# Patient Record
Sex: MALE | Race: ASIAN | NOT HISPANIC OR LATINO | Employment: OTHER | URBAN - METROPOLITAN AREA
[De-identification: names, ages, dates, MRNs, and addresses within clinical notes are randomized per-mention and may not be internally consistent; named-entity substitution may affect disease eponyms.]

---

## 2017-05-23 ENCOUNTER — HOSPITAL ENCOUNTER (EMERGENCY)
Facility: HOSPITAL | Age: 77
Discharge: HOME | End: 2017-05-23
Attending: EMERGENCY MEDICINE
Payer: COMMERCIAL

## 2017-05-23 ENCOUNTER — APPOINTMENT (EMERGENCY)
Dept: CT IMAGING | Facility: HOSPITAL | Age: 77
End: 2017-05-23
Payer: COMMERCIAL

## 2017-05-23 VITALS
RESPIRATION RATE: 18 BRPM | OXYGEN SATURATION: 97 % | SYSTOLIC BLOOD PRESSURE: 187 MMHG | HEART RATE: 78 BPM | TEMPERATURE: 98.9 F | DIASTOLIC BLOOD PRESSURE: 88 MMHG

## 2017-05-23 DIAGNOSIS — S13.9XXA ACUTE CERVICAL SPRAIN, INITIAL ENCOUNTER: ICD-10-CM

## 2017-05-23 DIAGNOSIS — S00.83XA TRAUMATIC HEMATOMA OF FOREHEAD, INITIAL ENCOUNTER: ICD-10-CM

## 2017-05-23 DIAGNOSIS — S00.81XA ABRASION OF FACE, INITIAL ENCOUNTER: ICD-10-CM

## 2017-05-23 DIAGNOSIS — S09.90XA CLOSED HEAD INJURY, INITIAL ENCOUNTER: Primary | ICD-10-CM

## 2017-05-23 DIAGNOSIS — S00.83XA CONTUSION OF FACE, INITIAL ENCOUNTER: ICD-10-CM

## 2017-05-23 PROCEDURE — 70450 CT HEAD/BRAIN W/O DYE: CPT

## 2017-05-23 PROCEDURE — 72125 CT NECK SPINE W/O DYE: CPT

## 2017-05-23 PROCEDURE — 99284 EMERGENCY DEPT VISIT MOD MDM: CPT

## 2017-06-27 PROBLEM — Z00.00 ENCOUNTER FOR PREVENTIVE HEALTH EXAMINATION: Status: ACTIVE | Noted: 2017-06-27

## 2017-07-08 ENCOUNTER — OUTPATIENT (OUTPATIENT)
Dept: OUTPATIENT SERVICES | Facility: HOSPITAL | Age: 77
LOS: 1 days | End: 2017-07-08
Payer: MEDICARE

## 2017-07-08 ENCOUNTER — APPOINTMENT (OUTPATIENT)
Dept: MRI IMAGING | Facility: IMAGING CENTER | Age: 77
End: 2017-07-08

## 2017-07-08 DIAGNOSIS — Z00.8 ENCOUNTER FOR OTHER GENERAL EXAMINATION: ICD-10-CM

## 2017-07-08 DIAGNOSIS — Z98.890 OTHER SPECIFIED POSTPROCEDURAL STATES: Chronic | ICD-10-CM

## 2017-07-08 DIAGNOSIS — Z90.49 ACQUIRED ABSENCE OF OTHER SPECIFIED PARTS OF DIGESTIVE TRACT: Chronic | ICD-10-CM

## 2017-07-08 PROCEDURE — A9585: CPT

## 2017-07-08 PROCEDURE — 82565 ASSAY OF CREATININE: CPT

## 2017-07-08 PROCEDURE — 70553 MRI BRAIN STEM W/O & W/DYE: CPT

## 2017-08-03 ENCOUNTER — APPOINTMENT (OUTPATIENT)
Dept: CT IMAGING | Facility: IMAGING CENTER | Age: 77
End: 2017-08-03
Payer: MEDICARE

## 2017-08-03 ENCOUNTER — OUTPATIENT (OUTPATIENT)
Dept: OUTPATIENT SERVICES | Facility: HOSPITAL | Age: 77
LOS: 1 days | End: 2017-08-03
Payer: MEDICARE

## 2017-08-03 DIAGNOSIS — Z98.890 OTHER SPECIFIED POSTPROCEDURAL STATES: Chronic | ICD-10-CM

## 2017-08-03 DIAGNOSIS — Z90.49 ACQUIRED ABSENCE OF OTHER SPECIFIED PARTS OF DIGESTIVE TRACT: Chronic | ICD-10-CM

## 2017-08-03 DIAGNOSIS — Z00.8 ENCOUNTER FOR OTHER GENERAL EXAMINATION: ICD-10-CM

## 2017-08-03 PROCEDURE — 70496 CT ANGIOGRAPHY HEAD: CPT

## 2017-08-03 PROCEDURE — 70496 CT ANGIOGRAPHY HEAD: CPT | Mod: 26

## 2017-09-19 ENCOUNTER — FORM ENCOUNTER (OUTPATIENT)
Age: 77
End: 2017-09-19

## 2017-09-20 ENCOUNTER — OUTPATIENT (OUTPATIENT)
Dept: OUTPATIENT SERVICES | Facility: HOSPITAL | Age: 77
LOS: 1 days | End: 2017-09-20
Payer: MEDICARE

## 2017-09-20 ENCOUNTER — APPOINTMENT (OUTPATIENT)
Dept: CT IMAGING | Facility: IMAGING CENTER | Age: 77
End: 2017-09-20
Payer: MEDICARE

## 2017-09-20 ENCOUNTER — INPATIENT (INPATIENT)
Facility: HOSPITAL | Age: 77
LOS: 5 days | Discharge: HOME | DRG: 392 | End: 2017-09-26
Attending: SURGERY | Admitting: SURGERY
Payer: MEDICARE

## 2017-09-20 VITALS
RESPIRATION RATE: 18 BRPM | TEMPERATURE: 99 F | WEIGHT: 134.92 LBS | HEART RATE: 89 BPM | DIASTOLIC BLOOD PRESSURE: 77 MMHG | SYSTOLIC BLOOD PRESSURE: 119 MMHG | OXYGEN SATURATION: 95 %

## 2017-09-20 DIAGNOSIS — R10.30 LOWER ABDOMINAL PAIN, UNSPECIFIED: ICD-10-CM

## 2017-09-20 DIAGNOSIS — Z00.8 ENCOUNTER FOR OTHER GENERAL EXAMINATION: ICD-10-CM

## 2017-09-20 DIAGNOSIS — Z98.890 OTHER SPECIFIED POSTPROCEDURAL STATES: Chronic | ICD-10-CM

## 2017-09-20 DIAGNOSIS — Z95.5 PRESENCE OF CORONARY ANGIOPLASTY IMPLANT AND GRAFT: Chronic | ICD-10-CM

## 2017-09-20 DIAGNOSIS — Z90.49 ACQUIRED ABSENCE OF OTHER SPECIFIED PARTS OF DIGESTIVE TRACT: Chronic | ICD-10-CM

## 2017-09-20 PROCEDURE — 99285 EMERGENCY DEPT VISIT HI MDM: CPT | Mod: 25

## 2017-09-20 PROCEDURE — 82565 ASSAY OF CREATININE: CPT

## 2017-09-20 PROCEDURE — 93010 ELECTROCARDIOGRAM REPORT: CPT

## 2017-09-20 PROCEDURE — 74177 CT ABD & PELVIS W/CONTRAST: CPT

## 2017-09-20 PROCEDURE — 74177 CT ABD & PELVIS W/CONTRAST: CPT | Mod: 26

## 2017-09-20 RX ORDER — SODIUM CHLORIDE 9 MG/ML
1000 INJECTION INTRAMUSCULAR; INTRAVENOUS; SUBCUTANEOUS ONCE
Qty: 0 | Refills: 0 | Status: COMPLETED | OUTPATIENT
Start: 2017-09-20 | End: 2017-09-20

## 2017-09-20 NOTE — ED PROVIDER NOTE - OBJECTIVE STATEMENT
78 y/o M pt with PMHx of HTN, DM, HLD, cardiac stent (APR 2017) PSHx of hernia repair c/o abd pain x6 weeks. Pt went to urologist for the abd pain and they completed CT of pelvis and abd today. CT indicated an abscess and infection of the colon. Pt was sent into the ED by urologist to get abscess removed. Denies fever or any other complaints. NKDA. Current mediations: metformin

## 2017-09-20 NOTE — ED ADULT NURSE NOTE - OBJECTIVE STATEMENT
77 year old male presented to ED with c/o of abdominal pain for the past 5 weeks. Pt went to imaging and had CT and showed cyst on colon. Pt referred to surgery. Pt denies CP, SOB, nausea/vomiting, numbness/tingling, fever, cough, chills, dizziness, headache. Pt a&ox3, lung sounds clear, heart rate regular, abdomen soft nontender nondistended to palp. Pt states he is not having normal BM and last BM was 1 day ago. +BS in all four quadrants. skin intact. Pt currently resting in bed with family at bedside. Will continue to monitor and reassess while offering support and reassurance.

## 2017-09-20 NOTE — ED ADULT NURSE NOTE - PMH
Anemia    BPH (benign prostatic hyperplasia)    Diabetes    GERD (gastroesophageal reflux disease)    HLD (hyperlipidemia)    HTN (hypertension)    TIA (transient ischemic attack)

## 2017-09-20 NOTE — ED PROVIDER NOTE - NS_ ATTENDINGSCRIBEDETAILS _ED_A_ED_FT
Attending MD Medrano: 77M with PMH including HTN DM HLD CAD s/p stent 4/2017, PSH bilateral inguinal hernia repairs presents to the ED with 6 weeks of abdominal pain.  Reports that his urologist Dr. Brianda Lujan sent him for a CT A/P today and was told he had an infection of the colon and an abscess and should come to the ED immediately.  Denies fevers, chills.  Reports chronic chest pain unchanged from baseline, reports chronic shortness of breath with chest pain also unchanged, palpitations.  Reports PMD Dr. Kingston.  Denies dysuria, hematuria, change in urinary habits, reports prostate "problems".  On exam, head NCAT, PERRL, FROM at neck, no tenderness to palpation or stepoffs along length of spine, lungs CTAB with good inspiratory effort, +S1S2, no m/r/g, abdomen soft with +BS, +diffuse tenderness greater in lower abdomen, mild distention, no CVAT, moving all extremities with 5/5 strength bilateral upper and lower extremities, good and equal  strength bilaterally; A/P: 77M with sigmoid diverticulitis with abscess on CT A/P, will obtain preop labs, EKG, CXR for admission, surgery consult

## 2017-09-20 NOTE — ED ADULT NURSE NOTE - CHPI ED SYMPTOMS NEG
no dysuria/no blood in stool/no diarrhea/no hematuria/no chills/no fever/no burning urination/no vomiting/no abdominal distension

## 2017-09-20 NOTE — ED ADULT NURSE NOTE - CHIEF COMPLAINT QUOTE
ct of abd at [address] due to abdominal pain; called to come to ED for eval for a cyst and infection of colon; now has intermittant pain, nausea and vomit

## 2017-09-20 NOTE — ED ADULT TRIAGE NOTE - CHIEF COMPLAINT QUOTE
ct of abd at [address] due to abdominal pain; called to come to ED for eval for a cyst and infection of colon; now has intermittant pain, nausea and vomit

## 2017-09-21 ENCOUNTER — MESSAGE (OUTPATIENT)
Age: 77
End: 2017-09-21

## 2017-09-21 DIAGNOSIS — K57.20 DIVERTICULITIS OF LARGE INTESTINE WITH PERFORATION AND ABSCESS WITHOUT BLEEDING: ICD-10-CM

## 2017-09-21 LAB
ALBUMIN SERPL ELPH-MCNC: 3.9 G/DL — SIGNIFICANT CHANGE UP (ref 3.3–5)
ALP SERPL-CCNC: 69 U/L — SIGNIFICANT CHANGE UP (ref 40–120)
ALT FLD-CCNC: 11 U/L RC — SIGNIFICANT CHANGE UP (ref 10–45)
ANION GAP SERPL CALC-SCNC: 10 MMOL/L — SIGNIFICANT CHANGE UP (ref 5–17)
ANION GAP SERPL CALC-SCNC: 12 MMOL/L — SIGNIFICANT CHANGE UP (ref 5–17)
ANION GAP SERPL CALC-SCNC: 19 MMOL/L — HIGH (ref 5–17)
APPEARANCE UR: ABNORMAL
APTT BLD: 29.5 SEC — SIGNIFICANT CHANGE UP (ref 27.5–37.4)
AST SERPL-CCNC: 11 U/L — SIGNIFICANT CHANGE UP (ref 10–40)
BASOPHILS # BLD AUTO: 0 K/UL — SIGNIFICANT CHANGE UP (ref 0–0.2)
BILIRUB SERPL-MCNC: 0.5 MG/DL — SIGNIFICANT CHANGE UP (ref 0.2–1.2)
BILIRUB UR-MCNC: NEGATIVE — SIGNIFICANT CHANGE UP
BLD GP AB SCN SERPL QL: NEGATIVE — SIGNIFICANT CHANGE UP
BUN SERPL-MCNC: 10 MG/DL — SIGNIFICANT CHANGE UP (ref 7–23)
BUN SERPL-MCNC: 10 MG/DL — SIGNIFICANT CHANGE UP (ref 7–23)
BUN SERPL-MCNC: 15 MG/DL — SIGNIFICANT CHANGE UP (ref 7–23)
CALCIUM SERPL-MCNC: 8.8 MG/DL — SIGNIFICANT CHANGE UP (ref 8.4–10.5)
CALCIUM SERPL-MCNC: 9.1 MG/DL — SIGNIFICANT CHANGE UP (ref 8.4–10.5)
CALCIUM SERPL-MCNC: 9.1 MG/DL — SIGNIFICANT CHANGE UP (ref 8.4–10.5)
CHLORIDE SERPL-SCNC: 87 MMOL/L — LOW (ref 96–108)
CHLORIDE SERPL-SCNC: 92 MMOL/L — LOW (ref 96–108)
CHLORIDE SERPL-SCNC: 93 MMOL/L — LOW (ref 96–108)
CK MB BLD-MCNC: 5.6 % — HIGH (ref 0–3.5)
CK MB CFR SERPL CALC: 1.8 NG/ML — SIGNIFICANT CHANGE UP (ref 0–6.7)
CK SERPL-CCNC: 32 U/L — SIGNIFICANT CHANGE UP (ref 30–200)
CO2 SERPL-SCNC: 21 MMOL/L — LOW (ref 22–31)
CO2 SERPL-SCNC: 25 MMOL/L — SIGNIFICANT CHANGE UP (ref 22–31)
CO2 SERPL-SCNC: 28 MMOL/L — SIGNIFICANT CHANGE UP (ref 22–31)
COLOR SPEC: YELLOW — SIGNIFICANT CHANGE UP
CREAT SERPL-MCNC: 0.84 MG/DL — SIGNIFICANT CHANGE UP (ref 0.5–1.3)
CREAT SERPL-MCNC: 0.87 MG/DL — SIGNIFICANT CHANGE UP (ref 0.5–1.3)
CREAT SERPL-MCNC: 1 MG/DL — SIGNIFICANT CHANGE UP (ref 0.5–1.3)
DIFF PNL FLD: ABNORMAL
EOSINOPHIL # BLD AUTO: 0.2 K/UL — SIGNIFICANT CHANGE UP (ref 0–0.5)
EOSINOPHIL NFR BLD AUTO: 1 % — SIGNIFICANT CHANGE UP (ref 0–6)
EPI CELLS # UR: SIGNIFICANT CHANGE UP /HPF
GAS PNL BLDV: SIGNIFICANT CHANGE UP
GLUCOSE SERPL-MCNC: 157 MG/DL — HIGH (ref 70–99)
GLUCOSE SERPL-MCNC: 159 MG/DL — HIGH (ref 70–99)
GLUCOSE SERPL-MCNC: 179 MG/DL — HIGH (ref 70–99)
GLUCOSE UR QL: NEGATIVE — SIGNIFICANT CHANGE UP
GRAM STN FLD: SIGNIFICANT CHANGE UP
HCT VFR BLD CALC: 35.4 % — LOW (ref 39–50)
HGB BLD-MCNC: 11.2 G/DL — LOW (ref 13–17)
HYALINE CASTS # UR AUTO: ABNORMAL
INR BLD: 1.16 RATIO — SIGNIFICANT CHANGE UP (ref 0.88–1.16)
KETONES UR-MCNC: NEGATIVE — SIGNIFICANT CHANGE UP
LACTATE BLDV-MCNC: 0.8 MMOL/L — SIGNIFICANT CHANGE UP (ref 0.7–2)
LEUKOCYTE ESTERASE UR-ACNC: ABNORMAL
LYMPHOCYTES # BLD AUTO: 1.2 K/UL — SIGNIFICANT CHANGE UP (ref 1–3.3)
LYMPHOCYTES # BLD AUTO: 5 % — LOW (ref 13–44)
MAGNESIUM SERPL-MCNC: 1.5 MG/DL — LOW (ref 1.6–2.6)
MCHC RBC-ENTMCNC: 21.1 PG — LOW (ref 27–34)
MCHC RBC-ENTMCNC: 31.5 GM/DL — LOW (ref 32–36)
MCV RBC AUTO: 67.1 FL — LOW (ref 80–100)
MONOCYTES # BLD AUTO: 1.6 K/UL — HIGH (ref 0–0.9)
MONOCYTES NFR BLD AUTO: 8 % — SIGNIFICANT CHANGE UP (ref 2–14)
NEUTROPHILS # BLD AUTO: 12.2 K/UL — HIGH (ref 1.8–7.4)
NEUTROPHILS NFR BLD AUTO: 85 % — HIGH (ref 43–77)
NITRITE UR-MCNC: NEGATIVE — SIGNIFICANT CHANGE UP
OSMOLALITY SERPL: 273 MOS/KG — LOW (ref 275–300)
PH UR: 6.5 — SIGNIFICANT CHANGE UP (ref 5–8)
PHOSPHATE SERPL-MCNC: 3.3 MG/DL — SIGNIFICANT CHANGE UP (ref 2.5–4.5)
PLATELET # BLD AUTO: 338 K/UL — SIGNIFICANT CHANGE UP (ref 150–400)
POTASSIUM SERPL-MCNC: 3.7 MMOL/L — SIGNIFICANT CHANGE UP (ref 3.5–5.3)
POTASSIUM SERPL-MCNC: 3.8 MMOL/L — SIGNIFICANT CHANGE UP (ref 3.5–5.3)
POTASSIUM SERPL-MCNC: 4.1 MMOL/L — SIGNIFICANT CHANGE UP (ref 3.5–5.3)
POTASSIUM SERPL-SCNC: 3.7 MMOL/L — SIGNIFICANT CHANGE UP (ref 3.5–5.3)
POTASSIUM SERPL-SCNC: 3.8 MMOL/L — SIGNIFICANT CHANGE UP (ref 3.5–5.3)
POTASSIUM SERPL-SCNC: 4.1 MMOL/L — SIGNIFICANT CHANGE UP (ref 3.5–5.3)
POTASSIUM UR-SCNC: 12 MMOL/L — SIGNIFICANT CHANGE UP
PROT SERPL-MCNC: 7.4 G/DL — SIGNIFICANT CHANGE UP (ref 6–8.3)
PROT UR-MCNC: 100 MG/DL
PROTHROM AB SERPL-ACNC: 12.7 SEC — SIGNIFICANT CHANGE UP (ref 9.8–12.7)
RBC # BLD: 5.27 M/UL — SIGNIFICANT CHANGE UP (ref 4.2–5.8)
RBC # FLD: 17.1 % — HIGH (ref 10.3–14.5)
RBC CASTS # UR COMP ASSIST: ABNORMAL /HPF (ref 0–2)
RH IG SCN BLD-IMP: POSITIVE — SIGNIFICANT CHANGE UP
RH IG SCN BLD-IMP: POSITIVE — SIGNIFICANT CHANGE UP
SODIUM SERPL-SCNC: 127 MMOL/L — LOW (ref 135–145)
SODIUM SERPL-SCNC: 129 MMOL/L — LOW (ref 135–145)
SODIUM SERPL-SCNC: 131 MMOL/L — LOW (ref 135–145)
SODIUM UR-SCNC: 41 MMOL/L — SIGNIFICANT CHANGE UP
SP GR SPEC: >1.03 — HIGH (ref 1.01–1.02)
SPECIMEN SOURCE: SIGNIFICANT CHANGE UP
TROPONIN T SERPL-MCNC: <0.01 NG/ML — SIGNIFICANT CHANGE UP (ref 0–0.06)
TSH SERPL-MCNC: 1.58 UIU/ML — SIGNIFICANT CHANGE UP (ref 0.27–4.2)
UROBILINOGEN FLD QL: NEGATIVE — SIGNIFICANT CHANGE UP
UUN UR-MCNC: 189 MG/DL — SIGNIFICANT CHANGE UP
WBC # BLD: 15.3 K/UL — HIGH (ref 3.8–10.5)
WBC # FLD AUTO: 15.3 K/UL — HIGH (ref 3.8–10.5)
WBC UR QL: >50 /HPF (ref 0–5)

## 2017-09-21 PROCEDURE — 99223 1ST HOSP IP/OBS HIGH 75: CPT

## 2017-09-21 PROCEDURE — 49406 IMAGE CATH FLUID PERI/RETRO: CPT

## 2017-09-21 PROCEDURE — 99232 SBSQ HOSP IP/OBS MODERATE 35: CPT

## 2017-09-21 PROCEDURE — 99223 1ST HOSP IP/OBS HIGH 75: CPT | Mod: AI

## 2017-09-21 PROCEDURE — 71010: CPT | Mod: 26

## 2017-09-21 RX ORDER — SODIUM CHLORIDE 9 MG/ML
1000 INJECTION, SOLUTION INTRAVENOUS
Qty: 0 | Refills: 0 | Status: DISCONTINUED | OUTPATIENT
Start: 2017-09-21 | End: 2017-09-26

## 2017-09-21 RX ORDER — MAGNESIUM SULFATE 500 MG/ML
2 VIAL (ML) INJECTION ONCE
Qty: 0 | Refills: 0 | Status: COMPLETED | OUTPATIENT
Start: 2017-09-21 | End: 2017-09-21

## 2017-09-21 RX ORDER — METOPROLOL TARTRATE 50 MG
100 TABLET ORAL DAILY
Qty: 0 | Refills: 0 | Status: DISCONTINUED | OUTPATIENT
Start: 2017-09-21 | End: 2017-09-26

## 2017-09-21 RX ORDER — AMPICILLIN SODIUM AND SULBACTAM SODIUM 250; 125 MG/ML; MG/ML
3 INJECTION, POWDER, FOR SUSPENSION INTRAMUSCULAR; INTRAVENOUS ONCE
Qty: 0 | Refills: 0 | Status: COMPLETED | OUTPATIENT
Start: 2017-09-21 | End: 2017-09-21

## 2017-09-21 RX ORDER — POTASSIUM CHLORIDE 20 MEQ
10 PACKET (EA) ORAL ONCE
Qty: 0 | Refills: 0 | Status: COMPLETED | OUTPATIENT
Start: 2017-09-21 | End: 2017-09-21

## 2017-09-21 RX ORDER — DEXTROSE 50 % IN WATER 50 %
1 SYRINGE (ML) INTRAVENOUS ONCE
Qty: 0 | Refills: 0 | Status: DISCONTINUED | OUTPATIENT
Start: 2017-09-21 | End: 2017-09-26

## 2017-09-21 RX ORDER — SODIUM CHLORIDE 9 MG/ML
1000 INJECTION, SOLUTION INTRAVENOUS
Qty: 0 | Refills: 0 | Status: DISCONTINUED | OUTPATIENT
Start: 2017-09-21 | End: 2017-09-21

## 2017-09-21 RX ORDER — GLUCAGON INJECTION, SOLUTION 0.5 MG/.1ML
1 INJECTION, SOLUTION SUBCUTANEOUS ONCE
Qty: 0 | Refills: 0 | Status: DISCONTINUED | OUTPATIENT
Start: 2017-09-21 | End: 2017-09-26

## 2017-09-21 RX ORDER — DEXTROSE 50 % IN WATER 50 %
25 SYRINGE (ML) INTRAVENOUS ONCE
Qty: 0 | Refills: 0 | Status: DISCONTINUED | OUTPATIENT
Start: 2017-09-21 | End: 2017-09-26

## 2017-09-21 RX ORDER — SIMVASTATIN 20 MG/1
40 TABLET, FILM COATED ORAL AT BEDTIME
Qty: 0 | Refills: 0 | Status: DISCONTINUED | OUTPATIENT
Start: 2017-09-21 | End: 2017-09-26

## 2017-09-21 RX ORDER — MORPHINE SULFATE 50 MG/1
2 CAPSULE, EXTENDED RELEASE ORAL EVERY 4 HOURS
Qty: 0 | Refills: 0 | Status: DISCONTINUED | OUTPATIENT
Start: 2017-09-21 | End: 2017-09-22

## 2017-09-21 RX ORDER — PANTOPRAZOLE SODIUM 20 MG/1
40 TABLET, DELAYED RELEASE ORAL
Qty: 0 | Refills: 0 | Status: DISCONTINUED | OUTPATIENT
Start: 2017-09-21 | End: 2017-09-26

## 2017-09-21 RX ORDER — HEPARIN SODIUM 5000 [USP'U]/ML
5000 INJECTION INTRAVENOUS; SUBCUTANEOUS EVERY 8 HOURS
Qty: 0 | Refills: 0 | Status: DISCONTINUED | OUTPATIENT
Start: 2017-09-21 | End: 2017-09-26

## 2017-09-21 RX ORDER — AMPICILLIN SODIUM AND SULBACTAM SODIUM 250; 125 MG/ML; MG/ML
3 INJECTION, POWDER, FOR SUSPENSION INTRAMUSCULAR; INTRAVENOUS EVERY 6 HOURS
Qty: 0 | Refills: 0 | Status: DISCONTINUED | OUTPATIENT
Start: 2017-09-21 | End: 2017-09-22

## 2017-09-21 RX ORDER — INSULIN LISPRO 100/ML
VIAL (ML) SUBCUTANEOUS
Qty: 0 | Refills: 0 | Status: DISCONTINUED | OUTPATIENT
Start: 2017-09-21 | End: 2017-09-21

## 2017-09-21 RX ORDER — ASPIRIN/CALCIUM CARB/MAGNESIUM 324 MG
81 TABLET ORAL DAILY
Qty: 0 | Refills: 0 | Status: DISCONTINUED | OUTPATIENT
Start: 2017-09-21 | End: 2017-09-26

## 2017-09-21 RX ORDER — ACETAMINOPHEN 500 MG
1000 TABLET ORAL ONCE
Qty: 0 | Refills: 0 | Status: COMPLETED | OUTPATIENT
Start: 2017-09-21 | End: 2017-09-22

## 2017-09-21 RX ORDER — MORPHINE SULFATE 50 MG/1
4 CAPSULE, EXTENDED RELEASE ORAL EVERY 4 HOURS
Qty: 0 | Refills: 0 | Status: DISCONTINUED | OUTPATIENT
Start: 2017-09-21 | End: 2017-09-22

## 2017-09-21 RX ORDER — SODIUM CHLORIDE 9 MG/ML
1000 INJECTION INTRAMUSCULAR; INTRAVENOUS; SUBCUTANEOUS
Qty: 0 | Refills: 0 | Status: DISCONTINUED | OUTPATIENT
Start: 2017-09-21 | End: 2017-09-24

## 2017-09-21 RX ORDER — DEXTROSE 50 % IN WATER 50 %
12.5 SYRINGE (ML) INTRAVENOUS ONCE
Qty: 0 | Refills: 0 | Status: DISCONTINUED | OUTPATIENT
Start: 2017-09-21 | End: 2017-09-26

## 2017-09-21 RX ORDER — AMPICILLIN SODIUM AND SULBACTAM SODIUM 250; 125 MG/ML; MG/ML
INJECTION, POWDER, FOR SUSPENSION INTRAMUSCULAR; INTRAVENOUS
Qty: 0 | Refills: 0 | Status: DISCONTINUED | OUTPATIENT
Start: 2017-09-21 | End: 2017-09-22

## 2017-09-21 RX ORDER — PILOCARPINE HCL 4 %
1 DROPS OPHTHALMIC (EYE)
Qty: 0 | Refills: 0 | Status: DISCONTINUED | OUTPATIENT
Start: 2017-09-21 | End: 2017-09-26

## 2017-09-21 RX ORDER — INSULIN LISPRO 100/ML
VIAL (ML) SUBCUTANEOUS AT BEDTIME
Qty: 0 | Refills: 0 | Status: DISCONTINUED | OUTPATIENT
Start: 2017-09-21 | End: 2017-09-21

## 2017-09-21 RX ORDER — INSULIN LISPRO 100/ML
VIAL (ML) SUBCUTANEOUS EVERY 6 HOURS
Qty: 0 | Refills: 0 | Status: DISCONTINUED | OUTPATIENT
Start: 2017-09-21 | End: 2017-09-22

## 2017-09-21 RX ADMIN — SODIUM CHLORIDE 100 MILLILITER(S): 9 INJECTION, SOLUTION INTRAVENOUS at 02:06

## 2017-09-21 RX ADMIN — AMPICILLIN SODIUM AND SULBACTAM SODIUM 200 GRAM(S): 250; 125 INJECTION, POWDER, FOR SUSPENSION INTRAMUSCULAR; INTRAVENOUS at 08:08

## 2017-09-21 RX ADMIN — Medication 81 MILLIGRAM(S): at 15:45

## 2017-09-21 RX ADMIN — Medication 100 MILLIEQUIVALENT(S): at 19:58

## 2017-09-21 RX ADMIN — Medication 1 DROP(S): at 06:43

## 2017-09-21 RX ADMIN — AMPICILLIN SODIUM AND SULBACTAM SODIUM 200 GRAM(S): 250; 125 INJECTION, POWDER, FOR SUSPENSION INTRAMUSCULAR; INTRAVENOUS at 02:05

## 2017-09-21 RX ADMIN — Medication 50 GRAM(S): at 08:57

## 2017-09-21 RX ADMIN — SIMVASTATIN 40 MILLIGRAM(S): 20 TABLET, FILM COATED ORAL at 21:10

## 2017-09-21 RX ADMIN — Medication 100 MILLIGRAM(S): at 06:39

## 2017-09-21 RX ADMIN — SODIUM CHLORIDE 1000 MILLILITER(S): 9 INJECTION INTRAMUSCULAR; INTRAVENOUS; SUBCUTANEOUS at 00:17

## 2017-09-21 RX ADMIN — HEPARIN SODIUM 5000 UNIT(S): 5000 INJECTION INTRAVENOUS; SUBCUTANEOUS at 06:40

## 2017-09-21 RX ADMIN — HEPARIN SODIUM 5000 UNIT(S): 5000 INJECTION INTRAVENOUS; SUBCUTANEOUS at 15:45

## 2017-09-21 RX ADMIN — PANTOPRAZOLE SODIUM 40 MILLIGRAM(S): 20 TABLET, DELAYED RELEASE ORAL at 06:39

## 2017-09-21 RX ADMIN — Medication 1 DROP(S): at 17:07

## 2017-09-21 RX ADMIN — Medication 1: at 17:37

## 2017-09-21 RX ADMIN — AMPICILLIN SODIUM AND SULBACTAM SODIUM 200 GRAM(S): 250; 125 INJECTION, POWDER, FOR SUSPENSION INTRAMUSCULAR; INTRAVENOUS at 15:42

## 2017-09-21 RX ADMIN — AMPICILLIN SODIUM AND SULBACTAM SODIUM 200 GRAM(S): 250; 125 INJECTION, POWDER, FOR SUSPENSION INTRAMUSCULAR; INTRAVENOUS at 21:10

## 2017-09-21 RX ADMIN — HEPARIN SODIUM 5000 UNIT(S): 5000 INJECTION INTRAVENOUS; SUBCUTANEOUS at 21:10

## 2017-09-21 RX ADMIN — Medication 1 DROP(S): at 11:47

## 2017-09-21 NOTE — H&P ADULT - ASSESSMENT
78yo M with diverticulitis and pelvic abscess after a few weeks of pain. No evidence of free perforation, peritonitis, or sepsis.      - NPO  - Unasyn  - IVF resuscitation  - Serial abdominal exams 78yo M with diverticulitis and pelvic abscess after a few weeks of pain. No evidence of free perforation, peritonitis, or sepsis.      - NPO  - Unasyn  - IVF resuscitation  - Serial abdominal exams  - Will plan for initial nonoperative management. Pt will require colonoscopy as outpatient after 6-8 wks

## 2017-09-21 NOTE — H&P ADULT - NSHPSOCIALHISTORY_GEN_ALL_CORE
No previous smoking or ETOH history. Pt lives at home with his wife. Family reports he is starting to have memory problems.

## 2017-09-21 NOTE — H&P ADULT - NSHPREVIEWOFSYSTEMS_GEN_ALL_CORE
Systemic:	[ ] Fever	[ ] Chills	[ ] Night sweats    [ ] Fatigue	[ ] Other  [] Cardiovascular:  [] Pulmonary:  [] Renal/Urologic:  [x] Gastrointestinal: abdominal pain, constipation  [] Metabolic:  [] Neurologic:  [] Hematologic:  [] ENT:  [] Ophthalmologic:  [] Musculoskeletal: 10-pt ROS negative except as in HPI.

## 2017-09-21 NOTE — H&P ADULT - ATTENDING COMMENTS
78y/o man with h/o DM, HTN, CAD s/p stent placement March 2017 on ASA/Plavix presents with several weeks of LLQ pain which he has never had before, as well as dysuria and urinary frequency. In ED, pt was hemodynamically normal with LLQ tenderness without rebound or guarding. Labs were significant for WBC 15, LA 3.4, and Na 127. I personally reviewed pt's CT images, which demonstrate sigmoid diverticulitis with anterior abscess abutting and with mass effect on the bladder.     Sigmoid diverticulitis with pelvic abscess (Hinchey 1), hyponatremia, and elevated lactate:  - NPO  - IV antibiotics  - IR for percutaneous drainage of abscess  - IVF: D5NS for hyponatremia  - Recheck LA  - Recheck Na

## 2017-09-21 NOTE — PROGRESS NOTE ADULT - SUBJECTIVE AND OBJECTIVE BOX
[facility] GENERAL SURGERY POST-OP NOTE    SUBJECTIVE: Pt underwent IR drainage of pelvic abscess. Intra-operatively, 5cc of purulent fluid drained from an air and fluid filled   collection interposed between the sigmoid colon and the bladder, which was unchanged from the prior exam. Pt tolerated the procedure well and was transferred to PACU then floor. Pt seen and evaluated at bedside. Resting comfortably in bed. Pain controlled. Denies nausea/vomiting, CP, palpitations, SOB, lightheaded, dizziness. NPO. On Unasyn abx.     Objective:  Gen: NAD, AAOx3  Pulm: b/l chest rise. No work on breathing  Card: RRR  Abd: soft, LLQ appropriately tender, ND. Dressings CDI.    Vital Signs Last 24 Hrs  T(C): 36.6 (21 Sep 2017 16:40), Max: 37.2 (21 Sep 2017 01:30)  T(F): 97.9 (21 Sep 2017 16:40), Max: 99 (21 Sep 2017 01:30)  HR: 64 (21 Sep 2017 16:40) (64 - 88)  BP: 141/83 (21 Sep 2017 16:40) (120/78 - 158/84)  BP(mean): --  RR: 18 (21 Sep 2017 16:40) (18 - 18)  SpO2: 97% (21 Sep 2017 16:40) (96% - 98%)  I&O's Summary    20 Sep 2017 07:01  -  21 Sep 2017 07:00  --------------------------------------------------------  IN: 400 mL / OUT: 350 mL / NET: 50 mL    21 Sep 2017 07:01  -  21 Sep 2017 21:33  --------------------------------------------------------  IN: 1030 mL / OUT: 500 mL / NET: 530 mL      I&O's Detail    20 Sep 2017 07:01  -  21 Sep 2017 07:00  --------------------------------------------------------  IN:    dextrose 5% + sodium chloride 0.9%: 400 mL  Total IN: 400 mL    OUT:    Voided: 350 mL  Total OUT: 350 mL    Total NET: 50 mL      21 Sep 2017 07:01  -  21 Sep 2017 21:33  --------------------------------------------------------  IN:    IV PiggyBack: 250 mL    sodium chloride 0.9%.: 780 mL  Total IN: 1030 mL    OUT:    Voided: 500 mL  Total OUT: 500 mL    Total NET: 530 mL          MEDICATIONS  (STANDING):  ampicillin/sulbactam  IVPB      heparin  Injectable 5000 Unit(s) SubCutaneous every 8 hours  metoprolol succinate  milliGRAM(s) Oral daily  pilocarpine 1% Solution 1 Drop(s) Both EYES four times a day  pantoprazole    Tablet 40 milliGRAM(s) Oral before breakfast  simvastatin 40 milliGRAM(s) Oral at bedtime  aspirin enteric coated 81 milliGRAM(s) Oral daily  ampicillin/sulbactam  IVPB 3 Gram(s) IV Intermittent every 6 hours  dextrose 5%. 1000 milliLiter(s) (50 mL/Hr) IV Continuous <Continuous>  dextrose 50% Injectable 12.5 Gram(s) IV Push once  dextrose 50% Injectable 25 Gram(s) IV Push once  dextrose 50% Injectable 25 Gram(s) IV Push once  insulin lispro (HumaLOG) corrective regimen sliding scale   SubCutaneous every 6 hours  sodium chloride 0.9%. 1000 milliLiter(s) (65 mL/Hr) IV Continuous <Continuous>    MEDICATIONS  (PRN):  morphine  - Injectable 2 milliGRAM(s) IV Push every 4 hours PRN Moderate Pain (4 - 6)  morphine  - Injectable 4 milliGRAM(s) IV Push every 4 hours PRN Severe Pain (7 - 10)  dextrose Gel 1 Dose(s) Oral once PRN Blood Glucose LESS THAN 70 milliGRAM(s)/deciliter  glucagon  Injectable 1 milliGRAM(s) IntraMuscular once PRN Glucose LESS THAN 70 milligrams/deciliter  acetaminophen  IVPB. 1000 milliGRAM(s) IV Intermittent once PRN Moderate Pain (4 - 6)      LABS:                        11.2   15.3  )-----------( 338      ( 21 Sep 2017 00:11 )             35.4     09-21    131<L>  |  93<L>  |  10  ----------------------------<  157<H>  3.8   |  28  |  0.87    Ca    9.1      21 Sep 2017 09:44  Phos  3.3     09-21  Mg     1.5     09-21    TPro  7.4  /  Alb  3.9  /  TBili  0.5  /  DBili  x   /  AST  11  /  ALT  11  /  AlkPhos  69  09-21    PT/INR - ( 21 Sep 2017 00:11 )   PT: 12.7 sec;   INR: 1.16 ratio         PTT - ( 21 Sep 2017 00:11 )  PTT:29.5 sec  Urinalysis Basic - ( 21 Sep 2017 00:16 )    Color: Yellow / Appearance: Turbid / SG: >1.030 / pH: x  Gluc: x / Ketone: Negative  / Bili: Negative / Urobili: Negative   Blood: x / Protein: 100 mg/dL / Nitrite: Negative   Leuk Esterase: Large / RBC: 5-10 /HPF / WBC >50 /HPF   Sq Epi: x / Non Sq Epi: Few /HPF / Bacteria: x        RADIOLOGY & ADDITIONAL STUDIES:      A/P: 77y Male with sigmoid diverticulitis with pelvic abscess s/p IR drainage of pelvic abscess. Pt doing well and hemodynamically stable.      - Pain control - continue current regimen  - Strict I/O's  - NPO/ D5NS fluids for hyponatremia  - OOB/DVT ppx  - F/u AM labs  - Continue Unasyn [facility] GENERAL SURGERY POST-OP NOTE    SUBJECTIVE: Pt underwent IR drainage of pelvic abscess. Intra-operatively, 5cc of purulent fluid drained from an air and fluid filled   collection interposed between the sigmoid colon and the bladder, which was unchanged from the prior exam. Pt tolerated the procedure well and was transferred to PACU then floor. Pt seen and evaluated at bedside. Resting comfortably in bed. Pain controlled. Denies nausea/vomiting, CP, palpitations, SOB, lightheaded, dizziness, fevers or chills. NPO. On Unasyn abx.     Objective:  Gen: NAD, AAOx3  Pulm: b/l chest rise. No work on breathing  Card: RRR  Abd: soft, minimal suprapubic tenderness, ND. Dressings CDI, drain in place with moderate serosanguinous output    Vital Signs Last 24 Hrs  T(C): 36.6 (21 Sep 2017 16:40), Max: 37.2 (21 Sep 2017 01:30)  T(F): 97.9 (21 Sep 2017 16:40), Max: 99 (21 Sep 2017 01:30)  HR: 64 (21 Sep 2017 16:40) (64 - 88)  BP: 141/83 (21 Sep 2017 16:40) (120/78 - 158/84)  BP(mean): --  RR: 18 (21 Sep 2017 16:40) (18 - 18)  SpO2: 97% (21 Sep 2017 16:40) (96% - 98%)  I&O's Summary    20 Sep 2017 07:01  -  21 Sep 2017 07:00  --------------------------------------------------------  IN: 400 mL / OUT: 350 mL / NET: 50 mL    21 Sep 2017 07:01  -  21 Sep 2017 21:33  --------------------------------------------------------  IN: 1030 mL / OUT: 500 mL / NET: 530 mL      I&O's Detail    20 Sep 2017 07:01  -  21 Sep 2017 07:00  --------------------------------------------------------  IN:    dextrose 5% + sodium chloride 0.9%: 400 mL  Total IN: 400 mL    OUT:    Voided: 350 mL  Total OUT: 350 mL    Total NET: 50 mL      21 Sep 2017 07:01  -  21 Sep 2017 21:33  --------------------------------------------------------  IN:    IV PiggyBack: 250 mL    sodium chloride 0.9%.: 780 mL  Total IN: 1030 mL    OUT:    Voided: 500 mL  Total OUT: 500 mL    Total NET: 530 mL          MEDICATIONS  (STANDING):  ampicillin/sulbactam  IVPB      heparin  Injectable 5000 Unit(s) SubCutaneous every 8 hours  metoprolol succinate  milliGRAM(s) Oral daily  pilocarpine 1% Solution 1 Drop(s) Both EYES four times a day  pantoprazole    Tablet 40 milliGRAM(s) Oral before breakfast  simvastatin 40 milliGRAM(s) Oral at bedtime  aspirin enteric coated 81 milliGRAM(s) Oral daily  ampicillin/sulbactam  IVPB 3 Gram(s) IV Intermittent every 6 hours  dextrose 5%. 1000 milliLiter(s) (50 mL/Hr) IV Continuous <Continuous>  dextrose 50% Injectable 12.5 Gram(s) IV Push once  dextrose 50% Injectable 25 Gram(s) IV Push once  dextrose 50% Injectable 25 Gram(s) IV Push once  insulin lispro (HumaLOG) corrective regimen sliding scale   SubCutaneous every 6 hours  sodium chloride 0.9%. 1000 milliLiter(s) (65 mL/Hr) IV Continuous <Continuous>    MEDICATIONS  (PRN):  morphine  - Injectable 2 milliGRAM(s) IV Push every 4 hours PRN Moderate Pain (4 - 6)  morphine  - Injectable 4 milliGRAM(s) IV Push every 4 hours PRN Severe Pain (7 - 10)  dextrose Gel 1 Dose(s) Oral once PRN Blood Glucose LESS THAN 70 milliGRAM(s)/deciliter  glucagon  Injectable 1 milliGRAM(s) IntraMuscular once PRN Glucose LESS THAN 70 milligrams/deciliter  acetaminophen  IVPB. 1000 milliGRAM(s) IV Intermittent once PRN Moderate Pain (4 - 6)      LABS:                        11.2   15.3  )-----------( 338      ( 21 Sep 2017 00:11 )             35.4     09-21    131<L>  |  93<L>  |  10  ----------------------------<  157<H>  3.8   |  28  |  0.87    Ca    9.1      21 Sep 2017 09:44  Phos  3.3     09-21  Mg     1.5     09-21    TPro  7.4  /  Alb  3.9  /  TBili  0.5  /  DBili  x   /  AST  11  /  ALT  11  /  AlkPhos  69  09-21    PT/INR - ( 21 Sep 2017 00:11 )   PT: 12.7 sec;   INR: 1.16 ratio         PTT - ( 21 Sep 2017 00:11 )  PTT:29.5 sec  Urinalysis Basic - ( 21 Sep 2017 00:16 )    Color: Yellow / Appearance: Turbid / SG: >1.030 / pH: x  Gluc: x / Ketone: Negative  / Bili: Negative / Urobili: Negative   Blood: x / Protein: 100 mg/dL / Nitrite: Negative   Leuk Esterase: Large / RBC: 5-10 /HPF / WBC >50 /HPF   Sq Epi: x / Non Sq Epi: Few /HPF / Bacteria: x        RADIOLOGY & ADDITIONAL STUDIES:      A/P: 77y Male with sigmoid diverticulitis with pelvic abscess s/p IR drainage of pelvic abscess. Pt doing well and hemodynamically stable, and afebrile.      - Pain control - continue current regimen  - Strict I/O's  - NPO/ D5NS fluids for hyponatremia  - OOB/DVT ppx  - F/u AM labs  - Continue Unasyn

## 2017-09-21 NOTE — CONSULT NOTE ADULT - ASSESSMENT
77 year old male with hx of CAD s/p stent few months ago on DAPT, HTN, HLD, DM2, BPH, TIA, gerd, admitted for sigmoid diverticulitis with pelvic abscess    1.Sigmoid diverticulitis with pelvic abscess   -IR today for drainage  -IV unasyn  -Continue IVF  -Trend wbc and temp curve   -Currently NPO   -Morphine prn for pain control   -Sx follow up     2. CAD s/p stents   -Plavix on hold  -Continue asprin   -Continue toprol and zocor  -Cardiology follow up     3.HTN  -Continue toprol xl 100mg   -Resume losartan 100mg qd  -Hold home dose of hctz in the setting of hyponatremia     4.HLD  -Continue zocor 40mg     5.DM2  -Continue SS for now  -Check A1C  -Continue to monitor FS    6.Gerd  -Please start protonix 40mg, interchange for prilosec 20mg    7.Hyponatremia  -Follow up urine lytes, serum osm  -Start NS @65cc/hr for 24 hours  -Check BMP at 6pm    8.Microcytic anemia   -Check iron studies, folate, b12  -Continue to trend h/h    9.DVT ppx Hep sq    Full code

## 2017-09-21 NOTE — CONSULT NOTE ADULT - SUBJECTIVE AND OBJECTIVE BOX
HPI:  76yo M with h/o CAD s/p RIGO in LAD (12/16, unstable angina) presents to ER with abdominal pain for a few weeks. Pt is unable to clearly define how long he has had the pain. The pain was initially vague, but became more localized to the LLQ over time. He notes that the pain is worse with movement/going over bumps, urination, and with bowel movements. PT also notes dysuria and frequency of urination. He endorses chills, denies fevers. He is able to tolerate PO without emesis or nausea. He has been having small, hard bowel movements, no diarrhea.     His last colonoscopy was a number of years ago. The pt was unable to recall when, but reports it was normal. (21 Sep 2017 04:15)    Currently reports abdominal pain is better, no chills, no chest pain, no sob    PAST MEDICAL & SURGICAL HISTORY:  TIA (transient ischemic attack)  HLD (hyperlipidemia)  GERD (gastroesophageal reflux disease)  Anemia  Diabetes  BPH (benign prostatic hyperplasia)  HTN (hypertension)  Stented coronary artery  H/O hernia repair  History of appendectomy      Review of Systems:   CONSTITUTIONAL: No fever, weight loss, or fatigue  EYES: No eye pain, visual disturbances, or discharge  ENMT:  No difficulty hearing, tinnitus, vertigo; No sinus or throat pain  NECK: No pain or stiffness  RESPIRATORY: No cough, wheezing, chills or hemoptysis; No shortness of breath  CARDIOVASCULAR: No chest pain, palpitations, dizziness, or leg swelling  GASTROINTESTINAL: abdominal pain  GENITOURINARY: No dysuria, frequency, hematuria, or incontinence  NEUROLOGICAL: No headaches, memory loss, loss of strength, numbness, or tremors  MUSCULOSKELETAL: No joint pain or swelling; No muscle, back, or extremity pain  PSYCHIATRIC: No depression, anxiety, mood swings, or difficulty sleeping    Allergies    No Known Allergies    Intolerances        Social History:    Quit smoking 40 years ago, smoked 20 years, few ciggarettes/day, social etoh use     FAMILY HISTORY:  Family history of heart disease (Father)      MEDICATIONS  (STANDING):  ampicillin/sulbactam  IVPB      heparin  Injectable 5000 Unit(s) SubCutaneous every 8 hours  metoprolol succinate  milliGRAM(s) Oral daily  pilocarpine 1% Solution 1 Drop(s) Both EYES four times a day  pantoprazole    Tablet 40 milliGRAM(s) Oral before breakfast  simvastatin 40 milliGRAM(s) Oral at bedtime  aspirin enteric coated 81 milliGRAM(s) Oral daily  ampicillin/sulbactam  IVPB 3 Gram(s) IV Intermittent every 6 hours  dextrose 5%. 1000 milliLiter(s) (50 mL/Hr) IV Continuous <Continuous>  dextrose 50% Injectable 12.5 Gram(s) IV Push once  dextrose 50% Injectable 25 Gram(s) IV Push once  dextrose 50% Injectable 25 Gram(s) IV Push once  potassium chloride  10 mEq/100 mL IVPB 10 milliEquivalent(s) IV Intermittent once  insulin lispro (HumaLOG) corrective regimen sliding scale   SubCutaneous every 6 hours  sodium chloride 0.9%. 1000 milliLiter(s) (65 mL/Hr) IV Continuous <Continuous>    MEDICATIONS  (PRN):  morphine  - Injectable 2 milliGRAM(s) IV Push every 4 hours PRN Moderate Pain (4 - 6)  morphine  - Injectable 4 milliGRAM(s) IV Push every 4 hours PRN Severe Pain (7 - 10)  dextrose Gel 1 Dose(s) Oral once PRN Blood Glucose LESS THAN 70 milliGRAM(s)/deciliter  glucagon  Injectable 1 milliGRAM(s) IntraMuscular once PRN Glucose LESS THAN 70 milligrams/deciliter        CAPILLARY BLOOD GLUCOSE  133 (21 Sep 2017 11:50)        I&O's Summary    20 Sep 2017 07:01  -  21 Sep 2017 07:00  --------------------------------------------------------  IN: 400 mL / OUT: 350 mL / NET: 50 mL    21 Sep 2017 07:01  -  21 Sep 2017 13:54  --------------------------------------------------------  IN: 150 mL / OUT: 300 mL / NET: -150 mL        PHYSICAL EXAM:  GENERAL: NAD, well-developed  HEAD:  Atraumatic, Normocephalic  EYES: EOMI, PERRLA, conjunctiva and sclera clear  NECK: Supple, No JVD  CHEST/LUNG: Clear to auscultation bilaterally; No wheeze  HEART: Regular rate and rhythm; S1S2  ABDOMEN: Soft, generalized abdominal tenderness, Nondistended; Bowel sounds present  EXTREMITIES:  2+ Peripheral Pulses, No clubbing, cyanosis, or edema  PSYCH: AAOx3  NEUROLOGY: non-focal  SKIN: No rashes or lesions    LABS:                        11.2   15.3  )-----------( 338      ( 21 Sep 2017 00:11 )             35.4     09-21    131<L>  |  93<L>  |  10  ----------------------------<  157<H>  3.8   |  28  |  0.87    Ca    9.1      21 Sep 2017 09:44  Phos  3.3     09-21  Mg     1.5     09-21    TPro  7.4  /  Alb  3.9  /  TBili  0.5  /  DBili  x   /  AST  11  /  ALT  11  /  AlkPhos  69  09-21    PT/INR - ( 21 Sep 2017 00:11 )   PT: 12.7 sec;   INR: 1.16 ratio         PTT - ( 21 Sep 2017 00:11 )  PTT:29.5 sec  CARDIAC MARKERS ( 21 Sep 2017 00:11 )  x     / <0.01 ng/mL / 32 U/L / x     / 1.8 ng/mL      Urinalysis Basic - ( 21 Sep 2017 00:16 )    Color: Yellow / Appearance: Turbid / SG: >1.030 / pH: x  Gluc: x / Ketone: Negative  / Bili: Negative / Urobili: Negative   Blood: x / Protein: 100 mg/dL / Nitrite: Negative   Leuk Esterase: Large / RBC: 5-10 /HPF / WBC >50 /HPF   Sq Epi: x / Non Sq Epi: Few /HPF / Bacteria: x        RADIOLOGY & ADDITIONAL TESTS:    Imaging Personally Reviewed:    Consultant(s) Notes Reviewed:  sx    Care Discussed with Consultants/Other Providers: sx

## 2017-09-21 NOTE — H&P ADULT - NSHPLABSRESULTS_GEN_ALL_CORE
11.2   15.3  )-----------( 338      ( 21 Sep 2017 00:11 )             35.4   09-21    127<L>  |  87<L>  |  15  ----------------------------<  179<H>  4.1   |  21<L>  |  1.00    Ca    9.1      21 Sep 2017 00:11    TPro  7.4  /  Alb  3.9  /  TBili  0.5  /  DBili  x   /  AST  11  /  ALT  11  /  AlkPhos  69  09-21    PT/INR - ( 21 Sep 2017 00:11 )   PT: 12.7 sec;   INR: 1.16 ratio       PTT - ( 21 Sep 2017 00:11 )  PTT:29.5 sec    < from: CT Abdomen and Pelvis w/ Oral Cont and w/ IV Cont (09.20.17 @ 17:30) >    IMPRESSION: Sigmoid diverticulitis complicated by 4.4 cm anterior pelvic   abscess.    < end of copied text >

## 2017-09-21 NOTE — H&P ADULT - NSHPPHYSICALEXAM_GEN_ALL_CORE
PHYSICAL EXAM:    Constitutional: NAD    Eyes: anicteric    ENMT: no rhinorrhea    Neck: supple    Respiratory: Clear bilaterally, respirations not labored, no retractions    Cardiovascular: RRR, NL S1S2    Gastrointestinal: Soft, ND, Tender in LLQ, voluntary guarding, no rebound    Genitourinary: Normal external genitalia    Extremities: No deformities    Vascular: Ext. warm, normal cap refill    Neurological: sensation and motor intact to all extremities    Skin: warm, dry, no rash    Lymph Nodes: no palpable adenopathy

## 2017-09-21 NOTE — PROGRESS NOTE ADULT - SUBJECTIVE AND OBJECTIVE BOX
78yo M with h/o CAD s/p RIGO in LAD on plavix last dose on 9/20 AM a/w abdominal pain with CT scan showing abdominal abscess presented to IR for drainage.     Allergies:No Known Allergies      PAST MEDICAL & SURGICAL HISTORY:  TIA (transient ischemic attack)  HLD (hyperlipidemia)  GERD (gastroesophageal reflux disease)  Anemia  Diabetes  BPH (benign prostatic hyperplasia)  HTN (hypertension)  Stented coronary artery  H/O hernia repair  History of appendectomy        Pertinent labs:                      11.2   15.3  )-----------( 338      ( 21 Sep 2017 00:11 )             35.4   09-21    131<L>  |  93<L>  |  10  ----------------------------<  157<H>  3.8   |  28  |  0.87    Ca    9.1      21 Sep 2017 09:44  Phos  3.3     09-21  Mg     1.5     09-21    TPro  7.4  /  Alb  3.9  /  TBili  0.5  /  DBili  x   /  AST  11  /  ALT  11  /  AlkPhos  69  09-21  PT/INR - ( 21 Sep 2017 00:11 )   PT: 12.7 sec;   INR: 1.16 ratio         PTT - ( 21 Sep 2017 00:11 )  PTT:29.5 sec    Consent: Procedure/risks/ Benefits explained. Informed consent obtained from patient. Pt verbalizes understanding.

## 2017-09-22 LAB
ANION GAP SERPL CALC-SCNC: 17 MMOL/L — SIGNIFICANT CHANGE UP (ref 5–17)
ANISOCYTOSIS BLD QL: SLIGHT — SIGNIFICANT CHANGE UP
BASOPHILS # BLD AUTO: 0 K/UL — SIGNIFICANT CHANGE UP (ref 0–0.2)
BASOPHILS # BLD AUTO: 0.04 K/UL — SIGNIFICANT CHANGE UP (ref 0–0.2)
BASOPHILS NFR BLD AUTO: 0 % — SIGNIFICANT CHANGE UP (ref 0–2)
BASOPHILS NFR BLD AUTO: 0.4 % — SIGNIFICANT CHANGE UP (ref 0–2)
BLASTS # FLD: 0 % — SIGNIFICANT CHANGE UP (ref 0–0)
BUN SERPL-MCNC: 8 MG/DL — SIGNIFICANT CHANGE UP (ref 7–23)
CALCIUM SERPL-MCNC: 8.7 MG/DL — SIGNIFICANT CHANGE UP (ref 8.4–10.5)
CHLORIDE SERPL-SCNC: 99 MMOL/L — SIGNIFICANT CHANGE UP (ref 96–108)
CO2 SERPL-SCNC: 20 MMOL/L — LOW (ref 22–31)
CREAT SERPL-MCNC: 0.77 MG/DL — SIGNIFICANT CHANGE UP (ref 0.5–1.3)
CULTURE RESULTS: SIGNIFICANT CHANGE UP
EOSINOPHIL # BLD AUTO: 0.11 K/UL — SIGNIFICANT CHANGE UP (ref 0–0.5)
EOSINOPHIL # BLD AUTO: 0.13 K/UL — SIGNIFICANT CHANGE UP (ref 0–0.5)
EOSINOPHIL NFR BLD AUTO: 0.9 % — SIGNIFICANT CHANGE UP (ref 0–6)
EOSINOPHIL NFR BLD AUTO: 1.4 % — SIGNIFICANT CHANGE UP (ref 0–6)
FERRITIN SERPL-MCNC: 56 NG/ML — SIGNIFICANT CHANGE UP (ref 30–400)
FOLATE SERPL-MCNC: >20 NG/ML — SIGNIFICANT CHANGE UP (ref 4.8–24.2)
GIANT PLATELETS BLD QL SMEAR: PRESENT — SIGNIFICANT CHANGE UP
GLUCOSE SERPL-MCNC: 93 MG/DL — SIGNIFICANT CHANGE UP (ref 70–99)
HBA1C BLD-MCNC: 6.6 % — HIGH (ref 4–5.6)
HCT VFR BLD CALC: 30.3 % — LOW (ref 39–50)
HCT VFR BLD CALC: 33.4 % — LOW (ref 39–50)
HGB BLD-MCNC: 10.6 G/DL — LOW (ref 13–17)
HGB BLD-MCNC: 9.6 G/DL — LOW (ref 13–17)
IMM GRANULOCYTES NFR BLD AUTO: 2.1 % — HIGH (ref 0–1.5)
IRON SATN MFR SERPL: 20 UG/DL — LOW (ref 45–165)
IRON SATN MFR SERPL: 9 % — LOW (ref 16–55)
LYMPHOCYTES # BLD AUTO: 0.68 K/UL — LOW (ref 1–3.3)
LYMPHOCYTES # BLD AUTO: 1.16 K/UL — SIGNIFICANT CHANGE UP (ref 1–3.3)
LYMPHOCYTES # BLD AUTO: 12.7 % — LOW (ref 13–44)
LYMPHOCYTES # BLD AUTO: 5.3 % — LOW (ref 13–44)
LYMPHOCYTES # SPEC AUTO: 0 % — SIGNIFICANT CHANGE UP (ref 0–0)
MAGNESIUM SERPL-MCNC: 2.1 MG/DL — SIGNIFICANT CHANGE UP (ref 1.6–2.6)
MANUAL SMEAR VERIFICATION: SIGNIFICANT CHANGE UP
MANUAL SMEAR VERIFICATION: SIGNIFICANT CHANGE UP
MCHC RBC-ENTMCNC: 19.9 PG — LOW (ref 27–34)
MCHC RBC-ENTMCNC: 20.3 PG — LOW (ref 27–34)
MCHC RBC-ENTMCNC: 31.7 GM/DL — LOW (ref 32–36)
MCHC RBC-ENTMCNC: 31.7 GM/DL — LOW (ref 32–36)
MCV RBC AUTO: 62.7 FL — LOW (ref 80–100)
MCV RBC AUTO: 63.9 FL — LOW (ref 80–100)
METAMYELOCYTES # FLD: 0 % — SIGNIFICANT CHANGE UP (ref 0–0)
MICROCYTES BLD QL: SLIGHT — SIGNIFICANT CHANGE UP
MONOCYTES # BLD AUTO: 0.62 K/UL — SIGNIFICANT CHANGE UP (ref 0–0.9)
MONOCYTES # BLD AUTO: 0.89 K/UL — SIGNIFICANT CHANGE UP (ref 0–0.9)
MONOCYTES NFR BLD AUTO: 6.8 % — SIGNIFICANT CHANGE UP (ref 2–14)
MONOCYTES NFR BLD AUTO: 7 % — SIGNIFICANT CHANGE UP (ref 2–14)
MYELOCYTES NFR BLD: 0.9 % — HIGH (ref 0–0)
NEUTROPHILS # BLD AUTO: 10.73 K/UL — HIGH (ref 1.8–7.4)
NEUTROPHILS # BLD AUTO: 7.02 K/UL — SIGNIFICANT CHANGE UP (ref 1.8–7.4)
NEUTROPHILS NFR BLD AUTO: 76.6 % — SIGNIFICANT CHANGE UP (ref 43–77)
NEUTROPHILS NFR BLD AUTO: 84.2 % — HIGH (ref 43–77)
NEUTS BAND # BLD: 0 % — SIGNIFICANT CHANGE UP (ref 0–8)
OSMOLALITY UR: 193 MOS/KG — SIGNIFICANT CHANGE UP (ref 50–1200)
OVALOCYTES BLD QL SMEAR: SIGNIFICANT CHANGE UP
PHOSPHATE SERPL-MCNC: 3.4 MG/DL — SIGNIFICANT CHANGE UP (ref 2.5–4.5)
PLAT MORPH BLD: NORMAL — SIGNIFICANT CHANGE UP
PLAT MORPH BLD: NORMAL — SIGNIFICANT CHANGE UP
PLATELET # BLD AUTO: 314 K/UL — SIGNIFICANT CHANGE UP (ref 150–400)
PLATELET # BLD AUTO: 325 K/UL — SIGNIFICANT CHANGE UP (ref 150–400)
POIKILOCYTOSIS BLD QL AUTO: SIGNIFICANT CHANGE UP
POLYCHROMASIA BLD QL SMEAR: SLIGHT — SIGNIFICANT CHANGE UP
POTASSIUM SERPL-MCNC: 4.4 MMOL/L — SIGNIFICANT CHANGE UP (ref 3.5–5.3)
POTASSIUM SERPL-SCNC: 4.4 MMOL/L — SIGNIFICANT CHANGE UP (ref 3.5–5.3)
PROMYELOCYTES # FLD: 0 % — SIGNIFICANT CHANGE UP (ref 0–0)
RBC # BLD: 4.83 M/UL — SIGNIFICANT CHANGE UP (ref 4.2–5.8)
RBC # BLD: 5.23 M/UL — SIGNIFICANT CHANGE UP (ref 4.2–5.8)
RBC # FLD: 18 % — HIGH (ref 10.3–14.5)
RBC # FLD: 18.1 % — HIGH (ref 10.3–14.5)
RBC BLD AUTO: ABNORMAL
RBC BLD AUTO: SIGNIFICANT CHANGE UP
SODIUM SERPL-SCNC: 136 MMOL/L — SIGNIFICANT CHANGE UP (ref 135–145)
SPECIMEN SOURCE: SIGNIFICANT CHANGE UP
TIBC SERPL-MCNC: 235 UG/DL — SIGNIFICANT CHANGE UP (ref 220–430)
UIBC SERPL-MCNC: 215 UG/DL — SIGNIFICANT CHANGE UP (ref 110–370)
VARIANT LYMPHS # BLD: 1.8 % — SIGNIFICANT CHANGE UP (ref 0–6)
VIT B12 SERPL-MCNC: 1944 PG/ML — HIGH (ref 243–894)
WBC # BLD: 12.74 K/UL — HIGH (ref 3.8–10.5)
WBC # BLD: 9.16 K/UL — SIGNIFICANT CHANGE UP (ref 3.8–10.5)
WBC # FLD AUTO: 12.74 K/UL — HIGH (ref 3.8–10.5)
WBC # FLD AUTO: 9.16 K/UL — SIGNIFICANT CHANGE UP (ref 3.8–10.5)

## 2017-09-22 PROCEDURE — 99232 SBSQ HOSP IP/OBS MODERATE 35: CPT

## 2017-09-22 PROCEDURE — 99233 SBSQ HOSP IP/OBS HIGH 50: CPT

## 2017-09-22 RX ORDER — OXYCODONE HYDROCHLORIDE 5 MG/1
5 TABLET ORAL EVERY 4 HOURS
Qty: 0 | Refills: 0 | Status: DISCONTINUED | OUTPATIENT
Start: 2017-09-22 | End: 2017-09-26

## 2017-09-22 RX ORDER — ACETAMINOPHEN 500 MG
650 TABLET ORAL EVERY 6 HOURS
Qty: 0 | Refills: 0 | Status: DISCONTINUED | OUTPATIENT
Start: 2017-09-22 | End: 2017-09-26

## 2017-09-22 RX ORDER — INSULIN LISPRO 100/ML
VIAL (ML) SUBCUTANEOUS
Qty: 0 | Refills: 0 | Status: DISCONTINUED | OUTPATIENT
Start: 2017-09-22 | End: 2017-09-26

## 2017-09-22 RX ORDER — CLOPIDOGREL BISULFATE 75 MG/1
75 TABLET, FILM COATED ORAL DAILY
Qty: 0 | Refills: 0 | Status: DISCONTINUED | OUTPATIENT
Start: 2017-09-22 | End: 2017-09-26

## 2017-09-22 RX ORDER — PIPERACILLIN AND TAZOBACTAM 4; .5 G/20ML; G/20ML
3.38 INJECTION, POWDER, LYOPHILIZED, FOR SOLUTION INTRAVENOUS EVERY 8 HOURS
Qty: 0 | Refills: 0 | Status: DISCONTINUED | OUTPATIENT
Start: 2017-09-22 | End: 2017-09-25

## 2017-09-22 RX ADMIN — SIMVASTATIN 40 MILLIGRAM(S): 20 TABLET, FILM COATED ORAL at 22:31

## 2017-09-22 RX ADMIN — Medication 1: at 12:24

## 2017-09-22 RX ADMIN — HEPARIN SODIUM 5000 UNIT(S): 5000 INJECTION INTRAVENOUS; SUBCUTANEOUS at 06:20

## 2017-09-22 RX ADMIN — Medication 1 DROP(S): at 17:53

## 2017-09-22 RX ADMIN — Medication 1000 MILLIGRAM(S): at 06:48

## 2017-09-22 RX ADMIN — Medication 400 MILLIGRAM(S): at 06:28

## 2017-09-22 RX ADMIN — CLOPIDOGREL BISULFATE 75 MILLIGRAM(S): 75 TABLET, FILM COATED ORAL at 17:56

## 2017-09-22 RX ADMIN — Medication 1 DROP(S): at 06:21

## 2017-09-22 RX ADMIN — Medication 1 DROP(S): at 12:19

## 2017-09-22 RX ADMIN — PIPERACILLIN AND TAZOBACTAM 25 GRAM(S): 4; .5 INJECTION, POWDER, LYOPHILIZED, FOR SOLUTION INTRAVENOUS at 22:31

## 2017-09-22 RX ADMIN — AMPICILLIN SODIUM AND SULBACTAM SODIUM 200 GRAM(S): 250; 125 INJECTION, POWDER, FOR SUSPENSION INTRAMUSCULAR; INTRAVENOUS at 03:38

## 2017-09-22 RX ADMIN — PANTOPRAZOLE SODIUM 40 MILLIGRAM(S): 20 TABLET, DELAYED RELEASE ORAL at 06:21

## 2017-09-22 RX ADMIN — Medication 1: at 18:07

## 2017-09-22 RX ADMIN — Medication 81 MILLIGRAM(S): at 12:19

## 2017-09-22 RX ADMIN — HEPARIN SODIUM 5000 UNIT(S): 5000 INJECTION INTRAVENOUS; SUBCUTANEOUS at 22:31

## 2017-09-22 RX ADMIN — PIPERACILLIN AND TAZOBACTAM 25 GRAM(S): 4; .5 INJECTION, POWDER, LYOPHILIZED, FOR SOLUTION INTRAVENOUS at 14:10

## 2017-09-22 RX ADMIN — HEPARIN SODIUM 5000 UNIT(S): 5000 INJECTION INTRAVENOUS; SUBCUTANEOUS at 13:31

## 2017-09-22 NOTE — PROVIDER CONTACT NOTE (OTHER) - ACTION/TREATMENT ORDERED:
MD aware, will assess pt on rounds this afternoon
MD notified, instruction to re-assess in AM, no further interventions given at this time, will continue to monitor.

## 2017-09-22 NOTE — PROVIDER CONTACT NOTE (CRITICAL VALUE NOTIFICATION) - SITUATION
[facility] called w/ results from body fluid culture collected 9/21: few gram variable rods and gram variable cocci, numerous polymorphonuclear leukocytes

## 2017-09-22 NOTE — PROGRESS NOTE ADULT - ASSESSMENT
77y Male with Hinchey 2 sigmoid diverticulitis with pelvic abscess s/p IR drainage of pelvic abscess with cocci and rods on priliminary gram stain. Pt doing well and hemodynamically stable, and afebrile overnight. Pain adequately controlled. No GI function yet.        Plan:  - advance to [facility], assess tolerance and GI function  - Continue Unasyn  - Pain control - continue current regimen  - Strict I/O's  - OOB/DVT ppx  - F/u AM labs

## 2017-09-22 NOTE — PROGRESS NOTE ADULT - ASSESSMENT
77 year old male with hx of CAD s/p stent few months ago on DAPT, HTN, HLD, DM2, BPH, TIA, gerd, admitted for sigmoid diverticulitis with pelvic abscess    1.Sigmoid diverticulitis with pelvic abscess   -S/p IR drainage yesterday   -Continue IV unasyn, duration per surgical team  -Abdominal fluid culture with gram variable rods  -Trend wbc and temp curve   -Currently on clears diet, advance as tolerated   -Morphine prn for pain control   -Sx follow up     2. CAD s/p stents   -Plavix on hold  -Continue asprin   -Continue toprol and zocor  -Resume all meds on discharge      3.HTN  -Continue toprol xl 100mg   -Resume losartan 100mg qd  -Hold home dose of hctz in the setting of hyponatremia     4.HLD  -Continue zocor 40mg     5.DM2  -Continue SS for now  -Check A1C, pending   -Continue to monitor FS, have been stable thus far    6.Gerd  -Please start protonix 40mg, interchange for prilosec 20mg    7.Hyponatremia  -Follow up urine lytes, serum osm  -pt on NS @65cc/hr for 24 hours  -Na levels stable  -No mental status changes     8.Microcytic anemia   -Check iron studies, folate, b12  -Continue to trend h/h  -Stable thus far    9.DVT ppx Hep sq    10. Headaches, chronic  -Pt s/p MRI brain 7/8/17 with findings of:  Wedge-shaped area of abnormal to prolongation in the right   cerebellum with associated area of enhancement. This finding is   suspicious for subacute infarct though clinical correlation and close and   follow-up recommended.    Serpiginous area of abnormal enhancement is seen involving the right   cerebellum which could be compatible underlying vascular malformation CT   angiogram can be done to better evaluate this finding.    Numerous areas of susceptibility are seen in the posterior fossa   supratentorial region which could be compatible areas of old hemorrhage   possibly secondary to amyloid angiopathy. Clinical correlation continued   close interval follow-up recommended    obtained outpt PMD records, documenting headaches secondary to tension headaches, pt currently without any neurologic defecits, oupt PMD follow up onm discharge with Dr. Asa Kingston [phone]

## 2017-09-22 NOTE — PROVIDER CONTACT NOTE (OTHER) - SITUATION
pt states he had fallen "months ago" and says that since then he gets intermittent headaches and has been to a neurologist and had an MRI.
Pt's fingerstick was 99, previous fingerstick from 6 hours prior was 175. Concerned sugar is dropping because IVF was changed to normal saline w/ no dextrose.

## 2017-09-22 NOTE — PROVIDER CONTACT NOTE (OTHER) - ASSESSMENT
pt offered tylenol for pain, declines and states he has no pain at this time
Pt A&Ox4, VSS, NS infusing @65ml/hr.

## 2017-09-22 NOTE — PROGRESS NOTE ADULT - SUBJECTIVE AND OBJECTIVE BOX
Patient is a 77y old  Male who presents with a chief complaint of Abdominal pain (21 Sep 2017 04:15)      SUBJECTIVE / OVERNIGHT EVENTS: No abdominal pain, no chills, no cp, sob. Complaining of headache, front to back, with sensitivity to light and noise     MEDICATIONS  (STANDING):  heparin  Injectable 5000 Unit(s) SubCutaneous every 8 hours  metoprolol succinate  milliGRAM(s) Oral daily  pilocarpine 1% Solution 1 Drop(s) Both EYES four times a day  pantoprazole    Tablet 40 milliGRAM(s) Oral before breakfast  simvastatin 40 milliGRAM(s) Oral at bedtime  aspirin enteric coated 81 milliGRAM(s) Oral daily  dextrose 5%. 1000 milliLiter(s) (50 mL/Hr) IV Continuous <Continuous>  dextrose 50% Injectable 12.5 Gram(s) IV Push once  dextrose 50% Injectable 25 Gram(s) IV Push once  dextrose 50% Injectable 25 Gram(s) IV Push once  sodium chloride 0.9%. 1000 milliLiter(s) (65 mL/Hr) IV Continuous <Continuous>  piperacillin/tazobactam IVPB. 3.375 Gram(s) IV Intermittent every 8 hours  insulin lispro (HumaLOG) corrective regimen sliding scale   SubCutaneous Before meals and at bedtime    MEDICATIONS  (PRN):  dextrose Gel 1 Dose(s) Oral once PRN Blood Glucose LESS THAN 70 milliGRAM(s)/deciliter  glucagon  Injectable 1 milliGRAM(s) IntraMuscular once PRN Glucose LESS THAN 70 milligrams/deciliter  acetaminophen   Tablet. 650 milliGRAM(s) Oral every 6 hours PRN Mild Pain (1 - 3)  oxyCODONE    IR 5 milliGRAM(s) Oral every 4 hours PRN Moderate Pain (4 - 6)        CAPILLARY BLOOD GLUCOSE  187 (22 Sep 2017 12:19)  105 (22 Sep 2017 06:05)  99 (22 Sep 2017 01:06)  175 (21 Sep 2017 17:29)        I&O's Summary    21 Sep 2017 07:01  -  22 Sep 2017 07:00  --------------------------------------------------------  IN: 2110 mL / OUT: 1050 mL / NET: 1060 mL    22 Sep 2017 07:01  -  22 Sep 2017 13:58  --------------------------------------------------------  IN: 165 mL / OUT: 250 mL / NET: -85 mL        PHYSICAL EXAM:  GENERAL: NAD, well-developed  HEAD:  Atraumatic, Normocephalic  EYES: EOMI, PERRLA, conjunctiva and sclera clear  NECK: Supple, No JVD  CHEST/LUNG: Clear to auscultation bilaterally; No wheeze  HEART: Regular rate and rhythm; S1S2  ABDOMEN: Soft, right sided abdomen ttp, Nondistended; Bowel sounds present  EXTREMITIES:  2+ Peripheral Pulses, No clubbing, cyanosis, or edema  PSYCH: AAOx3  NEUROLOGY: non-focal      LABS:                        10.6   9.16  )-----------( 314      ( 22 Sep 2017 09:38 )             33.4     09-22    136  |  99  |  8   ----------------------------<  93  4.4   |  20<L>  |  0.77    Ca    8.7      22 Sep 2017 09:24  Phos  3.4     09-22  Mg     2.1     09-22    TPro  7.4  /  Alb  3.9  /  TBili  0.5  /  DBili  x   /  AST  11  /  ALT  11  /  AlkPhos  69  09-21    PT/INR - ( 21 Sep 2017 00:11 )   PT: 12.7 sec;   INR: 1.16 ratio         PTT - ( 21 Sep 2017 00:11 )  PTT:29.5 sec  CARDIAC MARKERS ( 21 Sep 2017 00:11 )  x     / <0.01 ng/mL / 32 U/L / x     / 1.8 ng/mL      Urinalysis Basic - ( 21 Sep 2017 00:16 )    Color: Yellow / Appearance: Turbid / SG: >1.030 / pH: x  Gluc: x / Ketone: Negative  / Bili: Negative / Urobili: Negative   Blood: x / Protein: 100 mg/dL / Nitrite: Negative   Leuk Esterase: Large / RBC: 5-10 /HPF / WBC >50 /HPF   Sq Epi: x / Non Sq Epi: Few /HPF / Bacteria: x        RADIOLOGY & ADDITIONAL TESTS:    Imaging Personally Reviewed:    Consultant(s) Notes Reviewed:  sx    Care Discussed with Consultants/Other Providers: sx

## 2017-09-22 NOTE — PROGRESS NOTE ADULT - SUBJECTIVE AND OBJECTIVE BOX
Daily Progress Note    SUBJECTIVE: Pt underwent IR drainage of pelvic abscess yesterday with 5cc of purulent fluid drained, preliminary gram stain showed gram vrriable cocci and rods. Pt tolerated the procedure well. Seen this morning at bedside, stating not having "as much pain as before". Pt has no gas or bowel movement yet.      Objective:  Gen: NAD, AAOx3  Pulm: b/l chest rise. No work on breathing  Abd: soft, minimal suprapubic tenderness, ND. Dressings CDI, drain in place with moderate serosanguinous output    Vital Signs Last 24 Hrs  T(C): 36.6 (21 Sep 2017 16:40), Max: 37.2 (21 Sep 2017 01:30)  T(F): 97.9 (21 Sep 2017 16:40), Max: 99 (21 Sep 2017 01:30)  HR: 64 (21 Sep 2017 16:40) (64 - 88)  BP: 141/83 (21 Sep 2017 16:40) (120/78 - 158/84)  BP(mean): --  RR: 18 (21 Sep 2017 16:40) (18 - 18)  SpO2: 97% (21 Sep 2017 16:40) (96% - 98%)  I&O's Summary    20 Sep 2017 07:01  -  21 Sep 2017 07:00  --------------------------------------------------------  IN: 400 mL / OUT: 350 mL / NET: 50 mL    21 Sep 2017 07:01  -  21 Sep 2017 21:33  --------------------------------------------------------  IN: 1030 mL / OUT: 500 mL / NET: 530 mL      I&O's Detail    20 Sep 2017 07:01  -  21 Sep 2017 07:00  --------------------------------------------------------  IN:    dextrose 5% + sodium chloride 0.9%: 400 mL  Total IN: 400 mL    OUT:    Voided: 350 mL  Total OUT: 350 mL    Total NET: 50 mL      21 Sep 2017 07:01  -  21 Sep 2017 21:33  --------------------------------------------------------  IN:    IV PiggyBack: 250 mL    sodium chloride 0.9%.: 780 mL  Total IN: 1030 mL    OUT:    Voided: 500 mL  Total OUT: 500 mL    Total NET: 530 mL          MEDICATIONS  (STANDING):  ampicillin/sulbactam  IVPB      heparin  Injectable 5000 Unit(s) SubCutaneous every 8 hours  metoprolol succinate  milliGRAM(s) Oral daily  pilocarpine 1% Solution 1 Drop(s) Both EYES four times a day  pantoprazole    Tablet 40 milliGRAM(s) Oral before breakfast  simvastatin 40 milliGRAM(s) Oral at bedtime  aspirin enteric coated 81 milliGRAM(s) Oral daily  ampicillin/sulbactam  IVPB 3 Gram(s) IV Intermittent every 6 hours  dextrose 5%. 1000 milliLiter(s) (50 mL/Hr) IV Continuous <Continuous>  dextrose 50% Injectable 12.5 Gram(s) IV Push once  dextrose 50% Injectable 25 Gram(s) IV Push once  dextrose 50% Injectable 25 Gram(s) IV Push once  insulin lispro (HumaLOG) corrective regimen sliding scale   SubCutaneous every 6 hours  sodium chloride 0.9%. 1000 milliLiter(s) (65 mL/Hr) IV Continuous <Continuous>    MEDICATIONS  (PRN):  morphine  - Injectable 2 milliGRAM(s) IV Push every 4 hours PRN Moderate Pain (4 - 6)  morphine  - Injectable 4 milliGRAM(s) IV Push every 4 hours PRN Severe Pain (7 - 10)  dextrose Gel 1 Dose(s) Oral once PRN Blood Glucose LESS THAN 70 milliGRAM(s)/deciliter  glucagon  Injectable 1 milliGRAM(s) IntraMuscular once PRN Glucose LESS THAN 70 milligrams/deciliter  acetaminophen  IVPB. 1000 milliGRAM(s) IV Intermittent once PRN Moderate Pain (4 - 6)      LABS:                        11.2   15.3  )-----------( 338      ( 21 Sep 2017 00:11 )             35.4     09-21    131<L>  |  93<L>  |  10  ----------------------------<  157<H>  3.8   |  28  |  0.87    Ca    9.1      21 Sep 2017 09:44  Phos  3.3     09-21  Mg     1.5     09-21    TPro  7.4  /  Alb  3.9  /  TBili  0.5  /  DBili  x   /  AST  11  /  ALT  11  /  AlkPhos  69  09-21    PT/INR - ( 21 Sep 2017 00:11 )   PT: 12.7 sec;   INR: 1.16 ratio         PTT - ( 21 Sep 2017 00:11 )  PTT:29.5 sec  Urinalysis Basic - ( 21 Sep 2017 00:16 )    Color: Yellow / Appearance: Turbid / SG: >1.030 / pH: x  Gluc: x / Ketone: Negative  / Bili: Negative / Urobili: Negative   Blood: x / Protein: 100 mg/dL / Nitrite: Negative   Leuk Esterase: Large / RBC: 5-10 /HPF / WBC >50 /HPF   Sq Epi: x / Non Sq Epi: Few /HPF / Bacteria: x

## 2017-09-23 LAB
ANION GAP SERPL CALC-SCNC: 17 MMOL/L — SIGNIFICANT CHANGE UP (ref 5–17)
BUN SERPL-MCNC: 7 MG/DL — SIGNIFICANT CHANGE UP (ref 7–23)
CALCIUM SERPL-MCNC: 9 MG/DL — SIGNIFICANT CHANGE UP (ref 8.4–10.5)
CHLORIDE SERPL-SCNC: 97 MMOL/L — SIGNIFICANT CHANGE UP (ref 96–108)
CO2 SERPL-SCNC: 19 MMOL/L — LOW (ref 22–31)
CREAT SERPL-MCNC: 0.89 MG/DL — SIGNIFICANT CHANGE UP (ref 0.5–1.3)
GLUCOSE SERPL-MCNC: 124 MG/DL — HIGH (ref 70–99)
HCT VFR BLD CALC: 29.3 % — LOW (ref 39–50)
HGB BLD-MCNC: 9.4 G/DL — LOW (ref 13–17)
MAGNESIUM SERPL-MCNC: 1.9 MG/DL — SIGNIFICANT CHANGE UP (ref 1.6–2.6)
MCHC RBC-ENTMCNC: 20.2 PG — LOW (ref 27–34)
MCHC RBC-ENTMCNC: 32.1 GM/DL — SIGNIFICANT CHANGE UP (ref 32–36)
MCV RBC AUTO: 62.9 FL — LOW (ref 80–100)
PHOSPHATE SERPL-MCNC: 3 MG/DL — SIGNIFICANT CHANGE UP (ref 2.5–4.5)
PLATELET # BLD AUTO: 338 K/UL — SIGNIFICANT CHANGE UP (ref 150–400)
POTASSIUM SERPL-MCNC: 4.1 MMOL/L — SIGNIFICANT CHANGE UP (ref 3.5–5.3)
POTASSIUM SERPL-SCNC: 4.1 MMOL/L — SIGNIFICANT CHANGE UP (ref 3.5–5.3)
RBC # BLD: 4.66 M/UL — SIGNIFICANT CHANGE UP (ref 4.2–5.8)
RBC # FLD: 18.3 % — HIGH (ref 10.3–14.5)
SODIUM SERPL-SCNC: 133 MMOL/L — LOW (ref 135–145)
WBC # BLD: 9.6 K/UL — SIGNIFICANT CHANGE UP (ref 3.8–10.5)
WBC # FLD AUTO: 9.6 K/UL — SIGNIFICANT CHANGE UP (ref 3.8–10.5)

## 2017-09-23 PROCEDURE — 99232 SBSQ HOSP IP/OBS MODERATE 35: CPT

## 2017-09-23 RX ADMIN — Medication 1 DROP(S): at 23:28

## 2017-09-23 RX ADMIN — Medication 1 DROP(S): at 12:00

## 2017-09-23 RX ADMIN — Medication 81 MILLIGRAM(S): at 14:43

## 2017-09-23 RX ADMIN — Medication 1 DROP(S): at 00:17

## 2017-09-23 RX ADMIN — SIMVASTATIN 40 MILLIGRAM(S): 20 TABLET, FILM COATED ORAL at 21:22

## 2017-09-23 RX ADMIN — Medication 100 MILLIGRAM(S): at 06:35

## 2017-09-23 RX ADMIN — PIPERACILLIN AND TAZOBACTAM 25 GRAM(S): 4; .5 INJECTION, POWDER, LYOPHILIZED, FOR SOLUTION INTRAVENOUS at 14:46

## 2017-09-23 RX ADMIN — PIPERACILLIN AND TAZOBACTAM 25 GRAM(S): 4; .5 INJECTION, POWDER, LYOPHILIZED, FOR SOLUTION INTRAVENOUS at 06:36

## 2017-09-23 RX ADMIN — Medication 1 DROP(S): at 18:05

## 2017-09-23 RX ADMIN — HEPARIN SODIUM 5000 UNIT(S): 5000 INJECTION INTRAVENOUS; SUBCUTANEOUS at 21:22

## 2017-09-23 RX ADMIN — Medication 1 DROP(S): at 06:39

## 2017-09-23 RX ADMIN — HEPARIN SODIUM 5000 UNIT(S): 5000 INJECTION INTRAVENOUS; SUBCUTANEOUS at 06:36

## 2017-09-23 RX ADMIN — HEPARIN SODIUM 5000 UNIT(S): 5000 INJECTION INTRAVENOUS; SUBCUTANEOUS at 14:43

## 2017-09-23 RX ADMIN — CLOPIDOGREL BISULFATE 75 MILLIGRAM(S): 75 TABLET, FILM COATED ORAL at 14:50

## 2017-09-23 RX ADMIN — Medication 2: at 21:22

## 2017-09-23 RX ADMIN — PIPERACILLIN AND TAZOBACTAM 25 GRAM(S): 4; .5 INJECTION, POWDER, LYOPHILIZED, FOR SOLUTION INTRAVENOUS at 21:23

## 2017-09-23 RX ADMIN — PANTOPRAZOLE SODIUM 40 MILLIGRAM(S): 20 TABLET, DELAYED RELEASE ORAL at 06:35

## 2017-09-23 NOTE — PROGRESS NOTE ADULT - SUBJECTIVE AND OBJECTIVE BOX
Daily Progress Note    SUBJECTIVE: Pt seen this morning, no over night event, pt states that he is not at "100%" and "feels about the same". Denies nausea or vomiting, patient passed stool and tolerated CLD    OBJECTIVE:   Vital Signs Last 24 Hrs  T(C): 36.6 (23 Sep 2017 09:12), Max: 37 (23 Sep 2017 06:20)  T(F): 97.8 (23 Sep 2017 09:12), Max: 98.6 (23 Sep 2017 06:20)  HR: 71 (23 Sep 2017 09:12) (51 - 71)  BP: 131/67 (23 Sep 2017 09:12) (130/73 - 160/71)  BP(mean): --  RR: 18 (23 Sep 2017 09:12) (18 - 18)  SpO2: 95% (23 Sep 2017 09:12) (95% - 98%)    PHYSICAL EXAM:  Constitutional: resting in bed with no acute distress  Respiratory:  unlabored breathing  Gastrointestinal: Abdomen soft, non distended,  at where the drain is, drain present wtih serosanguinous output  Extremities:  No edema, no calf tenderrness,  Neurological: AxAxOx3    I/O  I&O's Summary    22 Sep 2017 07:01  -  23 Sep 2017 07:00  --------------------------------------------------------  IN: 885 mL / OUT: 504 mL / NET: 381 mL    23 Sep 2017 07:01  -  23 Sep 2017 10:20  --------------------------------------------------------  IN: 0 mL / OUT: 350 mL / NET: -350 mL      I&O's Detail    22 Sep 2017 07:01  -  23 Sep 2017 07:00  --------------------------------------------------------  IN:    IV PiggyBack: 300 mL    Oral Fluid: 520 mL    sodium chloride 0.9%.: 65 mL  Total IN: 885 mL    OUT:    Drain: 4 mL    Voided: 500 mL  Total OUT: 504 mL    Total NET: 381 mL      23 Sep 2017 07:01  -  23 Sep 2017 10:20  --------------------------------------------------------  IN:  Total IN: 0 mL    OUT:    Voided: 350 mL  Total OUT: 350 mL    Total NET: -350 mL    LABS:                        9.4    9.60  )-----------( 338      ( 23 Sep 2017 08:46 )             29.3     09-22    136  |  99  |  8   ----------------------------<  93  4.4   |  20<L>  |  0.77    Ca    8.7      22 Sep 2017 09:24  Phos  3.4     09-22  Mg     2.1     09-22            RADIOLOGY & ADDITIONAL STUDIES: no new studies performed

## 2017-09-23 NOTE — PROGRESS NOTE ADULT - ASSESSMENT
77y Male with Hinchey 2 sigmoid diverticulitis with pelvic abscess s/p IR drainage of pelvic abscess with cocci and rods on priliminary gram stain. Pt doing well and hemodynamically stable, and afebrile overnight. Pain adequately controlled. Tolerate CLD, passed stool   WBC 9.6 and HGB stable 9.4    Plan:  - Keep on CLD for now, but may advance later if patient continues to do well.   - Continue Zosyn  - Pain control - continue current regimen  - Strict I/O's  - OOB/DVT ppx  - F/u AM labs

## 2017-09-24 ENCOUNTER — TRANSCRIPTION ENCOUNTER (OUTPATIENT)
Age: 77
End: 2017-09-24

## 2017-09-24 LAB
ANION GAP SERPL CALC-SCNC: 14 MMOL/L — SIGNIFICANT CHANGE UP (ref 5–17)
BUN SERPL-MCNC: 12 MG/DL — SIGNIFICANT CHANGE UP (ref 7–23)
CALCIUM SERPL-MCNC: 9.3 MG/DL — SIGNIFICANT CHANGE UP (ref 8.4–10.5)
CHLORIDE SERPL-SCNC: 96 MMOL/L — SIGNIFICANT CHANGE UP (ref 96–108)
CO2 SERPL-SCNC: 23 MMOL/L — SIGNIFICANT CHANGE UP (ref 22–31)
CREAT SERPL-MCNC: 1.36 MG/DL — HIGH (ref 0.5–1.3)
GLUCOSE SERPL-MCNC: 123 MG/DL — HIGH (ref 70–99)
HCT VFR BLD CALC: 33.7 % — LOW (ref 39–50)
HGB BLD-MCNC: 10.6 G/DL — LOW (ref 13–17)
MCHC RBC-ENTMCNC: 19.8 PG — LOW (ref 27–34)
MCHC RBC-ENTMCNC: 31.5 GM/DL — LOW (ref 32–36)
MCV RBC AUTO: 62.9 FL — LOW (ref 80–100)
PLATELET # BLD AUTO: 452 K/UL — HIGH (ref 150–400)
POTASSIUM SERPL-MCNC: 4.2 MMOL/L — SIGNIFICANT CHANGE UP (ref 3.5–5.3)
POTASSIUM SERPL-SCNC: 4.2 MMOL/L — SIGNIFICANT CHANGE UP (ref 3.5–5.3)
RBC # BLD: 5.36 M/UL — SIGNIFICANT CHANGE UP (ref 4.2–5.8)
RBC # FLD: 18.1 % — HIGH (ref 10.3–14.5)
SODIUM SERPL-SCNC: 133 MMOL/L — LOW (ref 135–145)
WBC # BLD: 8.29 K/UL — SIGNIFICANT CHANGE UP (ref 3.8–10.5)
WBC # FLD AUTO: 8.29 K/UL — SIGNIFICANT CHANGE UP (ref 3.8–10.5)

## 2017-09-24 PROCEDURE — 99232 SBSQ HOSP IP/OBS MODERATE 35: CPT

## 2017-09-24 RX ORDER — SODIUM CHLORIDE 9 MG/ML
1000 INJECTION INTRAMUSCULAR; INTRAVENOUS; SUBCUTANEOUS
Qty: 0 | Refills: 0 | Status: DISCONTINUED | OUTPATIENT
Start: 2017-09-24 | End: 2017-09-25

## 2017-09-24 RX ADMIN — HEPARIN SODIUM 5000 UNIT(S): 5000 INJECTION INTRAVENOUS; SUBCUTANEOUS at 14:20

## 2017-09-24 RX ADMIN — HEPARIN SODIUM 5000 UNIT(S): 5000 INJECTION INTRAVENOUS; SUBCUTANEOUS at 21:09

## 2017-09-24 RX ADMIN — CLOPIDOGREL BISULFATE 75 MILLIGRAM(S): 75 TABLET, FILM COATED ORAL at 11:35

## 2017-09-24 RX ADMIN — PIPERACILLIN AND TAZOBACTAM 25 GRAM(S): 4; .5 INJECTION, POWDER, LYOPHILIZED, FOR SOLUTION INTRAVENOUS at 05:56

## 2017-09-24 RX ADMIN — Medication 1: at 11:36

## 2017-09-24 RX ADMIN — PIPERACILLIN AND TAZOBACTAM 25 GRAM(S): 4; .5 INJECTION, POWDER, LYOPHILIZED, FOR SOLUTION INTRAVENOUS at 21:09

## 2017-09-24 RX ADMIN — Medication 2: at 18:45

## 2017-09-24 RX ADMIN — Medication 81 MILLIGRAM(S): at 11:35

## 2017-09-24 RX ADMIN — Medication 1 DROP(S): at 05:57

## 2017-09-24 RX ADMIN — SIMVASTATIN 40 MILLIGRAM(S): 20 TABLET, FILM COATED ORAL at 21:09

## 2017-09-24 RX ADMIN — Medication 1 DROP(S): at 11:36

## 2017-09-24 RX ADMIN — PANTOPRAZOLE SODIUM 40 MILLIGRAM(S): 20 TABLET, DELAYED RELEASE ORAL at 05:52

## 2017-09-24 RX ADMIN — PIPERACILLIN AND TAZOBACTAM 25 GRAM(S): 4; .5 INJECTION, POWDER, LYOPHILIZED, FOR SOLUTION INTRAVENOUS at 14:19

## 2017-09-24 RX ADMIN — Medication 1 DROP(S): at 17:57

## 2017-09-24 RX ADMIN — HEPARIN SODIUM 5000 UNIT(S): 5000 INJECTION INTRAVENOUS; SUBCUTANEOUS at 05:52

## 2017-09-24 RX ADMIN — Medication 100 MILLIGRAM(S): at 05:51

## 2017-09-24 NOTE — DISCHARGE NOTE ADULT - PLAN OF CARE
Follow up with:   (1) Dr. Godoy (Acute Care/Critical Care Surgeon) in one week. Please call [phone] to schedule an appointment.   (2) Dr. Cortez in one week for drain check. Please call to schedule an appointment.   (3) Will need a repeat colonoscopy in 6-8 weeks.   NOTIFY YOUR SURGEON IF: You have any bleeding that does not stop, any pus draining from your drain, any fever (over 100.4 F) or chills, persistent nausea/vomiting, persistent diarrhea, or if your pain is not controlled on your discharge pain medications.   Activity: No heavy lifting or straining;  Do not drive while taking narcotic pain medication Please follow up with your primary care physician regarding your hospitalization. Please schedule an appointment with your primary care provider within two weeks after your discharge to review your hospital course. recover from diverticulitis

## 2017-09-24 NOTE — DISCHARGE NOTE ADULT - HOSPITAL COURSE
78yo M with h/o CAD s/p RIGO in LAD (12/16, unstable angina) presents to ER with abdominal pain for a few weeks. Pt is unable to clearly define how long he has had the pain. The pain was initially vague, but became more localized to the LLQ over time. He notes that the pain is worse with movement/going over bumps, urination, and with bowel movements. PT also notes dysuria and frequency of urination. He endorses chills, denies fevers. He is able to tolerate PO without emesis or nausea. He has been having small, hard bowel movements, no diarrhea.     His last colonoscopy was a number of years ago. The pt was unable to recall when, but reports it was normal.     In the ED, pt was hemodynamically normal with LLQ tenderness without rebound or guarding. Labs were significant for WBC 15, LA 3.4, and Na 127. CT abdomen and pelvis revealed sigmoid diverticulitis complicated by 4.4 cm anterior pelvic abscess. Pt was made NPO, IVF hydration given, IV antibiotics started, and IR consulted for abscess drainage. Pt underwent IR drainage on 9/21/2017 with removal of 5 cc of purulent discharge. Pt tolerated the procedure. Once pain decreased, diet was slowly advanced as tolerated. Physical therapy evaluated patient and recommended home PT. WBC trended from 15.3 to 8.29. Hyponatremia treated with IV fluids and improved with sodium levels from 127 to 136. Pt currently tolerating a diet, ambulating, voiding and pain controlled. Pt stable for discharge to home. Home care arranged for BRYANT drain management.

## 2017-09-24 NOTE — DISCHARGE NOTE ADULT - ADDITIONAL INSTRUCTIONS
You will be discharged with BRYANT drain. You will need to empty and record outputs accurately. This will be taught to you by the nursing staff. Keep drain to suction at all times. Please do not remove the BRYANT drain. They will be removed in the office. Please bring to the office accurate records of output. Change dressing at drain site daily with dry 4x4 guaze and tape over. You will be discharged with BRYANT drain. You will need to empty and record outputs accurately. This will be taught to you by the nursing staff. Keep drain to suction at all times. Please do not remove the BRYANT drain. They will be removed in the office. Please bring to the office accurate records of output. Change dressing at drain site daily with dry 4x4 guaze and tape over.    Flush drain with 5 cc of normal saline daily You will be discharged with BRYANT drain. You will need to empty and record outputs accurately. This will be taught to you by the nursing staff. Keep drain to suction at all times. Please do not remove the BRYANT drain. They will be removed in the office. Please bring to the office accurate records of output. Change dressing at drain site daily with dry 4x4 guaze and tape over.    Flush drain with 5 cc of normal saline daily    Your headaches secondary to tension headaches, oupt PMD follow up on discharge with Dr. Asa Kingston [phone]

## 2017-09-24 NOTE — DISCHARGE NOTE ADULT - OTHER SIGNIFICANT FINDINGS
Headaches, chronic  -Pt s/p MRI brain 7/8/17 with findings of:  Wedge-shaped area of abnormal to prolongation in the right cerebellum with associated area of enhancement. This finding is suspicious for subacute infarct though clinical correlation and close and follow-up recommended.    Serpiginous area of abnormal enhancement is seen involving the right cerebellum which could be compatible underlying vascular malformation CT angiogram can be done to better evaluate this finding.    Numerous areas of susceptibility are seen in the posterior fossa supratentorial region which could be compatible areas of old hemorrhage possibly secondary to amyloid angiopathy. Clinical correlation continued close interval follow-up recommended

## 2017-09-24 NOTE — DISCHARGE NOTE ADULT - MEDICATION SUMMARY - MEDICATIONS TO TAKE
I will START or STAY ON the medications listed below when I get home from the hospital:    Aspirin Enteric Coated 81 mg oral delayed release tablet  -- 1 tab(s) by mouth once a day  -- Indication: For antiplatelet    acetaminophen 325 mg oral tablet  -- 2 tab(s) by mouth every 6 hours, As needed, Mild Pain (1 - 3)  -- Indication: For pain    oxyCODONE 5 mg oral tablet  -- 1 tab(s) by mouth every 4-6 hours, As needed,  Pain MDD:6  -- Indication: For pain    metFORMIN  -- Indication: For Diabetes    Glucophage 850 mg oral tablet  -- 1 tab(s) by mouth 2 times a day - may resume 12/16  -- Indication: For Diabetes    cetirizine  -- Indication: For allergy    ezetimibe  -- Indication: For high cholesterol    Zocor 40 mg oral tablet  -- 1 tab(s) by mouth once a day (at bedtime)  -- Indication: For high cholesterol    losartan-hydroCHLOROthiazide 100mg-25mg oral tablet  -- 1 tab(s) by mouth once a day  -- Indication: For high blood pressure    clopidogrel 75 mg oral tablet  -- 1 tab(s) by mouth once a day  -- Indication: For antiplatelet    Toprol- mg oral tablet, extended release  -- 1 tab(s) by mouth once a day  -- Indication: For high blood pressure    baclofen  --  by mouth   -- Indication: For Spacticity    pilocarpine 1% ophthalmic solution  -- 1 drop(s) to each affected eye 4 times a day  -- Indication: For glaucoma    amoxicillin-clavulanate 875 mg-125 mg oral tablet  -- 1 tab(s) by mouth 2 times a day  -- Indication: For antibiotics for diverticulitis    PriLOSEC 20 mg oral delayed release capsule  -- 1 cap(s) by mouth once a day  -- Indication: For reflux

## 2017-09-24 NOTE — PROGRESS NOTE ADULT - ASSESSMENT
77y Male with Hinchey 2 sigmoid diverticulitis with pelvic abscess s/p IR drainage of pelvic abscess with cocci and rods on priliminary gram stain. Pt doing well and hemodynamically stable, and afebrile overnight. Pain adequately controlled. Tolerate CLD, passed stool   WBC 9.6 and HGB stable 9.4    Plan:  - Continue LRD  - Continue Zosyn  - Pain control - continue current regimen  - Monitor I/O's  - OOB/DVT ppx  - Dispo planning    Anton Siddiqui MD

## 2017-09-24 NOTE — PROGRESS NOTE ADULT - SUBJECTIVE AND OBJECTIVE BOX
ACS Progress Note    S: Patient seen and examined this AM. No acute events overnight. Pain well controlled with current regimen.   Denies nausea/vomiting, tolerating low fiber diet.     O:  Vital Signs Last 24 Hrs  T(C): 36.7 (24 Sep 2017 09:29), Max: 36.7 (24 Sep 2017 01:02)  T(F): 98.1 (24 Sep 2017 09:29), Max: 98.1 (24 Sep 2017 01:02)  HR: 57 (24 Sep 2017 09:29) (55 - 72)  BP: 149/70 (24 Sep 2017 09:29) (149/70 - 183/73)  RR: 18 (24 Sep 2017 09:29) (16 - 18)  SpO2: 99% (24 Sep 2017 09:29) (97% - 99%)    I&O's Detail    23 Sep 2017 07:01  -  24 Sep 2017 07:00  --------------------------------------------------------  IN:    IV PiggyBack: 100 mL    Oral Fluid: 920 mL  Total IN: 1020 mL    OUT:    Drain: 10 mL    Voided: 1950 mL  Total OUT: 1960 mL    Total NET: -940 mL    24 Sep 2017 07:01  -  24 Sep 2017 13:02  --------------------------------------------------------  IN:    Oral Fluid: 260 mL  Total IN: 260 mL    OUT:    Voided: 150 mL  Total OUT: 150 mL    Total NET: 110 mL    MEDICATIONS  (STANDING):  heparin  Injectable 5000 Unit(s) SubCutaneous every 8 hours  metoprolol succinate  milliGRAM(s) Oral daily  pilocarpine 1% Solution 1 Drop(s) Both EYES four times a day  pantoprazole    Tablet 40 milliGRAM(s) Oral before breakfast  simvastatin 40 milliGRAM(s) Oral at bedtime  aspirin enteric coated 81 milliGRAM(s) Oral daily  dextrose 5%. 1000 milliLiter(s) (50 mL/Hr) IV Continuous <Continuous>  dextrose 50% Injectable 12.5 Gram(s) IV Push once  dextrose 50% Injectable 25 Gram(s) IV Push once  dextrose 50% Injectable 25 Gram(s) IV Push once  sodium chloride 0.9%. 1000 milliLiter(s) (65 mL/Hr) IV Continuous <Continuous>  piperacillin/tazobactam IVPB. 3.375 Gram(s) IV Intermittent every 8 hours  insulin lispro (HumaLOG) corrective regimen sliding scale   SubCutaneous Before meals and at bedtime  clopidogrel Tablet 75 milliGRAM(s) Oral daily    MEDICATIONS  (PRN):  dextrose Gel 1 Dose(s) Oral once PRN Blood Glucose LESS THAN 70 milliGRAM(s)/deciliter  glucagon  Injectable 1 milliGRAM(s) IntraMuscular once PRN Glucose LESS THAN 70 milligrams/deciliter  acetaminophen   Tablet. 650 milliGRAM(s) Oral every 6 hours PRN Mild Pain (1 - 3)  oxyCODONE    IR 5 milliGRAM(s) Oral every 4 hours PRN Moderate Pain (4 - 6)                      10.6   8.29  )-----------( 452      ( 24 Sep 2017 09:04 )             33.7     09-24    133<L>  |  96  |  12  ----------------------------<  123<H>  4.2   |  23  |  1.36<H>    Ca    9.3      24 Sep 2017 08:53  Phos  3.0     09-23  Mg     1.9     09-23    Physical Exam:  Gen: Laying in bed, NAD, alert and oriented.   Resp: Unlabored breathing  Abd: soft, ND, minimal tenderness. IR drain present with SS output.    Lines:   IV: patent, in place.

## 2017-09-24 NOTE — DISCHARGE NOTE ADULT - CARE PLAN
Principal Discharge DX:	Diverticulitis of large intestine with abscess, unspecified bleeding status  Goal:	recover from diverticulitis  Instructions for follow-up, activity and diet:	Follow up with:   (1) Dr. Godoy (Acute Care/Critical Care Surgeon) in one week. Please call [phone] to schedule an appointment.   (2) Dr. Cortez in one week for drain check. Please call to schedule an appointment.   (3) Will need a repeat colonoscopy in 6-8 weeks.   NOTIFY YOUR SURGEON IF: You have any bleeding that does not stop, any pus draining from your drain, any fever (over 100.4 F) or chills, persistent nausea/vomiting, persistent diarrhea, or if your pain is not controlled on your discharge pain medications.   Activity: No heavy lifting or straining;  Do not drive while taking narcotic pain medication  Instructions for follow-up, activity and diet:	Please follow up with your primary care physician regarding your hospitalization. Please schedule an appointment with your primary care provider within two weeks after your discharge to review your hospital course.

## 2017-09-24 NOTE — DISCHARGE NOTE ADULT - HOME CARE AGENCY
[facility], start of care day after discharge [phone] [facility] home care [phone]. Visiting nurse will call day after discharge to schedule visit.

## 2017-09-24 NOTE — DISCHARGE NOTE ADULT - CARE PROVIDER_API CALL
Reynaldo Godoy), Surgery; Surgical Critical Care  [address] [place] [id number]  Phone: [phone]  Fax: [phone]

## 2017-09-25 LAB
ANION GAP SERPL CALC-SCNC: 15 MMOL/L — SIGNIFICANT CHANGE UP (ref 5–17)
BUN SERPL-MCNC: 11 MG/DL — SIGNIFICANT CHANGE UP (ref 7–23)
CALCIUM SERPL-MCNC: 9.4 MG/DL — SIGNIFICANT CHANGE UP (ref 8.4–10.5)
CHLORIDE SERPL-SCNC: 99 MMOL/L — SIGNIFICANT CHANGE UP (ref 96–108)
CO2 SERPL-SCNC: 22 MMOL/L — SIGNIFICANT CHANGE UP (ref 22–31)
CREAT SERPL-MCNC: 1.11 MG/DL — SIGNIFICANT CHANGE UP (ref 0.5–1.3)
CULTURE RESULTS: SIGNIFICANT CHANGE UP
GLUCOSE SERPL-MCNC: 112 MG/DL — HIGH (ref 70–99)
MAGNESIUM SERPL-MCNC: 1.9 MG/DL — SIGNIFICANT CHANGE UP (ref 1.6–2.6)
PHOSPHATE SERPL-MCNC: 3.4 MG/DL — SIGNIFICANT CHANGE UP (ref 2.5–4.5)
POTASSIUM SERPL-MCNC: 4.5 MMOL/L — SIGNIFICANT CHANGE UP (ref 3.5–5.3)
POTASSIUM SERPL-SCNC: 4.5 MMOL/L — SIGNIFICANT CHANGE UP (ref 3.5–5.3)
SODIUM SERPL-SCNC: 136 MMOL/L — SIGNIFICANT CHANGE UP (ref 135–145)
SPECIMEN SOURCE: SIGNIFICANT CHANGE UP

## 2017-09-25 PROCEDURE — 99233 SBSQ HOSP IP/OBS HIGH 50: CPT

## 2017-09-25 RX ORDER — HYDRALAZINE HCL 50 MG
10 TABLET ORAL ONCE
Qty: 0 | Refills: 0 | Status: COMPLETED | OUTPATIENT
Start: 2017-09-25 | End: 2017-09-26

## 2017-09-25 RX ADMIN — Medication 1 DROP(S): at 18:36

## 2017-09-25 RX ADMIN — HEPARIN SODIUM 5000 UNIT(S): 5000 INJECTION INTRAVENOUS; SUBCUTANEOUS at 21:49

## 2017-09-25 RX ADMIN — Medication 1 TABLET(S): at 16:00

## 2017-09-25 RX ADMIN — Medication 1 DROP(S): at 05:12

## 2017-09-25 RX ADMIN — Medication 1: at 21:49

## 2017-09-25 RX ADMIN — HEPARIN SODIUM 5000 UNIT(S): 5000 INJECTION INTRAVENOUS; SUBCUTANEOUS at 05:09

## 2017-09-25 RX ADMIN — Medication 81 MILLIGRAM(S): at 12:45

## 2017-09-25 RX ADMIN — PANTOPRAZOLE SODIUM 40 MILLIGRAM(S): 20 TABLET, DELAYED RELEASE ORAL at 05:09

## 2017-09-25 RX ADMIN — Medication 1 DROP(S): at 12:46

## 2017-09-25 RX ADMIN — Medication 100 MILLIGRAM(S): at 05:09

## 2017-09-25 RX ADMIN — PIPERACILLIN AND TAZOBACTAM 25 GRAM(S): 4; .5 INJECTION, POWDER, LYOPHILIZED, FOR SOLUTION INTRAVENOUS at 05:08

## 2017-09-25 RX ADMIN — HEPARIN SODIUM 5000 UNIT(S): 5000 INJECTION INTRAVENOUS; SUBCUTANEOUS at 16:00

## 2017-09-25 RX ADMIN — SIMVASTATIN 40 MILLIGRAM(S): 20 TABLET, FILM COATED ORAL at 21:49

## 2017-09-25 RX ADMIN — CLOPIDOGREL BISULFATE 75 MILLIGRAM(S): 75 TABLET, FILM COATED ORAL at 12:45

## 2017-09-25 NOTE — PHYSICAL THERAPY INITIAL EVALUATION ADULT - PERTINENT HX OF CURRENT PROBLEM, REHAB EVAL
77 year old male with hx of CAD s/p stent few months ago on DAPT, HTN, HLD, DM2, BPH, TIA, gerd, admitted for sigmoid diverticulitis with pelvic abscess. S/p IR drainage

## 2017-09-25 NOTE — PHYSICAL THERAPY INITIAL EVALUATION ADULT - ADDITIONAL COMMENTS
Patient lives alone in private home where he rents a room from [employer]. Patient has 4 JOHN home and no stairs inside. PTA, pt was independent with all mobility with use of cane. As per patient, has h/o recent falls 2/2 impaired balance

## 2017-09-25 NOTE — PROGRESS NOTE ADULT - ATTENDING COMMENTS
Pt seen and examined.  Chart reviewed.  Resident note confirmed.  Pt is a 77 year old male with a medical history significant for TIA/CAD/HTN/DM2 who presents to [facility] with abdominal pain.  Work up revealed a Hinchey 2 sigmoid diverticulitis w/ pelvic abscess.  Pt is s/p IR drainage with good effect.  IV abx continue and pt is tolerating a diet.  No acute events overnight.    - continue pain control  - monitor vitals  - ISP for atelectasis  - Advance to LRD diet  - Resolving RASHAUN noted, monitor  - Continue Lovenox for DVT proph  - PT/OT eval  - D/c planning

## 2017-09-25 NOTE — PHYSICAL THERAPY INITIAL EVALUATION ADULT - DISCHARGE DISPOSITION, PT EVAL
daughter to assist as needed; patient has cane at home. Patient may also benefit from home health aide to assist with mobility and ADL's/home w/ home PT

## 2017-09-25 NOTE — PROGRESS NOTE ADULT - SUBJECTIVE AND OBJECTIVE BOX
Patient is a 77y old  Male who presents with a chief complaint of Abdominal pain (24 Sep 2017 13:52)      SUBJECTIVE / OVERNIGHT EVENTS: No chest pain, abdominal pain better, no n/v or chills     MEDICATIONS  (STANDING):  heparin  Injectable 5000 Unit(s) SubCutaneous every 8 hours  metoprolol succinate  milliGRAM(s) Oral daily  pilocarpine 1% Solution 1 Drop(s) Both EYES four times a day  pantoprazole    Tablet 40 milliGRAM(s) Oral before breakfast  simvastatin 40 milliGRAM(s) Oral at bedtime  aspirin enteric coated 81 milliGRAM(s) Oral daily  dextrose 5%. 1000 milliLiter(s) (50 mL/Hr) IV Continuous <Continuous>  dextrose 50% Injectable 12.5 Gram(s) IV Push once  dextrose 50% Injectable 25 Gram(s) IV Push once  dextrose 50% Injectable 25 Gram(s) IV Push once  insulin lispro (HumaLOG) corrective regimen sliding scale   SubCutaneous Before meals and at bedtime  clopidogrel Tablet 75 milliGRAM(s) Oral daily  sodium chloride 0.9%. 1000 milliLiter(s) (100 mL/Hr) IV Continuous <Continuous>  amoxicillin  875 milliGRAM(s)/clavulanate 1 Tablet(s) Oral two times a day    MEDICATIONS  (PRN):  dextrose Gel 1 Dose(s) Oral once PRN Blood Glucose LESS THAN 70 milliGRAM(s)/deciliter  glucagon  Injectable 1 milliGRAM(s) IntraMuscular once PRN Glucose LESS THAN 70 milligrams/deciliter  acetaminophen   Tablet. 650 milliGRAM(s) Oral every 6 hours PRN Mild Pain (1 - 3)  oxyCODONE    IR 5 milliGRAM(s) Oral every 4 hours PRN Moderate Pain (4 - 6)        CAPILLARY BLOOD GLUCOSE  122 (25 Sep 2017 11:50)  134 (25 Sep 2017 07:14)  136 (24 Sep 2017 21:46)  215 (24 Sep 2017 17:32)        I&O's Summary    24 Sep 2017 07:01  -  25 Sep 2017 07:00  --------------------------------------------------------  IN: 2160 mL / OUT: 1265 mL / NET: 895 mL    25 Sep 2017 07:01  -  25 Sep 2017 15:28  --------------------------------------------------------  IN: 660 mL / OUT: 425 mL / NET: 235 mL        PHYSICAL EXAM:  GENERAL: NAD, well-developed  HEAD:  Atraumatic, Normocephalic  EYES: EOMI, PERRLA, conjunctiva and sclera clear  NECK: Supple, No JVD  CHEST/LUNG: Clear to auscultation bilaterally; No wheeze  HEART: Regular rate and rhythm;S1S2  ABDOMEN: Soft, Nontender, Nondistended; Bowel sounds present  EXTREMITIES:  2+ Peripheral Pulses, No clubbing, cyanosis, or edema  PSYCH: AAOx3  NEUROLOGY: non-focal  SKIN: No rashes or lesions    LABS:                        10.6   8.29  )-----------( 452      ( 24 Sep 2017 09:04 )             33.7     09-25    136  |  99  |  11  ----------------------------<  112<H>  4.5   |  22  |  1.11    Ca    9.4      25 Sep 2017 08:57  Phos  3.4     09-25  Mg     1.9     09-25                RADIOLOGY & ADDITIONAL TESTS:    Imaging Personally Reviewed:    Consultant(s) Notes Reviewed:  sx    Care Discussed with Consultants/Other Providers: sx

## 2017-09-25 NOTE — PROGRESS NOTE ADULT - SUBJECTIVE AND OBJECTIVE BOX
ATP Progress Note    S: No acute events overnight.    O:  T(C): 36.3 (09-25-17 @ 09:23), Max: 36.8 (09-24-17 @ 21:46)  HR: 56 (09-25-17 @ 09:23) (51 - 60)  BP: 148/70 (09-25-17 @ 09:23) (147/65 - 175/75)  RR: 18 (09-25-17 @ 09:23) (18 - 18)  SpO2: 99% (09-25-17 @ 09:23) (97% - 99%)  Wt(kg): --    09-24 @ 07:01  -  09-25 @ 07:00  --------------------------------------------------------  IN:    IV PiggyBack: 100 mL    Oral Fluid: 860 mL    sodium chloride 0.9%.: 1200 mL  Total IN: 2160 mL    OUT:    Drain: 15 mL    Voided: 1250 mL  Total OUT: 1265 mL    Total NET: 895 mL      09-25 @ 07:01  -  09-25 @ 10:30  --------------------------------------------------------  IN:    Oral Fluid: 300 mL  Total IN: 300 mL    OUT:    Drain: 15 mL    Voided: 200 mL  Total OUT: 215 mL    Total NET: 85 mL        CBC Full  -  ( 24 Sep 2017 09:04 )  WBC Count : 8.29 K/uL  Hemoglobin : 10.6 g/dL  Hematocrit : 33.7 %  Platelet Count - Automated : 452 K/uL  Mean Cell Volume : 62.9 fl  Mean Cell Hemoglobin : 19.8 pg  Mean Cell Hemoglobin Concentration : 31.5 gm/dL      CAPILLARY BLOOD GLUCOSE  134 (25 Sep 2017 07:14)  136 (24 Sep 2017 21:46)  215 (24 Sep 2017 17:32)  154 (24 Sep 2017 11:35)          PHYSICAL EXAM:  Gen: A&Ox3, laying in bed in NAD  Abd: Soft, NT, ND  Lines: Drain intact w/ serosanguinous output

## 2017-09-25 NOTE — PROGRESS NOTE ADULT - ASSESSMENT
A/P: 77M w/ Hinchey 2 sigmoid diverticulitis w/ pelvic abscess s/p IR drainage. Hemodynamically stable and doing well. Tolerating diet.    - Physical therapy to see  - Diet: LRD  - Abx: Switch Zosyn to Augmentin  - Pain control  - Encourage IS  - Dispo: Discharge home tomorrow     Anton Dey M.D.

## 2017-09-26 VITALS
HEART RATE: 65 BPM | DIASTOLIC BLOOD PRESSURE: 73 MMHG | SYSTOLIC BLOOD PRESSURE: 175 MMHG | RESPIRATION RATE: 18 BRPM | OXYGEN SATURATION: 97 % | TEMPERATURE: 98 F

## 2017-09-26 LAB
ANION GAP SERPL CALC-SCNC: 12 MMOL/L — SIGNIFICANT CHANGE UP (ref 5–17)
BUN SERPL-MCNC: 13 MG/DL — SIGNIFICANT CHANGE UP (ref 7–23)
CALCIUM SERPL-MCNC: 9.1 MG/DL — SIGNIFICANT CHANGE UP (ref 8.4–10.5)
CHLORIDE SERPL-SCNC: 100 MMOL/L — SIGNIFICANT CHANGE UP (ref 96–108)
CO2 SERPL-SCNC: 24 MMOL/L — SIGNIFICANT CHANGE UP (ref 22–31)
CREAT SERPL-MCNC: 1.03 MG/DL — SIGNIFICANT CHANGE UP (ref 0.5–1.3)
CULTURE RESULTS: SIGNIFICANT CHANGE UP
CULTURE RESULTS: SIGNIFICANT CHANGE UP
GLUCOSE SERPL-MCNC: 124 MG/DL — HIGH (ref 70–99)
MAGNESIUM SERPL-MCNC: 2 MG/DL — SIGNIFICANT CHANGE UP (ref 1.6–2.6)
PHOSPHATE SERPL-MCNC: 3.2 MG/DL — SIGNIFICANT CHANGE UP (ref 2.5–4.5)
POTASSIUM SERPL-MCNC: 4.5 MMOL/L — SIGNIFICANT CHANGE UP (ref 3.5–5.3)
POTASSIUM SERPL-SCNC: 4.5 MMOL/L — SIGNIFICANT CHANGE UP (ref 3.5–5.3)
SODIUM SERPL-SCNC: 136 MMOL/L — SIGNIFICANT CHANGE UP (ref 135–145)
SPECIMEN SOURCE: SIGNIFICANT CHANGE UP
SPECIMEN SOURCE: SIGNIFICANT CHANGE UP

## 2017-09-26 PROCEDURE — 99233 SBSQ HOSP IP/OBS HIGH 50: CPT

## 2017-09-26 RX ORDER — ACETAMINOPHEN 500 MG
2 TABLET ORAL
Qty: 0 | Refills: 0 | COMMUNITY
Start: 2017-09-26

## 2017-09-26 RX ORDER — OXYCODONE HYDROCHLORIDE 5 MG/1
1 TABLET ORAL
Qty: 15 | Refills: 0 | OUTPATIENT
Start: 2017-09-26

## 2017-09-26 RX ADMIN — Medication 1 TABLET(S): at 05:57

## 2017-09-26 RX ADMIN — Medication 1 DROP(S): at 00:00

## 2017-09-26 RX ADMIN — Medication 1 TABLET(S): at 18:15

## 2017-09-26 RX ADMIN — Medication 1 DROP(S): at 13:43

## 2017-09-26 RX ADMIN — CLOPIDOGREL BISULFATE 75 MILLIGRAM(S): 75 TABLET, FILM COATED ORAL at 13:43

## 2017-09-26 RX ADMIN — PANTOPRAZOLE SODIUM 40 MILLIGRAM(S): 20 TABLET, DELAYED RELEASE ORAL at 06:03

## 2017-09-26 RX ADMIN — HEPARIN SODIUM 5000 UNIT(S): 5000 INJECTION INTRAVENOUS; SUBCUTANEOUS at 13:43

## 2017-09-26 RX ADMIN — Medication 1 DROP(S): at 18:15

## 2017-09-26 RX ADMIN — Medication 81 MILLIGRAM(S): at 13:43

## 2017-09-26 RX ADMIN — Medication 10 MILLIGRAM(S): at 05:59

## 2017-09-26 RX ADMIN — Medication 1 DROP(S): at 06:07

## 2017-09-26 RX ADMIN — HEPARIN SODIUM 5000 UNIT(S): 5000 INJECTION INTRAVENOUS; SUBCUTANEOUS at 06:01

## 2017-09-26 NOTE — PROGRESS NOTE ADULT - ASSESSMENT
A/P: 77M w/ Hinchey 2 sigmoid diverticulitis w/ pelvic abscess s/p IR drainage. Hemodynamically stable and doing well. Tolerating diet. Serum creatinine downtrending and normalized.    - Diet: LRD  - Abx: Augmentin  - Pain control  - Encourage IS  - Dispo: Home w/ home PT today.    Anton Dey M.D.

## 2017-09-26 NOTE — PROGRESS NOTE ADULT - ASSESSMENT
77 year old male with hx of CAD s/p stent few months ago on DAPT, HTN, HLD, DM2, BPH, TIA, gerd, admitted for sigmoid diverticulitis with pelvic abscess    1.Sigmoid diverticulitis with pelvic abscess   -S/p IR drainage   -Abx deescalated to PO augmentin duration per surgical team   -Abdominal fluid culture with gram variable rods/ alpha hemolytic strep   -Trend wbc and temp curve   -Tolerating low fiber diet  -Oxycodone prn for pain control   -Sx follow up     2. CAD s/p stents   -Continue asprin and plavix  -Continue toprol and zocor  -Resume all meds on discharge      3.HTN  -Continue toprol xl 100mg   -Resume losartan 100mg qd  -Hold home dose of hctz in the setting of hyponatremia   -BP has been acceptable on above regimen     4.HLD  -Continue zocor 40mg     5.DM2  -Continue SS for now  -A1C 6.6 at goal for age   -Continue to monitor FS, have been stable thus far    6.Gerd  -Continue protonix 40mg qd    7.Hyponatremia  -Resolved post IVF  -No mental status changes     8.Microcytic anemia   -Iron studies consistent with iron deficiency though MCV on lower end, given nationality/heritage would refer to outpt PMD for electrophoresis r/o beta thalassemia minor prior to starting iron sulfate  -Continue to trend h/h  -Stable thus far    9.Mild RASHAUN yesterday  -Resolved post IVF  -Avoid nephrotoxic agents    10. Headaches, chronic  -Pt s/p MRI brain 7/8/17 with findings of:  Wedge-shaped area of abnormal to prolongation in the right   cerebellum with associated area of enhancement. This finding is   suspicious for subacute infarct though clinical correlation and close and   follow-up recommended.    Serpiginous area of abnormal enhancement is seen involving the right   cerebellum which could be compatible underlying vascular malformation CT   angiogram can be done to better evaluate this finding.    Numerous areas of susceptibility are seen in the posterior fossa   supratentorial region which could be compatible areas of old hemorrhage   possibly secondary to amyloid angiopathy. Clinical correlation continued   close interval follow-up recommended    obtained outpt PMD records, documenting headaches secondary to tension headaches, pt currently without any neurologic defecits, oupt PMD follow up onm discharge with Dr. Asa Kingston [phone]    Hep sq for dvt ppx

## 2017-09-26 NOTE — PROGRESS NOTE ADULT - SUBJECTIVE AND OBJECTIVE BOX
Patient is a 77y old  Male who presents with a chief complaint of Abdominal pain (24 Sep 2017 13:52)      SUBJECTIVE / OVERNIGHT EVENTS: No cp, sob, chills, abdominal pain better    MEDICATIONS  (STANDING):  heparin  Injectable 5000 Unit(s) SubCutaneous every 8 hours  metoprolol succinate  milliGRAM(s) Oral daily  pilocarpine 1% Solution 1 Drop(s) Both EYES four times a day  pantoprazole    Tablet 40 milliGRAM(s) Oral before breakfast  simvastatin 40 milliGRAM(s) Oral at bedtime  aspirin enteric coated 81 milliGRAM(s) Oral daily  dextrose 5%. 1000 milliLiter(s) (50 mL/Hr) IV Continuous <Continuous>  dextrose 50% Injectable 12.5 Gram(s) IV Push once  dextrose 50% Injectable 25 Gram(s) IV Push once  dextrose 50% Injectable 25 Gram(s) IV Push once  insulin lispro (HumaLOG) corrective regimen sliding scale   SubCutaneous Before meals and at bedtime  clopidogrel Tablet 75 milliGRAM(s) Oral daily  amoxicillin  875 milliGRAM(s)/clavulanate 1 Tablet(s) Oral two times a day    MEDICATIONS  (PRN):  dextrose Gel 1 Dose(s) Oral once PRN Blood Glucose LESS THAN 70 milliGRAM(s)/deciliter  glucagon  Injectable 1 milliGRAM(s) IntraMuscular once PRN Glucose LESS THAN 70 milligrams/deciliter  acetaminophen   Tablet. 650 milliGRAM(s) Oral every 6 hours PRN Mild Pain (1 - 3)  oxyCODONE    IR 5 milliGRAM(s) Oral every 4 hours PRN Moderate Pain (4 - 6)        CAPILLARY BLOOD GLUCOSE  124 (26 Sep 2017 12:19)  124 (26 Sep 2017 07:40)  178 (25 Sep 2017 21:18)  124 (25 Sep 2017 17:35)        I&O's Summary    25 Sep 2017 07:01  -  26 Sep 2017 07:00  --------------------------------------------------------  IN: 1180 mL / OUT: 1075 mL / NET: 105 mL    26 Sep 2017 07:01  -  26 Sep 2017 16:19  --------------------------------------------------------  IN: 700 mL / OUT: 100 mL / NET: 600 mL        PHYSICAL EXAM:  GENERAL: NAD, well-developed  HEAD:  Atraumatic, Normocephalic  EYES: EOMI, PERRLA, conjunctiva and sclera clear  NECK: Supple, No JVD  CHEST/LUNG: Clear to auscultation bilaterally; No wheeze  HEART: Regular rate and rhythm; No murmurs, rubs, or gallops  ABDOMEN: Soft, Nontender, Nondistended; Bowel sounds present, right drain in place  EXTREMITIES:  2+ Peripheral Pulses, No clubbing, cyanosis, or edema  PSYCH: AAOx3  NEUROLOGY: non-focal  SKIN: No rashes or lesions    LABS:    09-26    136  |  100  |  13  ----------------------------<  124<H>  4.5   |  24  |  1.03    Ca    9.1      26 Sep 2017 07:54  Phos  3.2     09-26  Mg     2.0     09-26                RADIOLOGY & ADDITIONAL TESTS:    Imaging Personally Reviewed:    Consultant(s) Notes Reviewed:  sx    Care Discussed with Consultants/Other Providers: sx

## 2017-09-26 NOTE — PROGRESS NOTE ADULT - SUBJECTIVE AND OBJECTIVE BOX
ATP Progress Note    S: No acute events overnight.    O:  T(C): 36.4 (09-26-17 @ 08:40), Max: 37 (09-25-17 @ 17:35)  HR: 72 (09-26-17 @ 08:40) (53 - 72)  BP: 146/70 (09-26-17 @ 08:40) (146/70 - 171/75)  RR: 18 (09-26-17 @ 08:40) (17 - 18)  SpO2: 95% (09-26-17 @ 08:40) (95% - 100%)  Wt(kg): --    09-25 @ 07:01  -  09-26 @ 07:00  --------------------------------------------------------  IN:    Oral Fluid: 1180 mL  Total IN: 1180 mL    OUT:    Drain: 25 mL    Voided: 1050 mL  Total OUT: 1075 mL    Total NET: 105 mL          CAPILLARY BLOOD GLUCOSE  124 (26 Sep 2017 07:40)  178 (25 Sep 2017 21:18)  124 (25 Sep 2017 17:35)  122 (25 Sep 2017 11:50)        PHYSICAL EXAM:  Gen: A&Ox3, laying in bed in NAD  Abd: Soft, NT, ND  Lines: Drain intact w/ serosanguinous output

## 2017-09-29 ENCOUNTER — INPATIENT (INPATIENT)
Facility: HOSPITAL | Age: 77
LOS: 3 days | Discharge: HOME | DRG: 64 | End: 2017-10-03
Attending: PSYCHIATRY & NEUROLOGY | Admitting: PSYCHIATRY & NEUROLOGY
Payer: MEDICARE

## 2017-09-29 VITALS
RESPIRATION RATE: 16 BRPM | HEART RATE: 68 BPM | SYSTOLIC BLOOD PRESSURE: 128 MMHG | OXYGEN SATURATION: 99 % | TEMPERATURE: 98 F | DIASTOLIC BLOOD PRESSURE: 74 MMHG

## 2017-09-29 DIAGNOSIS — Z98.890 OTHER SPECIFIED POSTPROCEDURAL STATES: Chronic | ICD-10-CM

## 2017-09-29 DIAGNOSIS — Z90.49 ACQUIRED ABSENCE OF OTHER SPECIFIED PARTS OF DIGESTIVE TRACT: Chronic | ICD-10-CM

## 2017-09-29 DIAGNOSIS — Z95.5 PRESENCE OF CORONARY ANGIOPLASTY IMPLANT AND GRAFT: Chronic | ICD-10-CM

## 2017-09-29 DIAGNOSIS — I61.9 NONTRAUMATIC INTRACEREBRAL HEMORRHAGE, UNSPECIFIED: ICD-10-CM

## 2017-09-29 LAB
ALBUMIN SERPL ELPH-MCNC: 4.4 G/DL — SIGNIFICANT CHANGE UP (ref 3.3–5)
ALP SERPL-CCNC: 66 U/L — SIGNIFICANT CHANGE UP (ref 40–120)
ALT FLD-CCNC: 18 U/L RC — SIGNIFICANT CHANGE UP (ref 10–45)
ANION GAP SERPL CALC-SCNC: 17 MMOL/L — SIGNIFICANT CHANGE UP (ref 5–17)
APTT BLD: 31.1 SEC — SIGNIFICANT CHANGE UP (ref 27.5–37.4)
AST SERPL-CCNC: 9 U/L — LOW (ref 10–40)
BASOPHILS # BLD AUTO: 0 K/UL — SIGNIFICANT CHANGE UP (ref 0–0.2)
BASOPHILS NFR BLD AUTO: 0 % — SIGNIFICANT CHANGE UP (ref 0–2)
BILIRUB SERPL-MCNC: 0.5 MG/DL — SIGNIFICANT CHANGE UP (ref 0.2–1.2)
BUN SERPL-MCNC: 22 MG/DL — SIGNIFICANT CHANGE UP (ref 7–23)
CALCIUM SERPL-MCNC: 9.5 MG/DL — SIGNIFICANT CHANGE UP (ref 8.4–10.5)
CHLORIDE SERPL-SCNC: 90 MMOL/L — LOW (ref 96–108)
CK MB BLD-MCNC: 10.5 % — HIGH (ref 0–3.5)
CK MB CFR SERPL CALC: 2 NG/ML — SIGNIFICANT CHANGE UP (ref 0–6.7)
CK SERPL-CCNC: 19 U/L — LOW (ref 30–200)
CO2 SERPL-SCNC: 22 MMOL/L — SIGNIFICANT CHANGE UP (ref 22–31)
CREAT SERPL-MCNC: 1.06 MG/DL — SIGNIFICANT CHANGE UP (ref 0.5–1.3)
EOSINOPHIL # BLD AUTO: 0 K/UL — SIGNIFICANT CHANGE UP (ref 0–0.5)
EOSINOPHIL NFR BLD AUTO: 0 % — SIGNIFICANT CHANGE UP (ref 0–6)
GAS PNL BLDV: SIGNIFICANT CHANGE UP
GLUCOSE SERPL-MCNC: 127 MG/DL — HIGH (ref 70–99)
HCT VFR BLD CALC: 34.5 % — LOW (ref 39–50)
HGB BLD-MCNC: 11 G/DL — LOW (ref 13–17)
INR BLD: 0.98 RATIO — SIGNIFICANT CHANGE UP (ref 0.88–1.16)
LYMPHOCYTES # BLD AUTO: 1.7 K/UL — SIGNIFICANT CHANGE UP (ref 1–3.3)
LYMPHOCYTES # BLD AUTO: 6 % — LOW (ref 13–44)
MCHC RBC-ENTMCNC: 21.5 PG — LOW (ref 27–34)
MCHC RBC-ENTMCNC: 32 GM/DL — SIGNIFICANT CHANGE UP (ref 32–36)
MCV RBC AUTO: 67.1 FL — LOW (ref 80–100)
MONOCYTES # BLD AUTO: SIGNIFICANT CHANGE UP (ref 0–0.9)
MONOCYTES NFR BLD AUTO: 8 % — SIGNIFICANT CHANGE UP (ref 2–14)
NEUTROPHILS # BLD AUTO: 9 K/UL — HIGH (ref 1.8–7.4)
NEUTROPHILS NFR BLD AUTO: 82 % — HIGH (ref 43–77)
NT-PROBNP SERPL-SCNC: 148 PG/ML — SIGNIFICANT CHANGE UP (ref 0–300)
PLATELET # BLD AUTO: 405 K/UL — HIGH (ref 150–400)
POTASSIUM SERPL-MCNC: 4.9 MMOL/L — SIGNIFICANT CHANGE UP (ref 3.5–5.3)
POTASSIUM SERPL-SCNC: 4.9 MMOL/L — SIGNIFICANT CHANGE UP (ref 3.5–5.3)
PROT SERPL-MCNC: 7.3 G/DL — SIGNIFICANT CHANGE UP (ref 6–8.3)
PROTHROM AB SERPL-ACNC: 10.6 SEC — SIGNIFICANT CHANGE UP (ref 9.8–12.7)
RBC # BLD: 5.14 M/UL — SIGNIFICANT CHANGE UP (ref 4.2–5.8)
RBC # FLD: 17.8 % — HIGH (ref 10.3–14.5)
SODIUM SERPL-SCNC: 129 MMOL/L — LOW (ref 135–145)
TROPONIN T SERPL-MCNC: <0.01 NG/ML — SIGNIFICANT CHANGE UP (ref 0–0.06)
WBC # BLD: 11.7 K/UL — HIGH (ref 3.8–10.5)
WBC # FLD AUTO: 11.7 K/UL — HIGH (ref 3.8–10.5)

## 2017-09-29 PROCEDURE — 71010: CPT | Mod: 26

## 2017-09-29 PROCEDURE — 99285 EMERGENCY DEPT VISIT HI MDM: CPT | Mod: 25

## 2017-09-29 PROCEDURE — 70450 CT HEAD/BRAIN W/O DYE: CPT | Mod: 26

## 2017-09-29 PROCEDURE — 93010 ELECTROCARDIOGRAM REPORT: CPT

## 2017-09-29 RX ORDER — DEXTROSE 50 % IN WATER 50 %
12.5 SYRINGE (ML) INTRAVENOUS ONCE
Qty: 0 | Refills: 0 | Status: DISCONTINUED | OUTPATIENT
Start: 2017-09-29 | End: 2017-10-03

## 2017-09-29 RX ORDER — DEXTROSE 50 % IN WATER 50 %
1 SYRINGE (ML) INTRAVENOUS ONCE
Qty: 0 | Refills: 0 | Status: DISCONTINUED | OUTPATIENT
Start: 2017-09-29 | End: 2017-10-03

## 2017-09-29 RX ORDER — INSULIN LISPRO 100/ML
VIAL (ML) SUBCUTANEOUS
Qty: 0 | Refills: 0 | Status: DISCONTINUED | OUTPATIENT
Start: 2017-09-29 | End: 2017-10-03

## 2017-09-29 RX ORDER — LOSARTAN POTASSIUM 100 MG/1
100 TABLET, FILM COATED ORAL DAILY
Qty: 0 | Refills: 0 | Status: DISCONTINUED | OUTPATIENT
Start: 2017-09-29 | End: 2017-10-03

## 2017-09-29 RX ORDER — HYDRALAZINE HCL 50 MG
10 TABLET ORAL EVERY 6 HOURS
Qty: 0 | Refills: 0 | Status: DISCONTINUED | OUTPATIENT
Start: 2017-09-29 | End: 2017-10-03

## 2017-09-29 RX ORDER — DEXTROSE 50 % IN WATER 50 %
25 SYRINGE (ML) INTRAVENOUS ONCE
Qty: 0 | Refills: 0 | Status: DISCONTINUED | OUTPATIENT
Start: 2017-09-29 | End: 2017-10-03

## 2017-09-29 RX ORDER — OXYCODONE HYDROCHLORIDE 5 MG/1
5 TABLET ORAL EVERY 4 HOURS
Qty: 0 | Refills: 0 | Status: DISCONTINUED | OUTPATIENT
Start: 2017-09-29 | End: 2017-10-03

## 2017-09-29 RX ORDER — SODIUM CHLORIDE 9 MG/ML
1000 INJECTION INTRAMUSCULAR; INTRAVENOUS; SUBCUTANEOUS ONCE
Qty: 0 | Refills: 0 | Status: COMPLETED | OUTPATIENT
Start: 2017-09-29 | End: 2017-09-29

## 2017-09-29 RX ORDER — INFLUENZA VIRUS VACCINE 15; 15; 15; 15 UG/.5ML; UG/.5ML; UG/.5ML; UG/.5ML
0.5 SUSPENSION INTRAMUSCULAR ONCE
Qty: 0 | Refills: 0 | Status: DISCONTINUED | OUTPATIENT
Start: 2017-09-29 | End: 2017-10-03

## 2017-09-29 RX ORDER — METOPROLOL TARTRATE 50 MG
100 TABLET ORAL DAILY
Qty: 0 | Refills: 0 | Status: DISCONTINUED | OUTPATIENT
Start: 2017-09-29 | End: 2017-10-03

## 2017-09-29 RX ORDER — HYDROCHLOROTHIAZIDE 25 MG
12.5 TABLET ORAL DAILY
Qty: 0 | Refills: 0 | Status: DISCONTINUED | OUTPATIENT
Start: 2017-09-29 | End: 2017-10-03

## 2017-09-29 RX ORDER — SIMVASTATIN 20 MG/1
40 TABLET, FILM COATED ORAL AT BEDTIME
Qty: 0 | Refills: 0 | Status: DISCONTINUED | OUTPATIENT
Start: 2017-09-29 | End: 2017-10-03

## 2017-09-29 RX ORDER — SODIUM CHLORIDE 9 MG/ML
1000 INJECTION, SOLUTION INTRAVENOUS
Qty: 0 | Refills: 0 | Status: DISCONTINUED | OUTPATIENT
Start: 2017-09-29 | End: 2017-10-03

## 2017-09-29 RX ORDER — GLUCAGON INJECTION, SOLUTION 0.5 MG/.1ML
1 INJECTION, SOLUTION SUBCUTANEOUS ONCE
Qty: 0 | Refills: 0 | Status: DISCONTINUED | OUTPATIENT
Start: 2017-09-29 | End: 2017-10-03

## 2017-09-29 RX ADMIN — Medication 1 TABLET(S): at 21:43

## 2017-09-29 RX ADMIN — SODIUM CHLORIDE 1000 MILLILITER(S): 9 INJECTION INTRAMUSCULAR; INTRAVENOUS; SUBCUTANEOUS at 14:46

## 2017-09-29 RX ADMIN — SIMVASTATIN 40 MILLIGRAM(S): 20 TABLET, FILM COATED ORAL at 21:43

## 2017-09-29 NOTE — ED PROVIDER NOTE - MEDICAL DECISION MAKING DETAILS
Attending MD Medrano: 77M with chills, syncope, chest pain and dysuria, will obtain labs, cultures, enzymes, EKG, CXR, CT head, surgery consult, likely admit

## 2017-09-29 NOTE — H&P ADULT - HISTORY OF PRESENT ILLNESS
78YO RHM with PMHx CAD s/p stent (4/2017), HLD, BPH, HTN, DM, "brain hemorrhage", "brain infarctions", recent drain placed for sigmoid diverticulitis with pelvic abscess (d/c 3 d ago on 9/26) presents after syncopal episode this morning.  Family at bedside, reports patient went to bathroom at 4AM and then called for family. Wife found him on the floor but patient thought he was in the bed and but could remember how he got there or where he was.    Reports this morning was in a cold sweat, lightheaded, dizzy, weak.  Family reports that since discharge patient has had poor po intake, decreased appetite and reports patient as being "dehydrated". Patient was recently admitted on 9/20/17 and was discharged on 9/26/17 for sigmoid diverticulitis with pelvic abscess s/p drainage. Reports during hospital stay had drain placed, reports has been draining.     Denies vomiting, diarrhea, reports constipation, but reports BM today. Patient with multiple medical complaints. Reports persistent abdominal pain at lower abdomen, denies redness around drain site.  Reports chronic chest pain, and chest pain today.  Reports chest pain is R sided and similar to the pain he has had in the past, but reports present at time of incident.  Reports shortness of breath with chest pain, also chronic.  Reports chronic lower extremity numbness, unchanged today.  Reports dysuria, burning and abdominal pain since abdominal pain started (before admission).  Reports has been taking discharge medications since 9/27/17.

## 2017-09-29 NOTE — H&P ADULT - NSHPREVIEWOFSYSTEMS_GEN_ALL_CORE
REVIEW OF SYSTEMS:  CONSTITUTIONAL:  No weight loss, fever, chills, weakness or fatigue.  HEENT:  Eyes:  No visual loss, blurred vision, double vision or yellow sclerae. Ears, Nose, Throat:  No hearing loss, sneezing, congestion, runny nose or sore throat.  SKIN:  No rash or itching.  CARDIOVASCULAR:  No chest pain, chest pressure or chest discomfort. No palpitations or edema.  RESPIRATORY:  No shortness of breath, cough or sputum.  GASTROINTESTINAL:  No anorexia, nausea, vomiting or diarrhea. No abdominal pain or blood.  GENITOURINARY:  denies incontinence/retention   NEUROLOGICAL:  No headache, dizziness, syncope, paralysis, ataxia, numbness or tingling in the extremities. No change in bowel or bladder control.  MUSCULOSKELETAL:  No muscle, back pain, joint pain or stiffness.  HEMATOLOGIC:  No anemia, bleeding or bruising.  LYMPHATICS:  No enlarged nodes. No history of splenectomy.  PSYCHIATRIC:  No history of depression or anxiety.  ENDOCRINOLOGIC:  No reports of sweating, cold or heat intolerance. No polyuria or polydipsia.

## 2017-09-29 NOTE — H&P ADULT - ASSESSMENT
76YO RHM w/ CAD s/p stent, HLD, HTN, DM, vague unclear h/o brain hemorrhage/infarct, discharged 3 days ago s/p drain placed for pelvic abscess 2/2 sigmoid diverticulitis who p/w episode of syncope this morning. Symptoms occurred in the setting of chest pain and poor PO intake. Exam is nonfocal and stable at this time. Small IPH in L caudate. Possible etiologies include HTN, underlying vascular malformation, less likely hemorrhagic met. On previous MRI questionable area of enhancement in R cerebellum suspicious for vascular malformation. Admit to stroke service for further management and workup.           - MRI Brain w/ w/o  - BP control, goal <160/90   - PRN Hydralazine   - TTE  - hold ASA/Plavix for now (no AP)  - venodynes  - surgery/medicine consult   - f/u U/A  - PT/OT

## 2017-09-29 NOTE — CONSULT NOTE ADULT - SUBJECTIVE AND OBJECTIVE BOX
ACUTE CARE SURGERY ([facility] - #[id number]) CONSULT NOTE  ---------------------------------------------------------------------------------------------     HPI: Patient is a 77y old  Male who presents with a chief complaint of syncope.  Patient was recently admitted to the Acute Care Surgery service from 9/20-9/27 for perforated Hinchey II diverticulitis.  Patient had been on IV antibiotics and is s/p an IR drain placed on 9/21.  His IR drain remained in place during his hospitalization and he was discharged on Augmentin with the IR drain in place with instructions for follow up next week. Patient had been feeling okay since discharge through yesterday evening, able to ambulate without assistance and tolerating a regular diet, although he states that he wasn't eating a lot of food.  His drain was being emptied daily, and was putting out about 5-10mL of fluid.  He was having daily BMs, which were normal and reports he was taking all of his discharge medications as scheduled, including Augmentin.  He only complained of minimal abdominal pain, around the site of the drain.      This morning, patient called for his family around 4am when he was going from his bed to the bathroom.  His wife found him on the floor but the patient believed that he was still in his bed at that time.  He does not remember the event clearly.  He was feeling weak, dizzy, and lightheaded at that time, and felt dehydrated.      Patient denies fevers/chills, denies nausea/vomiting, denies constipation/diarrhea.  Patient does complain of chronic shortness of breath and associated chronic chest pain, as well as chronic lower extremity weakness/numbness.      ROS: 10-system review is otherwise negative except HPI above.      PAST MEDICAL & SURGICAL HISTORY:  TIA (transient ischemic attack)  HLD (hyperlipidemia)  GERD (gastroesophageal reflux disease)  Anemia  Diabetes  BPH (benign prostatic hyperplasia)  HTN (hypertension)  Stented coronary artery  H/O hernia repair  History of appendectomy    FAMILY HISTORY:  Family history of heart disease (Father)    ALLERGIES: No Known Allergies    HOME MEDICATIONS:   Augmentin, Oxycodone, Tylenol, Plavix, Certirizine, Ezetimibe, Metformin, Baclofen, Aspirin, Baclofen, Losartan/HCTZ, Pilocarpine ophthalmic, Prilosec, Toprol, Zocor, Glucophage    CURRENT MEDICATIONS  MEDICATIONS (STANDING):   MEDICATIONS (PRN):hydrALAZINE Injectable 10 milliGRAM(s) IV Push every 6 hours PRN Systolic/Diastolic >160/90    --------------------------------------------------------------------------------------------  Vitals:   T(C): 36.6 (09-29-17 @ 14:49), Max: 36.8 (09-29-17 @ 12:51)  HR: 65 (09-29-17 @ 14:49) (65 - 68)  BP: 137/65 (09-29-17 @ 14:49) (128/74 - 137/65)  RR: 17 (09-29-17 @ 14:49) (16 - 17)  SpO2: 100% (09-29-17 @ 14:49) (99% - 100%)    PHYSICAL EXAM:   General: NAD, Lying in bed comfortably  Neuro: A+Ox3  HEENT: NC/AT, EOMI  Neck: Soft, supple  Cardio: RRR, nml S1/S2  Resp: Good effort, CTA b/l  Thorax: No chest wall tenderness  GI/Abd: Soft, ND, no rebound/guarding, suprapubic drain in place with grey/purulent fluid in bulb, mild tenderness around drain insertion site.   Vascular: All 4 extremities warm.  Musculoskeletal: All 4 extremities moving  --------------------------------------------------------------------------------------------    LABS  CBC (09-29 @ 13:40)                              11.0<L>                         11.7<H>  )----------------(  405<H>     82.0<H>% Neutrophils, 6.0<L>% Lymphocytes, ANC: 9.0<H>                              34.5<L>  BMP (09-29 @ 13:40)             129<L>  |  90<L>   |  22    		Ca++ --      Ca 9.5                ---------------------------------( 127<H>		Mg --                 4.9     |  22      |  1.06  			Ph --      LFTs (09-29 @ 13:40)      TPro 7.3 / Alb 4.4 / TBili 0.5 / DBili -- / AST 9<L> / ALT 18 / AlkPhos 66  Coags (09-29 @ 13:40)  aPTT 31.1 / INR 0.98 / PT 10.6  Cardiac Markers (09-29 @ 13:40)     Trop: <0.01 -- / CKMB: -- / CK: 19  VBG (09-29 @ 13:40)     7.32<L> / 49 / 20<L> / 24 / -1.5 / 24<L>%     Lactate: 3.0<H>    --------------------------------------------------------------------------------------------  MICROBIOLOGY  Culture - Body Fluid with Gram Stain (09.21.17 @ 17:04)    Gram Stain:   Numerous polymorphonuclear leukocytes per low power field  Numerous Gram Variable Rods per oil power field  Numerous Gram Variable Cocci per oil power field    Specimen Source: Abdominal Fl Abdominal Fluid    Culture Results:   Numerous Alpha hemolytic strep  Moderate Mixed gram negative rods  Numerous Mixed Anaerobic Kindra    --------------------------------------------------------------------------------------------  IMAGING  < from: CT Head No Cont (09.29.17 @ 14:26) >  IMPRESSION:   Acute 1.1 x 0.5 cm parenchymal hemorrhage approximating the posterior   aspect of the left caudate nucleus.  < end of copied text >    < from: CT Abdomen and Pelvis w/ Oral Cont and w/ IV Cont (09.20.17 @ 17:30) >  IMPRESSION: Sigmoid diverticulitis complicated by 4.4 cm anterior pelvic   abscess.  < end of copied text >    < from: IR Procedure (09.21.17 @ 17:31) >  IMPRESSION:   CT  guided drainage of pelvic abscess.  < end of copied text >    --------------------------------------------------------------------------------------------

## 2017-09-29 NOTE — H&P ADULT - NSHPPHYSICALEXAM_GEN_ALL_CORE
Visit Information Date & Time Provider Department Dept. Phone Encounter #  
 2/23/2017 10:40 AM Ashley Ponce MD [facility] [phone] [id number] Upcoming Health Maintenance Date Due  
 HPV AGE 9Y-34Y (1 of 3 - Female 3 Dose Series) 8/7/2003 DTaP/Tdap/Td series (1 - Tdap) 8/7/2013 PAP AKA CERVICAL CYTOLOGY 3/7/2017* *Topic was postponed. The date shown is not the original due date. Allergies as of 2/23/2017  Review Complete On: 2/23/2017 By: Ashley Ponce MD  
 No Known Allergies Current Immunizations  Never Reviewed No immunizations on file. Not reviewed this visit You Were Diagnosed With   
  
 Codes Comments Routine general medical examination at a health care facility    -  Primary ICD-10-CM: Z00.00 ICD-9-CM: V70.0 Fam hx-ischem heart disease     ICD-10-CM: Z82.49 
ICD-9-CM: V17.3 Vitals BP  
  
  
  
  
  
 139/69 (BP 1 Location: Left arm, BP Patient Position: Sitting) BMI and BSA Data Body Mass Index Body Surface Area  
 27.63 kg/m 2 1.9 m 2 Your Updated Medication List  
  
   
This list is accurate as of: 2/23/17 12:05 PM.  Always use your most recent med list.  
  
  
  
  
 MINASTRIN 24 FE 1 mg-20 mcg(24) /75 mg (4) Chew Generic drug:  norethindrone-e.estradiol-iron CHEW ONE TABLET AT THE SAME TIME DAILY  
  
 multivitamin tablet Commonly known as:  ONE A DAY Take 1 Tab by mouth daily. We Performed the Following LIPID PANEL [30003 CPT(R)] METABOLIC PANEL, COMPREHENSIVE [83367 CPT(R)] Patient Instructions [employer] Activation Thank you for requesting access to [employer]. Please follow the instructions below to securely access and download your online medical record. [employer] allows you to send messages to your doctor, view your test results, renew your prescriptions, schedule appointments, and more. How Do I Sign Up? 1.  In your internet browser, go to [email or web address] 
2. Click on the First Time User? Click Here link in the Sign In box. You will be redirect to the New Member Sign Up page. 3. Enter your IntelePeer Access Code exactly as it appears below. You will not need to use this code after youve completed the sign-up process. If you do not sign up before the expiration date, you must request a new code. IntelePeer Access Code: [email or web address] Expires: 2017 10:51 AM (This is the date your IntelePeer access code will ) 4. Enter the last four digits of your Social Security Number (xxxx) and Date of Birth (mm/dd/yyyy) as indicated and click Submit. You will be taken to the next sign-up page. 5. Create a IntelePeer ID. This will be your IntelePeer login ID and cannot be changed, so think of one that is secure and easy to remember. 6. Create a IntelePeer password. You can change your password at any time. 7. Enter your Password Reset Question and Answer. This can be used at a later time if you forget your password. 8. Enter your e-mail address. You will receive e-mail notification when new information is available in [address] 9. Click Sign Up. You can now view and download portions of your medical record. 10. Click the Download Summary menu link to download a portable copy of your medical information. Additional Information If you have questions, please visit the Frequently Asked Questions section of the Inte[employer] website at [email or web address] Remember, IntelePeer is NOT to be used for urgent needs. For medical emergencies, dial 911. Introducing [facility]! Viktor Lamas introduces Inte[employer]Peer patient portal. Now you can access parts of your medical record, email your doctor's office, and request medication refills online. 1. In your internet browser, go to [email or web address] 2. Click on the First Time User? Click Here link in the Sign In box.  You will see the New Member Sign Up page. 3. Enter your gis.to Access Code exactly as it appears below. You will not need to use this code after youve completed the sign-up process. If you do not sign up before the expiration date, you must request a new code. · gis.to Access Code: [email or web address] Expires: 5/24/2017 10:51 AM 
 
4. Enter the last four digits of your Social Security Number (xxxx) and Date of Birth (mm/dd/yyyy) as indicated and click Submit. You will be taken to the next sign-up page. 5. Create a gis.to ID. This will be your gis.to login ID and cannot be changed, so think of one that is secure and easy to remember. 6. Create a gis.to password. You can change your password at any time. 7. Enter your Password Reset Question and Answer. This can be used at a later time if you forget your password. 8. Enter your e-mail address. You will receive e-mail notification when new information is available in [address] 9. Click Sign Up. You can now view and download portions of your medical record. 10. Click the Download Summary menu link to download a portable copy of your medical information. If you have questions, please visit the Frequently Asked Questions section of the gis.to website. Remember, gis.to is NOT to be used for urgent needs. For medical emergencies, dial 911. Now available from your iPhone and Android! Please provide this summary of care documentation to your next provider. Your primary care clinician is listed as TAWANDA ALEJO. If you have any questions after today's visit, please call [phone]. Vital Signs Last 24 Hrs  T(C): 36.6 (29 Sep 2017 14:49), Max: 36.8 (29 Sep 2017 12:51)  T(F): 97.9 (29 Sep 2017 14:49), Max: 98.3 (29 Sep 2017 12:51)  HR: 65 (29 Sep 2017 14:49) (65 - 68)  BP: 137/65 (29 Sep 2017 14:49) (128/74 - 137/65)  BP(mean): --  RR: 17 (29 Sep 2017 14:49) (16 - 17)  SpO2: 100% (29 Sep 2017 14:49) (99% - 100%)    PHYSICAL EXAM:   General appearance: No acute distress                 Mental Status: AAOx3- incorrect month, fluent speech, follows simple commands, able to name./repeat  Cranial Nerves: EOMI, PERRL, VFF, V1-V3 intact, face symmetric,  tongue midline  Motor: 4/5 L hip flex (baseline), 5/5 throughout rest of strength exam  Sensation: Intact to LT throughout  Coordination: FTN intact b/l  Gait: deferred

## 2017-09-29 NOTE — ED PROVIDER NOTE - OBJECTIVE STATEMENT
76 y/o M with PMHX CAD s/p stent, HLD, BPH, HTN, DM, brain hemorrhage, brain infarction, TIA and PSHX hernia repair, appendectomy c/o syncope, dizziness and cold sweats. Pt went to bathroom at 4AM and called for family but could remember how he got there or where he was. He came to ED recently with diverticulitis with abbess two days ago and had a drain placed. . Last head catscan was 2 months ago. Pt also complains of dysuria since abd pain started, decreased appetite and dehydration, intermittent Mild Cp on right side for years with some SOB Recently on amoxicillin clavulanate. Regularly takes aspirin, baclofen, Clopidogrel, ezetimibe, Glucophage, hydrochlorothiazide, Metformin, Prilosec, Zocor, Augmentin, Plavix. Pt did not take all of the medications while in hospital but resumed his medicine on 9/27/17. Denies fevers, vomiting, diarrhea, melena, hematuria. NKDA. Former smoker 77M with PMHx CAD s/p stent (4/2017), HLD, BPH, HTN, DM, "brain hemorrhage", "brain infarctions",  and PSHX hernia repairs and recent drain placed for sigmoid diverticulitis with pelvic abscess presents after syncopal episode this morning.  Family, bedside, reports patient went to bathroom at 4AM and then called for family.  Reports wife found him on the floor but patient thought he was in the bed and but could remember how he got there or where he was.  Reports this morning was in a cold sweat, lightheaded, dizzy, weak.  Family reports that since discharge patient has had poor po intake, decreased appetite and reports patient is "dehydrated".  Patient was recently admitted on 9/20/17 and was discharged on 9/26/17 for sigmoid diverticulitis with pelvic abscess.  Reports during hospital stay had drain placed, reports has been draining.  Denies vomiting, diarrhea, reports constipation, but reports BM today.  Denies blood in stools, hematuria.  Patient with multiple medical complaints.  Reports persistent abdominal pain at lower abdomen, denies redness around drain site.  Reports chronic chest pain, reports chest pain today.  Reports chest pain is R sided and similar to the pain he has had in the past.  Reports shortness of breath with chest pain, also chronic.  Reports chronic lower extremity numbness, unchanged today.  Reports dysuria, burning and abdominal pain since abdominal pain started (before admission).  Reports has been taking discharge medications since 9/27/17.  Review of meds reveals patient is on Augmentin. Regularly takes aspirin, baclofen, Clopidogrel, ezetimibe, Glucophage, hydrochlorothiazide, Metformin, Prilosec, Zocor, Augmentin, Plavix. 77M with PMHx CAD s/p stent (4/2017), HLD, BPH, HTN, DM, "brain hemorrhage", "brain infarctions",  and PSHX hernia repairs and recent drain placed for sigmoid diverticulitis with pelvic abscess presents after syncopal episode this morning.  Family, bedside, reports patient went to bathroom at 4AM and then called for family.  Reports wife found him on the floor but patient thought he was in the bed and but could remember how he got there or where he was.  Reports this morning was in a cold sweat, lightheaded, dizzy, weak.  Family reports that since discharge patient has had poor po intake, decreased appetite and reports patient is "dehydrated".  Patient was recently admitted on 9/20/17 and was discharged on 9/26/17 for sigmoid diverticulitis with pelvic abscess.  Reports during hospital stay had drain placed, reports has been draining.  Denies vomiting, diarrhea, reports constipation, but reports BM today.  Denies blood in stools, hematuria.  Patient with multiple medical complaints.  Reports persistent abdominal pain at lower abdomen, denies redness around drain site.  Reports chronic chest pain, reports chest pain today.  Reports chest pain is R sided and similar to the pain he has had in the past, but reports present at time of incident.  Reports shortness of breath with chest pain, also chronic.  Reports chronic lower extremity numbness, unchanged today.  Reports dysuria, burning and abdominal pain since abdominal pain started (before admission).  Reports has been taking discharge medications since 9/27/17.  Review of meds reveals patient is on Augmentin. Regularly takes aspirin, baclofen, Clopidogrel, ezetimibe, Glucophage, hydrochlorothiazide, Metformin, Prilosec, Zocor, Augmentin, Plavix.

## 2017-09-29 NOTE — ED ADULT NURSE REASSESSMENT NOTE - NS ED NURSE REASSESS COMMENT FT1
Pts VS stable and he is in no acute distress, Pt denies any complaints at this time. Father at bedside. Safety and comfort maintained

## 2017-09-29 NOTE — ED PROVIDER NOTE - CARE PLAN
Principal Discharge DX:	Left-sided nontraumatic intracerebral hemorrhage, unspecified cerebral location

## 2017-09-29 NOTE — ED PROVIDER NOTE - PHYSICAL EXAMINATION
On exam, AAOx3, NAD, head NCAT, PERRL, CN2-12 grossly intact, FROM at neck, no tenderness to palpation or stepoffs along length of spine, lungs CTAB with good inspiratory effort, +S1S2, no m/r/g, abdomen soft with +BS, NT, ND, no CVAT, moving all extremities with 5/5 strength bilateral upper and lower extremities, good and equal  strength bilaterally On exam, AAOx3, NAD, head NCAT, PERRL, CN2-12 grossly intact, FROM at neck, no tenderness to palpation or stepoffs along length of spine, lungs CTAB with good inspiratory effort, +S1S2, no m/r/g, abdomen soft with +BS, +tenderness across lower abdomen, +drain to the R of midline infraumbilically, no erythema, +tenderness around drain, +draining, +suprapubic tenderness, no CVAT, moving all extremities, good and equal  strength bilaterally, sensory grossly intact On exam, AAOx3, NAD, head NCAT, PERRL, CN2-12 grossly intact, FROM at neck, no tenderness to palpation or stepoffs along length of spine, lungs CTAB with good inspiratory effort, +S1S2, no m/r/g, abdomen soft with +BS, +tenderness across lower abdomen, +drain to the R of midline infraumbilically, no erythema, +tenderness around drain, +draining, +suprapubic tenderness, no CVAT, moving all extremities with good strength, good and equal  strength bilaterally, sensory grossly intact

## 2017-09-29 NOTE — ED PROVIDER NOTE - PROGRESS NOTE DETAILS
Attending MD Medrano: Called by radiology, patient with L acute hemorrhage in L basal ganglia. Attending MD Medrano: Spoke with neurology, will see patient Attending MD Medrano: Neurosurgery paged.  (Josh) Reports can be managed by neurology if small, no read in system yet.  Will review images and call back. Attending MD Medrano: Seen by surgery team, will need CT A/P, does not need emergently.  Will follow, no acute surgical intervention.

## 2017-09-29 NOTE — H&P ADULT - ATTENDING COMMENTS
Mr. Chamberlain is a 76y/o RH [place] gentleman with vascular risk factors of age, diabetes, HTN, HLD, history of previous strokes and microhemorrhages who presented yesterday 4am with confusion found on his bathroom floor. He states woke up to go to the bathroom and last thing he remembers is that he was under the impression that when back to bed, upon waking up he was at the bathroom floor and called his wife for aid.  Patient was recently admitted on 17 and was discharged on 17 for sigmoid diverticulitis with pelvic abscess s/p drainage. Reports during hospital stay had drain placed with adequate function just mild tenderness around the area. Upon CT scan evaluation hyperdensity noted at the left caudate territory of lenticulostriates.   Upon exam patient alert, awake, oriented to person, time, and place, fluent speech, intact comprehension and repetition. No CN deficits. Strength full in all 4 extremities. No dysmetria.  Head CT reviewed with left posterior caudate intraparenchymal hemorrhage.   I reviewed a Brain MRI done in 2017 and patient with multiple areas of susceptibility at the deep nuclei and posterior fossa as can be seen with uncontrolled HTN, still could not exclude multiple cavernomas. There was also an area of increased susceptibility and multiple flow voids at right cerebellum which was suspicious for underling AVM.  Given history and neurological exam diagnostic impression non-traumatic subcortical intraparenchymal hemorrhage in the setting of previous imaging findings etiology hypertensive vs underlying vascular lesion cavernoma/AVM. At the past MRI also there are two areas of microhemorhage in locations suspicious for amyloid angiopathy but in this setting considering the ICH is at the caudate is almost improbable.     Patient will benefit from the followin-Brain MRI with and without contrast  2-CTA/Head and neck MRA  3-TTE   4-BP monitor goal <140/90  5-Er-otdwquxvnjq of ASA and Plavix after demonstrating no hematoma expansion    6-DVT with pneumatic compression devices upon objective demonstration of hematoma stabilization will initiate Lovenox   7-PT/OT evaluation Mr. Chamberlain is a 76y/o RH Swazi gentleman with vascular risk factors of age, diabetes, HTN, HLD, history of previous strokes and microhemorrhages who presented yesterday 4am with confusion found on his bathroom floor. He states woke up to go to the bathroom and last thing he remembers is that he was under the impression that when back to bed, upon waking up he was at the bathroom floor and called his wife for aid.  Patient was recently admitted on 17 and was discharged on 17 for sigmoid diverticulitis with pelvic abscess s/p drainage. Reports during hospital stay had drain placed with adequate function just mild tenderness around the area. Upon CT scan evaluation hyperdensity noted at the left caudate territory of lenticulostriates.   Upon exam patient alert, awake, oriented to person, time, and place, fluent speech, intact comprehension and repetition. No CN deficits. Strength full in all 4 extremities. No dysmetria.  Head CT reviewed with left posterior caudate intraparenchymal hemorrhage.   I reviewed a Brain MRI done in 2017 and patient with multiple areas of susceptibility at the deep nuclei and posterior fossa as can be seen with uncontrolled HTN, still could not exclude multiple cavernomas. There was also an area of increased susceptibility and multiple flow voids at right cerebellum which was suspicious for underling AVM.  Given history and neurological exam diagnostic impression non-traumatic subcortical intraparenchymal hemorrhage in the setting of previous imaging findings etiology hypertensive vs underlying vascular lesion cavernoma/AVM. At the past MRI also there are two areas of microhemorhage in locations suspicious for amyloid angiopathy but in this setting considering the ICH is at the caudate is almost improbable.     Patient will benefit from the followin-Brain MRI with and without contrast  2-CTA/Head and neck MRA  3-TTE   4-BP monitor goal <140/90  3-Dx-vkfocekhmxa of ASA and Plavix after demonstrating no hematoma expansion    6-DVT with pneumatic compression devices upon objective demonstration of hematoma stabilization will initiate Lovenox   7-PT/OT evaluation  8-LDL goal <70, patient apparently not on statin therapy not indicated at this moment will re-evaluate with lipid profile results

## 2017-09-29 NOTE — ED ADULT NURSE NOTE - OBJECTIVE STATEMENT
Pt is a 76 yo male recently d/elidia from hospital 2 days ago after Pt is a 76 yo male recently d/elidia from hospital 2 days ago after having a drained placed for diverticulitis with abscess. This am around 4 pt became dizzy, diaphoretic weak and thought that he was sitting on the bed but realized he was on the floor.  Pts family reports a decrease in PO intake and he is "dehydrated". Pt reports having intermittent right sided chest pain x years and felt the same episode of CP at the time of the incident. Pt denies any N/V/D no cough fever or chills no SOB. Pt has pmh of HLD, HTN, BPH, DM, CAD with stent.

## 2017-09-29 NOTE — H&P ADULT - NSHPLABSRESULTS_GEN_ALL_CORE
11.0   11.7  )-----------( 405      ( 29 Sep 2017 13:40 )             34.5     09-29    129<L>  |  90<L>  |  22  ----------------------------<  127<H>  4.9   |  22  |  1.06    Ca    9.5      29 Sep 2017 13:40    TPro  7.3  /  Alb  4.4  /  TBili  0.5  /  DBili  x   /  AST  9<L>  /  ALT  18  /  AlkPhos  66  09-29            CT Head:   Acute  1.1  x  0.5  cm  parenchymal  hemorrhage  approximating  the  posterior  aspect of  the  left  caudate  nucleus.

## 2017-09-30 LAB
ANION GAP SERPL CALC-SCNC: 16 MMOL/L — SIGNIFICANT CHANGE UP (ref 5–17)
APPEARANCE UR: CLEAR — SIGNIFICANT CHANGE UP
BILIRUB UR-MCNC: NEGATIVE — SIGNIFICANT CHANGE UP
BUN SERPL-MCNC: 15 MG/DL — SIGNIFICANT CHANGE UP (ref 7–23)
CALCIUM SERPL-MCNC: 9.5 MG/DL — SIGNIFICANT CHANGE UP (ref 8.4–10.5)
CHLORIDE SERPL-SCNC: 97 MMOL/L — SIGNIFICANT CHANGE UP (ref 96–108)
CO2 SERPL-SCNC: 20 MMOL/L — LOW (ref 22–31)
COLOR SPEC: COLORLESS — SIGNIFICANT CHANGE UP
CREAT SERPL-MCNC: 0.85 MG/DL — SIGNIFICANT CHANGE UP (ref 0.5–1.3)
CULTURE RESULTS: NO GROWTH — SIGNIFICANT CHANGE UP
DIFF PNL FLD: NEGATIVE — SIGNIFICANT CHANGE UP
GLUCOSE SERPL-MCNC: 136 MG/DL — HIGH (ref 70–99)
GLUCOSE UR QL: NEGATIVE — SIGNIFICANT CHANGE UP
HCT VFR BLD CALC: 38 % — LOW (ref 39–50)
HGB BLD-MCNC: 12 G/DL — LOW (ref 13–17)
KETONES UR-MCNC: NEGATIVE — SIGNIFICANT CHANGE UP
LEUKOCYTE ESTERASE UR-ACNC: NEGATIVE — SIGNIFICANT CHANGE UP
MCHC RBC-ENTMCNC: 21.2 PG — LOW (ref 27–34)
MCHC RBC-ENTMCNC: 31.5 GM/DL — LOW (ref 32–36)
MCV RBC AUTO: 67.2 FL — LOW (ref 80–100)
NITRITE UR-MCNC: NEGATIVE — SIGNIFICANT CHANGE UP
PH UR: 6.5 — SIGNIFICANT CHANGE UP (ref 5–8)
PLATELET # BLD AUTO: 381 K/UL — SIGNIFICANT CHANGE UP (ref 150–400)
POTASSIUM SERPL-MCNC: 4.4 MMOL/L — SIGNIFICANT CHANGE UP (ref 3.5–5.3)
POTASSIUM SERPL-SCNC: 4.4 MMOL/L — SIGNIFICANT CHANGE UP (ref 3.5–5.3)
PROT UR-MCNC: NEGATIVE — SIGNIFICANT CHANGE UP
RBC # BLD: 5.66 M/UL — SIGNIFICANT CHANGE UP (ref 4.2–5.8)
RBC # FLD: 18.1 % — HIGH (ref 10.3–14.5)
RBC CASTS # UR COMP ASSIST: SIGNIFICANT CHANGE UP /HPF (ref 0–2)
SODIUM SERPL-SCNC: 133 MMOL/L — LOW (ref 135–145)
SP GR SPEC: 1 — LOW (ref 1.01–1.02)
SPECIMEN SOURCE: SIGNIFICANT CHANGE UP
UROBILINOGEN FLD QL: NEGATIVE — SIGNIFICANT CHANGE UP
WBC # BLD: 10.3 K/UL — SIGNIFICANT CHANGE UP (ref 3.8–10.5)
WBC # FLD AUTO: 10.3 K/UL — SIGNIFICANT CHANGE UP (ref 3.8–10.5)
WBC UR QL: SIGNIFICANT CHANGE UP /HPF (ref 0–5)

## 2017-09-30 PROCEDURE — 99223 1ST HOSP IP/OBS HIGH 75: CPT

## 2017-09-30 PROCEDURE — 70544 MR ANGIOGRAPHY HEAD W/O DYE: CPT | Mod: 26,59

## 2017-09-30 PROCEDURE — 70553 MRI BRAIN STEM W/O & W/DYE: CPT | Mod: 26

## 2017-09-30 PROCEDURE — 70549 MR ANGIOGRAPH NECK W/O&W/DYE: CPT | Mod: 26

## 2017-09-30 RX ORDER — ACETAMINOPHEN 500 MG
650 TABLET ORAL EVERY 6 HOURS
Qty: 0 | Refills: 0 | Status: DISCONTINUED | OUTPATIENT
Start: 2017-09-30 | End: 2017-10-03

## 2017-09-30 RX ORDER — SODIUM CHLORIDE 9 MG/ML
1000 INJECTION INTRAMUSCULAR; INTRAVENOUS; SUBCUTANEOUS
Qty: 0 | Refills: 0 | Status: DISCONTINUED | OUTPATIENT
Start: 2017-09-30 | End: 2017-10-01

## 2017-09-30 RX ADMIN — SODIUM CHLORIDE 75 MILLILITER(S): 9 INJECTION INTRAMUSCULAR; INTRAVENOUS; SUBCUTANEOUS at 21:24

## 2017-09-30 RX ADMIN — Medication 1 TABLET(S): at 05:59

## 2017-09-30 RX ADMIN — Medication 10 MILLIGRAM(S): at 00:30

## 2017-09-30 RX ADMIN — OXYCODONE HYDROCHLORIDE 5 MILLIGRAM(S): 5 TABLET ORAL at 05:59

## 2017-09-30 RX ADMIN — Medication 12.5 MILLIGRAM(S): at 05:59

## 2017-09-30 RX ADMIN — Medication 1 TABLET(S): at 17:13

## 2017-09-30 RX ADMIN — LOSARTAN POTASSIUM 100 MILLIGRAM(S): 100 TABLET, FILM COATED ORAL at 06:00

## 2017-09-30 RX ADMIN — SIMVASTATIN 40 MILLIGRAM(S): 20 TABLET, FILM COATED ORAL at 21:24

## 2017-09-30 RX ADMIN — Medication 100 MILLIGRAM(S): at 06:00

## 2017-09-30 RX ADMIN — OXYCODONE HYDROCHLORIDE 5 MILLIGRAM(S): 5 TABLET ORAL at 06:50

## 2017-09-30 RX ADMIN — OXYCODONE HYDROCHLORIDE 5 MILLIGRAM(S): 5 TABLET ORAL at 20:45

## 2017-09-30 RX ADMIN — OXYCODONE HYDROCHLORIDE 5 MILLIGRAM(S): 5 TABLET ORAL at 21:15

## 2017-09-30 RX ADMIN — SODIUM CHLORIDE 75 MILLILITER(S): 9 INJECTION INTRAMUSCULAR; INTRAVENOUS; SUBCUTANEOUS at 17:15

## 2017-09-30 NOTE — PROVIDER CONTACT NOTE (OTHER) - BACKGROUND
Admitted for parenchymal hemorrhage in the posterior aspect of the left caudate nucleus. s/p drain for sigmoid diverticulitis with pelvic abscess on 9/26/17

## 2017-10-01 LAB
ANION GAP SERPL CALC-SCNC: 13 MMOL/L — SIGNIFICANT CHANGE UP (ref 5–17)
BUN SERPL-MCNC: 20 MG/DL — SIGNIFICANT CHANGE UP (ref 7–23)
CALCIUM SERPL-MCNC: 9.1 MG/DL — SIGNIFICANT CHANGE UP (ref 8.4–10.5)
CHLORIDE SERPL-SCNC: 94 MMOL/L — LOW (ref 96–108)
CHOLEST SERPL-MCNC: 128 MG/DL — SIGNIFICANT CHANGE UP (ref 10–199)
CO2 SERPL-SCNC: 25 MMOL/L — SIGNIFICANT CHANGE UP (ref 22–31)
CREAT SERPL-MCNC: 1.09 MG/DL — SIGNIFICANT CHANGE UP (ref 0.5–1.3)
GLUCOSE SERPL-MCNC: 116 MG/DL — HIGH (ref 70–99)
HCT VFR BLD CALC: 35 % — LOW (ref 39–50)
HDLC SERPL-MCNC: 46 MG/DL — SIGNIFICANT CHANGE UP (ref 40–125)
HGB BLD-MCNC: 11.1 G/DL — LOW (ref 13–17)
LIPID PNL WITH DIRECT LDL SERPL: 65 MG/DL — SIGNIFICANT CHANGE UP
MCHC RBC-ENTMCNC: 21.4 PG — LOW (ref 27–34)
MCHC RBC-ENTMCNC: 31.8 GM/DL — LOW (ref 32–36)
MCV RBC AUTO: 67.4 FL — LOW (ref 80–100)
PLATELET # BLD AUTO: 368 K/UL — SIGNIFICANT CHANGE UP (ref 150–400)
POTASSIUM SERPL-MCNC: 4.6 MMOL/L — SIGNIFICANT CHANGE UP (ref 3.5–5.3)
POTASSIUM SERPL-SCNC: 4.6 MMOL/L — SIGNIFICANT CHANGE UP (ref 3.5–5.3)
RBC # BLD: 5.19 M/UL — SIGNIFICANT CHANGE UP (ref 4.2–5.8)
RBC # FLD: 17.6 % — HIGH (ref 10.3–14.5)
SODIUM SERPL-SCNC: 132 MMOL/L — LOW (ref 135–145)
TOTAL CHOLESTEROL/HDL RATIO MEASUREMENT: 2.8 RATIO — LOW (ref 3.4–9.6)
TRIGL SERPL-MCNC: 83 MG/DL — SIGNIFICANT CHANGE UP (ref 10–149)
WBC # BLD: 9.3 K/UL — SIGNIFICANT CHANGE UP (ref 3.8–10.5)
WBC # FLD AUTO: 9.3 K/UL — SIGNIFICANT CHANGE UP (ref 3.8–10.5)

## 2017-10-01 RX ORDER — SODIUM CHLORIDE 9 MG/ML
2 INJECTION INTRAMUSCULAR; INTRAVENOUS; SUBCUTANEOUS
Qty: 0 | Refills: 0 | Status: DISCONTINUED | OUTPATIENT
Start: 2017-10-01 | End: 2017-10-03

## 2017-10-01 RX ORDER — ENOXAPARIN SODIUM 100 MG/ML
40 INJECTION SUBCUTANEOUS DAILY
Qty: 0 | Refills: 0 | Status: DISCONTINUED | OUTPATIENT
Start: 2017-10-01 | End: 2017-10-03

## 2017-10-01 RX ORDER — ASPIRIN/CALCIUM CARB/MAGNESIUM 324 MG
81 TABLET ORAL DAILY
Qty: 0 | Refills: 0 | Status: DISCONTINUED | OUTPATIENT
Start: 2017-10-01 | End: 2017-10-03

## 2017-10-01 RX ADMIN — Medication 81 MILLIGRAM(S): at 10:39

## 2017-10-01 RX ADMIN — ENOXAPARIN SODIUM 40 MILLIGRAM(S): 100 INJECTION SUBCUTANEOUS at 10:39

## 2017-10-01 RX ADMIN — OXYCODONE HYDROCHLORIDE 5 MILLIGRAM(S): 5 TABLET ORAL at 21:26

## 2017-10-01 RX ADMIN — Medication 1 TABLET(S): at 17:00

## 2017-10-01 RX ADMIN — OXYCODONE HYDROCHLORIDE 5 MILLIGRAM(S): 5 TABLET ORAL at 10:40

## 2017-10-01 RX ADMIN — SIMVASTATIN 40 MILLIGRAM(S): 20 TABLET, FILM COATED ORAL at 21:00

## 2017-10-01 RX ADMIN — SODIUM CHLORIDE 2 GRAM(S): 9 INJECTION INTRAMUSCULAR; INTRAVENOUS; SUBCUTANEOUS at 17:46

## 2017-10-01 RX ADMIN — Medication 12.5 MILLIGRAM(S): at 06:22

## 2017-10-01 RX ADMIN — Medication 100 MILLIGRAM(S): at 06:22

## 2017-10-01 RX ADMIN — Medication 1 TABLET(S): at 06:21

## 2017-10-01 RX ADMIN — LOSARTAN POTASSIUM 100 MILLIGRAM(S): 100 TABLET, FILM COATED ORAL at 06:22

## 2017-10-01 RX ADMIN — OXYCODONE HYDROCHLORIDE 5 MILLIGRAM(S): 5 TABLET ORAL at 20:56

## 2017-10-01 NOTE — OCCUPATIONAL THERAPY INITIAL EVALUATION ADULT - PERTINENT HX OF CURRENT PROBLEM, REHAB EVAL
76 y/o male p/w syncopal episode this morning.  Family reports pt went to bathroom at 4am & then called for family.  Wife found him on the floor but pt thought he was in bed, bu could remember how he got there or where he was. Reports this mornign was in a cold sweat, light headed, dizzy, weak. Pt recently discarted 9/26/17 for sigmoid diverticulitis, and per family, since discharge pt has had poor PO intake, decreased appetite & reports pt as being "dehydrated."  (see below)... 78 y/o male p/w syncopal episode this morning.  Family reports pt went to bathroom at 4am & then called for family.  Wife found him on the floor but pt thought he was in bed, but could remember how he got there or where he was. Reports this mornign was in a cold sweat, light headed, dizzy, weak. Pt recently discharged 9/26/17 for sigmoid diverticulitis, and per family, since discharge pt has had poor PO intake, decreased appetite & reports pt as being "dehydrated."  (see below)...

## 2017-10-01 NOTE — PHYSICAL THERAPY INITIAL EVALUATION ADULT - ADDITIONAL COMMENTS
Pt rents a room in a private house, his x-wife is the landlord, and helps on occasion. Daughter assists for 4 hours a day. Ambulates using a straight cane. Has 4 steps to enter, +rail.

## 2017-10-01 NOTE — OCCUPATIONAL THERAPY INITIAL EVALUATION ADULT - LIVES WITH, PROFILE
private home (rents a room in private home, landlord can assist per pt), 4 steps to enter, +rail, bed/bath main floor,/alone

## 2017-10-01 NOTE — PROGRESS NOTE ADULT - ASSESSMENT
ASSESSMENT: 76y/o RH [place] man PMH diabetes, HTN, HLD, history of previous strokes and microhemorrhages who presented yesterday 4am with confusion found on his bathroom floor. Patient was recently admitted on 9/20/17 and was discharged on 9/26/17 for sigmoid diverticulitis with pelvic abscess s/p drainage. Head CT shows a left posterior caudate intraparenchymal hemorrhage. Brain MRI done in 7/2017 and patient with multiple areas of susceptibility at the deep nuclei and posterior fossa as can be seen with uncontrolled HTN, still could not exclude multiple cavernomas. There was also an area of increased susceptibility and multiple flow voids at right cerebellum which was suspicious for underling AVM.  Impression: non-traumatic subcortical intraparenchymal hemorrhage in the setting of previous imaging findings etiology hypertensive vs underlying vascular lesion cavernoma/AVM.    NEURO: Continue close monitoring for cerebral edema and neurologic deterioration, SBP <160/90 with titration to normotensive, no need for statin tdue to ICH. Physical therapy/OT/Speech eval/treatment.     ANTITHROMBOTIC THERAPY: none due to ICH    PULMONARY: CXR (09/29) clear, protecting airway, saturating well     CARDIOVASCULAR: check TTE, cardiac monitoring no events, Contacted Dr. Rahul Noland office (pt's private cardiologist) to discuss hospitalization. continue current cardiac meds for BP management                            GASTROINTESTINAL:  dysphagia screen passed tolerating well, Pelvic abscess: as per surgery team continue Augmentin until 10/02 and plan to obtain CT abd/pelvis on 10/05 to eval drain       Diet: Regular (low fiber)    RENAL: BUN/Cr stable, good urine output, hyponatremia, continue NS IVF and will start 2mg BID NaCl tabs      Na Goal: Greater than 135     Mei: N    HEMATOLOGY: H/H 11.1/35.0 stable, Platelets normal      DVT ppx: LMWH     ID: afebrile, no leukocytosis     OTHER:     DISPOSITION: Rehab or home depending on PT eval once stable and workup is complete    CORE MEASURES:        Admission NIHSS:      TPA:  NO      LDL/HDL: 65/46     Depression Screen: 0     Statin Therapy: N ICH     Dysphagia Screen: PASS      Smoking NO      Afib  NO     [employer] Education YES ASSESSMENT: 76y/o RH Citizen of [place] man PMH diabetes, HTN, HLD, history of previous strokes and microhemorrhages who presented yesterday 4am with confusion found on his bathroom floor. Patient was recently admitted on 9/20/17 and was discharged on 9/26/17 for sigmoid diverticulitis with pelvic abscess s/p drainage. Head CT shows a left posterior caudate intraparenchymal hemorrhage. Brain MRI done in 7/2017 and patient with multiple areas of susceptibility at the deep nuclei and posterior fossa as can be seen with uncontrolled HTN, still could not exclude multiple cavernomas. There was also an area of increased susceptibility and multiple flow voids at right cerebellum which was suspicious for underling AVM.  Impression: non-traumatic subcortical intraparenchymal hemorrhage in the setting of previous imaging findings etiology hypertensive vs underlying vascular lesion cavernoma/AVM.    NEURO: Continue close monitoring for cerebral edema and neurologic deterioration, SBP <160/90 with titration to normotensive, will start ASA and continue home dose statin for secondary stroke prevention. Physical therapy/OT/Speech eval/treatment.     ANTITHROMBOTIC THERAPY: Will start ASA due to cardiac stents    PULMONARY: CXR (09/29) clear, protecting airway, saturating well     CARDIOVASCULAR: check TTE, cardiac monitoring no events, Contacted Dr. Rahul Noland office (pt's private cardiologist) to discuss hospitalization. continue current cardiac meds for BP management                            GASTROINTESTINAL:  dysphagia screen passed tolerating well, Pelvic abscess: as per surgery team continue Augmentin until 10/02 and plan to obtain CT abd/pelvis on 10/05 to eval drain       Diet: Regular (low fiber)    RENAL: BUN/Cr stable, good urine output, hyponatremia, continue NS IVF and will start 2mg BID NaCl tabs      Na Goal: Greater than 135     Mei: N    HEMATOLOGY: H/H 11.1/35.0 stable, Platelets normal      DVT ppx: LMWH     ID: afebrile, no leukocytosis     OTHER:     DISPOSITION: D/C with home OT pending PT eval once stable and workup is complete    CORE MEASURES:        Admission NIHSS:      TPA:  NO      LDL/HDL: 65/46     Depression Screen: 0     Statin Therapy: Y     Dysphagia Screen: PASS      Smoking NO      Afib  NO     [employer] Education YES ASSESSMENT: 76y/o RH [place] man PMH diabetes, HTN, HLD, history of previous strokes and microhemorrhages who presented yesterday 4am with confusion found on his bathroom floor. Patient was recently admitted on 9/20/17 and was discharged on 9/26/17 for sigmoid diverticulitis with pelvic abscess s/p drainage. Head CT shows a left posterior caudate intraparenchymal hemorrhage. Brain MRI done in 7/2017 and patient with multiple areas of susceptibility at the deep nuclei and posterior fossa as can be seen with uncontrolled HTN, still could not exclude multiple cavernomas. There was also an area of increased susceptibility and multiple flow voids at right cerebellum which was suspicious for underling AVM.    Impression: Non-traumatic subcortical intraparenchymal hemorrhage in the setting of previous imaging findings etiology hypertensive vs underlying vascular lesion cavernoma/AVM.     NEURO: Continue close monitoring for cerebral edema and neurologic deterioration, SBP <140/90, will start ASA and continue home dose statin for secondary stroke prevention. Physical therapy/OT/Speech eval/treatment. Digital Substraction Angiography could be considered as outpatient for better definition of R cerebellar areas of increase susceptibility at MRI. TTE scheduled can not rule out possibility of embolic source.      ANTITHROMBOTIC THERAPY: Will start ASA due to cardiac stents and demonstration of stable hematoma with no significant risk of expansion    PULMONARY: CXR (09/29) clear, protecting airway, saturating well     CARDIOVASCULAR: Check TTE, cardiac monitoring no events, Contacted Dr. Rahul Noland office (pt's private cardiologist) to discuss hospitalization. Continue current cardiac meds for BP management                            GASTROINTESTINAL: Dysphagia screen passed tolerating well, Pelvic abscess: as per surgery team continue Augmentin until 10/02 and plan to obtain CT abd/pelvis on 10/05 to eval drain       Diet: Regular (low fiber)    RENAL: BUN/Cr stable, good urine output, hyponatremia, continue NS IVF and will start 2mg BID NaCl tabs      Na Goal: Greater than 135     Mei: N    HEMATOLOGY: Stable Hgb and Hct 11.1/35.0, last hospital stay anemia determined to be microcytic anemia and iron studies consistent with iron deficiency. Plan was that taking into consideration MCV, and given nationality/heritage patient was to be followed outpt PMD for electrophoresis r/o beta thalassemia minor prior to starting iron sulfate.     DVT ppx: LMWH     ID: Afebrile, no leukocytosis     DISPOSITION: D/C with home OT pending PT eval once stable and workup is complete    CORE MEASURES:        Admission NIHSS:      TPA:  NO      LDL/HDL: 65/46     Depression Screen: 0     Statin Therapy: Y     Dysphagia Screen: PASS      Smoking NO      Afib  NO     [employer] Education YES

## 2017-10-01 NOTE — PROGRESS NOTE ADULT - SUBJECTIVE AND OBJECTIVE BOX
THE PATIENT WAS SEEN AND EXAMINED BY ME WITH THE HOUSESTAFF AND STROKE TEAM DURING MORNING ROUNDS.   HPI:  78YO RHM w/ CAD s/p cardiac stent (12/14/16), HLD, HTN, DM, right cerebellar infarct and microhemorrhages in 07/2017 discharged on 09/26 s/p drain placed for pelvic abscess 2/2 sigmoid diverticulitis. Presented to [facility] ED after having an episode of syncope on 09/29. Family at bedside, reports patient went to bathroom at 4AM and then called for family. Wife found him on the floor but patient thought he was in the bed and but could remember how he got there or where he was. Head Ct Shows small IPH in L caudate    SUBJECTIVE: No events overnight.  No new neurologic complaints.      hydrALAZINE Injectable 10 milliGRAM(s) IV Push every 6 hours PRN  oxyCODONE    IR 5 milliGRAM(s) Oral every 4 hours PRN  simvastatin 40 milliGRAM(s) Oral at bedtime  metoprolol succinate  milliGRAM(s) Oral daily  amoxicillin  875 milliGRAM(s)/clavulanate 1 Tablet(s) Oral two times a day  losartan 100 milliGRAM(s) Oral daily  hydrochlorothiazide 12.5 milliGRAM(s) Oral daily  insulin lispro (HumaLOG) corrective regimen sliding scale   SubCutaneous three times a day before meals  dextrose 5%. 1000 milliLiter(s) IV Continuous <Continuous>  dextrose Gel 1 Dose(s) Oral once PRN  dextrose 50% Injectable 12.5 Gram(s) IV Push once  dextrose 50% Injectable 25 Gram(s) IV Push once  dextrose 50% Injectable 25 Gram(s) IV Push once  glucagon  Injectable 1 milliGRAM(s) IntraMuscular once PRN  influenza   Vaccine 0.5 milliLiter(s) IntraMuscular once  acetaminophen   Tablet. 650 milliGRAM(s) Oral every 6 hours PRN  sodium chloride 0.9%. 1000 milliLiter(s) IV Continuous <Continuous>      PHYSICAL EXAM:   Vital Signs Last 24 Hrs  T(C): 36.4 (01 Oct 2017 06:13), Max: 36.8 (30 Sep 2017 20:00)  T(F): 97.6 (01 Oct 2017 06:13), Max: 98.2 (30 Sep 2017 20:00)  HR: 65 (01 Oct 2017 06:13) (52 - 79)  BP: 138/57 (01 Oct 2017 06:13) (120/66 - 164/82)  BP(mean): 79 (01 Oct 2017 06:13) (77 - 135)  RR: 14 (01 Oct 2017 06:13) (11 - 22)  SpO2: 98% (01 Oct 2017 06:13) (72% - 100%)    General: No acute distress  HEENT: EOM intact, visual fields full  Abdomen: Soft, nontender, nondistended   Extremities: No edema    NEUROLOGICAL EXAM:  Mental status: Awake, alert, oriented x3, fluent speech, no neglect, able to follow commands, ID objects  Cranial Nerves: No facial asymmetry, no nystagmus, no dysarthria, tongue midline, shoulder shrug intact bilaterally.  Motor exam: Normal tone, no drift, RUE 5/5, RLE 5/5, LUE 5/5, LLE 5/5.  Sensation: Intact to light touch   Coordination/ Gait: No dysmetria.    LABS:                        11.1   9.3   )-----------( 368      ( 01 Oct 2017 02:54 )             35.0    10-01    132<L>  |  94<L>  |  20  ----------------------------<  116<H>  4.6   |  25  |  1.09    Ca    9.1      01 Oct 2017 02:54    TPro  7.3  /  Alb  4.4  /  TBili  0.5  /  DBili  x   /  AST  9<L>  /  ALT  18  /  AlkPhos  66  09-29  PT/INR - ( 29 Sep 2017 13:40 )   PT: 10.6 sec;   INR: 0.98 ratio         PTT - ( 29 Sep 2017 13:40 )  PTT:31.1 sec  Hemoglobin A1C, Whole Blood: 6.6 % (09-22 @ 09:38)      IMAGING: Reviewed by me.     Head CT (09/29) Acute 1.1 x 0.5 cm parenchymal hemorrhage approximating the posterior aspect of the left caudate nucleus.  Brain MRI/MRA (09/30) MRI BRAIN: 1.2 x 1.7 cm region of abnormal parenchymal enhancement in the posterior right cerebellar hemisphere is stable since 7/8/2017. Multiple  foci of susceptibility artifact are associated with this region of enhancement. There is no edema surrounding this region of enhancement. Similar appearing 1.0 x 0.5 cm early subacute parenchymal hemorrhage approximating the lateral aspect of the left ventricular body, just posterior to the left caudate nucleus. The presence of T1 shortening limits evaluation for underlying enhancement and diffusion abnormality. Redemonstration of numerous foci of susceptibility artifact in the thalami and bilateral cerebellar hemispheres. These may represent cavernoma or chronic microhemorrhages.  MRA BRAIN: Limited by motion. No gross evidence for flow-limiting stenosis or vascular aneurysm. MRA NECK: No flow-limiting stenosis. Carotid bifurcations and origins of the common carotid and vertebral arteries unremarkable. THE PATIENT WAS SEEN AND EXAMINED BY ME WITH THE HOUSESTAFF AND STROKE TEAM DURING MORNING ROUNDS.   HPI:  76YO RHM w/ CAD s/p cardiac stent (12/14/16), HLD, HTN, DM, right cerebellar infarct and microhemorrhages in 07/2017 discharged on 09/26 s/p drain placed for pelvic abscess 2/2 sigmoid diverticulitis. Presented to [facility] ED after having an episode of syncope on 09/29. Family at bedside, reports patient went to bathroom at 4AM and then called for family. Wife found him on the floor but patient thought he was in the bed and but could remember how he got there or where he was. Head Ct Shows small IPH in L caudate    SUBJECTIVE: No events overnight.  No new neurologic complaints.      hydrALAZINE Injectable 10 milliGRAM(s) IV Push every 6 hours PRN  oxyCODONE    IR 5 milliGRAM(s) Oral every 4 hours PRN  simvastatin 40 milliGRAM(s) Oral at bedtime  metoprolol succinate  milliGRAM(s) Oral daily  amoxicillin  875 milliGRAM(s)/clavulanate 1 Tablet(s) Oral two times a day  losartan 100 milliGRAM(s) Oral daily  hydrochlorothiazide 12.5 milliGRAM(s) Oral daily  insulin lispro (HumaLOG) corrective regimen sliding scale   SubCutaneous three times a day before meals  dextrose 5%. 1000 milliLiter(s) IV Continuous <Continuous>  dextrose Gel 1 Dose(s) Oral once PRN  dextrose 50% Injectable 12.5 Gram(s) IV Push once  dextrose 50% Injectable 25 Gram(s) IV Push once  dextrose 50% Injectable 25 Gram(s) IV Push once  glucagon  Injectable 1 milliGRAM(s) IntraMuscular once PRN  influenza   Vaccine 0.5 milliLiter(s) IntraMuscular once  acetaminophen   Tablet. 650 milliGRAM(s) Oral every 6 hours PRN  sodium chloride 0.9%. 1000 milliLiter(s) IV Continuous <Continuous>      PHYSICAL EXAM:   Vital Signs Last 24 Hrs  T(C): 36.4 (01 Oct 2017 06:13), Max: 36.8 (30 Sep 2017 20:00)  T(F): 97.6 (01 Oct 2017 06:13), Max: 98.2 (30 Sep 2017 20:00)  HR: 65 (01 Oct 2017 06:13) (52 - 79)  BP: 138/57 (01 Oct 2017 06:13) (120/66 - 164/82)  BP(mean): 79 (01 Oct 2017 06:13) (77 - 135)  RR: 14 (01 Oct 2017 06:13) (11 - 22)  SpO2: 98% (01 Oct 2017 06:13) (72% - 100%)    General: No acute distress  HEENT: EOM intact, visual fields full  Abdomen: Soft, nontender, nondistended   Extremities: No edema    NEUROLOGICAL EXAM:  Mental status: Awake, alert, oriented x3, fluent speech, no neglect, able to follow commands, ID objects  Cranial Nerves: No facial asymmetry, no nystagmus, no dysarthria, tongue midline, shoulder shrug intact bilaterally.  Motor exam: Normal tone, no drift, RUE 5/5, RLE 5/5, LUE 5/5, LLE 5/5.  Sensation: Intact to light touch   Coordination/ Gait: No dysmetria.    LABS:                        11.1   9.3   )-----------( 368      ( 01 Oct 2017 02:54 )             35.0    10-01    132<L>  |  94<L>  |  20  ----------------------------<  116<H>  4.6   |  25  |  1.09    Ca    9.1      01 Oct 2017 02:54    TPro  7.3  /  Alb  4.4  /  TBili  0.5  /  DBili  x   /  AST  9<L>  /  ALT  18  /  AlkPhos  66  09-29  PT/INR - ( 29 Sep 2017 13:40 )   PT: 10.6 sec;   INR: 0.98 ratio         PTT - ( 29 Sep 2017 13:40 )  PTT:31.1 sec  Hemoglobin A1C, Whole Blood: 6.6 % (09-22 @ 09:38)      IMAGING: Reviewed by me.     Head CT (09/29) Acute 1.1 x 0.5 cm parenchymal hemorrhage approximating the posterior aspect of the left caudate nucleus.  Brain MRI/MRA (09/30) MRI BRAIN: 1.2 x 1.7 cm region of abnormal parenchymal enhancement in the posterior right cerebellar hemisphere is stable since 7/8/2017. Multiple foci of susceptibility artifact are associated with this region of enhancement. There is no edema surrounding this region of enhancement. Similar appearing 1.0 x 0.5 cm early subacute parenchymal hemorrhage approximating the lateral aspect of the left ventricular body, just posterior to the left caudate nucleus. The presence of T1 shortening limits evaluation for underlying enhancement and diffusion abnormality. Re-demonstration of numerous foci of susceptibility artifact in the thalami and bilateral cerebellar hemispheres. These may represent cavernoma or chronic microhemorrhages.  MRA BRAIN: Limited by motion. No gross evidence for flow-limiting stenosis or vascular aneurysm. MRA NECK: No flow-limiting stenosis. Carotid bifurcations and origins of the common carotid and vertebral arteries unremarkable.

## 2017-10-01 NOTE — PHYSICAL THERAPY INITIAL EVALUATION ADULT - MANUAL MUSCLE TESTING RESULTS, REHAB EVAL
NESHA shoulder flexion, elbow flexion/extension, , L hip flexion, NESHA knee extension and ankle DF 5/5. R hip flexion 4+/5

## 2017-10-01 NOTE — PHYSICAL THERAPY INITIAL EVALUATION ADULT - PERTINENT HX OF CURRENT PROBLEM, REHAB EVAL
77 y/oM admitted 9/29 s/p syncopal episode, fount on floor of bathroom at 4AM. Lower abdominal pain, R sided chest pain. Found to have L intraparenchymal hemorrhage. PMH CAD s/p stent (4/2017), HLD, BPH, HTN, DM, "brain hemorrhage", "brain infarctions", recent drain placed for sigmoid diverticulitis with pelvic abscess (d/c 3d pta on 9/26). As per H&P, family reports decreased appetite and po intake since d/c from hospital 9/26.

## 2017-10-01 NOTE — OCCUPATIONAL THERAPY INITIAL EVALUATION ADULT - ANTICIPATED DISCHARGE DISPOSITION, OT EVAL
home OT for ADLs, supervision/assist for functional activities as needed (pt requesting home health aides)

## 2017-10-02 LAB
ANION GAP SERPL CALC-SCNC: 15 MMOL/L — SIGNIFICANT CHANGE UP (ref 5–17)
BUN SERPL-MCNC: 17 MG/DL — SIGNIFICANT CHANGE UP (ref 7–23)
CALCIUM SERPL-MCNC: 8.8 MG/DL — SIGNIFICANT CHANGE UP (ref 8.4–10.5)
CHLORIDE SERPL-SCNC: 95 MMOL/L — LOW (ref 96–108)
CO2 SERPL-SCNC: 22 MMOL/L — SIGNIFICANT CHANGE UP (ref 22–31)
CREAT SERPL-MCNC: 0.94 MG/DL — SIGNIFICANT CHANGE UP (ref 0.5–1.3)
GLUCOSE SERPL-MCNC: 116 MG/DL — HIGH (ref 70–99)
HCT VFR BLD CALC: 33.6 % — LOW (ref 39–50)
HGB BLD-MCNC: 10.8 G/DL — LOW (ref 13–17)
MCHC RBC-ENTMCNC: 21.6 PG — LOW (ref 27–34)
MCHC RBC-ENTMCNC: 32.1 GM/DL — SIGNIFICANT CHANGE UP (ref 32–36)
MCV RBC AUTO: 67.5 FL — LOW (ref 80–100)
PLATELET # BLD AUTO: 347 K/UL — SIGNIFICANT CHANGE UP (ref 150–400)
POTASSIUM SERPL-MCNC: 4.4 MMOL/L — SIGNIFICANT CHANGE UP (ref 3.5–5.3)
POTASSIUM SERPL-SCNC: 4.4 MMOL/L — SIGNIFICANT CHANGE UP (ref 3.5–5.3)
RBC # BLD: 4.97 M/UL — SIGNIFICANT CHANGE UP (ref 4.2–5.8)
RBC # FLD: 17.7 % — HIGH (ref 10.3–14.5)
SODIUM SERPL-SCNC: 132 MMOL/L — LOW (ref 135–145)
WBC # BLD: 9 K/UL — SIGNIFICANT CHANGE UP (ref 3.8–10.5)
WBC # FLD AUTO: 9 K/UL — SIGNIFICANT CHANGE UP (ref 3.8–10.5)

## 2017-10-02 PROCEDURE — 99233 SBSQ HOSP IP/OBS HIGH 50: CPT

## 2017-10-02 RX ORDER — DOCUSATE SODIUM 100 MG
100 CAPSULE ORAL THREE TIMES A DAY
Qty: 0 | Refills: 0 | Status: DISCONTINUED | OUTPATIENT
Start: 2017-10-02 | End: 2017-10-03

## 2017-10-02 RX ORDER — SENNA PLUS 8.6 MG/1
2 TABLET ORAL AT BEDTIME
Qty: 0 | Refills: 0 | Status: DISCONTINUED | OUTPATIENT
Start: 2017-10-02 | End: 2017-10-03

## 2017-10-02 RX ORDER — INSULIN LISPRO 100/ML
VIAL (ML) SUBCUTANEOUS AT BEDTIME
Qty: 0 | Refills: 0 | Status: DISCONTINUED | OUTPATIENT
Start: 2017-10-02 | End: 2017-10-03

## 2017-10-02 RX ORDER — METOPROLOL TARTRATE 50 MG
1 TABLET ORAL
Qty: 0 | Refills: 0 | COMMUNITY
Start: 2017-10-02

## 2017-10-02 RX ORDER — SIMVASTATIN 20 MG/1
1 TABLET, FILM COATED ORAL
Qty: 0 | Refills: 0 | DISCHARGE
Start: 2017-10-02

## 2017-10-02 RX ADMIN — ENOXAPARIN SODIUM 40 MILLIGRAM(S): 100 INJECTION SUBCUTANEOUS at 15:43

## 2017-10-02 RX ADMIN — SODIUM CHLORIDE 2 GRAM(S): 9 INJECTION INTRAMUSCULAR; INTRAVENOUS; SUBCUTANEOUS at 05:26

## 2017-10-02 RX ADMIN — Medication 81 MILLIGRAM(S): at 15:43

## 2017-10-02 RX ADMIN — Medication 12.5 MILLIGRAM(S): at 05:27

## 2017-10-02 RX ADMIN — LOSARTAN POTASSIUM 100 MILLIGRAM(S): 100 TABLET, FILM COATED ORAL at 05:27

## 2017-10-02 RX ADMIN — SENNA PLUS 2 TABLET(S): 8.6 TABLET ORAL at 23:09

## 2017-10-02 RX ADMIN — Medication 100 MILLIGRAM(S): at 23:09

## 2017-10-02 RX ADMIN — SIMVASTATIN 40 MILLIGRAM(S): 20 TABLET, FILM COATED ORAL at 21:03

## 2017-10-02 RX ADMIN — Medication 1 TABLET(S): at 05:26

## 2017-10-02 RX ADMIN — Medication 100 MILLIGRAM(S): at 05:27

## 2017-10-02 RX ADMIN — SODIUM CHLORIDE 2 GRAM(S): 9 INJECTION INTRAMUSCULAR; INTRAVENOUS; SUBCUTANEOUS at 17:35

## 2017-10-02 NOTE — DISCHARGE NOTE ADULT - MEDICATION SUMMARY - MEDICATIONS TO STOP TAKING
I will STOP taking the medications listed below when I get home from the hospital:    Aspirin Enteric Coated 81 mg oral delayed release tablet  -- 1 tab(s) by mouth once a day    clopidogrel 75 mg oral tablet  -- 1 tab(s) by mouth once a day    amoxicillin-clavulanate 875 mg-125 mg oral tablet  -- 1 tab(s) by mouth 2 times a day

## 2017-10-02 NOTE — DISCHARGE NOTE ADULT - CARE PROVIDER_API CALL
Vicki Younger), Neurology  [address] [place] [id number]  Phone: [phone]  Fax: [phone]    Abraham Weiss), Surgery  [address] [place] [address]  Phone: [phone]  Fax: [phone] Vicki Younger), Neurology  [address] [place] [address]  Phone: [phone]  Fax: [phone]    Armaan Wilson (MD), ColonRectal Surgery; Surgery  [address]  [address]  [place] [address]  Phone: [phone]  Fax: [phone]

## 2017-10-02 NOTE — DISCHARGE NOTE ADULT - HOME CARE AGENCY
[facility] [phone] FOR VN, PT ,OT AND HHA. VISITING NURSE WILL CALL TO SCHEDULE VISIT DAY AFTER DISCHARGE.

## 2017-10-02 NOTE — DISCHARGE NOTE ADULT - PLAN OF CARE
Prevention of recurrence Please follow up with vascular neurology, Please follow up with vascular neurology, Dr. Grijalva.    Restart Aspirin on 10/7  Restart Plavix on 10/9 Please follow up with vascular neurology, Dr. Grijalva.  repeat MRI/MRA in 2-4 weeks    Restart Aspirin on 10/7  Restart Plavix on 10/9

## 2017-10-02 NOTE — PROGRESS NOTE ADULT - SUBJECTIVE AND OBJECTIVE BOX
THE PATIENT WAS SEEN AND EXAMINED BY ME WITH THE HOUSESTAFF AND STROKE TEAM DURING MORNING ROUNDS.   HPI:  [id number] RHM with PMHx CAD s/p stent (4/2017), HLD, BPH, HTN, DM, "brain hemorrhage", "brain infarctions", recent drain placed for sigmoid diverticulitis with pelvic abscess (d/c 3 d ago on 9/26) presents after syncopal episode day of admission.  Family at bedside, reported patient went to bathroom at 4AM and then called for family. Wife found him on the floor but patient thought he was in the bed and but could remember how he got there or where he was.  Reported the morning of admission he was in a cold sweat, lightheaded, dizzy, weak.  Family reports that since discharge from previous admission patient has had poor po intake, decreased appetite and reported patient as being "dehydrated". Patient was recently admitted on 9/20/17 and was discharged on 9/26/17 for sigmoid diverticulitis with pelvic abscess s/p drainage. Reports during hospital stay had drain placed, reports that is has been draining. Denied vomiting, diarrhea, reports constipation.  Reports persistent abdominal pain at lower abdomen, denies redness around drain site.  Reports chest pain is R sided and similar to the pain he has had in the past, but reports present at time of incident.  Reports shortness of breath with chest pain, also chronic.  Reports chronic lower extremity numbness, unchanged today.  Reports dysuria, burning and abdominal pain since abdominal pain started (before admission).  Reports has been taking discharge medications since 9/27/17.     SUBJECTIVE: No events overnight.  No new neurologic complaints.      hydrALAZINE Injectable 10 milliGRAM(s) IV Push every 6 hours PRN  oxyCODONE    IR 5 milliGRAM(s) Oral every 4 hours PRN  simvastatin 40 milliGRAM(s) Oral at bedtime  metoprolol succinate  milliGRAM(s) Oral daily  losartan 100 milliGRAM(s) Oral daily  hydrochlorothiazide 12.5 milliGRAM(s) Oral daily  insulin lispro (HumaLOG) corrective regimen sliding scale   SubCutaneous three times a day before meals  dextrose 5%. 1000 milliLiter(s) IV Continuous <Continuous>  dextrose Gel 1 Dose(s) Oral once PRN  dextrose 50% Injectable 12.5 Gram(s) IV Push once  dextrose 50% Injectable 25 Gram(s) IV Push once  dextrose 50% Injectable 25 Gram(s) IV Push once  glucagon  Injectable 1 milliGRAM(s) IntraMuscular once PRN  influenza   Vaccine 0.5 milliLiter(s) IntraMuscular once  acetaminophen   Tablet. 650 milliGRAM(s) Oral every 6 hours PRN  enoxaparin Injectable 40 milliGRAM(s) SubCutaneous daily  sodium chloride 2 Gram(s) Oral two times a day  aspirin  chewable 81 milliGRAM(s) Oral daily      PHYSICAL EXAM:   Vital Signs Last 24 Hrs  T(C): 36.7 (02 Oct 2017 15:00), Max: 37.1 (02 Oct 2017 13:31)  T(F): 98.1 (02 Oct 2017 15:00), Max: 98.7 (02 Oct 2017 13:31)  HR: 64 (02 Oct 2017 15:00) (61 - 66)  BP: 136/72 (02 Oct 2017 15:00) (122/71 - 157/74)  BP(mean): --  RR: 18 (02 Oct 2017 15:00) (16 - 18)  SpO2: 98% (02 Oct 2017 15:00) (95% - 99%)    General: No acute distress  HEENT: EOM intact, visual fields full  Abdomen: Soft, nontender, nondistended   Extremities: No edema    NEUROLOGICAL EXAM:  Mental status: Awake, alert, oriented x3, no aphasia, no neglect, normal memory   Cranial Nerves: No facial asymmetry, no nystagmus, no dysarthria, no dysphagia, tongue midline, shoulder shrug intact bilaterally.  Motor exam: Normal tone, no drift, 5/5 RUE, 5/5 RLE, 5/5 LUE, 5/5 LLE, normal fine finger movements.  Sensation: Intact to light touch   Coordination/ Gait: No dysmetria, ALONDRA intact and symmetric bilaterally, on bedrest     LABS:                        10.8   9.0   )-----------( 347      ( 02 Oct 2017 05:34 )             33.6    10-02    132<L>  |  95<L>  |  17  ----------------------------<  116<H>  4.4   |  22  |  0.94    Ca    8.8      02 Oct 2017 05:34      Hemoglobin A1C, Whole Blood: 6.6 % (09-22 @ 09:38)      IMAGING: Reviewed by me. THE PATIENT WAS SEEN AND EXAMINED BY ME WITH THE HOUSESTAFF AND STROKE TEAM DURING MORNING ROUNDS.     HPI:  [id number] RHM with PMHx CAD s/p stent (4/2017), HLD, BPH, HTN, DM, "brain hemorrhage", "brain infarctions", recent drain placed for sigmoid diverticulitis with pelvic abscess (d/c 3 d ago on 9/26) presents after syncopal episode day of admission.  Family at bedside, reported patient went to bathroom at 4AM and then called for family. Wife found him on the floor but patient thought he was in the bed and but could remember how he got there or where he was.  Reported the morning of admission he was in a cold sweat, lightheaded, dizzy, weak.  Family reports that since discharge from previous admission patient has had poor po intake, decreased appetite and reported patient as being "dehydrated". Patient was recently admitted on 9/20/17 and was discharged on 9/26/17 for sigmoid diverticulitis with pelvic abscess s/p drainage. Reports during hospital stay had drain placed, reports that is has been draining. Denied vomiting, diarrhea, reports constipation.  Reports persistent abdominal pain at lower abdomen, denies redness around drain site.  Reports chest pain is R sided and similar to the pain he has had in the past, but reports present at time of incident.  Reports shortness of breath with chest pain, also chronic.  Reports chronic lower extremity numbness, unchanged today.  Reports dysuria, burning and abdominal pain since abdominal pain started (before admission).  Reports has been taking discharge medications since 9/27/17.     SUBJECTIVE: No events overnight.  No new neurologic complaints.      hydrALAZINE Injectable 10 milliGRAM(s) IV Push every 6 hours PRN  oxyCODONE    IR 5 milliGRAM(s) Oral every 4 hours PRN  simvastatin 40 milliGRAM(s) Oral at bedtime  metoprolol succinate  milliGRAM(s) Oral daily  losartan 100 milliGRAM(s) Oral daily  hydrochlorothiazide 12.5 milliGRAM(s) Oral daily  insulin lispro (HumaLOG) corrective regimen sliding scale   SubCutaneous three times a day before meals  dextrose 5%. 1000 milliLiter(s) IV Continuous <Continuous>  dextrose Gel 1 Dose(s) Oral once PRN  dextrose 50% Injectable 12.5 Gram(s) IV Push once  dextrose 50% Injectable 25 Gram(s) IV Push once  dextrose 50% Injectable 25 Gram(s) IV Push once  glucagon  Injectable 1 milliGRAM(s) IntraMuscular once PRN  influenza   Vaccine 0.5 milliLiter(s) IntraMuscular once  acetaminophen   Tablet. 650 milliGRAM(s) Oral every 6 hours PRN  enoxaparin Injectable 40 milliGRAM(s) SubCutaneous daily  sodium chloride 2 Gram(s) Oral two times a day  aspirin  chewable 81 milliGRAM(s) Oral daily      PHYSICAL EXAM:   Vital Signs Last 24 Hrs  T(C): 36.7 (02 Oct 2017 15:00), Max: 37.1 (02 Oct 2017 13:31)  T(F): 98.1 (02 Oct 2017 15:00), Max: 98.7 (02 Oct 2017 13:31)  HR: 64 (02 Oct 2017 15:00) (61 - 66)  BP: 136/72 (02 Oct 2017 15:00) (122/71 - 157/74)  BP(mean): --  RR: 18 (02 Oct 2017 15:00) (16 - 18)  SpO2: 98% (02 Oct 2017 15:00) (95% - 99%)    General: No acute distress  HEENT: EOM intact, visual fields full  Abdomen: Soft, nontender, nondistended   Extremities: No edema    NEUROLOGICAL EXAM:  Mental status: Awake, alert, oriented x3, no aphasia, no neglect, normal memory   Cranial Nerves: Subtle right nasolabial flattening, no nystagmus, mild dysarthria, tongue midline, shoulder shrug intact bilaterally.  Motor exam: Normal tone, no drift, 5/5 RUE, 5/5 RLE, 5/5 LUE, 5/5 LLE, normal fine finger movements.  Sensation: Intact to light touch   Coordination/ Gait: No dysmetria, ALONDRA intact and symmetric bilaterally, on bedrest     LABS:                        10.8   9.0   )-----------( 347      ( 02 Oct 2017 05:34 )             33.6    10-02    132<L>  |  95<L>  |  17  ----------------------------<  116<H>  4.4   |  22  |  0.94    Ca    8.8      02 Oct 2017 05:34      Hemoglobin A1C, Whole Blood: 6.6 % (09-22 @ 09:38)      IMAGING: Reviewed by me.

## 2017-10-02 NOTE — DISCHARGE NOTE ADULT - PATIENT PORTAL LINK FT
“You can access the [facility] Patient Portal, offered by [facility], by registering with the following website: [email or web address]”

## 2017-10-02 NOTE — PROGRESS NOTE ADULT - ASSESSMENT
ASSESSMENT: 78y/o RH [place] Islander man PMH diabetes, HTN, HLD, history of previous strokes and microhemorrhages who presented yesterday 4am with confusion found on his bathroom floor. Patient was recently admitted on 9/20/17 and was discharged on 9/26/17 for sigmoid diverticulitis with pelvic abscess s/p drainage. Head CT shows a left posterior caudate intraparenchymal hemorrhage. Brain MRI done in 7/2017 and patient with multiple areas of susceptibility at the deep nuclei and posterior fossa as can be seen with uncontrolled HTN, still could not exclude multiple cavernomas. There was also an area of increased susceptibility and multiple flow voids at right cerebellum which was suspicious for underling AVM.    Impression: Non-traumatic subcortical intraparenchymal hemorrhage in the setting of previous imaging findings etiology hypertensive vs underlying vascular lesion cavernoma/AVM.     NEURO: Continue close monitoring for cerebral edema and neurologic deterioration, SBP <140/90, will start ASA and continue home dose statin for secondary stroke prevention. Physical therapy/OT/Speech eval/treatment. Digital Substraction Angiography could be considered as outpatient for better definition of R cerebellar areas of increase susceptibility at MRI. TTE scheduled can not rule out possibility of embolic source.      ANTITHROMBOTIC THERAPY: Will start ASA due to cardiac stents and demonstration of stable hematoma with no significant risk of expansion    PULMONARY: CXR (09/29) clear, protecting airway, saturating well     CARDIOVASCULAR:  TTE will be done as an outpatient, cardiac monitoring no events,  Dr. Rahul Noland office (pt's private cardiologist)aware of admission.  Continue current cardiac meds for BP management                            GASTROINTESTINAL: Dysphagia screen passed tolerating well, Pelvic abscess: as per surgery team continue Augmentin until 10/02 and plan to obtain CT abd/pelvis on 10/05 to eval drain       Diet: Regular (low fiber)    RENAL: BUN/Cr stable, good urine output, hyponatremia, continue NS IVF and will start 2mg BID NaCl tabs      Na Goal: Greater than 135     Mei: N    HEMATOLOGY: Stable Hgb and Hct without acute changes, last hospital stay anemia determined to be microcytic anemia and iron studies consistent with iron deficiency. Plan was that taking into consideration MCV, and given nationality/heritage patient was to be followed outpt PMD for electrophoresis r/o beta thalassemia minor prior to starting iron sulfate.     DVT ppx: LMWH     ID: Afebrile, no leukocytosis     DISPOSITION: D/C with home OT/PT to be discharged tomorrow once home care RN set up to assess patient/family's ability to flush drainage catheter.    CORE MEASURES:        Admission NIHSS:      TPA:  NO      LDL/HDL: 65/46     Depression Screen: 0     Statin Therapy: Y     Dysphagia Screen: PASS      Smoking NO      Afib  NO     [employer] YES ASSESSMENT:   78 y/o RH Norwegian man PMH diabetes II, HTN, HPLD, history of previous strokes who presented with confusion found on his bathroom floor. Of note, he was recently admitted to [facility] on 9/20 and was discharged on 9/26/17 for sigmoid diverticulitis with pelvic abscess s/p drainage. CT brain on admission showed left posterior caudate intraparenchymal hemorrhage with extension into CR. MRI brain done in 7/2017 and patient with multiple areas of susceptibility at the deep nuclei and posterior fossa as can be seen with uncontrolled HTN, still could not exclude multiple cavernomas. There was also an area of increased susceptibility and multiple flow voids at right cerebellum which was suspicious for underling AVM.    Impression: Non-traumatic subcortical intraparenchymal hemorrhage in the setting of previous imaging findings etiology hypertensive vs underlying vascular lesion cavernoma/AVM.     NEURO: Continue close monitoring for cerebral edema and neurologic deterioration, SBP <140/90, will start ASA and continue home dose statin for secondary stroke prevention. Physical therapy/OT/Speech eval/treatment. Digital Substraction Angiography could be considered as outpatient for better definition of R cerebellar areas of increase susceptibility at MRI. TTE scheduled can not rule out possibility of embolic source.      ANTITHROMBOTIC THERAPY: Will start ASA due to cardiac stents and demonstration of stable hematoma with no significant risk of expansion    PULMONARY: CXR (09/29) clear, protecting airway, saturating well     CARDIOVASCULAR:  TTE will be done as an outpatient, cardiac monitoring no events,  Dr. Rahul Noland office (pt's private cardiologist)aware of admission.  Continue current cardiac meds for BP management                            GASTROINTESTINAL: Dysphagia screen passed tolerating well, Pelvic abscess: as per surgery team continue Augmentin until 10/02 and plan to obtain CT abd/pelvis on 10/05 to eval drain       Diet: Regular (low fiber)    RENAL: BUN/Cr stable, good urine output, hyponatremia, continue NS IVF and will start 2mg BID NaCl tabs      Na Goal: Greater than 135     Mei: N    HEMATOLOGY: Stable Hgb and Hct without acute changes, last hospital stay anemia determined to be microcytic anemia and iron studies consistent with iron deficiency. Plan was that taking into consideration MCV, and given nationality/heritage patient was to be followed outpt PMD for electrophoresis r/o beta thalassemia minor prior to starting iron sulfate.     DVT ppx: LMWH     ID: Afebrile, no leukocytosis     DISPOSITION: D/C with home OT/PT to be discharged tomorrow once home care RN set up to assess patient/family's ability to flush drainage catheter.    CORE MEASURES:        Admission NIHSS:      TPA:  NO      LDL/HDL: 65/46     Depression Screen: 0     Statin Therapy: Y     Dysphagia Screen: PASS      Smoking NO      Afib  NO     [employer] Education YES ASSESSMENT:   76 y/o RH Somali man PMH diabetes II, HTN, HPLD, history of previous strokes who presented with confusion found on his bathroom floor. Of note, he was recently admitted to [facility] on 9/20 and was discharged on 9/26/17 for sigmoid diverticulitis with pelvic abscess s/p drainage. CT brain on admission showed left posterior caudate intraparenchymal hemorrhage with extension into CR. MRI brain showed stable left posterior caudate with extension into the corona radiata and multiple old microhemorrhages predominantly involving deep  territories and mild-to-moderate leukoaraiosis. MRA head and neck  did not show any evidence of vascular malformation being the etiology of his ICH or any significant intracranial or extracranial large vessel severe stenosis or occlusion.    Impression:   Non-traumatic left hemispheric subcortically located ICH. Left posterior caudate ICH with extension into corona radiata - likely etiology being hypertensive ICH versus underlying vascular malformation like cavernoma, favoring the former    NEURO: Continue close monitoring for cerebral edema and neurologic deterioration, BP goal - gradual normotension, will start aspirin and continue home dose statin for secondary stroke prevention. Physical therapy/OT/Speech eval/treatment.     ANTITHROMBOTIC THERAPY: Will start ASA due to recent cardiac stents and demonstration of stable hematoma (benefits outweigh potential risks)    PULMONARY: CXR (09/29) clear, protecting airway, saturating well     CARDIOVASCULAR:  TTE to evaluate for LVH suggestive of long-standing hypertension - which could be performed as outpatient, cardiac monitoring no events,  Dr. Rahul Noland office (pt's private cardiologist)aware of admission. Continue current cardiac meds for BP management                            GASTROINTESTINAL: Dysphagia screen passed tolerating well, Pelvic abscess: as per surgery team continue Augmentin until 10/02 and plan to obtain CT abd/pelvis on 10/05 to eval drain       Diet: Regular (low fiber)    RENAL: BUN/Cr stable, good urine output, hyponatremia, continue NS IVF and will start 2mg BID NaCl tabs      Na Goal: Greater than 135     Mei: N    HEMATOLOGY: Stable Hgb and Hct without acute changes, last hospital stay anemia determined to be microcytic anemia and iron studies consistent with iron deficiency. Plan was that taking into consideration MCV, and given nationality/heritage patient was to be followed outpt PMD for electrophoresis r/o beta thalassemia minor prior to starting iron sulfate.     DVT ppx: LMWH     ID: Afebrile, no leukocytosis     DISPOSITION: D/C with home OT/PT to be discharged tomorrow once home care RN set up to assess patient/family's ability to flush drainage catheter.    CORE MEASURES:        Admission NIHSS:      TPA:  NO      LDL/HDL: 65/46     Depression Screen: 0     Statin Therapy: Y     Dysphagia Screen: PASS      Smoking NO      Afib  NO     [employer] YES

## 2017-10-02 NOTE — DISCHARGE NOTE ADULT - CARE PLAN
Principal Discharge DX:	Intracranial hemorrhage  Goal:	Prevention of recurrence  Instructions for follow-up, activity and diet:	Please follow up with vascular neurology, Principal Discharge DX:	Intracranial hemorrhage  Goal:	Prevention of recurrence  Instructions for follow-up, activity and diet:	Please follow up with vascular neurology, Dr. Grijalva.    Restart Aspirin on 10/7  Restart Plavix on 10/9 Principal Discharge DX:	Intracranial hemorrhage  Goal:	Prevention of recurrence  Instructions for follow-up, activity and diet:	Please follow up with vascular neurology, Dr. Grijalva.  repeat MRI/MRA in 2-4 weeks    Restart Aspirin on 10/7  Restart Plavix on 10/9

## 2017-10-02 NOTE — DISCHARGE NOTE ADULT - CARE PROVIDERS DIRECT ADDRESSES
,DirectAddress_Unknown,[email or web address] ,DirectAddress_Unknown,[email or web address]

## 2017-10-02 NOTE — DISCHARGE NOTE ADULT - HOSPITAL COURSE
78y/o RH [place] man PMH diabetes, HTN, HLD, history of previous strokes and microhemorrhages who presented yesterday 4am with confusion found on his bathroom floor. Patient was recently admitted on 9/20/17 and was discharged on 9/26/17 for sigmoid diverticulitis with pelvic abscess s/p drainage. Head CT shows a left posterior caudate intraparenchymal hemorrhage. Brain MRI done in 7/2017 and patient with multiple areas of susceptibility at the deep nuclei and posterior fossa as can be seen with uncontrolled HTN, still could not exclude multiple cavernomas. There was also an area of increased susceptibility and multiple flow voids at right cerebellum which was suspicious for underling AVM.    Impression: Non-traumatic subcortical intraparenchymal hemorrhage in the setting of previous imaging findings etiology hypertensive vs underlying vascular lesion cavernoma/AVM.     NEURO: Continue close monitoring for cerebral edema and neurologic deterioration, SBP <140/90, will start ASA and continue home dose statin for secondary stroke prevention. Physical therapy/OT/Speech eval/treatment. Digital Substraction Angiography could be considered as outpatient for better definition of R cerebellar areas of increase susceptibility at MRI.     TTE to be done outpatient.     Patient to be restarted on ASA on 10/7 and can be restarted on Plavix on 10/9. Discussed with patients cardiologist, Dr. Rahul Wright.     Has recent perforated sigmoid diverticulitis s/p IR drainage, was put on Augmentin for 10 days (finished on 10/2) and will need a follow up CT abdomen/pelvis on 10/5  and to flush IR drain with 3mL NS daily. Will follow up with outpatient surgery after discharge.     Patient is now stable to be discharged home with home PT. 76y/o RH [place] man PMH diabetes, HTN, HLD, history of previous strokes and microhemorrhages who presented yesterday 4am with confusion found on his bathroom floor. Patient was recently admitted on 9/20/17 and was discharged on 9/26/17 for sigmoid diverticulitis with pelvic abscess s/p drainage. Head CT shows a left posterior caudate intraparenchymal hemorrhage. Brain MRI done in 7/2017 and patient with multiple areas of susceptibility at the deep nuclei and posterior fossa as can be seen with uncontrolled HTN, still could not exclude multiple cavernomas. There was also an area of increased susceptibility and multiple flow voids at right cerebellum which was suspicious for underling AVM.    Most likely a non-traumatic subcortical intraparenchymal hemorrhage in the setting of previous imaging findings etiology hypertensive vs underlying vascular lesion cavernoma/AVM.    MRI BRAIN: 1.2 x 1.7 cm region of abnormal parenchymal enhancement in the  posterior right cerebellar hemisphere is stable since 7/8/2017. Multiple  foci of susceptibility artifact are associated with this region of  enhancement. There is no edema surrounding this region of enhancement.    Similar appearing 1.0 x 0.5 cm early subacute parenchymal hemorrhage  approximating the lateral aspect of the left ventricular body, just  posterior to the left caudate nucleus. The presence of T1 shortening limits  evaluation for underlying enhancement and diffusion abnormality. Recommend  following hemorrhage to resolution.    Redemonstration of numerous foci of susceptibility artifact in the thalami  and bilateral cerebellar hemispheres. These may represent cavernoma or  chronic microhemorrhages.    MRA BRAIN: Limited by motion. No gross evidence for flow-limiting stenosis  or vascular aneurysm.    MRA NECK: No flow-limiting stenosis. Carotid bifurcations and origins of the  common carotid and vertebral arteries unremarkable.    TTE to be done outpatient.     Patient to be restarted on ASA on 10/7 and can be restarted on Plavix on 10/9. Discussed with patients cardiologist, Dr. Rahul Wright.     Has recent perforated sigmoid diverticulitis s/p IR drainage, was put on Augmentin for 10 days (finished on 10/2) and will need a follow up CT abdomen/pelvis on 10/5  and to flush IR drain with 3mL NS daily. Will follow up with outpatient surgery after discharge.     Patient is now stable to be discharged home with home PT. 76y/o RH [place] man PMH diabetes, HTN, HLD, history of previous strokes and microhemorrhages who presented yesterday 4am with confusion found on his bathroom floor. Patient was recently admitted on 9/20/17 and was discharged on 9/26/17 for sigmoid diverticulitis with pelvic abscess s/p drainage. Head CT shows a left posterior caudate intraparenchymal hemorrhage. Brain MRI done in 7/2017 and patient with multiple areas of susceptibility at the deep nuclei and posterior fossa as can be seen with uncontrolled HTN, still could not exclude multiple cavernomas. There was also an area of increased susceptibility and multiple flow voids at right cerebellum which was suspicious for underling AVM.    Most likely a non-traumatic subcortical intraparenchymal hemorrhage in the setting of previous imaging findings etiology hypertensive vs underlying vascular lesion cavernoma/AVM.    MRI BRAIN: 1.2 x 1.7 cm region of abnormal parenchymal enhancement in the  posterior right cerebellar hemisphere is stable since 7/8/2017. Multiple  foci of susceptibility artifact are associated with this region of  enhancement. There is no edema surrounding this region of enhancement.    Similar appearing 1.0 x 0.5 cm early subacute parenchymal hemorrhage  approximating the lateral aspect of the left ventricular body, just  posterior to the left caudate nucleus. The presence of T1 shortening limits  evaluation for underlying enhancement and diffusion abnormality. Recommend  following hemorrhage to resolution.    Redemonstration of numerous foci of susceptibility artifact in the thalami  and bilateral cerebellar hemispheres. These may represent cavernoma or  chronic microhemorrhages.    MRA BRAIN: Limited by motion. No gross evidence for flow-limiting stenosis  or vascular aneurysm.    MRA NECK: No flow-limiting stenosis. Carotid bifurcations and origins of the  common carotid and vertebral arteries unremarkable.    TTE to be done outpatient.     Patient to be restarted on ASA on 10/7 and can be restarted on Plavix on 10/9. Discussed with patients cardiologist, Dr. Rahul Wright.     Has recent perforated sigmoid diverticulitis s/p IR drainage, was put on Augmentin for 10 days (finished on 10/2) and will need a follow up CT abdomen/pelvis on 10/5  and to flush IR drain with 3mL NS daily. Will follow up with outpatient surgery after discharge, Dr. Wilson.     Patient is now stable to be discharged home with home PT. 78y/o RH Citizen of [place] man PMH diabetes, HTN, HLD, history of previous strokes and microhemorrhages who presented yesterday 4am with confusion found on his bathroom floor. Patient was recently admitted on 9/20/17 and was discharged on 9/26/17 for sigmoid diverticulitis with pelvic abscess s/p drainage. Head CT shows a left posterior caudate intraparenchymal hemorrhage. Brain MRI done in 7/2017 and patient with multiple areas of susceptibility at the deep nuclei and posterior fossa as can be seen with uncontrolled HTN, still could not exclude multiple cavernomas. There was also an area of increased susceptibility and multiple flow voids at right cerebellum which was suspicious for underling AVM.    Most likely a non-traumatic subcortical intraparenchymal hemorrhage in the setting of previous imaging findings etiology hypertensive vs underlying vascular lesion cavernoma/AVM.    MRI BRAIN: 1.2 x 1.7 cm region of abnormal parenchymal enhancement in the  posterior right cerebellar hemisphere is stable since 7/8/2017. Multiple  foci of susceptibility artifact are associated with this region of  enhancement. There is no edema surrounding this region of enhancement.    Similar appearing 1.0 x 0.5 cm early subacute parenchymal hemorrhage  approximating the lateral aspect of the left ventricular body, just  posterior to the left caudate nucleus. The presence of T1 shortening limits  evaluation for underlying enhancement and diffusion abnormality. Recommend  following hemorrhage to resolution.    Redemonstration of numerous foci of susceptibility artifact in the thalami  and bilateral cerebellar hemispheres. These may represent cavernoma or  chronic microhemorrhages.    MRA BRAIN: Limited by motion. No gross evidence for flow-limiting stenosis  or vascular aneurysm.    MRA NECK: No flow-limiting stenosis. Carotid bifurcations and origins of the  common carotid and vertebral arteries unremarkable.    TTE to be done outpatient.     Patient to be restarted on ASA on 10/7 and can be restarted on Plavix on 10/9. Discussed with patients cardiologist, Dr. Rahul Wright.     Has recent perforated sigmoid diverticulitis s/p IR drainage, was put on Augmentin for 10 days (finished on 10/2) and will need a follow up CT abdomen/pelvis on 10/5  and to flush IR drain with 3mL NS daily. Will follow up with outpatient surgery after discharge, Dr. Wilson.     Patient is now stable to be discharged home with home PT. Should follow up with vascular neurology and have repeat imaging MRI/MRA in 2-4 weeks to monitor his cavernoma or venous malformation. 76y/o RH [place] man PMH diabetes, HTN, HLD, history of previous strokes and microhemorrhages who presented yesterday 4am with confusion found on his bathroom floor. Patient was recently admitted on 9/20/17 and was discharged on 9/26/17 for sigmoid diverticulitis with pelvic abscess s/p drainage. Head CT shows a left posterior caudate intraparenchymal hemorrhage. Brain MRI done in 7/2017 and patient with multiple areas of susceptibility at the deep nuclei and posterior fossa as can be seen with uncontrolled HTN, still could not exclude multiple cavernomas. There was also an area of increased susceptibility and multiple flow voids at right cerebellum which was suspicious for underling AVM.    Most likely a non-traumatic subcortical intraparenchymal hemorrhage in the setting of previous imaging findings etiology hypertensive vs underlying vascular lesion cavernoma/AVM.    MRI BRAIN: 1.2 x 1.7 cm region of abnormal parenchymal enhancement in the  posterior right cerebellar hemisphere is stable since 7/8/2017. Multiple  foci of susceptibility artifact are associated with this region of  enhancement. There is no edema surrounding this region of enhancement.    Similar appearing 1.0 x 0.5 cm early subacute parenchymal hemorrhage  approximating the lateral aspect of the left ventricular body, just  posterior to the left caudate nucleus. The presence of T1 shortening limits  evaluation for underlying enhancement and diffusion abnormality. Recommend  following hemorrhage to resolution.    Redemonstration of numerous foci of susceptibility artifact in the thalami  and bilateral cerebellar hemispheres. These may represent cavernoma or  chronic microhemorrhages.    MRA BRAIN: Limited by motion. No gross evidence for flow-limiting stenosis  or vascular aneurysm.    MRA NECK: No flow-limiting stenosis. Carotid bifurcations and origins of the  common carotid and vertebral arteries unremarkable.    TTE to be done outpatient.     Patient to be restarted on ASA on 10/7 and can be restarted on Plavix on 10/9. Discussed with patients cardiologist, Dr. Rahul Wright.     Has recent perforated sigmoid diverticulitis s/p IR drainage, was put on Augmentin for 10 days (finished on 10/2) and will need a follow up CT abdomen/pelvis on 10/5  and to flush IR drain with 3mL NS daily. Will follow up with outpatient surgery after discharge, Dr. Wilson.     Patient is now stable to be discharged home with home PT. Should follow up with vascular neurology and have repeat imaging MRI/MRA in 2-4 weeks to monitor his cavernoma or venous malformation.

## 2017-10-02 NOTE — DISCHARGE NOTE ADULT - MEDICATION SUMMARY - MEDICATIONS TO TAKE
I will START or STAY ON the medications listed below when I get home from the hospital:    acetaminophen 325 mg oral tablet  -- 2 tab(s) by mouth every 6 hours, As needed, Mild Pain (1 - 3)  -- Indication: For pain    oxyCODONE 5 mg oral tablet  -- 1 tab(s) by mouth every 4-6 hours, As needed,  Pain MDD:6  -- Indication: For pain    metFORMIN  -- Indication: For DM    Glucophage 850 mg oral tablet  -- 1 tab(s) by mouth 2 times a day - may resume 12/16  -- Indication: For DM    cetirizine  -- Indication: For antihistimine    ezetimibe  -- Indication: For HLD    simvastatin 40 mg oral tablet  -- 1 tab(s) by mouth once a day (at bedtime)  -- Indication: For HLD    losartan-hydroCHLOROthiazide 100mg-25mg oral tablet  -- 1 tab(s) by mouth once a day  -- Indication: For HTN    metoprolol succinate 100 mg oral tablet, extended release  -- 1 tab(s) by mouth once a day  -- Indication: For HTN    baclofen  --  by mouth   -- Indication: For pain    pilocarpine 1% ophthalmic solution  -- 1 drop(s) to each affected eye 4 times a day  -- Indication: For ophthalmic solution    PriLOSEC 20 mg oral delayed release capsule  -- 1 cap(s) by mouth once a day  -- Indication: For PPI I will START or STAY ON the medications listed below when I get home from the hospital:    -  -- Rolling walker    -- Indication: For ambulation    acetaminophen 325 mg oral tablet  -- 2 tab(s) by mouth every 6 hours, As needed, Mild Pain (1 - 3)  -- Indication: For pain    oxyCODONE 5 mg oral tablet  -- 1 tab(s) by mouth every 4-6 hours, As needed,  Pain MDD:6  -- Indication: For pain    metFORMIN  -- Indication: For DM    Glucophage 850 mg oral tablet  -- 1 tab(s) by mouth 2 times a day - may resume 12/16  -- Indication: For DM    cetirizine  -- Indication: For antihistimine    ezetimibe  -- Indication: For HLD    simvastatin 40 mg oral tablet  -- 1 tab(s) by mouth once a day (at bedtime)  -- Indication: For HLD    losartan-hydroCHLOROthiazide 100mg-25mg oral tablet  -- 1 tab(s) by mouth once a day  -- Indication: For HTN    metoprolol succinate 100 mg oral tablet, extended release  -- 1 tab(s) by mouth once a day  -- Indication: For HTN    baclofen  --  by mouth   -- Indication: For pain    pilocarpine 1% ophthalmic solution  -- 1 drop(s) to each affected eye 4 times a day  -- Indication: For ophthalmic solution    PriLOSEC 20 mg oral delayed release capsule  -- 1 cap(s) by mouth once a day  -- Indication: For PPI I will START or STAY ON the medications listed below when I get home from the hospital:    -  -- Rolling walker    -- Indication: For ambulation    acetaminophen 325 mg oral tablet  -- 2 tab(s) by mouth every 6 hours, As needed, Mild Pain (1 - 3)  -- Indication: For pain    oxyCODONE 5 mg oral tablet  -- 1 tab(s) by mouth every 4-6 hours, As needed,  Pain MDD:6  -- Indication: For pain    metFORMIN  -- Indication: For DM    Glucophage 850 mg oral tablet  -- 1 tab(s) by mouth 2 times a day - may resume 12/16  -- Indication: For DM    cetirizine  -- Indication: For antihistimine    ezetimibe  -- Indication: For HLD    simvastatin 40 mg oral tablet  -- 1 tab(s) by mouth once a day (at bedtime)  -- Indication: For HLD    losartan-hydroCHLOROthiazide 100mg-25mg oral tablet  -- 1 tab(s) by mouth once a day  -- Indication: For HTN    metoprolol succinate 100 mg oral tablet, extended release  -- 1 tab(s) by mouth once a day  -- Indication: For HTN    senna oral tablet  -- 2 tab(s) by mouth once a day (at bedtime)  -- Indication: For constipation    docusate sodium 100 mg oral capsule  -- 1 cap(s) by mouth 3 times a day  -- Indication: For constipation    baclofen  --  by mouth   -- Indication: For pain    pilocarpine 1% ophthalmic solution  -- 1 drop(s) to each affected eye 4 times a day  -- Indication: For ophthalmic solution    PriLOSEC 20 mg oral delayed release capsule  -- 1 cap(s) by mouth once a day  -- Indication: For PPI

## 2017-10-03 VITALS
SYSTOLIC BLOOD PRESSURE: 145 MMHG | OXYGEN SATURATION: 95 % | HEART RATE: 60 BPM | RESPIRATION RATE: 18 BRPM | DIASTOLIC BLOOD PRESSURE: 73 MMHG | TEMPERATURE: 99 F

## 2017-10-03 DIAGNOSIS — I25.10 ATHEROSCLEROTIC HEART DISEASE OF NATIVE CORONARY ARTERY WITHOUT ANGINA PECTORIS: ICD-10-CM

## 2017-10-03 DIAGNOSIS — I62.9 NONTRAUMATIC INTRACRANIAL HEMORRHAGE, UNSPECIFIED: ICD-10-CM

## 2017-10-03 DIAGNOSIS — R07.89 OTHER CHEST PAIN: ICD-10-CM

## 2017-10-03 LAB
CK MB CFR SERPL CALC: 1.5 NG/ML — SIGNIFICANT CHANGE UP (ref 0–6.7)
CK SERPL-CCNC: 23 U/L — LOW (ref 30–200)
HCT VFR BLD CALC: 37 % — LOW (ref 39–50)
HGB BLD-MCNC: 11.6 G/DL — LOW (ref 13–17)
MCHC RBC-ENTMCNC: 21.3 PG — LOW (ref 27–34)
MCHC RBC-ENTMCNC: 31.3 GM/DL — LOW (ref 32–36)
MCV RBC AUTO: 68.2 FL — LOW (ref 80–100)
PHOSPHATE SERPL-MCNC: 3.3 MG/DL — SIGNIFICANT CHANGE UP (ref 2.5–4.5)
PLATELET # BLD AUTO: 331 K/UL — SIGNIFICANT CHANGE UP (ref 150–400)
RBC # BLD: 5.42 M/UL — SIGNIFICANT CHANGE UP (ref 4.2–5.8)
RBC # FLD: 18.2 % — HIGH (ref 10.3–14.5)
TROPONIN T SERPL-MCNC: <0.01 NG/ML — SIGNIFICANT CHANGE UP (ref 0–0.06)
WBC # BLD: 10 K/UL — SIGNIFICANT CHANGE UP (ref 3.8–10.5)
WBC # FLD AUTO: 10 K/UL — SIGNIFICANT CHANGE UP (ref 3.8–10.5)

## 2017-10-03 PROCEDURE — 93306 TTE W/DOPPLER COMPLETE: CPT | Mod: 26

## 2017-10-03 PROCEDURE — 93010 ELECTROCARDIOGRAM REPORT: CPT

## 2017-10-03 PROCEDURE — 99233 SBSQ HOSP IP/OBS HIGH 50: CPT

## 2017-10-03 RX ORDER — DOCUSATE SODIUM 100 MG
1 CAPSULE ORAL
Qty: 90 | Refills: 0 | OUTPATIENT
Start: 2017-10-03 | End: 2017-11-02

## 2017-10-03 RX ORDER — SENNA PLUS 8.6 MG/1
2 TABLET ORAL
Qty: 60 | Refills: 0 | OUTPATIENT
Start: 2017-10-03 | End: 2017-11-02

## 2017-10-03 RX ADMIN — ENOXAPARIN SODIUM 40 MILLIGRAM(S): 100 INJECTION SUBCUTANEOUS at 13:01

## 2017-10-03 RX ADMIN — Medication 100 MILLIGRAM(S): at 06:05

## 2017-10-03 RX ADMIN — Medication 81 MILLIGRAM(S): at 13:01

## 2017-10-03 RX ADMIN — SODIUM CHLORIDE 2 GRAM(S): 9 INJECTION INTRAMUSCULAR; INTRAVENOUS; SUBCUTANEOUS at 06:04

## 2017-10-03 RX ADMIN — Medication 12.5 MILLIGRAM(S): at 06:05

## 2017-10-03 RX ADMIN — LOSARTAN POTASSIUM 100 MILLIGRAM(S): 100 TABLET, FILM COATED ORAL at 06:05

## 2017-10-03 RX ADMIN — Medication 100 MILLIGRAM(S): at 13:01

## 2017-10-03 NOTE — SWALLOW BEDSIDE ASSESSMENT ADULT - SLP PERTINENT HISTORY OF CURRENT PROBLEM
76YO RHM w/ CAD s/p stent, HLD, HTN, DM, vague unclear h/o brain hemorrhage/infarct, discharged 3 days ago s/p drain placed for pelvic abscess 2/2 sigmoid diverticulitis who p/w episode of syncope this morning. Symptoms occurred in the setting of chest pain and poor PO intake. Exam is nonfocal and stable at this time. Small IPH in L caudate. Possible etiologies include HTN, underlying vascular malformation, less likely hemorrhagic met. On previous MRI questionable area of enhancement in R cerebellum suspicious for vascular malformation. Admit to stroke service for further management and workup.

## 2017-10-03 NOTE — SWALLOW BEDSIDE ASSESSMENT ADULT - SWALLOW EVAL: RECOMMENDED FEEDING/EATING TECHNIQUES
small sips/bites/crush medication (when feasible)/maintain upright posture during/after eating for 30 mins/position upright (90 degrees)

## 2017-10-03 NOTE — PROGRESS NOTE ADULT - SUBJECTIVE AND OBJECTIVE BOX
THE PATIENT WAS SEEN AND EXAMINED BY ME WITH THE HOUSESTAFF AND STROKE TEAM DURING MORNING ROUNDS.   HPI:  [id number] RHM with PMHx CAD s/p stent (4/2017), HLD, BPH, HTN, DM, "brain hemorrhage", "brain infarctions", recent drain placed for sigmoid diverticulitis with pelvic abscess (d/c on 9/26) presented after syncopal episode day of admission.  Family at bedside, reported patient went to bathroom at 4AM morning of admission and then called for family. Wife found him on the floor but patient thought he was in the bed and but could remember how he got there or where he was.  Reported the morning of admission he was in a cold sweat, lightheaded, dizzy, weak.  Family reported that since discharge from previous admission patient has had poor po intake, decreased appetite and reported patient as being "dehydrated". Patient was recently admitted on 9/20/17 and was discharged on 9/26/17 for sigmoid diverticulitis with pelvic abscess s/p drainage. Reported during hospital stay had drain placed, reports that is has been draining. On stroke service for further workup and evaluation.     SUBJECTIVE: C/O right shoulder/chest pain this am. Now resolved.    acetaminophen   Tablet. 650 milliGRAM(s) Oral every 6 hours PRN  aspirin  chewable 81 milliGRAM(s) Oral daily  dextrose 5%. 1000 milliLiter(s) IV Continuous <Continuous>  dextrose 50% Injectable 12.5 Gram(s) IV Push once  dextrose 50% Injectable 25 Gram(s) IV Push once  dextrose 50% Injectable 25 Gram(s) IV Push once  dextrose Gel 1 Dose(s) Oral once PRN  docusate sodium 100 milliGRAM(s) Oral three times a day  enoxaparin Injectable 40 milliGRAM(s) SubCutaneous daily  glucagon  Injectable 1 milliGRAM(s) IntraMuscular once PRN  hydrALAZINE Injectable 10 milliGRAM(s) IV Push every 6 hours PRN  hydrochlorothiazide 12.5 milliGRAM(s) Oral daily  influenza   Vaccine 0.5 milliLiter(s) IntraMuscular once  insulin lispro (HumaLOG) corrective regimen sliding scale   SubCutaneous three times a day before meals  insulin lispro (HumaLOG) corrective regimen sliding scale   SubCutaneous at bedtime  losartan 100 milliGRAM(s) Oral daily  metoprolol succinate  milliGRAM(s) Oral daily  oxyCODONE    IR 5 milliGRAM(s) Oral every 4 hours PRN  senna 2 Tablet(s) Oral at bedtime  simvastatin 40 milliGRAM(s) Oral at bedtime  sodium chloride 2 Gram(s) Oral two times a day      PHYSICAL EXAM:   Vital Signs Last 24 Hrs  T(C): 36.4 (03 Oct 2017 12:47), Max: 36.8 (03 Oct 2017 05:13)  T(F): 97.5 (03 Oct 2017 12:47), Max: 98.3 (03 Oct 2017 05:13)  HR: 67 (03 Oct 2017 12:47) (60 - 69)  BP: 128/64 (03 Oct 2017 12:47) (127/63 - 158/72)  BP(mean): --  RR: 17 (03 Oct 2017 12:47) (17 - 18)  SpO2: 91% (03 Oct 2017 12:47) (91% - 98%)    General: No acute distress  HEENT: EOM intact, visual fields full  Abdomen: Soft, nontender, nondistended   Extremities: No edema    NEUROLOGICAL EXAM:  Mental status: Awake, alert, oriented x3, no aphasia, no neglect, normal memory   Cranial Nerves: Subtle right nasolabial flattening, no nystagmus, mild dysarthria, tongue midline, shoulder shrug intact bilaterally.  Motor exam: Normal tone, no drift, mild right hemiparesis, 5/5 LUE, 5/5 LLE, normal fine finger movements.  Sensation: Intact to light touch   Coordination/ Gait: No dysmetria, ALONDRA intact and symmetric bilaterally,  LABS:                        11.6   10.0  )-----------( 331      ( 03 Oct 2017 10:23 )             37.0    10-02    132<L>  |  95<L>  |  17  ----------------------------<  116<H>  4.4   |  22  |  0.94    Ca    8.8      02 Oct 2017 05:34  Phos  3.3     10-03      Hemoglobin A1C, Whole Blood: 6.6 % (09-22 @ 09:38)      IMAGING: Reviewed by me.   MRI Head w/wo Cont (09.30.17)   MRI BRAIN: 1.2 x 1.7 cm region of abnormal parenchymal enhancement in the   posterior right cerebellar hemisphere is stable since 7/8/2017. Multiple   foci of susceptibility artifact are associated with this region of   enhancement. There is no edema surrounding this region of enhancement.  Similar appearing 1.0 x 0.5 cm early subacute parenchymal hemorrhage   approximating the lateral aspect of the left ventricular body, just   posterior to the left caudate nucleus. The presence of T1 shortening   limits evaluation for underlying enhancement and diffusion abnormality.   Recommend following hemorrhage to resolution.  Redemonstration of numerous foci of susceptibility artifact in the   thalami and bilateral cerebellar hemispheres. These may represent   cavernoma or chronic microhemorrhages.  MRA BRAIN: Limited by motion. No gross evidence for flow-limiting   stenosis or vascular aneurysm.  MRA NECK: No flow-limiting stenosis. Carotid bifurcations and origins of   the common carotid and vertebral arteries unremarkable. THE PATIENT WAS SEEN AND EXAMINED BY ME WITH THE HOUSESTAFF AND STROKE TEAM DURING MORNING ROUNDS.     HPI:  76YO RHM with PMHx CAD s/p stent (4/2017), HLD, BPH, HTN, DM, "brain hemorrhage", "brain infarctions", recent drain placed for sigmoid diverticulitis with pelvic abscess (d/c on 9/26) presented after syncopal episode day of admission.  Family at bedside, reported patient went to bathroom at 4AM morning of admission and then called for family. Wife found him on the floor but patient thought he was in the bed and but could remember how he got there or where he was.  Reported the morning of admission he was in a cold sweat, lightheaded, dizzy, weak.  Family reported that since discharge from previous admission patient has had poor po intake, decreased appetite and reported patient as being "dehydrated". Patient was recently admitted on 9/20/17 and was discharged on 9/26/17 for sigmoid diverticulitis with pelvic abscess s/p drainage. Reported during hospital stay had drain placed, reports that is has been draining. On stroke service for further workup and evaluation.     SUBJECTIVE: C/O right shoulder/chest pain this am. Now resolved.    acetaminophen   Tablet. 650 milliGRAM(s) Oral every 6 hours PRN  aspirin  chewable 81 milliGRAM(s) Oral daily  dextrose 5%. 1000 milliLiter(s) IV Continuous <Continuous>  dextrose 50% Injectable 12.5 Gram(s) IV Push once  dextrose 50% Injectable 25 Gram(s) IV Push once  dextrose 50% Injectable 25 Gram(s) IV Push once  dextrose Gel 1 Dose(s) Oral once PRN  docusate sodium 100 milliGRAM(s) Oral three times a day  enoxaparin Injectable 40 milliGRAM(s) SubCutaneous daily  glucagon  Injectable 1 milliGRAM(s) IntraMuscular once PRN  hydrALAZINE Injectable 10 milliGRAM(s) IV Push every 6 hours PRN  hydrochlorothiazide 12.5 milliGRAM(s) Oral daily  influenza   Vaccine 0.5 milliLiter(s) IntraMuscular once  insulin lispro (HumaLOG) corrective regimen sliding scale   SubCutaneous three times a day before meals  insulin lispro (HumaLOG) corrective regimen sliding scale   SubCutaneous at bedtime  losartan 100 milliGRAM(s) Oral daily  metoprolol succinate  milliGRAM(s) Oral daily  oxyCODONE    IR 5 milliGRAM(s) Oral every 4 hours PRN  senna 2 Tablet(s) Oral at bedtime  simvastatin 40 milliGRAM(s) Oral at bedtime  sodium chloride 2 Gram(s) Oral two times a day      PHYSICAL EXAM:   Vital Signs Last 24 Hrs  T(C): 36.4 (03 Oct 2017 12:47), Max: 36.8 (03 Oct 2017 05:13)  T(F): 97.5 (03 Oct 2017 12:47), Max: 98.3 (03 Oct 2017 05:13)  HR: 67 (03 Oct 2017 12:47) (60 - 69)  BP: 128/64 (03 Oct 2017 12:47) (127/63 - 158/72)  BP(mean): --  RR: 17 (03 Oct 2017 12:47) (17 - 18)  SpO2: 91% (03 Oct 2017 12:47) (91% - 98%)    General: No acute distress  HEENT: EOM intact, visual fields full  Abdomen: Soft, nontender, nondistended   Extremities: No edema    NEUROLOGICAL EXAM:  Mental status: Awake, alert, oriented x3, no aphasia, no neglect, normal memory   Cranial Nerves: Subtle right nasolabial flattening, no nystagmus, mild dysarthria, tongue midline, shoulder shrug intact bilaterally.  Motor exam: Normal tone, no drift, mild right hemiparesis, 5/5 LUE, 5/5 LLE, normal fine finger movements.  Sensation: Intact to light touch   Coordination/ Gait: No dysmetria, ALONDRA intact and symmetric bilaterally,    LABS:                        11.6   10.0  )-----------( 331      ( 03 Oct 2017 10:23 )             37.0    10-02    132<L>  |  95<L>  |  17  ----------------------------<  116<H>  4.4   |  22  |  0.94    Ca    8.8      02 Oct 2017 05:34  Phos  3.3     10-03      Hemoglobin A1C, Whole Blood: 6.6 % (09-22 @ 09:38)      IMAGING: Reviewed by me.   MRI Head w/wo Cont (09.30.17)   MRI BRAIN: 1.2 x 1.7 cm region of abnormal parenchymal enhancement in the   posterior right cerebellar hemisphere is stable since 7/8/2017. Multiple   foci of susceptibility artifact are associated with this region of   enhancement. There is no edema surrounding this region of enhancement.  Similar appearing 1.0 x 0.5 cm early subacute parenchymal hemorrhage   approximating the lateral aspect of the left ventricular body, just   posterior to the left caudate nucleus. The presence of T1 shortening   limits evaluation for underlying enhancement and diffusion abnormality.   Recommend following hemorrhage to resolution.  Redemonstration of numerous foci of susceptibility artifact in the   thalami and bilateral cerebellar hemispheres. These may represent   cavernoma or chronic microhemorrhages.  MRA BRAIN: Limited by motion. No gross evidence for flow-limiting   stenosis or vascular aneurysm.  MRA NECK: No flow-limiting stenosis. Carotid bifurcations and origins of   the common carotid and vertebral arteries unremarkable.

## 2017-10-03 NOTE — SWALLOW BEDSIDE ASSESSMENT ADULT - ASR SWALLOW ASPIRATION MONITOR
Monitor for s/s aspiration/laryngeal penetration. If noted:  D/C p.o. intake, provide non-oral nutrition/hydration/meds, and contact this service @ x00/fever/pneumonia/upper respiratory infection/change of breathing pattern/throat clearing/gurgly voice/cough

## 2017-10-03 NOTE — SWALLOW BEDSIDE ASSESSMENT ADULT - COMMENTS
9/29 CT Head: Acute 1.1 x 0.5 cm parenchymal hemorrhage approximating the posterior aspect of the left caudate nucleus. 9/30 MRI BRAIN: 1.2 x 1.7 cm region of abnormal parenchymal enhancement in the posterior right cerebellar hemisphere is stable since 7/8/2017. Multiple foci of susceptibility artifact are associated with this region of enhancement. There is no edema surrounding this region of enhancement. Similar appearing 1.0 x 0.5 cm early subacute parenchymal hemorrhage approximating the lateral aspect of the left ventricular body, just posterior to the left caudate nucleus. The presence of T1 shortening limits evaluation for underlying enhancement and diffusion abnormality. Recommend ollowing hemorrhage to resolution. Redemonstration of numerous foci of susceptibility artifact in the thalami and bilateral cerebellar hemispheres. These may represent cavernoma or chronic microhemorrhages. MRA BRAIN: Limited by motion. No gross evidence for flow-limiting stenosis or vascular aneurysm. MRA NECK: No flow-limiting stenosis. Carotid bifurcations and origins of the common carotid and vertebral arteries unremarkable. Per neuro, non-traumatic left hemispheric subcortically located ICH. 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 Passed dysphagia screen, tolerating well. 9/29 CXR (-); CT Head: Acute 1.1 x 0.5 cm parenchymal hemorrhage approximating the posterior aspect of the left caudate nucleus. 9/30 MRI BRAIN: 1.2 x 1.7 cm region of abnormal parenchymal enhancement in the posterior right cerebellar hemisphere is stable since 7/8/2017. Multiple foci of susceptibility artifact are associated with this region of enhancement. There is no edema surrounding this region of enhancement. Similar appearing 1.0 x 0.5 cm early subacute parenchymal hemorrhage approximating the lateral aspect of the left ventricular body, just posterior to the left caudate nucleus. The presence of T1 shortening limits evaluation for underlying enhancement and diffusion abnormality. Recommend ollowing hemorrhage to resolution. Redemonstration of numerous foci of susceptibility artifact in the thalami and bilateral cerebellar hemispheres. These may represent cavernoma or chronic microhemorrhages. MRA BRAIN: Limited by motion. No gross evidence for flow-limiting stenosis or vascular aneurysm. MRA NECK: No flow-limiting stenosis. Carotid bifurcations and origins of the common carotid and vertebral arteries unremarkable. Per neuro, non-traumatic left hemispheric subcortically located ICH. 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 Passed dysphagia screen, tolerating well.

## 2017-10-03 NOTE — SWALLOW BEDSIDE ASSESSMENT ADULT - SLP GENERAL OBSERVATIONS
Pt OOB in chair, A&Ox2 (grossly to hospital), able to follow commands for evaluation purposes, able to relay wants and needs.

## 2017-10-03 NOTE — SWALLOW BEDSIDE ASSESSMENT ADULT - SWALLOW EVAL: RECOMMENDED DIET
From an oropharyngeal standpoint, pt appears able to tolerate a Regular texture diet. However, given c/o choking, would recommend GI consult if PO feeding is continued.

## 2017-10-03 NOTE — SWALLOW BEDSIDE ASSESSMENT ADULT - SWALLOW EVAL: DIAGNOSIS
Pt presents with a mild oral dysphagia. Pharyngeal phase of the swallow appears WFL with no overt s/s of penetration or aspiration. Pt with complaints suggestive of esophageal dysphagia. Consider GI consult to fully assess.

## 2017-10-03 NOTE — CONSULT NOTE ADULT - PROBLEM SELECTOR RECOMMENDATION 9
non cardiac   can dc from cardiac standpoint  continue with ASA. Ok to hold plavix until deemed safe from neuro standpoint given the ICH

## 2017-10-03 NOTE — CONSULT NOTE ADULT - SUBJECTIVE AND OBJECTIVE BOX
CHIEF COMPLAINT: chest pain     HISTORY OF PRESENT ILLNESS:  78YO RHM with PMHx CAD s/p stent (2017), HLD, BPH, HTN, DM, "brain hemorrhage", "brain infarctions", recent drain placed for sigmoid diverticulitis with pelvic abscess (d/c 3 d ago on ) presents after syncopal episode this morning.  Family at bedside, reports patient went to bathroom at 4AM and then called for family. Wife found him on the floor but patient thought he was in the bed and but could remember how he got there or where he was.    Reports this morning was in a cold sweat, lightheaded, dizzy, weak.  Family reports that since discharge patient has had poor po intake, decreased appetite and reports patient as being "dehydrated". Patient was recently admitted on 17 and was discharged on 17 for sigmoid diverticulitis with pelvic abscess s/p drainage. Reports during hospital stay had drain placed, reports has been draining.     Denies vomiting, diarrhea, reports constipation, but reports BM today. Patient with multiple medical complaints. Reports persistent abdominal pain at lower abdomen, denies redness around drain site.  Reports chronic chest pain, and chest pain today.  Reports chest pain is R sided and similar to the pain he has had in the past, but reports present at time of incident.  Reports shortness of breath with chest pain, also chronic.  Reports chronic lower extremity numbness, unchanged today.  Reports dysuria, burning and abdominal pain since abdominal pain started (before admission).  Reports has been taking discharge medications since 17.          PAST MEDICAL & SURGICAL HISTORY:  TIA (transient ischemic attack)  HLD (hyperlipidemia)  GERD (gastroesophageal reflux disease)  Anemia  Diabetes  BPH (benign prostatic hyperplasia)  HTN (hypertension)  Stented coronary artery  H/O hernia repair  History of appendectomy          MEDICATIONS:  aspirin  chewable 81 milliGRAM(s) Oral daily  enoxaparin Injectable 40 milliGRAM(s) SubCutaneous daily  hydrALAZINE Injectable 10 milliGRAM(s) IV Push every 6 hours PRN  hydrochlorothiazide 12.5 milliGRAM(s) Oral daily  losartan 100 milliGRAM(s) Oral daily  metoprolol succinate  milliGRAM(s) Oral daily        acetaminophen   Tablet. 650 milliGRAM(s) Oral every 6 hours PRN  oxyCODONE    IR 5 milliGRAM(s) Oral every 4 hours PRN    docusate sodium 100 milliGRAM(s) Oral three times a day  senna 2 Tablet(s) Oral at bedtime    dextrose 50% Injectable 12.5 Gram(s) IV Push once  dextrose 50% Injectable 25 Gram(s) IV Push once  dextrose 50% Injectable 25 Gram(s) IV Push once  dextrose Gel 1 Dose(s) Oral once PRN  glucagon  Injectable 1 milliGRAM(s) IntraMuscular once PRN  insulin lispro (HumaLOG) corrective regimen sliding scale   SubCutaneous three times a day before meals  insulin lispro (HumaLOG) corrective regimen sliding scale   SubCutaneous at bedtime  simvastatin 40 milliGRAM(s) Oral at bedtime    dextrose 5%. 1000 milliLiter(s) IV Continuous <Continuous>  influenza   Vaccine 0.5 milliLiter(s) IntraMuscular once  sodium chloride 2 Gram(s) Oral two times a day      FAMILY HISTORY:  Family history of heart disease      SOCIAL HISTORY:    [ ] Non-smoker  [ ] Smoker  [ ] Alcohol    Allergies    No Known Allergies    Intolerances    	    REVIEW OF SYSTEMS:  CONSTITUTIONAL: No fever, weight loss, or fatigue  EYES: No eye pain, visual disturbances, or discharge  ENMT:  No difficulty hearing, tinnitus, vertigo; No sinus or throat pain  NECK: No pain or stiffness  RESPIRATORY: No cough, wheezing, chills or hemoptysis; No Shortness of Breath  CARDIOVASCULAR: No chest pain, palpitations, passing out, dizziness, or leg swelling  GASTROINTESTINAL: No abdominal or epigastric pain. No nausea, vomiting, or hematemesis; No diarrhea or constipation. No melena or hematochezia.  GENITOURINARY: No dysuria, frequency, hematuria, or incontinence  NEUROLOGICAL: No headaches, memory loss, loss of strength, numbness, or tremors  SKIN: No itching, burning, rashes, or lesions   LYMPH Nodes: No enlarged glands  ENDOCRINE: No heat or cold intolerance; No hair loss  MUSCULOSKELETAL: No joint pain or swelling; No muscle, back, or extremity pain  PSYCHIATRIC: No depression, anxiety, mood swings, or difficulty sleeping  HEME/LYMPH: No easy bruising, or bleeding gums  ALLERY AND IMMUNOLOGIC: No hives or eczema	    [ ] All others negative	  [ ] Unable to obtain    PHYSICAL EXAM:  T(C): 36.4 (10-03-17 @ 08:00), Max: 37.1 (10-02-17 @ 13:31)  HR: 60 (10-03-17 @ 08:00) (60 - 69)  BP: 130/69 (10-03-17 @ 08:00) (122/71 - 158/72)  RR: 18 (10-03-17 @ 08:00) (18 - 18)  SpO2: 98% (10-03-17 @ 08:00) (95% - 98%)  Wt(kg): --  I&O's Summary    02 Oct 2017 07:  -  03 Oct 2017 07:00  --------------------------------------------------------  IN: 1580 mL / OUT: 1175 mL / NET: 405 mL    03 Oct 2017 07:  -  03 Oct 2017 11:36  --------------------------------------------------------  IN: 240 mL / OUT: 400 mL / NET: -160 mL        Appearance: Normal	  HEENT:   Normal oral mucosa, PERRL, EOMI	  Lymphatic: No lymphadenopathy  Cardiovascular: Normal S1 S2, No JVD, No murmurs, No edema  Respiratory: Lungs clear to auscultation	  Psychiatry: A & O x 3, Mood & affect appropriate  Gastrointestinal:  Soft, Non-tender, + BS	  Skin: No rashes, No ecchymoses, No cyanosis	  Neurologic: Non-focal  Extremities: Normal range of motion, No clubbing, cyanosis or edema  Vascular: Peripheral pulses palpable 2+ bilaterally    TELEMETRY: 	    ECG:  	  RADIOLOGY:  < from: CT Head No Cont (17 @ 14:26) >    INTERPRETATION:  PROCEDURE: CT head without contrast.    INDICATION: Dizziness; loss of consciousness    TECHNIQUE: Multiple axial sections were obtained at 5 mm intervals. The   images were reviewed in brain and bone windows. Imaging is performed   using helical low-dose technique, and sagittal and coronal reformations   are provided.    COMPARISON: 8/3/2017    FINDINGS:     There is a 1.1 x 0.5 cm parenchymal hemorrhage in the region of the   posterior aspect of the left caudate nucleus approximating the left   frontal horn. No midline shift or effacement of the basal cisterns. No   hydrocephalus    The examination demonstrates generalized volume loss as manifested by the   enlargement of the ventricles, cisternal spaces, and cortical sulci   throughout.     No displaced skull fracture. The included paranasal sinuses and mastoid   air cells are predominantly clear.    Patient is status post left lens replacement surgery.    IMPRESSION:     Acute 1.1 x 0.5 cm parenchymal hemorrhage approximating the posterior   aspect of the left caudate nucleus.      < from: MRA Head w/o Cont (17 @ 16:24) >  INTERPRETATION:  Contrast-enhanced MRI of thebrain and MRA of the neck.   Noncontrast MRA of the brain.    CLINICAL INDICATION: Left caudate hemorrhage    TECHNIQUE: Multiplanar multisequence MRI of the brain and 3-D time of   flight images through the neck were obtained before and after the   dynamic, intravenous administration of  6 cc of Gadavist. 1.5 cc were   discarded.  Noncontrast 3-D time-of-flight images through the brain and   2-D time of flight images through the neck were obtained on the same   date.  Time of flight images were reformatted using an MIP protocol   targeted over the head and neck.    COMPARISON: CT brain 2017. MRI brain 2017.    FINDINGS:     MRI BRAIN: 1.2 x 1.7 cm region of abnormal parenchymal enhancement in the   posterior right cerebellar hemisphere is stable since 2017. Multiple   foci of susceptibility artifact are associated with this region of   enhancement. There is no edema surrounding this region of enhancement.    There is redemonstration of a 1.0 x 0.5 cm early subacute parenchymal   hemorrhage approximating the lateral aspect of the left ventricular body,   just posterior to the left caudate nucleus. The presence of T1 shortening   limits evaluation for underlying enhancement and diffusion abnormality.    There is no diffusion abnormality elsewhere in the brain. There is no   hydrocephalus or midline shift. Redemonstration of numerous foci of   susceptibility artifact in the thalami and bilateral cerebellar   hemispheres. Signal voids are seen within the major intracranial vessels   consistent with their patency.    There is small mastoid air cell effusions bilaterally. Visualized   paranasal sinuses are clear. The orbits, sellar and suprasellar   structures, and craniocervical junction are unremarkable.    MRA BRAIN: Study limited by motion. Grossly preserved flow-related signal   within the bilateral anterior, middle, and posterior cerebral arteries,   the basilar artery, the intracranial vertebral arteries, and the skull   base/intracranial internal carotid arteries. No vascular aneurysm or   flow-limiting stenosis.    No abnormal vessels in the region of the left para-caudate hemorrhage.    MRA NECK: There is good flow-related signal within the bilateral common   and internal carotid arteries. The vertebral arteries are well   visualized. There is no flow-limiting stenosis or vascular aneurysm.    Carotid bifurcations and origins of the common carotid and vertebral   arteries are unremarkable.    IMPRESSION:    MRI BRAIN: 1.2 x 1.7 cm region of abnormal parenchymal enhancement in the   posterior right cerebellar hemisphere is stable since 2017. Multiple   foci of susceptibility artifact are associated with this region of   enhancement. There is no edema surrounding this region of enhancement.    Similar appearing 1.0 x 0.5 cm early subacute parenchymal hemorrhage   approximating the lateral aspect of the left ventricular body, just   posterior to the left caudate nucleus. The presence of T1 shortening   limits evaluation for underlying enhancement and diffusion abnormality.   Recommend following hemorrhage to resolution.    Redemonstration of numerous foci of susceptibility artifact in the   thalami and bilateral cerebellar hemispheres. These may represent   cavernoma or chronic microhemorrhages.    MRA BRAIN: Limited by motion. No gross evidence for flow-limiting   stenosis or vascular aneurysm.    MRA NECK: No flow-limiting stenosis. Carotid bifurcations and origins of   the common carotid and vertebral arteries unremarkable.          < from: Cardiac Cath Lab - Adult (16 @ 15:40) >  --  Left heart catheterization with ventriculography.  --  Left coronary angiography.  --  Right coronary angiography.  --  Intervention on proximal LAD: drug-eluting stent.  TECHNIQUE: The risks and alternatives of the procedures and conscious  sedation were explained to the patient and informed consent was obtained.  Cardiac catheterization performed urgently.  Local anesthetic given. Right radial artery access. Left heart  catheterization. Ventriculography was performed. Left coronary artery  angiography. The vessel was injected utilizing a catheter. Right coronary  artery angiography. The vessel was injected utilizing a catheter.  RADIATION EXPOSURE: 10.5 min. A successful drug-eluting stent was  performed on the 90 % lesion in the proximal LAD. Following intervention  there was a 1 % residual stenosis. According to the ACC/AHA classification  system, this lesion was a type C lesion. There was AGUILAR 3 flow before the  procedure and AGUILAR 3 flow after the procedure. Vessel setup was performed.  A 6FR JL3.5 LAUNCHER guiding catheter was used to intubate the vessel.  Vessel setup was performed. A CoreFlow UNIVERSAL 190CM wire was used to cross  thelesion. Balloon angioplasty was performed, using a 2.75 X 12 NC APEX  balloon, with 2 inflations and a maximum inflation pressure of 12 emma. A  ABSORB 3.00 X 18 drug-eluting stent was placed across the lesion and  deployed at a maximum inflation pressure of 12 emma. Balloon angioplasty  was performed, using a 3.25 X 15 NC APEX balloon, with 1 inflations and a  maximum inflation pressure of 20 emma.  CONTRAST GIVEN: Omnipaque 44 ml. Omnipaque 54 ml.  MEDICATIONS GIVEN: Fentanyl, 25 mcg, IV. Midazolam, 1 mg, IV. Verapamil  (Isoptin, Calan, Covera), 2.5 mg, IA. Heparin, 3000 units, IA. Heparin,  2000 units, IV.  CORONARY VESSELS: The coronary circulation is right dominant.  LM:   --  LM: Angiography showed minor luminal irregularities with no flow  limiting lesions.  LAD:   --  Proximal LAD: There was a 90 % stenosis.  CX:   --  Distal circumflex: There was a 80 % stenosis. The reference  vessel diameter was 1.5 mm. Lesion severity was assessed by visual  estimate.  RCA:   --  RCA: Angiographyshowed minor luminal irregularities with no  flow limiting lesions.  COMPLICATIONS: There were no complications.  DIAGNOSTIC RECOMMENDATIONS: ASA and Plavix for 1 year.  INTERVENTIONAL RECOMMENDATIONS: ASA and Plavix for 1 year.  Prepared and signed by  Andi Krishnan M.D.  Signed 2016 16:55:55  HEMODYNAMIC TABLES  Pressures:  Baseline/ Room Air  Pressures:  - HR: 64  Pressures:  - Rhythm:  Pressures:  -- Aortic Pressure (S/D/M): 164/68/103  Pressures:  -- Left Ventricle (s/edp): 166/23/--  Pressures:  Intervention  Pressures:  - HR: 66  Pressures:  - Rhythm:  Pressures:  -- Aortic Pressure (S/D/M): 166/72/110  Outputs:  Baseline/ Room Air  Outputs:  -- CALCULATIONS[age]  Outputs:  -- CALCULATIONS: Body Surface Area: 1.66  Outputs:  -- CALCULATIONS: Height in cm: 160.00  Outputs:  -- CALCULATIONS: Sex: Male  Outputs:  -- CALCULATIONS: Weight in k.50        OTHER: 	  	  LABS:	 	    CARDIAC MARKERS:                                  11.6   10.0  )-----------( 331      ( 03 Oct 2017 10:23 )             37.0     10-02    132<L>  |  95<L>  |  17  ----------------------------<  116<H>  4.4   |  22  |  0.94    Ca    8.8      02 Oct 2017 05:34  Phos  3.3     10-03      proBNP:   Lipid Profile:   HgA1c:   TSH: CHIEF COMPLAINT: chest pain     HISTORY OF PRESENT ILLNESS:  78YO RHM with PMHx CAD s/p stent (2017), HLD, BPH, HTN, DM, "brain hemorrhage", "brain infarctions", recent drain placed for sigmoid diverticulitis with pelvic abscess (d/c 3 d ago on ) presents after syncopal episode this morning.  Family at bedside, reports patient went to bathroom at 4AM and then called for family. Wife found him on the floor but patient thought he was in the bed and but could remember how he got there or where he was.  He was found to have a small intracranial bleed.   This am, he reported right sided chest pain similar to the pain he has had in the past, but no similar to his anginal equivalent. HE had a cath and LAD PCI in 2016. Pain is reproducible with palpation.     PAST MEDICAL & SURGICAL HISTORY:  TIA (transient ischemic attack)  HLD (hyperlipidemia)  GERD (gastroesophageal reflux disease)  Anemia  Diabetes  BPH (benign prostatic hyperplasia)  HTN (hypertension)  Stented coronary artery  H/O hernia repair  History of appendectomy          MEDICATIONS:  aspirin  chewable 81 milliGRAM(s) Oral daily  enoxaparin Injectable 40 milliGRAM(s) SubCutaneous daily  hydrALAZINE Injectable 10 milliGRAM(s) IV Push every 6 hours PRN  hydrochlorothiazide 12.5 milliGRAM(s) Oral daily  losartan 100 milliGRAM(s) Oral daily  metoprolol succinate  milliGRAM(s) Oral daily        acetaminophen   Tablet. 650 milliGRAM(s) Oral every 6 hours PRN  oxyCODONE    IR 5 milliGRAM(s) Oral every 4 hours PRN    docusate sodium 100 milliGRAM(s) Oral three times a day  senna 2 Tablet(s) Oral at bedtime    dextrose 50% Injectable 12.5 Gram(s) IV Push once  dextrose 50% Injectable 25 Gram(s) IV Push once  dextrose 50% Injectable 25 Gram(s) IV Push once  dextrose Gel 1 Dose(s) Oral once PRN  glucagon  Injectable 1 milliGRAM(s) IntraMuscular once PRN  insulin lispro (HumaLOG) corrective regimen sliding scale   SubCutaneous three times a day before meals  insulin lispro (HumaLOG) corrective regimen sliding scale   SubCutaneous at bedtime  simvastatin 40 milliGRAM(s) Oral at bedtime    dextrose 5%. 1000 milliLiter(s) IV Continuous <Continuous>  influenza   Vaccine 0.5 milliLiter(s) IntraMuscular once  sodium chloride 2 Gram(s) Oral two times a day      FAMILY HISTORY:  Family history of heart disease      SOCIAL HISTORY:    [ ] Non-smoker  [ ] Smoker  [ ] Alcohol    Allergies    No Known Allergies    Intolerances    	    REVIEW OF SYSTEMS:  CONSTITUTIONAL: No fever, weight loss, or fatigue  EYES: No eye pain, visual disturbances, or discharge  ENMT:  No difficulty hearing, tinnitus, vertigo; No sinus or throat pain  NECK: No pain or stiffness  RESPIRATORY: No cough, wheezing, chills or hemoptysis; No Shortness of Breath  CARDIOVASCULAR: No chest pain, palpitations, passing out, dizziness, or leg swelling  GASTROINTESTINAL: No abdominal or epigastric pain. No nausea, vomiting, or hematemesis; No diarrhea or constipation. No melena or hematochezia.  GENITOURINARY: No dysuria, frequency, hematuria, or incontinence  NEUROLOGICAL: No headaches, memory loss, loss of strength, numbness, or tremors  SKIN: No itching, burning, rashes, or lesions   LYMPH Nodes: No enlarged glands  ENDOCRINE: No heat or cold intolerance; No hair loss  MUSCULOSKELETAL: No joint pain or swelling; No muscle, back, or extremity pain  PSYCHIATRIC: No depression, anxiety, mood swings, or difficulty sleeping  HEME/LYMPH: No easy bruising, or bleeding gums  ALLERY AND IMMUNOLOGIC: No hives or eczema	    [ ] All others negative	  [ ] Unable to obtain    PHYSICAL EXAM:  T(C): 36.4 (10-03-17 @ 08:00), Max: 37.1 (10-02-17 @ 13:31)  HR: 60 (10-03-17 @ 08:00) (60 - 69)  BP: 130/69 (10-03-17 @ 08:00) (122/71 - 158/72)  RR: 18 (10-03-17 @ 08:00) (18 - 18)  SpO2: 98% (10-03-17 @ 08:00) (95% - 98%)  Wt(kg): --  I&O's Summary    02 Oct 2017 07:01  -  03 Oct 2017 07:00  --------------------------------------------------------  IN: 1580 mL / OUT: 1175 mL / NET: 405 mL    03 Oct 2017 07:01  -  03 Oct 2017 11:36  --------------------------------------------------------  IN: 240 mL / OUT: 400 mL / NET: -160 mL        Appearance: Normal	  HEENT:   Normal oral mucosa, PERRL, EOMI	  Lymphatic: No lymphadenopathy  Cardiovascular: Normal S1 S2, No JVD, No murmurs, No edema, reproducible right sided chest pain   Respiratory: Lungs clear to auscultation	  Psychiatry: A & O x 3, Mood & affect appropriate  Gastrointestinal:  Soft, Non-tender, + BS	  Skin: No rashes, No ecchymoses, No cyanosis	  Neurologic: Non-focal  Extremities: right index finger amputation   Vascular: Peripheral pulses palpable 2+ bilaterally    TELEMETRY: 	    ECG:  	NSR, no acute ischemic stt changes   RADIOLOGY:  < from: CT Head No Cont (17 @ 14:26) >    INTERPRETATION:  PROCEDURE: CT head without contrast.    INDICATION: Dizziness; loss of consciousness    TECHNIQUE: Multiple axial sections were obtained at 5 mm intervals. The   images were reviewed in brain and bone windows. Imaging is performed   using helical low-dose technique, and sagittal and coronal reformations   are provided.    COMPARISON: 8/3/2017    FINDINGS:     There is a 1.1 x 0.5 cm parenchymal hemorrhage in the region of the   posterior aspect of the left caudate nucleus approximating the left   frontal horn. No midline shift or effacement of the basal cisterns. No   hydrocephalus    The examination demonstrates generalized volume loss as manifested by the   enlargement of the ventricles, cisternal spaces, and cortical sulci   throughout.     No displaced skull fracture. The included paranasal sinuses and mastoid   air cells are predominantly clear.    Patient is status post left lens replacement surgery.    IMPRESSION:     Acute 1.1 x 0.5 cm parenchymal hemorrhage approximating the posterior   aspect of the left caudate nucleus.      < from: MRA Head w/o Cont (17 @ 16:24) >  INTERPRETATION:  Contrast-enhanced MRI of thebrain and MRA of the neck.   Noncontrast MRA of the brain.    CLINICAL INDICATION: Left caudate hemorrhage    TECHNIQUE: Multiplanar multisequence MRI of the brain and 3-D time of   flight images through the neck were obtained before and after the   dynamic, intravenous administration of  6 cc of Gadavist. 1.5 cc were   discarded.  Noncontrast 3-D time-of-flight images through the brain and   2-D time of flight images through the neck were obtained on the same   date.  Time of flight images were reformatted using an MIP protocol   targeted over the head and neck.    COMPARISON: CT brain 2017. MRI brain 2017.    FINDINGS:     MRI BRAIN: 1.2 x 1.7 cm region of abnormal parenchymal enhancement in the   posterior right cerebellar hemisphere is stable since 2017. Multiple   foci of susceptibility artifact are associated with this region of   enhancement. There is no edema surrounding this region of enhancement.    There is redemonstration of a 1.0 x 0.5 cm early subacute parenchymal   hemorrhage approximating the lateral aspect of the left ventricular body,   just posterior to the left caudate nucleus. The presence of T1 shortening   limits evaluation for underlying enhancement and diffusion abnormality.    There is no diffusion abnormality elsewhere in the brain. There is no   hydrocephalus or midline shift. Redemonstration of numerous foci of   susceptibility artifact in the thalami and bilateral cerebellar   hemispheres. Signal voids are seen within the major intracranial vessels   consistent with their patency.    There is small mastoid air cell effusions bilaterally. Visualized   paranasal sinuses are clear. The orbits, sellar and suprasellar   structures, and craniocervical junction are unremarkable.    MRA BRAIN: Study limited by motion. Grossly preserved flow-related signal   within the bilateral anterior, middle, and posterior cerebral arteries,   the basilar artery, the intracranial vertebral arteries, and the skull   base/intracranial internal carotid arteries. No vascular aneurysm or   flow-limiting stenosis.    No abnormal vessels in the region of the left para-caudate hemorrhage.    MRA NECK: There is good flow-related signal within the bilateral common   and internal carotid arteries. The vertebral arteries are well   visualized. There is no flow-limiting stenosis or vascular aneurysm.    Carotid bifurcations and origins of the common carotid and vertebral   arteries are unremarkable.    IMPRESSION:    MRI BRAIN: 1.2 x 1.7 cm region of abnormal parenchymal enhancement in the   posterior right cerebellar hemisphere is stable since 2017. Multiple   foci of susceptibility artifact are associated with this region of   enhancement. There is no edema surrounding this region of enhancement.    Similar appearing 1.0 x 0.5 cm early subacute parenchymal hemorrhage   approximating the lateral aspect of the left ventricular body, just   posterior to the left caudate nucleus. The presence of T1 shortening   limits evaluation for underlying enhancement and diffusion abnormality.   Recommend following hemorrhage to resolution.    Redemonstration of numerous foci of susceptibility artifact in the   thalami and bilateral cerebellar hemispheres. These may represent   cavernoma or chronic microhemorrhages.    MRA BRAIN: Limited by motion. No gross evidence for flow-limiting   stenosis or vascular aneurysm.    MRA NECK: No flow-limiting stenosis. Carotid bifurcations and origins of   the common carotid and vertebral arteries unremarkable.          < from: Cardiac Cath Lab - Adult (16 @ 15:40) >  --  Left heart catheterization with ventriculography.  --  Left coronary angiography.  --  Right coronary angiography.  --  Intervention on proximal LAD: drug-eluting stent.  TECHNIQUE: The risks and alternatives of the procedures and conscious  sedation were explained to the patient and informed consent was obtained.  Cardiac catheterization performed urgently.  Local anesthetic given. Right radial artery access. Left heart  catheterization. Ventriculography was performed. Left coronary artery  angiography. The vessel was injected utilizing a catheter. Right coronary  artery angiography. The vessel was injected utilizing a catheter.  RADIATION EXPOSURE: 10.5 min. A successful drug-eluting stent was  performed on the 90 % lesion in the proximal LAD. Following intervention  there was a 1 % residual stenosis. According to the ACC/AHA classification  system, this lesion was a type C lesion. There was AGUILAR 3 flow before the  procedure and AGUILAR 3 flow after the procedure. Vessel setup was performed.  A 6FR JL3.5 LAUNCHER guiding catheter was used to intubate the vessel.  Vessel setup was performed. A BMW UNIVERSAL 190CM wire was used to cross  thelesion. Balloon angioplasty was performed, using a 2.75 X 12 NC APEX  balloon, with 2 inflations and a maximum inflation pressure of 12 emma. A  ABSORB 3.00 X 18 drug-eluting stent was placed across the lesion and  deployed at a maximum inflation pressure of 12 emma. Balloon angioplasty  was performed, using a 3.25 X 15 NC APEX balloon, with 1 inflations and a  maximum inflation pressure of 20 emma.  CONTRAST GIVEN: Omnipaque 44 ml. Omnipaque 54 ml.  MEDICATIONS GIVEN: Fentanyl, 25 mcg, IV. Midazolam, 1 mg, IV. Verapamil  (Isoptin, Calan, Covera), 2.5 mg, IA. Heparin, 3000 units, IA. Heparin,  2000 units, IV.  CORONARY VESSELS: The coronary circulation is right dominant.  LM:   --  LM: Angiography showed minor luminal irregularities with no flow  limiting lesions.  LAD:   --  Proximal LAD: There was a 90 % stenosis.  CX:   --  Distal circumflex: There was a 80 % stenosis. The reference  vessel diameter was 1.5 mm. Lesion severity was assessed by visual  estimate.  RCA:   --  RCA: Angiographyshowed minor luminal irregularities with no  flow limiting lesions.  COMPLICATIONS: There were no complications.  DIAGNOSTIC RECOMMENDATIONS: ASA and Plavix for 1 year.  INTERVENTIONAL RECOMMENDATIONS: ASA and Plavix for 1 year.  Prepared and signed by  Andi Krishnan M.D.  Signed 2016 16:55:55  HEMODYNAMIC TABLES  Pressures:  Baseline/ Room Air  Pressures:  - HR: 64  Pressures:  - Rhythm:  Pressures:  -- Aortic Pressure (S/D/M): 164/68/103  Pressures:  -- Left Ventricle (s/edp): 166/23/--  Pressures:  Intervention  Pressures:  - HR: 66  Pressures:  - Rhythm:  Pressures:  -- Aortic Pressure (S/D/M): 166/72/110  Outputs:  Baseline/ Room Air  Outputs:  -- CALCULATIONS[age]  Outputs:  -- CALCULATIONS: Body Surface Area: 1.66  Outputs:  -- CALCULATIONS: Height in cm: 160.00  Outputs:  -- CALCULATIONS: Sex: Male  Outputs:  -- CALCULATIONS: Weight in k.50        OTHER: 	  	  LABS:	 	    CARDIAC MARKERS:                                  11.6   10.0  )-----------( 331      ( 03 Oct 2017 10:23 )             37.0     10-02    132<L>  |  95<L>  |  17  ----------------------------<  116<H>  4.4   |  22  |  0.94    Ca    8.8      02 Oct 2017 05:34  Phos  3.3     10-03      proBNP:   Lipid Profile:   HgA1c:   TSH:

## 2017-10-03 NOTE — CONSULT NOTE ADULT - ASSESSMENT
ASSESSMENT: Patient is a 77y old m with recent sigmoid diverticulitis s/p IR drainage now presenting with acute L intraparenchymal hemorrhage after syncopal episode.     PLAN:    - No acute surgical intervention indicated  - Care as per neurology for acute stroke  - C/w Abx, augmentin, for perforated diverticulitis through 10/2 as prescribed on prior discharge  - Regular diet as tolerated, encourage PO fluid intake  - IV fluids now for dehydration, d/c as PO tolerance improves.   - Flush IR drain with 3mL NS daily as per IR recs  - Pain Control  - No indication for further imaging at this time.  Earliest time for repeat imaging without a concerning abdominal exam would be 2 weeks after insertion (Drain placed on 9/21, earliest date CT Abd/Pelvis would be indicated is 10/5).  - Patient and plan discussed with Attending, Dr. Isela Bolaños MD PGY3  Acute Care Surgery, #[id number]
76 yo male with known CAD and recent LAD PCI (12/2016) and residual LCx disease was admitted with ICH and complaining of right sided chest pain

## 2017-10-03 NOTE — PROGRESS NOTE ADULT - NSHPATTENDINGPLANDISCUSS_GEN_ALL_CORE
Neurology residents and PA on stroke service
Neurology residents and TORREY Holloway
Neurology residents and NP on stroke service

## 2017-10-04 LAB
CULTURE RESULTS: SIGNIFICANT CHANGE UP
CULTURE RESULTS: SIGNIFICANT CHANGE UP
SPECIMEN SOURCE: SIGNIFICANT CHANGE UP
SPECIMEN SOURCE: SIGNIFICANT CHANGE UP

## 2017-10-04 RX ORDER — LOSARTAN POTASSIUM 100 MG/1
1 TABLET, FILM COATED ORAL
Qty: 30 | Refills: 0 | OUTPATIENT
Start: 2017-10-04 | End: 2017-11-03

## 2017-10-04 RX ORDER — DOCUSATE SODIUM 100 MG
1 CAPSULE ORAL
Qty: 90 | Refills: 0 | OUTPATIENT
Start: 2017-10-04 | End: 2017-11-03

## 2017-10-04 RX ORDER — METOPROLOL TARTRATE 50 MG
1 TABLET ORAL
Qty: 30 | Refills: 0 | OUTPATIENT
Start: 2017-10-04 | End: 2017-11-03

## 2017-10-04 RX ORDER — SENNA PLUS 8.6 MG/1
2 TABLET ORAL
Qty: 60 | Refills: 0 | OUTPATIENT
Start: 2017-10-04 | End: 2017-11-03

## 2017-10-05 LAB — MAGNESIUM RBC-MCNC: 4.1 MG/DL — SIGNIFICANT CHANGE UP (ref 4–6.4)

## 2017-10-07 ENCOUNTER — INPATIENT (INPATIENT)
Facility: HOSPITAL | Age: 77
LOS: 4 days | Discharge: NURSING, REHAB OR LONG-TERM CARE | End: 2017-10-12
Attending: HOSPITALIST | Admitting: HOSPITALIST
Payer: MEDICARE

## 2017-10-07 VITALS
TEMPERATURE: 98 F | OXYGEN SATURATION: 99 % | HEART RATE: 70 BPM | SYSTOLIC BLOOD PRESSURE: 147 MMHG | DIASTOLIC BLOOD PRESSURE: 79 MMHG | RESPIRATION RATE: 20 BRPM

## 2017-10-07 DIAGNOSIS — Z90.49 ACQUIRED ABSENCE OF OTHER SPECIFIED PARTS OF DIGESTIVE TRACT: Chronic | ICD-10-CM

## 2017-10-07 DIAGNOSIS — I61.9 NONTRAUMATIC INTRACEREBRAL HEMORRHAGE, UNSPECIFIED: ICD-10-CM

## 2017-10-07 DIAGNOSIS — D64.9 ANEMIA, UNSPECIFIED: ICD-10-CM

## 2017-10-07 DIAGNOSIS — I25.10 ATHEROSCLEROTIC HEART DISEASE OF NATIVE CORONARY ARTERY WITHOUT ANGINA PECTORIS: ICD-10-CM

## 2017-10-07 DIAGNOSIS — E87.1 HYPO-OSMOLALITY AND HYPONATREMIA: ICD-10-CM

## 2017-10-07 DIAGNOSIS — Z98.890 OTHER SPECIFIED POSTPROCEDURAL STATES: Chronic | ICD-10-CM

## 2017-10-07 DIAGNOSIS — Z95.5 PRESENCE OF CORONARY ANGIOPLASTY IMPLANT AND GRAFT: Chronic | ICD-10-CM

## 2017-10-07 DIAGNOSIS — K57.32 DIVERTICULITIS OF LARGE INTESTINE WITHOUT PERFORATION OR ABSCESS WITHOUT BLEEDING: ICD-10-CM

## 2017-10-07 DIAGNOSIS — Z29.9 ENCOUNTER FOR PROPHYLACTIC MEASURES, UNSPECIFIED: ICD-10-CM

## 2017-10-07 DIAGNOSIS — E11.9 TYPE 2 DIABETES MELLITUS WITHOUT COMPLICATIONS: ICD-10-CM

## 2017-10-07 DIAGNOSIS — E78.5 HYPERLIPIDEMIA, UNSPECIFIED: ICD-10-CM

## 2017-10-07 DIAGNOSIS — I10 ESSENTIAL (PRIMARY) HYPERTENSION: ICD-10-CM

## 2017-10-07 DIAGNOSIS — Z00.8 ENCOUNTER FOR OTHER GENERAL EXAMINATION: ICD-10-CM

## 2017-10-07 LAB
ALBUMIN SERPL ELPH-MCNC: 3.8 G/DL — SIGNIFICANT CHANGE UP (ref 3.3–5)
ALBUMIN SERPL ELPH-MCNC: 3.9 G/DL — SIGNIFICANT CHANGE UP (ref 3.3–5)
ALP SERPL-CCNC: 67 U/L — SIGNIFICANT CHANGE UP (ref 40–120)
ALP SERPL-CCNC: 72 U/L — SIGNIFICANT CHANGE UP (ref 40–120)
ALT FLD-CCNC: 12 U/L — SIGNIFICANT CHANGE UP (ref 4–41)
ALT FLD-CCNC: 13 U/L — SIGNIFICANT CHANGE UP (ref 4–41)
ANISOCYTOSIS BLD QL: SLIGHT — SIGNIFICANT CHANGE UP
APPEARANCE UR: CLEAR — SIGNIFICANT CHANGE UP
AST SERPL-CCNC: 11 U/L — SIGNIFICANT CHANGE UP (ref 4–40)
AST SERPL-CCNC: 14 U/L — SIGNIFICANT CHANGE UP (ref 4–40)
BASE EXCESS BLDV CALC-SCNC: 0.9 MMOL/L — SIGNIFICANT CHANGE UP
BASOPHILS # BLD AUTO: 0.03 K/UL — SIGNIFICANT CHANGE UP (ref 0–0.2)
BASOPHILS NFR BLD AUTO: 0.3 % — SIGNIFICANT CHANGE UP (ref 0–2)
BILIRUB SERPL-MCNC: 0.7 MG/DL — SIGNIFICANT CHANGE UP (ref 0.2–1.2)
BILIRUB SERPL-MCNC: 0.7 MG/DL — SIGNIFICANT CHANGE UP (ref 0.2–1.2)
BILIRUB UR-MCNC: NEGATIVE — SIGNIFICANT CHANGE UP
BLOOD GAS VENOUS - CREATININE: 0.7 MG/DL — SIGNIFICANT CHANGE UP (ref 0.5–1.3)
BLOOD UR QL VISUAL: NEGATIVE — SIGNIFICANT CHANGE UP
BUN SERPL-MCNC: 10 MG/DL — SIGNIFICANT CHANGE UP (ref 7–23)
BUN SERPL-MCNC: 8 MG/DL — SIGNIFICANT CHANGE UP (ref 7–23)
BUN SERPL-MCNC: 9 MG/DL — SIGNIFICANT CHANGE UP (ref 7–23)
CALCIUM SERPL-MCNC: 8 MG/DL — LOW (ref 8.4–10.5)
CALCIUM SERPL-MCNC: 8.4 MG/DL — SIGNIFICANT CHANGE UP (ref 8.4–10.5)
CALCIUM SERPL-MCNC: 8.6 MG/DL — SIGNIFICANT CHANGE UP (ref 8.4–10.5)
CHLORIDE BLDV-SCNC: 84 MMOL/L — LOW (ref 96–108)
CHLORIDE SERPL-SCNC: 79 MMOL/L — LOW (ref 98–107)
CHLORIDE SERPL-SCNC: 80 MMOL/L — LOW (ref 98–107)
CHLORIDE SERPL-SCNC: 80 MMOL/L — LOW (ref 98–107)
CHLORIDE UR-SCNC: 60 MMOL/L — SIGNIFICANT CHANGE UP
CK MB BLD-MCNC: 2.34 NG/ML — SIGNIFICANT CHANGE UP (ref 1–6.6)
CK SERPL-CCNC: 28 U/L — LOW (ref 30–200)
CO2 SERPL-SCNC: 21 MMOL/L — LOW (ref 22–31)
CO2 SERPL-SCNC: 22 MMOL/L — SIGNIFICANT CHANGE UP (ref 22–31)
CO2 SERPL-SCNC: 23 MMOL/L — SIGNIFICANT CHANGE UP (ref 22–31)
COLOR SPEC: YELLOW — SIGNIFICANT CHANGE UP
CREAT SERPL-MCNC: 0.75 MG/DL — SIGNIFICANT CHANGE UP (ref 0.5–1.3)
CREAT SERPL-MCNC: 0.77 MG/DL — SIGNIFICANT CHANGE UP (ref 0.5–1.3)
CREAT SERPL-MCNC: 0.79 MG/DL — SIGNIFICANT CHANGE UP (ref 0.5–1.3)
EOSINOPHIL # BLD AUTO: 0.03 K/UL — SIGNIFICANT CHANGE UP (ref 0–0.5)
EOSINOPHIL NFR BLD AUTO: 0.3 % — SIGNIFICANT CHANGE UP (ref 0–6)
GAS PNL BLDV: 114 MMOL/L — CRITICAL LOW (ref 136–146)
GLUCOSE BLDV-MCNC: 191 — HIGH (ref 70–99)
GLUCOSE SERPL-MCNC: 116 MG/DL — HIGH (ref 70–99)
GLUCOSE SERPL-MCNC: 132 MG/DL — HIGH (ref 70–99)
GLUCOSE SERPL-MCNC: 181 MG/DL — HIGH (ref 70–99)
GLUCOSE UR-MCNC: 50 — SIGNIFICANT CHANGE UP
HCO3 BLDV-SCNC: 25 MMOL/L — SIGNIFICANT CHANGE UP (ref 20–27)
HCT VFR BLD CALC: 30.3 % — LOW (ref 39–50)
HCT VFR BLDV CALC: 31 % — LOW (ref 39–51)
HGB BLD-MCNC: 9.8 G/DL — LOW (ref 13–17)
HGB BLDV-MCNC: 10 G/DL — LOW (ref 13–17)
HYPOCHROMIA BLD QL: SLIGHT — SIGNIFICANT CHANGE UP
IMM GRANULOCYTES # BLD AUTO: 0.13 # — SIGNIFICANT CHANGE UP
IMM GRANULOCYTES NFR BLD AUTO: 1.5 % — SIGNIFICANT CHANGE UP (ref 0–1.5)
KETONES UR-MCNC: SIGNIFICANT CHANGE UP
LACTATE BLDV-MCNC: 2 MMOL/L — SIGNIFICANT CHANGE UP (ref 0.5–2)
LEUKOCYTE ESTERASE UR-ACNC: HIGH
LYMPHOCYTES # BLD AUTO: 0.75 K/UL — LOW (ref 1–3.3)
LYMPHOCYTES # BLD AUTO: 8.6 % — LOW (ref 13–44)
MAGNESIUM SERPL-MCNC: 1.5 MG/DL — LOW (ref 1.6–2.6)
MANUAL SMEAR VERIFICATION: SIGNIFICANT CHANGE UP
MCHC RBC-ENTMCNC: 20.3 PG — LOW (ref 27–34)
MCHC RBC-ENTMCNC: 32.3 % — SIGNIFICANT CHANGE UP (ref 32–36)
MCV RBC AUTO: 62.9 FL — LOW (ref 80–100)
MICROCYTES BLD QL: SLIGHT — SIGNIFICANT CHANGE UP
MONOCYTES # BLD AUTO: 0.56 K/UL — SIGNIFICANT CHANGE UP (ref 0–0.9)
MONOCYTES NFR BLD AUTO: 6.4 % — SIGNIFICANT CHANGE UP (ref 2–14)
MUCOUS THREADS # UR AUTO: SIGNIFICANT CHANGE UP
NEUTROPHILS # BLD AUTO: 7.27 K/UL — SIGNIFICANT CHANGE UP (ref 1.8–7.4)
NEUTROPHILS NFR BLD AUTO: 82.9 % — HIGH (ref 43–77)
NITRITE UR-MCNC: NEGATIVE — SIGNIFICANT CHANGE UP
NRBC # FLD: 0 — SIGNIFICANT CHANGE UP
OSMOLALITY SERPL: 249 MOSMO/KG — LOW (ref 275–295)
OSMOLALITY UR: 472 MOSMO/KG — SIGNIFICANT CHANGE UP (ref 50–1200)
PCO2 BLDV: 36 MMHG — LOW (ref 41–51)
PH BLDV: 7.45 PH — HIGH (ref 7.32–7.43)
PH UR: 6.5 — SIGNIFICANT CHANGE UP (ref 4.6–8)
PHOSPHATE SERPL-MCNC: 2.8 MG/DL — SIGNIFICANT CHANGE UP (ref 2.5–4.5)
PLATELET # BLD AUTO: 313 K/UL — SIGNIFICANT CHANGE UP (ref 150–400)
PLATELET COUNT - ESTIMATE: NORMAL — SIGNIFICANT CHANGE UP
PMV BLD: 9.8 FL — SIGNIFICANT CHANGE UP (ref 7–13)
PO2 BLDV: 46 MMHG — HIGH (ref 35–40)
POLYCHROMASIA BLD QL SMEAR: SIGNIFICANT CHANGE UP
POTASSIUM BLDV-SCNC: 3.7 MMOL/L — SIGNIFICANT CHANGE UP (ref 3.4–4.5)
POTASSIUM SERPL-MCNC: 3.8 MMOL/L — SIGNIFICANT CHANGE UP (ref 3.5–5.3)
POTASSIUM SERPL-MCNC: 3.9 MMOL/L — SIGNIFICANT CHANGE UP (ref 3.5–5.3)
POTASSIUM SERPL-MCNC: 4.2 MMOL/L — SIGNIFICANT CHANGE UP (ref 3.5–5.3)
POTASSIUM SERPL-SCNC: 3.8 MMOL/L — SIGNIFICANT CHANGE UP (ref 3.5–5.3)
POTASSIUM SERPL-SCNC: 3.9 MMOL/L — SIGNIFICANT CHANGE UP (ref 3.5–5.3)
POTASSIUM SERPL-SCNC: 4.2 MMOL/L — SIGNIFICANT CHANGE UP (ref 3.5–5.3)
POTASSIUM UR-SCNC: 53.2 MEQ/L — SIGNIFICANT CHANGE UP
PROT SERPL-MCNC: 6.4 G/DL — SIGNIFICANT CHANGE UP (ref 6–8.3)
PROT SERPL-MCNC: 6.7 G/DL — SIGNIFICANT CHANGE UP (ref 6–8.3)
PROT UR-MCNC: 10 — SIGNIFICANT CHANGE UP
RBC # BLD: 4.82 M/UL — SIGNIFICANT CHANGE UP (ref 4.2–5.8)
RBC # FLD: 19.4 % — HIGH (ref 10.3–14.5)
RBC CASTS # UR COMP ASSIST: SIGNIFICANT CHANGE UP (ref 0–?)
SAO2 % BLDV: 81.9 % — SIGNIFICANT CHANGE UP (ref 60–85)
SODIUM SERPL-SCNC: 117 MMOL/L — CRITICAL LOW (ref 135–145)
SODIUM SERPL-SCNC: 119 MMOL/L — CRITICAL LOW (ref 135–145)
SODIUM SERPL-SCNC: 125 MMOL/L — LOW (ref 135–145)
SODIUM UR-SCNC: 46 MEQ/L — SIGNIFICANT CHANGE UP
SP GR SPEC: 1.01 — SIGNIFICANT CHANGE UP (ref 1–1.03)
SQUAMOUS # UR AUTO: SIGNIFICANT CHANGE UP
TROPONIN T SERPL-MCNC: < 0.06 NG/ML — SIGNIFICANT CHANGE UP (ref 0–0.06)
TSH SERPL-MCNC: 1.89 UIU/ML — SIGNIFICANT CHANGE UP (ref 0.27–4.2)
UROBILINOGEN FLD QL: NORMAL E.U. — SIGNIFICANT CHANGE UP (ref 0.1–0.2)
WBC # BLD: 8.77 K/UL — SIGNIFICANT CHANGE UP (ref 3.8–10.5)
WBC # FLD AUTO: 8.77 K/UL — SIGNIFICANT CHANGE UP (ref 3.8–10.5)
WBC UR QL: SIGNIFICANT CHANGE UP (ref 0–?)

## 2017-10-07 PROCEDURE — 74177 CT ABD & PELVIS W/CONTRAST: CPT | Mod: 26

## 2017-10-07 PROCEDURE — 70450 CT HEAD/BRAIN W/O DYE: CPT | Mod: 26

## 2017-10-07 PROCEDURE — 99223 1ST HOSP IP/OBS HIGH 75: CPT | Mod: GC

## 2017-10-07 PROCEDURE — 71010: CPT | Mod: 26

## 2017-10-07 RX ORDER — METOPROLOL TARTRATE 50 MG
100 TABLET ORAL DAILY
Qty: 0 | Refills: 0 | Status: DISCONTINUED | OUTPATIENT
Start: 2017-10-07 | End: 2017-10-12

## 2017-10-07 RX ORDER — FINASTERIDE 5 MG/1
5 TABLET, FILM COATED ORAL DAILY
Qty: 0 | Refills: 0 | Status: DISCONTINUED | OUTPATIENT
Start: 2017-10-07 | End: 2017-10-12

## 2017-10-07 RX ORDER — DEXTROSE 50 % IN WATER 50 %
25 SYRINGE (ML) INTRAVENOUS ONCE
Qty: 0 | Refills: 0 | Status: DISCONTINUED | OUTPATIENT
Start: 2017-10-07 | End: 2017-10-12

## 2017-10-07 RX ORDER — OXYCODONE HYDROCHLORIDE 5 MG/1
5 TABLET ORAL EVERY 6 HOURS
Qty: 0 | Refills: 0 | Status: DISCONTINUED | OUTPATIENT
Start: 2017-10-07 | End: 2017-10-12

## 2017-10-07 RX ORDER — MAGNESIUM OXIDE 400 MG ORAL TABLET 241.3 MG
400 TABLET ORAL
Qty: 0 | Refills: 0 | Status: DISCONTINUED | OUTPATIENT
Start: 2017-10-08 | End: 2017-10-08

## 2017-10-07 RX ORDER — INFLUENZA VIRUS VACCINE 15; 15; 15; 15 UG/.5ML; UG/.5ML; UG/.5ML; UG/.5ML
0.5 SUSPENSION INTRAMUSCULAR ONCE
Qty: 0 | Refills: 0 | Status: DISCONTINUED | OUTPATIENT
Start: 2017-10-07 | End: 2017-10-12

## 2017-10-07 RX ORDER — LOSARTAN POTASSIUM 100 MG/1
100 TABLET, FILM COATED ORAL DAILY
Qty: 0 | Refills: 0 | Status: DISCONTINUED | OUTPATIENT
Start: 2017-10-07 | End: 2017-10-07

## 2017-10-07 RX ORDER — INSULIN LISPRO 100/ML
VIAL (ML) SUBCUTANEOUS AT BEDTIME
Qty: 0 | Refills: 0 | Status: DISCONTINUED | OUTPATIENT
Start: 2017-10-07 | End: 2017-10-12

## 2017-10-07 RX ORDER — SIMVASTATIN 20 MG/1
40 TABLET, FILM COATED ORAL AT BEDTIME
Qty: 0 | Refills: 0 | Status: DISCONTINUED | OUTPATIENT
Start: 2017-10-07 | End: 2017-10-12

## 2017-10-07 RX ORDER — CLOPIDOGREL BISULFATE 75 MG/1
75 TABLET, FILM COATED ORAL DAILY
Qty: 0 | Refills: 0 | Status: DISCONTINUED | OUTPATIENT
Start: 2017-10-07 | End: 2017-10-07

## 2017-10-07 RX ORDER — OXYCODONE AND ACETAMINOPHEN 5; 325 MG/1; MG/1
1 TABLET ORAL EVERY 6 HOURS
Qty: 0 | Refills: 0 | Status: DISCONTINUED | OUTPATIENT
Start: 2017-10-07 | End: 2017-10-07

## 2017-10-07 RX ORDER — TAMSULOSIN HYDROCHLORIDE 0.4 MG/1
0.4 CAPSULE ORAL AT BEDTIME
Qty: 0 | Refills: 0 | Status: DISCONTINUED | OUTPATIENT
Start: 2017-10-07 | End: 2017-10-12

## 2017-10-07 RX ORDER — ONDANSETRON 8 MG/1
4 TABLET, FILM COATED ORAL ONCE
Qty: 0 | Refills: 0 | Status: COMPLETED | OUTPATIENT
Start: 2017-10-07 | End: 2017-10-07

## 2017-10-07 RX ORDER — LOSARTAN POTASSIUM 100 MG/1
100 TABLET, FILM COATED ORAL DAILY
Qty: 0 | Refills: 0 | Status: DISCONTINUED | OUTPATIENT
Start: 2017-10-07 | End: 2017-10-12

## 2017-10-07 RX ORDER — INSULIN LISPRO 100/ML
VIAL (ML) SUBCUTANEOUS
Qty: 0 | Refills: 0 | Status: DISCONTINUED | OUTPATIENT
Start: 2017-10-07 | End: 2017-10-12

## 2017-10-07 RX ORDER — SODIUM CHLORIDE 9 MG/ML
500 INJECTION INTRAMUSCULAR; INTRAVENOUS; SUBCUTANEOUS ONCE
Qty: 0 | Refills: 0 | Status: COMPLETED | OUTPATIENT
Start: 2017-10-07 | End: 2017-10-07

## 2017-10-07 RX ORDER — HEPARIN SODIUM 5000 [USP'U]/ML
5000 INJECTION INTRAVENOUS; SUBCUTANEOUS EVERY 8 HOURS
Qty: 0 | Refills: 0 | Status: DISCONTINUED | OUTPATIENT
Start: 2017-10-07 | End: 2017-10-12

## 2017-10-07 RX ORDER — DOCUSATE SODIUM 100 MG
100 CAPSULE ORAL
Qty: 0 | Refills: 0 | Status: DISCONTINUED | OUTPATIENT
Start: 2017-10-07 | End: 2017-10-10

## 2017-10-07 RX ORDER — DEXTROSE 50 % IN WATER 50 %
1 SYRINGE (ML) INTRAVENOUS ONCE
Qty: 0 | Refills: 0 | Status: DISCONTINUED | OUTPATIENT
Start: 2017-10-07 | End: 2017-10-12

## 2017-10-07 RX ORDER — GLUCAGON INJECTION, SOLUTION 0.5 MG/.1ML
1 INJECTION, SOLUTION SUBCUTANEOUS ONCE
Qty: 0 | Refills: 0 | Status: DISCONTINUED | OUTPATIENT
Start: 2017-10-07 | End: 2017-10-12

## 2017-10-07 RX ORDER — METOPROLOL TARTRATE 50 MG
100 TABLET ORAL DAILY
Qty: 0 | Refills: 0 | Status: DISCONTINUED | OUTPATIENT
Start: 2017-10-07 | End: 2017-10-07

## 2017-10-07 RX ORDER — DEXTROSE 50 % IN WATER 50 %
12.5 SYRINGE (ML) INTRAVENOUS ONCE
Qty: 0 | Refills: 0 | Status: DISCONTINUED | OUTPATIENT
Start: 2017-10-07 | End: 2017-10-12

## 2017-10-07 RX ORDER — ASPIRIN/CALCIUM CARB/MAGNESIUM 324 MG
81 TABLET ORAL DAILY
Qty: 0 | Refills: 0 | Status: DISCONTINUED | OUTPATIENT
Start: 2017-10-07 | End: 2017-10-12

## 2017-10-07 RX ORDER — SODIUM CHLORIDE 9 MG/ML
1000 INJECTION, SOLUTION INTRAVENOUS
Qty: 0 | Refills: 0 | Status: DISCONTINUED | OUTPATIENT
Start: 2017-10-07 | End: 2017-10-12

## 2017-10-07 RX ADMIN — ONDANSETRON 4 MILLIGRAM(S): 8 TABLET, FILM COATED ORAL at 12:51

## 2017-10-07 RX ADMIN — SIMVASTATIN 40 MILLIGRAM(S): 20 TABLET, FILM COATED ORAL at 21:40

## 2017-10-07 RX ADMIN — OXYCODONE HYDROCHLORIDE 5 MILLIGRAM(S): 5 TABLET ORAL at 22:30

## 2017-10-07 RX ADMIN — HEPARIN SODIUM 5000 UNIT(S): 5000 INJECTION INTRAVENOUS; SUBCUTANEOUS at 21:41

## 2017-10-07 RX ADMIN — TAMSULOSIN HYDROCHLORIDE 0.4 MILLIGRAM(S): 0.4 CAPSULE ORAL at 21:40

## 2017-10-07 RX ADMIN — OXYCODONE HYDROCHLORIDE 5 MILLIGRAM(S): 5 TABLET ORAL at 21:41

## 2017-10-07 RX ADMIN — ONDANSETRON 4 MILLIGRAM(S): 8 TABLET, FILM COATED ORAL at 11:04

## 2017-10-07 RX ADMIN — SODIUM CHLORIDE 500 MILLILITER(S): 9 INJECTION INTRAMUSCULAR; INTRAVENOUS; SUBCUTANEOUS at 11:04

## 2017-10-07 NOTE — ED PROVIDER NOTE - MEDICAL DECISION MAKING DETAILS
77M p/w abdominal pain and headache. Possible worsening IP bleed (CT head, noncon). New abscess formation (CT abd/pel). Found to be hyponatremic (117), will give NS, repeat to confirm no rapid correction. UA, Urine lytes. CBC, CMP, CE.

## 2017-10-07 NOTE — ED PROVIDER NOTE - PROGRESS NOTE DETAILS
Emili Ricketts DO: Spoke with MICU resident. Will not need MICU admission. Recommend d/c HCTZ, normal saline to correct Na+.

## 2017-10-07 NOTE — H&P ADULT - PROBLEM SELECTOR PLAN 5
- No acute changes seen on repeat CT   - previous imaging sig for non-traumatic intraparaparenchymal hemorrhage planned for repeat imaging as out patient in 2-4wks from 10/3 as per neurology  - BP goal on previous admission <140/90   - Cont w/ ASA, hold plavix at this time   - F/u neurology prn - No acute changes seen on repeat CT   - previous imaging sig for non-traumatic intraparaparenchymal hemorrhage planned for repeat imaging as out patient in 2-4wks from 10/3 as per neurology  - BP goal on previous admission <140/90   - Cont w/ ASA, hold plavix at this time (patient with RIGO placed >5 years ago, unclear indication for DAPT especially given recent ICH)

## 2017-10-07 NOTE — H&P ADULT - NSHPPHYSICALEXAM_GEN_ALL_CORE
PHYSICAL EXAM:  GENERAL: NAD, well-groomed, well-developed  HEAD:  Atraumatic, Normocephalic  EYES: EOMI, PERRLA, conjunctiva and sclera clear, cataracts b/l   ENMT: No tonsillar erythema, exudates, or enlargement; Moist mucous membranes, poor dentition, No lesions  NECK: Supple, No JVD, Normal thyroid  NERVOUS SYSTEM:  Alert & Oriented X3, Good concentration; Motor Strength 5/5 B/L upper and lower extremities  CHEST/LUNG: Clear to ascultation bilaterally; No rales, rhonchi, wheezing, or rubs  HEART: Regular rate and rhythm; No murmurs, rubs, or gallops, S1/S2  ABDOMEN: Soft, Nontender, slight distention, Bowel sounds present, BRYANT drain present, dressing c/d/i, (+) serosanguinous drainage in drain, (-) erythema, (+) hemostasis, (+) TTP   EXTREMITIES:  2+ Peripheral Pulses, No clubbing, cyanosis, or edema  LYMPH: No lymphadenopathy noted  SKIN: No rashes or lesions    Care Discussed with Consultants/Other Providers [ x] YES  [ ] NO PHYSICAL EXAM:  GENERAL: NAD, well-groomed, well-developed  HEAD:  Atraumatic, Normocephalic  EYES: EOMI, PERRLA, conjunctiva and sclera clear, cataracts b/l   ENMT: No tonsillar erythema, exudates, or enlargement; Moist mucous membranes, poor dentition, No lesions  NECK: Supple, No JVD, Normal thyroid  NERVOUS SYSTEM:  Good concentration; Motor Strength 5/5 B/L upper and lower extremities  PSYCH: A&Ox3  CHEST/LUNG: Clear to ascultation bilaterally; No rales, rhonchi, wheezing, or rubs  HEART: Regular rate and rhythm; No murmurs, rubs, or gallops, S1/S2  ABDOMEN: Soft, Nontender, slight distention, Bowel sounds present, BRYANT drain present, dressing c/d/i, (+) serosanguinous drainage in drain, (-) erythema, (+) hemostasis, (+) TTP   EXTREMITIES:  2+ Peripheral Pulses, No clubbing, cyanosis, or edema  LYMPH: No lymphadenopathy noted  SKIN: No rashes or lesions    Care Discussed with Consultants/Other Providers [ x] YES  [ ] NO

## 2017-10-07 NOTE — H&P ADULT - PMH
CAD (coronary artery disease)    DM (diabetes mellitus)    Essential hypertension    GERD (gastroesophageal reflux disease)    HLD (hyperlipidemia)    Intraparenchymal hemorrhage of brain

## 2017-10-07 NOTE — H&P ADULT - PROBLEM SELECTOR PLAN 6
- Planned for cont out pt follow-up, possible repeat TTE   - Cont ASA, hold plavix at this time No signs of angina.  - Cont ASA, hold plavix at this time (no h/o stenting within last year)

## 2017-10-07 NOTE — ED PROVIDER NOTE - OBJECTIVE STATEMENT
77M h/o 77M h/o CAD s/p stent (4/2017), HLD, BPH, HTN, DM, Intraparenchymal Hemorrhage, recent drain placed for sigmoid diverticulitis with pelvic abscess (d/c on 9/26) presents with severe headache which is unchanged since June but worsening, as well as abdominal pain around the site of the BRYANT drain. Admits to some nausea as well, and several episodes of NBNB vomit, unable to hold any solid food down. He also feels very sweaty at time, but denies fevers and admits to some constant right sided chest pain with no associated SOB.

## 2017-10-07 NOTE — ED PROVIDER NOTE - ATTENDING CONTRIBUTION TO CARE
Attending Statement: I have personally seen and examined this patient. I have fully participated in the care of this patient. I have reviewed all pertinent clinical information, including history physical exam, plan and the Resident's note and agree except as noted  78yo M from home w family at bedside pw headache, dizziness, nausea and abdominal pain for three days.   pt hx of recent admission for diverticulitis w a pelvic abscess on 9-20 to 9-26 followed by a small non traumatic ICP was discharged from [facility] on 10-3-17 wo any intervention for ICP. PT endorsed a "constant headache since bleed" now   associated with nausea, diaphoresis and dizziness for last three days as per pt and daughter. Diffuse dull headache, no change in vision. Dec po intake,  +NBNB vomit, no diarrhea. no fever or chills. no photophobia. +dizziness, "room spins" worse when "get up or use the bathroom" no focal weakness or numbness. no fall since dc home on 10-3-17 no chest pain or palpitations. not on AC. has not restarted aspirin. +RLQ abdominal pain "where  tube is " sp a BRYANT drain for abscess.   Vital signs noted. nontoxic. mmm. not pale. non icteric. normal S1-S2 good air entry. talks in full sentences. supple neck. Aox3 no facial asymmetry. no nystagmus. nl hand  bl. nl strength bl lower ext. neg pronator drift. no pedal edema. no calf tenderness. normal pulses bilateral feet. soft distended tender at the RLQ w drain in place. no erythema at site. no cvat.   plan labs, ua, ekg, cxr, ct head, ct abdomen./pelvis, IVF, anti emetic, re assess

## 2017-10-07 NOTE — H&P ADULT - PROBLEM SELECTOR PLAN 7
- D/c metformin   - Start ISS HS and premeal w/ FS - Hold metformin while inpatient  -monitor FSG  - Start ISS HS and premeal w/ FS

## 2017-10-07 NOTE — ED ADULT TRIAGE NOTE - CHIEF COMPLAINT QUOTE
Pt c/o diaphoresis, HA, vomiting, weakness and dizziness x4 days.  PMH recent diverticulitis with abscess and CVA

## 2017-10-07 NOTE — H&P ADULT - PROBLEM SELECTOR PLAN 1
- likely 2/2 to continued HCTZ despite d/c   - pt documented to be hyponatremic on previous admissions Na: 120s w/ response to IVF  - Continue trending BMP q6h w/ goal note to increase more than 10meq w/in 24hrs   - If repeat BMP Na: >126meq start d5w at 50cc/hr and recheck BMP   - Will replete prn - likely 2/2 to continued HCTZ despite d/c   - pt documented to be hyponatremic on previous admissions Na: 120s w/ response to IVF  - pt given 500cc NS in ED, Na: 119 ->125, goal <126 for first 24hrs   - Continue trending BMP q6h w/ goal note to increase more than 10meq w/in 24hrs   - If repeat BMP Na: >126meq start d5w at 50cc/hr and recheck BMP   - Will replete prn - likely 2/2 to continued HCTZ despite PMD recommending discontinuation; may be contributing to worsening HA  - pt documented to be hyponatremic on previous admissions Na: 120s w/ response to IVF  - pt given 500cc NS in ED, Na: 119 ->125, goal <126 for first 24hrs   - Continue trending BMP q6h w/ goal note to increase more than 10meq w/in 24hrs   - If repeat BMP Na: >126meq start d5w at 50cc/hr and recheck BMP

## 2017-10-07 NOTE — H&P ADULT - HISTORY OF PRESENT ILLNESS
This 78 y/o M with a PMH sig for DMII (noncomplicated and controlled), HTN, HLD, BPH, GERD, chronic anemia, h/o intraparenchymal hemorrhage(07/2017), CAD s/p RIGO (12/2016) and recent sigmoid diverticulitis w/ pelvic abscess s/p IR drainage and BRYANT placement (9/20/17- 9/26/17) and subsequent admission 9/29-10/3 s/p syncopal episode found to have non-traumatic subcortical intraparachymal hemorrhage w/ suspected etiology of hypertension vs underlying vascular lesion cavenoma/AVM planned for close outpatient follow up w/ repeated imaging in 2-4 wks that represented with several day duration of headache, cold sweats and nausea. He states the headaches have been chronic since a fall in June 2017 but appear to be worsening and becoming more frequent. He is unable to describe the headaches other than being "severe, constant" and "across the front, spreading to the back" previously reported to have tension headaches. He denies blurry vision or dysphagia but notes nausea. He also notes dizziness that occurs mostly when he sits/stands up. He feels like "the room is spinning." Of note, his physician recently changed his Losartan/HCTZ to Losartan but his daughter has not changed the medicine yet (wanted to continue it for a couple more days). Of note the patient reported minimal abdominal pain around BRYANT site. Denies fevers but notes occasional cold sweats. Additionally he endorsed three soft BM today. On presentation the ED patient was found to be hemodynamically stable, VS: 97.6, 59, 144/72, 17, 100% on RA, of significance review of his chemistry's showed severe hyponatremia Na: 117, hypochloremia Cl: 80 w/ no acute neurologic changes. While in the ED the patient received 500cc NS x1 w/ goal to replete sodium with goal of no greater than 10meq within the first 24hrs. Imaging obtained in the ED was sig for :CT abdomen pelvis (+) sigmoid diverticulitis w/ BRYANT drain present, no acute abscess, CT head: no acute changes, CXR: clear w/ no lung pathology. Additionally, review of his EKG was within normal limits. This 76 y/o M with a PMH sig for DMII (noncomplicated and controlled), HTN, HLD, BPH, GERD, chronic anemia, h/o intraparenchymal hemorrhage(07/2017), CAD s/p RIGO (12/2016) and recent sigmoid diverticulitis w/ pelvic abscess s/p IR drainage and BRYANT placement (9/20/17- 9/26/17) and subsequent admission 9/29-10/3 s/p syncopal episode found to have non-traumatic subcortical intraparachymal hemorrhage w/ suspected etiology of hypertension vs underlying vascular lesion cavenoma/AVM planned for close outpatient follow up w/ repeated imaging in 2-4 wks that represented with several day duration of headache, cold sweats and nausea. He states the headaches have been chronic since a fall in June 2017 but appear to be worsening and becoming more frequent. He is unable to describe the headaches other than being "severe, constant" and "across the front, spreading to the back" previously reported to have tension headaches. Pain can be 10/10 in severity per his report. He is unable to identify any alleviating/exacerbating factors. He denies blurry vision or dysphagia but notes nausea. He also notes dizziness that occurs mostly when he sits/stands up. He feels like "the room is spinning." Of note, his physician recently changed his Losartan/HCTZ to Losartan but his daughter has not changed the medicine yet (wanted to continue it for a couple more days). Of note the patient reported minimal abdominal pain around BRYANT site. Denies fevers but notes occasional cold sweats. Additionally he endorsed three soft BM today. On presentation the ED patient was found to be hemodynamically stable, VS: 97.6, 59, 144/72, 17, 100% on RA, of significance review of his chemistry's showed severe hyponatremia Na: 117, hypochloremia Cl: 80 w/ no acute neurologic changes. While in the ED the patient received 500cc NS x1 w/ goal to replete sodium with goal of no greater than 10meq within the first 24hrs. Imaging obtained in the ED was sig for :CT abdomen pelvis (+) sigmoid diverticulitis w/ BRYANT drain present, no acute abscess, CT head: no acute changes, CXR: clear w/ no lung pathology. Additionally, review of his EKG was within normal limits.

## 2017-10-07 NOTE — ED ADULT NURSE NOTE - OBJECTIVE STATEMENT
Pt presents to room 5, A&Ox3, ambulatory at baseline with a walker, hx of recent falls, coming in for evaluation of abdominal pain, decreased po intake, nausea, shakiness, "cold sweats", "strange" sensation of pain to head x 3-4 days.  Reports symptoms are made worse with movement.  Pt has a hx of CAD, s/p stent placement, diverticulitis with pelvis abscess (with blake drain), reports decreasing output since placement, and brain bleed.  Denies any chest pain, shortness of breath, palpitations, diarrhea, or constipation. rectal temp 97.3, no skin breakdown noted. IV established in right forearm with a 20g, labs drawn and sent, call bell in reach, side rails up, bed in locked position, md evaluation in progress, pt on telemetry-NSR @ 68 noted, will continue to monitor.

## 2017-10-07 NOTE — H&P ADULT - PROBLEM SELECTOR PLAN 2
- d/c HCTZ, continue Losartan 100mg QD , Toprol 100mg QD   - goal of BP: >140/90 as per cards/neuro on previous admission   - if hypertensive will consider adding Ca channel blocker   - Monitor vitals BP stable and at goal.  - d/c HCTZ, continue Losartan 100mg QD , Toprol 100mg QD   - if hypertensive will consider adding Ca channel blocker   - Monitor vitals

## 2017-10-07 NOTE — H&P ADULT - PROBLEM SELECTOR PLAN 3
- reported chronic microcytic anemia on previous admission, previous iron panel sig for iron deficiency w/ previous plan for outpatient electrophoresis to r/o thalassemia   - Trend CBC Hgb near baseline. No signs of overt hemorrhage.  - reported chronic microcytic anemia on previous admission, previous iron panel sig for iron deficiency w/ previous plan for outpatient electrophoresis to r/o thalassemia   - Trend CBC

## 2017-10-07 NOTE — H&P ADULT - ASSESSMENT
This 78 y/o M with a PMH sig for DMII (noncomplicated and controlled), HTN, HLD, BPH, GERD, chronic anemia, hyponatremia on prior admissions, h/o intraparenchymal hemorrhage (07/2017), CAD s/p RIGO (12/2016) and recent sigmoid diverticulitis w/ pelvic abscess s/p IR drainage and BRYANT placement (9/20/17- 9/26/17) and subsequent admission 9/29-10/3 s/p syncopal episode found to have non-traumatic subcortical intraparachymal hemorrhage w/ suspected etiology of hypertension vs underlying vascular lesion cavenoma/AVM who presented with persistent nausea and headaches admitted for severe hyponatremia, found to euvolemic suspected to be 2/2 continued HCTZ use.

## 2017-10-07 NOTE — H&P ADULT - PROBLEM SELECTOR PLAN 4
- Cont oxycodone IR 5mg q6h prn pain   - To monitor BRYANT drain  - F/u w/ gen surg in AM for recs Mild fat stranding noted on CT. Patient recently completed antibiotics and continues to have BRYANT drain for previous abscess a/w diverticulitis.  -consult general surgery for removal of drain  - Cont oxycodone IR 5mg q6h prn pain

## 2017-10-07 NOTE — ED PROVIDER NOTE - PMH
CAD (coronary artery disease)    DM (diabetes mellitus)    HLD (hyperlipidemia)    Intraparenchymal hemorrhage of brain

## 2017-10-07 NOTE — H&P ADULT - NSHPREVIEWOFSYSTEMS_GEN_ALL_CORE
REVIEW OF SYSTEMS:    Constitutional:     [ ] negative [ ] fevers [+] chills [ ] weight loss [ ] weight gain  HEENT:                  [x] negative [ ] dry eyes [ ] eye irritation [ ] postnasal drip [ ] nasal congestion  CV:                         [x] negative  [ ] chest pain [ ] orthopnea [ ] palpitations [ ] murmur  Resp:                     [x] negative [ ] cough [ ] shortness of breath [ ] dyspnea [ ] wheezing [ ] sputum [ ] hemoptysis  GI:                          [ ] negative [+] nausea [-] vomiting [-] diarrhea [-] constipation [+] abd pain [-] dysphagia   :                        [ ] negative [+] dysuria [ ] nocturia [ ] hematuria [+] increased urinary frequency  Musculoskeletal: [x ] negative [ ] back pain [ ] myalgias [ ] arthralgias [ ] fracture  Skin:                       [ x] negative [ ] rash [ ] itch  Neurological:        [ ] negative [+] headache [+] dizziness [ ] syncope [ ] weakness [ ] numbness  Psychiatric:           [x] negative [ ] anxiety [ ] depression  Endocrine:            [ ] negative [+] diabetes [ ] thyroid problem  Heme/Lymph:      [x] negative [ ] anemia [ ] bleeding problem  Allergic/Immune: [x ] negative [ ] itchy eyes [ ] nasal discharge [ ] hives [ ] angioedema    [x] All other systems negative  [ ] Unable to assess ROS because pt intubated and sedated.

## 2017-10-08 DIAGNOSIS — R06.6 HICCOUGH: ICD-10-CM

## 2017-10-08 LAB
BUN SERPL-MCNC: 5 MG/DL — LOW (ref 7–23)
BUN SERPL-MCNC: 8 MG/DL — SIGNIFICANT CHANGE UP (ref 7–23)
BUN SERPL-MCNC: 8 MG/DL — SIGNIFICANT CHANGE UP (ref 7–23)
CALCIUM SERPL-MCNC: 8.3 MG/DL — LOW (ref 8.4–10.5)
CALCIUM SERPL-MCNC: 8.3 MG/DL — LOW (ref 8.4–10.5)
CALCIUM SERPL-MCNC: 8.4 MG/DL — SIGNIFICANT CHANGE UP (ref 8.4–10.5)
CHLORIDE SERPL-SCNC: 82 MMOL/L — LOW (ref 98–107)
CHLORIDE SERPL-SCNC: 84 MMOL/L — LOW (ref 98–107)
CHLORIDE SERPL-SCNC: 89 MMOL/L — LOW (ref 98–107)
CO2 SERPL-SCNC: 26 MMOL/L — SIGNIFICANT CHANGE UP (ref 22–31)
CORTIS SERPL-MCNC: 9.6 UG/DL — SIGNIFICANT CHANGE UP (ref 2.7–18.4)
CREAT SERPL-MCNC: 0.79 MG/DL — SIGNIFICANT CHANGE UP (ref 0.5–1.3)
CREAT SERPL-MCNC: 0.8 MG/DL — SIGNIFICANT CHANGE UP (ref 0.5–1.3)
CREAT SERPL-MCNC: 0.84 MG/DL — SIGNIFICANT CHANGE UP (ref 0.5–1.3)
GLUCOSE SERPL-MCNC: 108 MG/DL — HIGH (ref 70–99)
GLUCOSE SERPL-MCNC: 112 MG/DL — HIGH (ref 70–99)
GLUCOSE SERPL-MCNC: 124 MG/DL — HIGH (ref 70–99)
HBA1C BLD-MCNC: 6.5 % — HIGH (ref 4–5.6)
HCT VFR BLD CALC: 29.7 % — LOW (ref 39–50)
HGB BLD-MCNC: 9.4 G/DL — LOW (ref 13–17)
MAGNESIUM SERPL-MCNC: 1.5 MG/DL — LOW (ref 1.6–2.6)
MAGNESIUM SERPL-MCNC: 2.2 MG/DL — SIGNIFICANT CHANGE UP (ref 1.6–2.6)
MCHC RBC-ENTMCNC: 20 PG — LOW (ref 27–34)
MCHC RBC-ENTMCNC: 31.6 % — LOW (ref 32–36)
MCV RBC AUTO: 63.3 FL — LOW (ref 80–100)
NRBC # FLD: 0 — SIGNIFICANT CHANGE UP
PHOSPHATE SERPL-MCNC: 2.8 MG/DL — SIGNIFICANT CHANGE UP (ref 2.5–4.5)
PHOSPHATE SERPL-MCNC: 3.3 MG/DL — SIGNIFICANT CHANGE UP (ref 2.5–4.5)
PLATELET # BLD AUTO: 298 K/UL — SIGNIFICANT CHANGE UP (ref 150–400)
PMV BLD: 9.5 FL — SIGNIFICANT CHANGE UP (ref 7–13)
POTASSIUM SERPL-MCNC: 4 MMOL/L — SIGNIFICANT CHANGE UP (ref 3.5–5.3)
POTASSIUM SERPL-MCNC: 4.2 MMOL/L — SIGNIFICANT CHANGE UP (ref 3.5–5.3)
POTASSIUM SERPL-MCNC: 4.4 MMOL/L — SIGNIFICANT CHANGE UP (ref 3.5–5.3)
POTASSIUM SERPL-SCNC: 4 MMOL/L — SIGNIFICANT CHANGE UP (ref 3.5–5.3)
POTASSIUM SERPL-SCNC: 4.2 MMOL/L — SIGNIFICANT CHANGE UP (ref 3.5–5.3)
POTASSIUM SERPL-SCNC: 4.4 MMOL/L — SIGNIFICANT CHANGE UP (ref 3.5–5.3)
RBC # BLD: 4.69 M/UL — SIGNIFICANT CHANGE UP (ref 4.2–5.8)
RBC # FLD: 18.6 % — HIGH (ref 10.3–14.5)
SODIUM SERPL-SCNC: 121 MMOL/L — LOW (ref 135–145)
SODIUM SERPL-SCNC: 124 MMOL/L — LOW (ref 135–145)
SODIUM SERPL-SCNC: 130 MMOL/L — LOW (ref 135–145)
SPECIMEN SOURCE: SIGNIFICANT CHANGE UP
SPECIMEN SOURCE: SIGNIFICANT CHANGE UP
WBC # BLD: 9.09 K/UL — SIGNIFICANT CHANGE UP (ref 3.8–10.5)
WBC # FLD AUTO: 9.09 K/UL — SIGNIFICANT CHANGE UP (ref 3.8–10.5)

## 2017-10-08 PROCEDURE — 99233 SBSQ HOSP IP/OBS HIGH 50: CPT | Mod: GC

## 2017-10-08 RX ORDER — MAGNESIUM SULFATE 500 MG/ML
2 VIAL (ML) INJECTION ONCE
Qty: 0 | Refills: 0 | Status: COMPLETED | OUTPATIENT
Start: 2017-10-08 | End: 2017-10-08

## 2017-10-08 RX ADMIN — HEPARIN SODIUM 5000 UNIT(S): 5000 INJECTION INTRAVENOUS; SUBCUTANEOUS at 05:21

## 2017-10-08 RX ADMIN — SIMVASTATIN 40 MILLIGRAM(S): 20 TABLET, FILM COATED ORAL at 21:57

## 2017-10-08 RX ADMIN — LOSARTAN POTASSIUM 100 MILLIGRAM(S): 100 TABLET, FILM COATED ORAL at 05:21

## 2017-10-08 RX ADMIN — Medication 100 MILLIGRAM(S): at 05:21

## 2017-10-08 RX ADMIN — Medication 81 MILLIGRAM(S): at 14:15

## 2017-10-08 RX ADMIN — TAMSULOSIN HYDROCHLORIDE 0.4 MILLIGRAM(S): 0.4 CAPSULE ORAL at 21:57

## 2017-10-08 RX ADMIN — HEPARIN SODIUM 5000 UNIT(S): 5000 INJECTION INTRAVENOUS; SUBCUTANEOUS at 21:56

## 2017-10-08 RX ADMIN — Medication 50 GRAM(S): at 08:38

## 2017-10-08 RX ADMIN — HEPARIN SODIUM 5000 UNIT(S): 5000 INJECTION INTRAVENOUS; SUBCUTANEOUS at 14:11

## 2017-10-08 RX ADMIN — FINASTERIDE 5 MILLIGRAM(S): 5 TABLET, FILM COATED ORAL at 14:11

## 2017-10-08 RX ADMIN — Medication 100 MILLIGRAM(S): at 17:19

## 2017-10-08 NOTE — PROGRESS NOTE ADULT - PROBLEM SELECTOR PLAN 5
- No acute changes seen on repeat CT   - previous imaging sig for non-traumatic intraparaparenchymal hemorrhage planned for repeat imaging as out patient in 2-4wks from 10/3 as per neurology  - BP goal on previous admission <140/90   - Cont w/ ASA, hold plavix at this time (patient with RIGO placed >5 years ago, unclear indication for DAPT especially given recent ICH) Mild fat stranding noted on CT. Patient recently completed antibiotics and continues to have BRYANT drain for previous abscess a/w diverticulitis.  - consult general surgery for removal of drain  - Cont oxycodone IR 5mg q6h prn pain

## 2017-10-08 NOTE — PROGRESS NOTE ADULT - PROBLEM SELECTOR PLAN 8
- Cont Simvastatin 40mg QD - Hold metformin while inpatient  - monitor FSG  - Start ISS HS and premeal w/ FS  - c/w simvastatin

## 2017-10-08 NOTE — PROGRESS NOTE ADULT - PROBLEM SELECTOR PLAN 3
Hgb near baseline. No signs of overt hemorrhage.  - reported chronic microcytic anemia on previous admission, previous iron panel sig for iron deficiency w/ previous plan for outpatient electrophoresis to r/o thalassemia   - Trend CBC  - H/H stable BP stable and at goal.  - d/c HCTZ, continue Losartan 100mg QD , Toprol 100mg QD   - if hypertensive will consider adding Ca channel blocker   - Monitor vitals

## 2017-10-08 NOTE — PROGRESS NOTE ADULT - SUBJECTIVE AND OBJECTIVE BOX
Patient is a 77y old  Male who presents with a chief complaint of headaches and nausea persistent for the last week (07 Oct 2017 20:03) found to the be severely hyponatremic in setting of recent hospitalization for sigmoid diverticulitis/ pelvis abscess s/p I & D and nontraumatic intraparenchymal hemorrhage      INTERVAL HPI/OVERNIGHT EVENTS:  no acute events reported o/n, patient resting comfortably on bedside examination. Pt reports that his pain is controlled and that his headaches and nausea have improved o/n. On examination the pt denies fever, chills, vomiting, diarrhea, constipation, SOB, CP. Pt reports BM yesterday. Of note patient was found to have hiccups. Pt also reported discomfort on the right side of his chest but thinks it is how he sleeps. He states it notices it when he wakes up in the morning and that it resolves during the day.     T(C): 36.6 (10-08-17 @ 05:14), Max: 36.7 (10-07-17 @ 09:54)  HR: 60 (10-08-17 @ 05:14) (59 - 72)  BP: 128/54 (10-08-17 @ 05:14) (128/54 - 156/102)  RR: 18 (10-08-17 @ 05:14) (17 - 20)  SpO2: 97% (10-08-17 @ 05:14) (97% - 100%)  Wt(kg): --Vital Signs Last 24 Hrs  T(C): 36.6 (08 Oct 2017 05:14), Max: 36.7 (07 Oct 2017 09:54)  T(F): 97.9 (08 Oct 2017 05:14), Max: 98.1 (07 Oct 2017 19:00)  HR: 60 (08 Oct 2017 05:14) (59 - 72)  BP: 128/54 (08 Oct 2017 05:14) (128/54 - 156/102)  BP(mean): --  RR: 18 (08 Oct 2017 05:14) (17 - 20)  SpO2: 97% (08 Oct 2017 05:14) (97% - 100%)  I&O's Summary    07 Oct 2017 07:01  -  08 Oct 2017 07:00  --------------------------------------------------------  IN: 0 mL / OUT: 502.5 mL / NET: -502.5 mL        LABS:                        9.4    9.09  )-----------( 298      ( 08 Oct 2017 05:29 )             29.7     10    124<L>  |  84<L>  |  5<L>  ----------------------------<  108<H>  4.2   |  26  |  0.80    Ca    8.3<L>      08 Oct 2017 05:29  Phos  2.8     10-07  Mg     1.5     10-07    TPro  6.4  /  Alb  3.8  /  TBili  0.7  /  DBili  x   /  AST  14  /  ALT  12  /  AlkPhos  67  10-07      Urinalysis Basic - ( 07 Oct 2017 11:35 )    Color: YELLOW / Appearance: CLEAR / S.015 / pH: 6.5  Gluc: 50 / Ketone: TRACE  / Bili: NEGATIVE / Urobili: NORMAL E.U.   Blood: NEGATIVE / Protein: 10 / Nitrite: NEGATIVE   Leuk Esterase: LARGE / RBC: 2-5 / WBC 10-25   Sq Epi: OCC / Non Sq Epi: x / Bacteria: x      CAPILLARY BLOOD GLUCOSE  127 (08 Oct 2017 08:27)  141 (07 Oct 2017 22:17)  183 (07 Oct 2017 09:54)            Urinalysis Basic - ( 07 Oct 2017 11:35 )    Color: YELLOW / Appearance: CLEAR / S.015 / pH: 6.5  Gluc: 50 / Ketone: TRACE  / Bili: NEGATIVE / Urobili: NORMAL E.U.   Blood: NEGATIVE / Protein: 10 / Nitrite: NEGATIVE   Leuk Esterase: LARGE / RBC: 2-5 / WBC 10-25   Sq Epi: OCC / Non Sq Epi: x / Bacteria: x      REVIEW OF SYSTEMS:  CONSTITUTIONAL: No fever, weight loss, or fatigue  EYES: No eye pain, visual disturbances, or discharge  ENMT: reports difficulty hearing, no tinnitus, vertigo; No sinus or throat pain  NECK: No pain or stiffness  RESPIRATORY: No cough, wheezing, chills or hemoptysis; No shortness of breath  CARDIOVASCULAR: No chest pain, palpitations, dizziness, or leg swelling  GASTROINTESTINAL: No abdominal or epigastric pain. No nausea, vomiting, or hematemesis; No diarrhea or constipation. No melena or hematochezia.  GENITOURINARY: + dysuria and frequency, no hematuria, or incontinence  NEUROLOGICAL: No headaches, memory loss, loss of strength, numbness, or tremors  SKIN: No itching, burning, rashes, or lesions, BRYANT drain abdomen    LYMPH NODES: No enlarged glands  ENDOCRINE: No heat or cold intolerance; No hair loss  MUSCULOSKELETAL: No joint pain or swelling; No muscle, back, or extremity pain  PSYCHIATRIC: No depression, anxiety, mood swings, or difficulty sleeping  HEME/LYMPH: No easy bruising, or bleeding gums  ALLERY AND IMMUNOLOGIC: No hives or eczema    RADIOLOGY & ADDITIONAL TESTS:    Imaging Personally Reviewed:  [x ] YES  [ ] NO    Consultant(s) Notes Reviewed:  [x ] YES  [ ] NO    PHYSICAL EXAM:  GENERAL: NAD, well-groomed, well-developed  HEAD:  Atraumatic, Normocephalic  EYES: EOMI, PERRLA, conjunctiva and sclera clear, (+) cataracts b/l, vision grossly intact  ENMT: No tonsillar erythema, exudates, or enlargement; Moist mucous membranes, Good dentition, No lesions  NECK: Supple, No JVD, Normal thyroid  NERVOUS SYSTEM:  Alert & Oriented X3, Good concentration; Motor Strength 5/5 B/L upper and lower extremities, (+) hiccups   CHEST/LUNG: Clear to ascultation bilaterally; No rales, rhonchi, wheezing, or rubs  HEART: Regular rate and rhythm; No murmurs, rubs, or gallops, S1/S2  ABDOMEN: Soft, Nontender, Nondistended; Bowel sounds present  EXTREMITIES:  2+ Peripheral Pulses, No clubbing, cyanosis, or edema  LYMPH: No lymphadenopathy noted  SKIN: No rashes or lesions    Care Discussed with Consultants/Other Providers [ x] YES  [ ] NO

## 2017-10-08 NOTE — PROGRESS NOTE ADULT - PROBLEM SELECTOR PLAN 6
No signs of angina.  - Cont ASA, hold plavix at this time (no h/o stenting within last year) - No acute changes seen on repeat CT   - previous imaging sig for non-traumatic intraparaparenchymal hemorrhage planned for repeat imaging as out patient in 2-4wks from 10/3 as per neurology  - BP goal on previous admission <140/90   - Cont w/ ASA, hold plavix at this time (patient with RIGO placed >5 years ago, unclear indication for DAPT especially given recent ICH)

## 2017-10-08 NOTE — PROGRESS NOTE ADULT - PROBLEM SELECTOR PLAN 7
- Hold metformin while inpatient  - monitor FSG  - Start ISS HS and premeal w/ FS No signs of angina.  - Cont ASA, hold plavix at this time (no h/o stenting within last year)

## 2017-10-08 NOTE — PROGRESS NOTE ADULT - PROBLEM SELECTOR PLAN 2
BP stable and at goal.  - d/c HCTZ, continue Losartan 100mg QD , Toprol 100mg QD   - if hypertensive will consider adding Ca channel blocker   - Monitor vitals Perhaps related to hyponatremia, will continue to address as in problem #1. Will also provide empiric dyspepsia regimen. No acute structural pathology noted on CTH low suspicion for central cause of hiccups).

## 2017-10-08 NOTE — PROGRESS NOTE ADULT - PROBLEM SELECTOR PLAN 4
Mild fat stranding noted on CT. Patient recently completed antibiotics and continues to have BRYANT drain for previous abscess a/w diverticulitis.  - consult general surgery for removal of drain  - Cont oxycodone IR 5mg q6h prn pain Hgb near baseline. No signs of overt hemorrhage.  - reported chronic microcytic anemia on previous admission, previous iron panel sig for iron deficiency w/ previous plan for outpatient electrophoresis to r/o thalassemia   - Trend CBC  - H/H stable

## 2017-10-09 ENCOUNTER — TRANSCRIPTION ENCOUNTER (OUTPATIENT)
Age: 77
End: 2017-10-09

## 2017-10-09 LAB
-  AMIKACIN: SIGNIFICANT CHANGE UP
-  AMIKACIN: SIGNIFICANT CHANGE UP
-  AMPICILLIN/SULBACTAM: SIGNIFICANT CHANGE UP
-  AMPICILLIN/SULBACTAM: SIGNIFICANT CHANGE UP
-  AMPICILLIN: SIGNIFICANT CHANGE UP
-  AMPICILLIN: SIGNIFICANT CHANGE UP
-  AZTREONAM: SIGNIFICANT CHANGE UP
-  AZTREONAM: SIGNIFICANT CHANGE UP
-  CEFAZOLIN: SIGNIFICANT CHANGE UP
-  CEFAZOLIN: SIGNIFICANT CHANGE UP
-  CEFEPIME: SIGNIFICANT CHANGE UP
-  CEFEPIME: SIGNIFICANT CHANGE UP
-  CEFOXITIN: SIGNIFICANT CHANGE UP
-  CEFOXITIN: SIGNIFICANT CHANGE UP
-  CEFTAZIDIME: SIGNIFICANT CHANGE UP
-  CEFTAZIDIME: SIGNIFICANT CHANGE UP
-  CEFTRIAXONE: SIGNIFICANT CHANGE UP
-  CEFTRIAXONE: SIGNIFICANT CHANGE UP
-  CIPROFLOXACIN: SIGNIFICANT CHANGE UP
-  CIPROFLOXACIN: SIGNIFICANT CHANGE UP
-  ERTAPENEM: SIGNIFICANT CHANGE UP
-  ERTAPENEM: SIGNIFICANT CHANGE UP
-  GENTAMICIN: SIGNIFICANT CHANGE UP
-  GENTAMICIN: SIGNIFICANT CHANGE UP
-  IMIPENEM: SIGNIFICANT CHANGE UP
-  IMIPENEM: SIGNIFICANT CHANGE UP
-  LEVOFLOXACIN: SIGNIFICANT CHANGE UP
-  LEVOFLOXACIN: SIGNIFICANT CHANGE UP
-  MEROPENEM: SIGNIFICANT CHANGE UP
-  MEROPENEM: SIGNIFICANT CHANGE UP
-  NITROFURANTOIN: SIGNIFICANT CHANGE UP
-  NITROFURANTOIN: SIGNIFICANT CHANGE UP
-  PIPERACILLIN/TAZOBACTAM: SIGNIFICANT CHANGE UP
-  PIPERACILLIN/TAZOBACTAM: SIGNIFICANT CHANGE UP
-  TIGECYCLINE: SIGNIFICANT CHANGE UP
-  TIGECYCLINE: SIGNIFICANT CHANGE UP
-  TOBRAMYCIN: SIGNIFICANT CHANGE UP
-  TOBRAMYCIN: SIGNIFICANT CHANGE UP
-  TRIMETHOPRIM/SULFAMETHOXAZOLE: SIGNIFICANT CHANGE UP
-  TRIMETHOPRIM/SULFAMETHOXAZOLE: SIGNIFICANT CHANGE UP
ACTH SER-ACNC: 12 PG/ML — SIGNIFICANT CHANGE UP (ref 0–46)
BACTERIA UR CULT: SIGNIFICANT CHANGE UP
BUN SERPL-MCNC: 11 MG/DL — SIGNIFICANT CHANGE UP (ref 7–23)
BUN SERPL-MCNC: 7 MG/DL — SIGNIFICANT CHANGE UP (ref 7–23)
CALCIUM SERPL-MCNC: 8.4 MG/DL — SIGNIFICANT CHANGE UP (ref 8.4–10.5)
CALCIUM SERPL-MCNC: 8.5 MG/DL — SIGNIFICANT CHANGE UP (ref 8.4–10.5)
CHLORIDE SERPL-SCNC: 90 MMOL/L — LOW (ref 98–107)
CHLORIDE SERPL-SCNC: 90 MMOL/L — LOW (ref 98–107)
CO2 SERPL-SCNC: 24 MMOL/L — SIGNIFICANT CHANGE UP (ref 22–31)
CO2 SERPL-SCNC: 26 MMOL/L — SIGNIFICANT CHANGE UP (ref 22–31)
CREAT SERPL-MCNC: 0.88 MG/DL — SIGNIFICANT CHANGE UP (ref 0.5–1.3)
CREAT SERPL-MCNC: 1.09 MG/DL — SIGNIFICANT CHANGE UP (ref 0.5–1.3)
GLUCOSE SERPL-MCNC: 100 MG/DL — HIGH (ref 70–99)
GLUCOSE SERPL-MCNC: 123 MG/DL — HIGH (ref 70–99)
HCT VFR BLD CALC: 30.8 % — LOW (ref 39–50)
HGB BLD-MCNC: 9.8 G/DL — LOW (ref 13–17)
MAGNESIUM SERPL-MCNC: 2 MG/DL — SIGNIFICANT CHANGE UP (ref 1.6–2.6)
MAGNESIUM SERPL-MCNC: 2 MG/DL — SIGNIFICANT CHANGE UP (ref 1.6–2.6)
MCHC RBC-ENTMCNC: 20 PG — LOW (ref 27–34)
MCHC RBC-ENTMCNC: 31.8 % — LOW (ref 32–36)
MCV RBC AUTO: 62.9 FL — LOW (ref 80–100)
METHOD TYPE: SIGNIFICANT CHANGE UP
METHOD TYPE: SIGNIFICANT CHANGE UP
NRBC # FLD: 0 — SIGNIFICANT CHANGE UP
ORGANISM # SPEC MICROSCOPIC CNT: SIGNIFICANT CHANGE UP
PHOSPHATE SERPL-MCNC: 3 MG/DL — SIGNIFICANT CHANGE UP (ref 2.5–4.5)
PHOSPHATE SERPL-MCNC: 3.1 MG/DL — SIGNIFICANT CHANGE UP (ref 2.5–4.5)
PLATELET # BLD AUTO: 304 K/UL — SIGNIFICANT CHANGE UP (ref 150–400)
PMV BLD: 10.3 FL — SIGNIFICANT CHANGE UP (ref 7–13)
POTASSIUM SERPL-MCNC: 3.9 MMOL/L — SIGNIFICANT CHANGE UP (ref 3.5–5.3)
POTASSIUM SERPL-MCNC: 4.9 MMOL/L — SIGNIFICANT CHANGE UP (ref 3.5–5.3)
POTASSIUM SERPL-SCNC: 3.9 MMOL/L — SIGNIFICANT CHANGE UP (ref 3.5–5.3)
POTASSIUM SERPL-SCNC: 4.9 MMOL/L — SIGNIFICANT CHANGE UP (ref 3.5–5.3)
RBC # BLD: 4.9 M/UL — SIGNIFICANT CHANGE UP (ref 4.2–5.8)
RBC # FLD: 18.9 % — HIGH (ref 10.3–14.5)
SODIUM SERPL-SCNC: 128 MMOL/L — LOW (ref 135–145)
SODIUM SERPL-SCNC: 130 MMOL/L — LOW (ref 135–145)
WBC # BLD: 6.35 K/UL — SIGNIFICANT CHANGE UP (ref 3.8–10.5)
WBC # FLD AUTO: 6.35 K/UL — SIGNIFICANT CHANGE UP (ref 3.8–10.5)

## 2017-10-09 PROCEDURE — 99233 SBSQ HOSP IP/OBS HIGH 50: CPT | Mod: GC

## 2017-10-09 RX ORDER — ATORVASTATIN CALCIUM 80 MG/1
100 TABLET, FILM COATED ORAL
Qty: 0 | Refills: 0 | COMMUNITY

## 2017-10-09 RX ORDER — TAMSULOSIN HYDROCHLORIDE 0.4 MG/1
1 CAPSULE ORAL
Qty: 0 | Refills: 0 | COMMUNITY

## 2017-10-09 RX ORDER — CLOPIDOGREL BISULFATE 75 MG/1
1 TABLET, FILM COATED ORAL
Qty: 0 | Refills: 0 | COMMUNITY

## 2017-10-09 RX ORDER — CLOPIDOGREL BISULFATE 75 MG/1
75 TABLET, FILM COATED ORAL DAILY
Qty: 0 | Refills: 0 | Status: DISCONTINUED | OUTPATIENT
Start: 2017-10-09 | End: 2017-10-10

## 2017-10-09 RX ORDER — ASPIRIN/CALCIUM CARB/MAGNESIUM 324 MG
1 TABLET ORAL
Qty: 0 | Refills: 0 | DISCHARGE
Start: 2017-10-09

## 2017-10-09 RX ORDER — ONDANSETRON 8 MG/1
4 TABLET, FILM COATED ORAL ONCE
Qty: 0 | Refills: 0 | Status: DISCONTINUED | OUTPATIENT
Start: 2017-10-09 | End: 2017-10-09

## 2017-10-09 RX ORDER — CLOPIDOGREL BISULFATE 75 MG/1
1 TABLET, FILM COATED ORAL
Qty: 0 | Refills: 0 | COMMUNITY
Start: 2017-10-09

## 2017-10-09 RX ORDER — METFORMIN HYDROCHLORIDE 850 MG/1
0 TABLET ORAL
Qty: 0 | Refills: 0 | COMMUNITY

## 2017-10-09 RX ORDER — EZETIMIBE 10 MG/1
0 TABLET ORAL
Qty: 0 | Refills: 0 | COMMUNITY

## 2017-10-09 RX ADMIN — Medication 100 MILLIGRAM(S): at 05:21

## 2017-10-09 RX ADMIN — TAMSULOSIN HYDROCHLORIDE 0.4 MILLIGRAM(S): 0.4 CAPSULE ORAL at 21:55

## 2017-10-09 RX ADMIN — LOSARTAN POTASSIUM 100 MILLIGRAM(S): 100 TABLET, FILM COATED ORAL at 05:22

## 2017-10-09 RX ADMIN — Medication 100 MILLIGRAM(S): at 05:22

## 2017-10-09 RX ADMIN — FINASTERIDE 5 MILLIGRAM(S): 5 TABLET, FILM COATED ORAL at 13:45

## 2017-10-09 RX ADMIN — Medication 81 MILLIGRAM(S): at 13:45

## 2017-10-09 RX ADMIN — HEPARIN SODIUM 5000 UNIT(S): 5000 INJECTION INTRAVENOUS; SUBCUTANEOUS at 05:21

## 2017-10-09 RX ADMIN — SIMVASTATIN 40 MILLIGRAM(S): 20 TABLET, FILM COATED ORAL at 21:55

## 2017-10-09 RX ADMIN — CLOPIDOGREL BISULFATE 75 MILLIGRAM(S): 75 TABLET, FILM COATED ORAL at 17:46

## 2017-10-09 RX ADMIN — Medication 100 MILLIGRAM(S): at 17:46

## 2017-10-09 RX ADMIN — HEPARIN SODIUM 5000 UNIT(S): 5000 INJECTION INTRAVENOUS; SUBCUTANEOUS at 13:45

## 2017-10-09 RX ADMIN — HEPARIN SODIUM 5000 UNIT(S): 5000 INJECTION INTRAVENOUS; SUBCUTANEOUS at 21:54

## 2017-10-09 NOTE — DISCHARGE NOTE ADULT - MEDICATION SUMMARY - MEDICATIONS TO STOP TAKING
I will STOP taking the medications listed below when I get home from the hospital:    atorvastatin  -- 100 milligram(s) by mouth once a day    losartan-hydrochlorothiazide 100mg-25mg oral tablet  -- 1 tab(s) by mouth once a day

## 2017-10-09 NOTE — DISCHARGE NOTE ADULT - MEDICATION SUMMARY - MEDICATIONS TO TAKE
I will START or STAY ON the medications listed below when I get home from the hospital:    -  -- Please provide rolling walker    -- Indication: For Physical therapy    dutasteride 0.5 mg oral capsule  -- 1 cap(s) by mouth once a day  -- Indication: For BPH (benign prostatic hyperplasia)    aspirin 81 mg oral delayed release tablet  -- 1 tab(s) by mouth once a day  -- Indication: For CAD (coronary artery disease)    oxyCODONE 5 mg oral tablet  -- 1 tab(s) by mouth every 6 hours, As Needed  -- Indication: For Pain management     losartan 100 mg oral tablet  -- 1 tab(s) by mouth once a day  -- Indication: For Hypertension    aluminum hydroxide-magnesium hydroxide 200 mg-200 mg/5 mL oral suspension  -- 30 milliliter(s) by mouth every 6 hours, As needed, Dyspepsia  -- Indication: For Dyspepsia     tamsulosin 0.4 mg oral capsule  -- 1 cap(s) by mouth once a day  -- Indication: For BPH (benign prostatic hyperplasia)    metFORMIN 850 mg oral tablet  -- 1 tab(s) by mouth 2 times a day  -- Indication: For Diabetes     meclizine 12.5 mg oral tablet  -- 1 tab(s) by mouth once, As needed, Dizziness  -- Indication: For Dizziness     cetirizine  -- Indication: For Allergies     simvastatin 40 mg oral tablet  -- 1 tab(s) by mouth once a day (at bedtime)  -- Indication: For Hyperlipidemia     clopidogrel 75 mg oral tablet  -- 1 tab(s) by mouth once a day  -- Indication: For CAD (coronary artery disease)    metoprolol succinate 100 mg oral tablet, extended release  -- 1 tab(s) by mouth once a day  -- Indication: For Hypertension    FeroSul 325 mg (65 mg elemental iron) oral tablet  -- 1 tab(s) by mouth 3 times a day  -- Indication: For Anemia     senna oral tablet  -- 2 tab(s) by mouth once a day (at bedtime), As Needed for constipation    -- Indication: For Constipation    docusate sodium 100 mg oral capsule  -- 1 cap(s) by mouth 3 times a day, As Needed -for constipation   -- Indication: For Constipation    pilocarpine 1% ophthalmic solution  -- 1 drop(s) to each affected eye 4 times a day  -- Indication: For Glaucoma     PriLOSEC 20 mg oral delayed release capsule  -- 1 cap(s) by mouth once a day  -- Indication: For GERD (gastroesophageal reflux disease)

## 2017-10-09 NOTE — DISCHARGE NOTE ADULT - SECONDARY DIAGNOSIS.
Diverticulitis of sigmoid colon Intraparenchymal hemorrhage of brain Chronic anemia CAD (coronary artery disease) Type 2 diabetes mellitus with diabetic cataract, without long-term current use of insulin Essential hypertension

## 2017-10-09 NOTE — DISCHARGE NOTE ADULT - PATIENT PORTAL LINK FT
“You can access the Follow[facility] Patient Portal, offered by [facility], by registering with the following website: [email or web address]”

## 2017-10-09 NOTE — PROGRESS NOTE ADULT - PROBLEM SELECTOR PLAN 5
Mild fat stranding noted on CT. Patient recently completed antibiotics and continues to have BRYANT drain for previous abscess a/w diverticulitis.  - consult general surgery for removal of drain  - Cont oxycodone IR 5mg q6h prn pain

## 2017-10-09 NOTE — PROGRESS NOTE ADULT - PROBLEM SELECTOR PLAN 6
- No acute changes seen on repeat CT   - previous imaging sig for non-traumatic intraparaparenchymal hemorrhage planned for repeat imaging as out patient in 2-4wks from 10/3 as per neurology  - BP goal on previous admission <140/90   - Cont w/ ASA, hold plavix at this time (patient with RIGO placed >5 years ago, unclear indication for DAPT especially given recent ICH)

## 2017-10-09 NOTE — DISCHARGE NOTE ADULT - PLAN OF CARE
Follow up/ discontinue On your most recent hospital admission your sodium was found to be really low which was likely the primary cause to your worsening headaches and nausea. This was caused by one of your medications called hydrochlorothiazide (HCTZ) which was discontinued. Please stop taking this medication and call and follow up with your primary medical doctor Dr. Asa Kingston [phone] with 1-2weeks following your discharge to home/rehab for follow up lab tests and monitoring of this condition. Follow up/ drain removal You previously had an incision and drainage of a abdominal abscess associated with your sigmoid diverticulitis at [facility] at which time a drain was placed. A repeat cat scan was done at this past hospitalization which needed to be done as per your prior discharge document. You need to call and schedule a follow up appointment with colorectal surgeon Dr. Katie Crook with in 1-2 weeks following your discharge for evaluation and removal of your drain. Follow up During your previous hospital admission at [facility] there were no new changes found in regards to your previously diagnosed brain bleed which you require further evaluation and follow up for. Please call and schedule a follow up appointment with your neurologist who treated you at [facility] Dr. Grijalva in 1-3 weeks following your discharge for re-evaluation and a repeat MRI. You have been previously diagnosed with anemia, low red blood cells. During your recent hospitalization you lab values were stable but you require further evaluation and follow up with your primary medical doctor Dr. Asa Kingston [phone]. Please call and schedule a follow up appointment with your primary medical doctor within 1-2 weeks following your discharge. Follow up/ continue medications You were restarted on your blood thinners, Aspirin and Plavix, as per your cardiologist Dr. Rahul Wright. Please call and schedule a follow up appointment with Dr. Rahul Wright for further evaluation and follow up as well as review of your recent heart exams within 1-2 weeks following your discharge. Please continue taking your Metformin as previously prescribed by your primary medical doctor. Please call and schedule a follow up appointment with Dr. Asa Kingston for further monitoring and treatment of this condition. Please discontinue your previously prescribed medication which was a combination pill, containing hydrochlorothiazide (HCTZ) and Losartan. The HCTZ is the medication that has caused your low sodium levels which has been discontinued. As per your cardiologist please continue taking Losartan 100mg daily and Metoprolol 100mg daily. If you have any questions in regards to your medication please ask your pharmacist and primary medical doctor. Please call and schedule a follow up appointment with your primary medical doctor Dr. Asa Kingston and cardiologist Dr. Rahul Wright. You previously had an incision and drainage of a abdominal abscess associated with your sigmoid diverticulitis at [facility] at which time a drain was placed. A repeat cat scan was done at this past hospitalization which needed to be done as per your prior discharge document and the drain was removed. Please call and schedule a follow up appointment with colorectal surgeon Dr. Armaan Wilson with in 1-2 weeks following your discharge from the hospital/rehab for follow up evaluation. During your hospital admission at [facility] there were no new changes found in regards to your previously diagnosed brain bleed which you require further evaluation and follow up for. Please call and schedule a follow up appointment with your neurologist who treated you at [facility] Dr. Grijalva in 1-3 weeks following your discharge for re-evaluation and a repeat MRI. You have been previously diagnosed with anemia, low red blood cells. During your recent hospitalization you lab values were stable and with suggestive of iron deficiency anemia so you were started on an iron supplement. Please continue to take this daily as prescribed.  Please call and schedule a follow up appointment with your primary medical doctor Dr. Asa Kingston at [phone] within 1-2 weeks following your discharge for further evaluation and treatment of this condition. Please discontinue your previously prescribed medication which was a combination pill, containing hydrochlorothiazide (HCTZ) and Losartan. The HCTZ is the medication that has caused your low sodium levels which has been discontinued. As per your cardiologist please continue taking Losartan 100mg daily and Metoprolol 100mg daily. If you have any questions in regards to your medication please ask your pharmacist or primary medical doctor. Please call and schedule a follow up appointment with your primary medical doctor Dr. Asa Kingston and cardiologist Dr. Rahul Wright.

## 2017-10-09 NOTE — DISCHARGE NOTE ADULT - CARE PROVIDERS DIRECT ADDRESSES
[email or web address]

## 2017-10-09 NOTE — PROGRESS NOTE ADULT - SUBJECTIVE AND OBJECTIVE BOX
Patient is a 77y old  Male who presents with a chief complaint of headaches and nausea persistent for the last week (07 Oct 2017 20:03) found to the be severely hyponatremic in setting of recent hospitalization for sigmoid diverticulitis/ pelvis abscess s/p I & D and nontraumatic intraparenchymal hemorrhage      INTERVAL HPI/OVERNIGHT EVENTS:  no acute events reported o/n, patient resting comfortably on bedside examination. Pt reports that his pain is controlled and that his headaches and nausea have improved o/n. On examination the pt denies fever, chills, vomiting, diarrhea, constipation, SOB, CP. Pt reports BM yesterday, but small. Pt reports that he has not been eating as much because he doesnt like the food. Pt endorses that the hiccups have improved. Pt also reported discomfort on the right side of his chest but thinks it is how he sleeps. He states it notices it when he wakes up in the morning and that it resolves within one hour after waking up.     T(C): 36.6 (10-09-17 @ 05:19), Max: 36.7 (10-08-17 @ 13:42)  HR: 66 (10-09-17 @ 05:19) (60 - 66)  BP: 129/55 (10-09-17 @ 05:19) (127/60 - 140/74)  RR: 17 (10-09-17 @ 05:19) (17 - 18)  SpO2: 98% (10-09-17 @ 05:19) (97% - 98%)                          9.8    6.35  )-----------( 304      ( 09 Oct 2017 05:48 )             30.8     10-09    130<L>  |  90<L>  |  7   ----------------------------<  100<H>  3.9   |  24  |  0.88    Ca    8.4      09 Oct 2017 05:50  Phos  3.1     10-09  Mg     2.0     10-09    TPro  6.4  /  Alb  3.8  /  TBili  0.7  /  DBili  x   /  AST  14  /  ALT  12  /  AlkPhos  67  10-07    CAPILLARY BLOOD GLUCOSE  137 (08 Oct 2017 22:14)  118 (08 Oct 2017 17:36)  143 (08 Oct 2017 12:13)  127 (08 Oct 2017 08:27)          Urinalysis Basic - ( 07 Oct 2017 11:35 )    Color: YELLOW / Appearance: CLEAR / S.015 / pH: 6.5  Gluc: 50 / Ketone: TRACE  / Bili: NEGATIVE / Urobili: NORMAL E.U.   Blood: NEGATIVE / Protein: 10 / Nitrite: NEGATIVE   Leuk Esterase: LARGE / RBC: 2-5 / WBC 10-25   Sq Epi: OCC / Non Sq Epi: x / Bacteria: x      Blood culture     NO ORGANISMS ISOLATED  NO ORGANISMS ISOLATED AT 24 HOURS    aluminum hydroxide/magnesium hydroxide/simethicone Suspension 30 milliLiter(s) Oral every 6 hours PRN  aspirin enteric coated 81 milliGRAM(s) Oral daily  dextrose 5%. 1000 milliLiter(s) IV Continuous <Continuous>  dextrose 50% Injectable 12.5 Gram(s) IV Push once  dextrose 50% Injectable 25 Gram(s) IV Push once  dextrose 50% Injectable 25 Gram(s) IV Push once  dextrose Gel 1 Dose(s) Oral once PRN  docusate sodium 100 milliGRAM(s) Oral two times a day  finasteride 5 milliGRAM(s) Oral daily  glucagon  Injectable 1 milliGRAM(s) IntraMuscular once PRN  heparin  Injectable 5000 Unit(s) SubCutaneous every 8 hours  influenza   Vaccine 0.5 milliLiter(s) IntraMuscular once  insulin lispro (HumaLOG) corrective regimen sliding scale   SubCutaneous three times a day before meals  insulin lispro (HumaLOG) corrective regimen sliding scale   SubCutaneous at bedtime  losartan 100 milliGRAM(s) Oral daily  metoprolol succinate  milliGRAM(s) Oral daily  oxyCODONE    IR 5 milliGRAM(s) Oral every 6 hours PRN  simvastatin 40 milliGRAM(s) Oral at bedtime  tamsulosin 0.4 milliGRAM(s) Oral at bedtime          REVIEW OF SYSTEMS:  CONSTITUTIONAL: No fever, weight loss, or fatigue  EYES: No eye pain, visual disturbances, or discharge  ENMT: reports difficulty hearing, no tinnitus, vertigo; No sinus or throat pain  NECK: No pain or stiffness  RESPIRATORY: No cough, wheezing, chills or hemoptysis; No shortness of breath  CARDIOVASCULAR: No chest pain, palpitations, dizziness, or leg swelling  GASTROINTESTINAL: No abdominal or epigastric pain. No nausea, vomiting, or hematemesis; No diarrhea or constipation. No melena or hematochezia.  GENITOURINARY: + frequency, no dysuria, hematuria, or incontinence  NEUROLOGICAL: No headaches, memory loss, loss of strength, numbness, or tremors  SKIN: No itching, burning, rashes, or lesions, BRYANT drain abdomen    LYMPH NODES: No enlarged glands  ENDOCRINE: No heat or cold intolerance; No hair loss  MUSCULOSKELETAL: No joint pain or swelling; No muscle, back, or extremity pain  PSYCHIATRIC: No depression, anxiety, mood swings, or difficulty sleeping  HEME/LYMPH: No easy bruising, or bleeding gums  ALLERY AND IMMUNOLOGIC: No hives or eczema    RADIOLOGY & ADDITIONAL TESTS:    Imaging Personally Reviewed:  [x ] YES  [ ] NO    Consultant(s) Notes Reviewed:  [x ] YES  [ ] NO    PHYSICAL EXAM:  GENERAL: NAD, well-groomed, well-developed  HEAD:  Atraumatic, Normocephalic  EYES: EOMI, PERRLA, conjunctiva and sclera clear, (+) cataracts b/l, vision grossly intact  ENMT: No tonsillar erythema, exudates, or enlargement; Moist mucous membranes, Good dentition, No lesions  NECK: Supple, No JVD, Normal thyroid  NERVOUS SYSTEM:  Alert & Oriented X3, Good concentration; Motor Strength 5/5 B/L upper and lower extremities, (+) hiccups improved  CHEST/LUNG: Clear to ascultation bilaterally; No rales, rhonchi, wheezing, or rubs  HEART: Regular rate and rhythm; No murmurs, rubs, or gallops, S1/S2  ABDOMEN: Soft, Nontender, Nondistended; Bowel sounds present, BRYANT drain present, dressing c/d/i, (-) erythema, minor TTP, (+) serosanguinous drainage  EXTREMITIES:  2+ Peripheral Pulses, No clubbing, cyanosis, or edema  LYMPH: No lymphadenopathy noted  SKIN: No rashes or lesions    Care Discussed with Consultants/Other Providers [ x] YES  [ ] NO

## 2017-10-09 NOTE — DISCHARGE NOTE ADULT - HOSPITAL COURSE
This 78 y/o M with a PMH sig for DMII (noncomplicated and controlled), HTN, HLD, BPH, GERD, chronic anemia, h/o intraparenchymal hemorrhage (07/2017), CAD s/p RIGO (4/2017) and recent sigmoid diverticulitis w/ pelvic abscess s/p IR drainage and BRYANT placement (9/20/17- 9/26/17) and subsequent admission 9/29-10/3 s/p syncopal episode found to have non-traumatic subcortical intraparachymal hemorrhage w/ suspected etiology of hypertension vs underlying vascular lesion cavenoma/AVM planned for close outpatient follow up w/ repeated imaging in 2-4 wks that represented with several day duration of headache, cold sweats and nausea. He states the headaches have been chronic since a fall in June 2017 but appear to be worsening and becoming more frequent. He was unable to describe the headaches other than being "severe, constant" and "across the front, spreading to the back" previously reported to have tension headaches that were being treated by his PMD. Pain can be 10/10 in severity per his report. He was unable to identify any alleviating/exacerbating factors. He denied blurry vision or dysphagia but noted nausea. He also noted dizziness that occurs mostly when he sits/stands up. Of note, his physician recently changed his Losartan/HCTZ to Losartan but his daughter has not changed the medicine yet (wanted to continue it for a couple more days). On presentation the ED patient was found to be hemodynamically stable, VS: 97.6, 59, 144/72, 17, 100% on RA, of significance review of his chemistry's showed severe hyponatremia Na: 117, hypochloremia Cl: 80 w/ no acute neurologic changes. While in the ED the patient received 500cc NS x1 w/ goal to replete sodium with goal of no greater than 10meq within the first 24hrs. Imaging obtained in the ED was sig for :CT abdomen pelvis (+) sigmoid diverticulitis w/ BRYANT drain present, no acute abscess or fluid collection evident, CT head: showed no acute changes, CXR: clear w/ no lung pathology. Additionally, review of his EKG was within normal limits. Further review of his labs were suggestive of hypovolemic hyponatremia likely 2/2 continued HCTZ used at which time it was d/c. The patients hospital course was uncomplicated and trended BMPs showed continued improvement/ self correction of his serum sodium. Of note, the patient was found to have persistent hiccups on 10/8 which subsequently improved as his serum sodium increased. He was additionally started on Maalox prn for dyspepsea as a potential inciting source of the hiccups which could also be 2/2 to the hyponatremia. This 76 y/o M with a PMH sig for DMII (noncomplicated, controlled), HTN, HLD, BPH, GERD, chronic anemia, tension headaches, h/o hyponatremia, h/o intraparenchymal hemorrhage (07/2017), CAD s/p RIGO (4/2017) and recent sigmoid diverticulitis w/ pelvic abscess s/p IR drainage and BRYANT placement (9/20/17- 9/26/17) and subsequent admission 9/29-10/3 s/p syncopal episode found to have non-traumatic subcortical intraparachymal hemorrhage w/ suspected etiology of hypertension vs underlying vascular lesion cavenoma/AVM planned for close outpatient follow up w/ repeated imaging in 2-4 wks that represented with several day duration of headache, cold sweats and nausea. On arrival the patient states the headaches have been chronic since a fall in June 2017 but appear to be worsening and becoming more frequent. He was unable to describe the headaches other than being "severe, constant" and "across the front, spreading to the back" previously reported to have tension headaches that were being treated by his PMD. Pain can be 10/10 in severity per his report. He was unable to identify any alleviating/exacerbating factors. He denied blurry vision or dysphagia but noted nausea. He also noted dizziness that occurs mostly when he sits/stands up. Of note, his physician recently changed his Losartan/HCTZ to Losartan but his daughter has not changed the medicine yet (wanted to continue it for a couple more days). On presentation to the ED the patient was found to be hemodynamically stable, VS: 97.6, 59, 144/72, 17, 100% on RA, of significance review of his chemistry's showed severe hyponatremia Na: 117, hypochloremia Cl: 80 w/ no acute neurologic changes. While in the ED the patient received 500cc NS x1 w/ a goal to replete his sodium at a rate no greater than 10meq within the first 24hrs. Imaging obtained in the ED was sig for :CT abdomen pelvis (+) sigmoid diverticulitis w/ BRYANT drain present, no acute abscess or fluid collection evident, CT head: showed no acute changes, CXR: clear w/ no lung pathology. Additionally, review of his EKG was within normal limits. Further review of his labs were suggestive of hypovolemic hyponatremia likely 2/2 continued HCTZ at which time it was d/c. The patient’s hospital course was uncomplicated and trended BMPs showed continued improvement/ self-correction of his serum sodium. Of note, the patient was found to have persistent hiccups on 10/8 which subsequently improved as his serum sodium increased. He was additionally started on Maalox prn for dyspepsia as a potential inciting source of the hiccups which could also be 2/2 to the hyponatremia. As per cardiology and neurology on his prior admission, his ASA was restarted on 10/7 and Plavix on 10/9.  Additionally, the patient was evaluated by IR on 10/11 at which time the abdominal BRYANT drain was removed. Furthermore, a repeat head CT on 10/11 in the setting of worsened HA and dizziness on AM examination, which subsequently resolved, showed no acute changes or hemorrhage. Review of a Hgb electrophoresis, it was determined that his chronic microcytic anemia was likely 2/2 to iron deficiency and he was started on iron supplementation.  On 10/12 he was deemed medically optimized and stable for discharge to a rehab facility planned for close outpatient follow up with his PMD, colorectal surgery, neurology and cardiology. This 76 y/o M with a PMH sig for DMII (noncomplicated, controlled), HTN, HLD, BPH, GERD, chronic anemia, tension headaches, h/o hyponatremia, h/o intraparenchymal hemorrhage (07/2017), CAD s/p RIGO (4/2017) and recent sigmoid diverticulitis w/ pelvic abscess s/p IR drainage and BRYANT placement (9/20/17- 9/26/17) and subsequent admission 9/29-10/3 s/p syncopal episode found to have non-traumatic subcortical intraparachymal hemorrhage w/ suspected etiology of hypertension vs underlying vascular lesion cavenoma/AVM planned for close outpatient follow up w/ repeated imaging in 2-4 wks that represented with several day duration of headache, cold sweats and nausea. Found to be hyponatremic to 117 on admission which improved with cessation of HCTZ and IV fluids.  Of note, his physician recently changed his Losartan/HCTZ to Losartan but his daughter has not changed the medicine yet (wanted to continue it for a couple more days). CT abdomen pelvis (+) sigmoid diverticulitis w/ BRYANT drain present, no acute abscess or fluid collection evident, CT head: showed no acute changes, CXR: clear w/ no lung pathology. Additionally, review of his EKG was within normal limits. Further review of his labs were suggestive of hypovolemic hyponatremia likely 2/2 continued HCTZ at which time it was d/c. The patient’s hospital course was uncomplicated and trended BMPs showed continued improvement/ self-correction of his serum sodium. Of note, the patient was found to have persistent hiccups on 10/8 which subsequently improved as his serum sodium increased. He was additionally started on Maalox prn for dyspepsia as a potential inciting source of the hiccups which could also be 2/2 to the hyponatremia. As per cardiology and neurology on his prior admission, his ASA was restarted on 10/7 and Plavix on 10/9.  Additionally, the patient was evaluated by IR on 10/11 at which time the abdominal BRYANT drain was removed. Furthermore, a repeat head CT on 10/11 in the setting of worsened HA and dizziness on AM examination, which subsequently resolved, showed no acute changes or hemorrhage. Review of a Hgb electrophoresis, it was determined that his chronic microcytic anemia was likely 2/2 to iron deficiency and he was started on iron supplementation.  On 10/12 he was deemed medically optimized and stable for discharge to a rehab facility planned for close outpatient follow up with his PMD, colorectal surgery, neurology and cardiology. This 78 y/o M with a PMH sig for DMII (noncomplicated, controlled), HTN, HLD, BPH, GERD, chronic anemia, tension headaches, h/o hyponatremia, h/o intraparenchymal hemorrhage (07/2017), CAD s/p RIGO (4/2017) and recent sigmoid diverticulitis w/ pelvic abscess s/p IR drainage and BRYANT placement (9/20/17- 9/26/17) and subsequent admission 9/29-10/3 s/p syncopal episode found to have non-traumatic subcortical intraparachymal hemorrhage w/ suspected etiology of hypertension vs underlying vascular lesion cavenoma/AVM planned for close outpatient follow up w/ repeated imaging in 2-4 wks that represented with several day duration of headache, cold sweats and nausea. Found to be hyponatremic to 117 on admission which improved with cessation of HCTZ and IV fluids.  Of note, his physician recently changed his Losartan/HCTZ to Losartan but his daughter has not changed the medicine yet (wanted to continue it for a couple more days). CT abdomen pelvis (+) sigmoid diverticulitis w/ BRYANT drain present, no acute abscess or fluid collection evident, CT head: showed no acute changes, CXR: clear w/ no lung pathology. Additionally, review of his EKG was within normal limits. Further review of his labs were suggestive of hypovolemic hyponatremia likely 2/2 continued HCTZ at which time it was d/c. The patient’s hospital course was uncomplicated and trended BMPs showed continued improvement/ self-correction of his serum sodium. He BP is controlled on Losartan 100mg QD and Metoprolol 100mg QD. Of note, the patient was found to have persistent hiccups on 10/8 which subsequently improved as his serum sodium increased. He was additionally started on Maalox prn for dyspepsia as a potential inciting source of the hiccups which could also be 2/2 to the hyponatremia. As per cardiology and neurology on his prior admission, his ASA was restarted on 10/7 and Plavix on 10/9.  Additionally, the patient was evaluated by IR on 10/11 at which time the abdominal BRYANT drain was removed. Furthermore, a repeat head CT on 10/11 in the setting of worsened HA and dizziness on AM examination, which subsequently resolved, showed no acute changes or hemorrhage. Review of a Hgb electrophoresis, it was determined that his chronic microcytic anemia was likely 2/2 to iron deficiency and he was started on iron supplementation.  On 10/12 he was deemed medically optimized and stable for discharge to a rehab facility planned for close outpatient follow up with his PMD, colorectal surgery, neurology and cardiology. This 78 y/o M with a PMH sig for DMII (noncomplicated, controlled), HTN, HLD, BPH, GERD, chronic anemia, tension headaches, h/o hyponatremia, h/o intraparenchymal hemorrhage (07/2017), CAD s/p RIGO (4/2017) and recent sigmoid diverticulitis w/ pelvic abscess s/p IR drainage and BRYANT placement (9/20/17- 9/26/17) and subsequent admission 9/29-10/3 s/p syncopal episode found to have non-traumatic subcortical intraparachymal hemorrhage w/ suspected etiology of hypertension vs underlying vascular lesion cavenoma/AVM planned for close outpatient follow up w/ repeated imaging in 2-4 wks that represented with several day duration of headache, cold sweats and nausea. Found to be hyponatremic to 117 on admission which improved with cessation of HCTZ and with administration of IV fluids.  Of note, his physician recently changed his Losartan/HCTZ to Losartan but his daughter has not changed the medicine yet (wanted to continue it for a couple more days). CT abdomen pelvis (+) sigmoid diverticulitis w/ BRYANT drain present, no acute abscess or fluid collection evident, CT head: showed no acute changes, CXR: clear w/ no lung pathology. Additionally, review of his EKG was within normal limits. The patient’s hospital course was uncomplicated and trended BMPs showed continued improvement/ self-correction of his serum sodium. BP is controlled on Losartan 100mg QD and Metoprolol 100mg QD. Of note, the patient was found to have persistent hiccups on 10/8 which subsequently improved as his serum sodium increased. He was additionally started on Maalox prn for dyspepsia as a potential inciting source of the hiccups which could also be 2/2 to the hyponatremia. As per recs of cardiology and neurology on his prior admission, his ASA was restarted on 10/7 and Plavix on 10/9.  Additionally, the patient was evaluated by IR on 10/11 at which time the abdominal BRYANT drain was removed without complications. Furthermore, a repeat head CT on 10/11 in the setting of worsened HA and dizziness on AM examination, which subsequently resolved, showed no acute changes or hemorrhage. Review of a Hgb electrophoresis, it was determined that his chronic microcytic anemia was likely 2/2 to iron deficiency and he was started on iron supplementation.  On 10/12 he was deemed medically optimized and stable for discharge to a rehab facility planned for close outpatient follow up with his PMD, colorectal surgery, neurology and cardiology.

## 2017-10-09 NOTE — PROGRESS NOTE ADULT - PROBLEM SELECTOR PLAN 4
Hgb near baseline. No signs of overt hemorrhage.  - reported chronic microcytic anemia on previous admission, previous iron panel sig for iron deficiency w/ previous plan for electrophoresis to r/o thalassemia   - Trend CBC  - H/H stable

## 2017-10-09 NOTE — DISCHARGE NOTE ADULT - CARE PLAN
Principal Discharge DX:	Hyponatremia  Goal:	Follow up/ discontinue  Instructions for follow-up, activity and diet:	On your most recent hospital admission your sodium was found to be really low which was likely the primary cause to your worsening headaches and nausea. This was caused by one of your medications called hydrochlorothiazide (HCTZ) which was discontinued. Please stop taking this medication and call and follow up with your primary medical doctor Dr. Asa Kingston [phone] with 1-2weeks following your discharge to home/rehab for follow up lab tests and monitoring of this condition.  Secondary Diagnosis:	Diverticulitis of sigmoid colon  Goal:	Follow up/ drain removal  Instructions for follow-up, activity and diet:	You previously had an incision and drainage of a abdominal abscess associated with your sigmoid diverticulitis at [facility] at which time a drain was placed. A repeat cat scan was done at this past hospitalization which needed to be done as per your prior discharge document. You need to call and schedule a follow up appointment with colorectal surgeon Dr. Katie Crook with in 1-2 weeks following your discharge for evaluation and removal of your drain.  Secondary Diagnosis:	Intraparenchymal hemorrhage of brain  Goal:	Follow up  Instructions for follow-up, activity and diet:	During your previous hospital admission at [facility] there were no new changes found in regards to your previously diagnosed brain bleed which you require further evaluation and follow up for. Please call and schedule a follow up appointment with your neurologist who treated you at [facility] Dr. Grijalva in 1-3 weeks following your discharge for re-evaluation and a repeat MRI.  Secondary Diagnosis:	Chronic anemia  Goal:	Follow up  Instructions for follow-up, activity and diet:	You have been previously diagnosed with anemia, low red blood cells. During your recent hospitalization you lab values were stable but you require further evaluation and follow up with your primary medical doctor Dr. Asa Kingston [phone]. Please call and schedule a follow up appointment with your primary medical doctor within 1-2 weeks following your discharge.  Secondary Diagnosis:	CAD (coronary artery disease)  Goal:	Follow up/ continue medications  Instructions for follow-up, activity and diet:	You were restarted on your blood thinners, Aspirin and Plavix, as per your cardiologist Dr. Rahul Wright. Please call and schedule a follow up appointment with Dr. Rahul Wright for further evaluation and follow up as well as review of your recent heart exams within 1-2 weeks following your discharge.  Secondary Diagnosis:	Type 2 diabetes mellitus with diabetic cataract, without long-term current use of insulin  Goal:	Follow up  Instructions for follow-up, activity and diet:	Please continue taking your Metformin as previously prescribed by your primary medical doctor. Please call and schedule a follow up appointment with Dr. Asa Kingston for further monitoring and treatment of this condition.  Secondary Diagnosis:	Essential hypertension  Goal:	Follow up  Instructions for follow-up, activity and diet:	Please discontinue your previously prescribed medication which was a combination pill, containing hydrochlorothiazide (HCTZ) and Losartan. The HCTZ is the medication that has caused your low sodium levels which has been discontinued. As per your cardiologist please continue taking Losartan 100mg daily and Metoprolol 100mg daily. If you have any questions in regards to your medication please ask your pharmacist and primary medical doctor. Please call and schedule a follow up appointment with your primary medical doctor Dr. Asa Kingston and cardiologist Dr. Rahul Wright. Principal Discharge DX:	Hyponatremia  Goal:	Follow up/ discontinue  Instructions for follow-up, activity and diet:	On your most recent hospital admission your sodium was found to be really low which was likely the primary cause to your worsening headaches and nausea. This was caused by one of your medications called hydrochlorothiazide (HCTZ) which was discontinued. Please stop taking this medication and call and follow up with your primary medical doctor Dr. Asa Kingston [phone] with 1-2weeks following your discharge to home/rehab for follow up lab tests and monitoring of this condition.  Secondary Diagnosis:	Diverticulitis of sigmoid colon  Goal:	Follow up  Instructions for follow-up, activity and diet:	You previously had an incision and drainage of a abdominal abscess associated with your sigmoid diverticulitis at [facility] at which time a drain was placed. A repeat cat scan was done at this past hospitalization which needed to be done as per your prior discharge document and the drain was removed. Please call and schedule a follow up appointment with colorectal surgeon Dr. Armaan Wilson with in 1-2 weeks following your discharge from the hospital/rehab for follow up evaluation.  Secondary Diagnosis:	Intraparenchymal hemorrhage of brain  Goal:	Follow up  Instructions for follow-up, activity and diet:	During your hospital admission at [facility] there were no new changes found in regards to your previously diagnosed brain bleed which you require further evaluation and follow up for. Please call and schedule a follow up appointment with your neurologist who treated you at [facility] Dr. Grijalva in 1-3 weeks following your discharge for re-evaluation and a repeat MRI.  Secondary Diagnosis:	Chronic anemia  Goal:	Follow up  Instructions for follow-up, activity and diet:	You have been previously diagnosed with anemia, low red blood cells. During your recent hospitalization you lab values were stable and with suggestive of iron deficiency anemia so you were started on an iron supplement. Please continue to take this daily as prescribed.  Please call and schedule a follow up appointment with your primary medical doctor Dr. Asa Kingston at [phone] within 1-2 weeks following your discharge for further evaluation and treatment of this condition.  Secondary Diagnosis:	CAD (coronary artery disease)  Goal:	Follow up/ continue medications  Instructions for follow-up, activity and diet:	You were restarted on your blood thinners, Aspirin and Plavix, as per your cardiologist Dr. Rahul Wright. Please call and schedule a follow up appointment with Dr. Rahul Wright for further evaluation and follow up as well as review of your recent heart exams within 1-2 weeks following your discharge.  Secondary Diagnosis:	Type 2 diabetes mellitus with diabetic cataract, without long-term current use of insulin  Goal:	Follow up  Instructions for follow-up, activity and diet:	Please continue taking your Metformin as previously prescribed by your primary medical doctor. Please call and schedule a follow up appointment with Dr. Asa Kingston for further monitoring and treatment of this condition.  Secondary Diagnosis:	Essential hypertension  Goal:	Follow up  Instructions for follow-up, activity and diet:	Please discontinue your previously prescribed medication which was a combination pill, containing hydrochlorothiazide (HCTZ) and Losartan. The HCTZ is the medication that has caused your low sodium levels which has been discontinued. As per your cardiologist please continue taking Losartan 100mg daily and Metoprolol 100mg daily. If you have any questions in regards to your medication please ask your pharmacist or primary medical doctor. Please call and schedule a follow up appointment with your primary medical doctor Dr. Asa Kingston and cardiologist Dr. Rahul Wright.

## 2017-10-09 NOTE — PROGRESS NOTE ADULT - PROBLEM SELECTOR PLAN 8
- Hold metformin while inpatient  - monitor FSG  - Start ISS HS and premeal w/ FS  - c/w simvastatin

## 2017-10-09 NOTE — DISCHARGE NOTE ADULT - ADDITIONAL INSTRUCTIONS
BRYANT drain care: 1) record daily drain output 2) change dressing daily w/ 4x4 gauze and tape 3) Flush drain w/ 3-5cc of normal saline daily     Follow up appointments as per above:   1) Primary medical doctor: Dr. Asa Kingston   2) Neurologist: Dr. Grijalva   3) Colorectal surgeon: Dr. Armaan Wilson   4) Cardiology: Dr. Rahul Wright Follow up appointments as per above:   1) Primary medical doctor: Dr. Asa Kingston   2) Neurologist: Dr. Grijalva   3) Colorectal surgeon: Dr. Armaan Wilson   4) Cardiology: Dr. Rahul Wright

## 2017-10-09 NOTE — DISCHARGE NOTE ADULT - COMMUNITY RESOURCES
[facility][1] [facility] [id number] [place] [id number] [phone]

## 2017-10-09 NOTE — DISCHARGE NOTE ADULT - CARE PROVIDER_API CALL
Armaan Wilson (MD), ColonRectal Surgery; Surgery  [address]  [address]  [place] [address]  Phone: [phone]  Fax: [phone]    Vicki Younger), Neurology  [address] [place] [address]  Phone: [phone]  Fax: [phone]    Rahul Noland), Cardiovascular Disease; Nuclear Cardiology  [address] [place] [address]  Phone: [phone]  Fax: [phone]

## 2017-10-09 NOTE — PROGRESS NOTE ADULT - PROBLEM SELECTOR PLAN 3
BP stable and at goal.  - d/c HCTZ, continue Losartan 100mg QD , Toprol 100mg QD   - if hypertensive will consider adding Ca channel blocker   - Monitor vitals

## 2017-10-10 LAB
BUN SERPL-MCNC: 10 MG/DL — SIGNIFICANT CHANGE UP (ref 7–23)
CALCIUM SERPL-MCNC: 8.6 MG/DL — SIGNIFICANT CHANGE UP (ref 8.4–10.5)
CHLORIDE SERPL-SCNC: 92 MMOL/L — LOW (ref 98–107)
CO2 SERPL-SCNC: 24 MMOL/L — SIGNIFICANT CHANGE UP (ref 22–31)
CREAT SERPL-MCNC: 0.88 MG/DL — SIGNIFICANT CHANGE UP (ref 0.5–1.3)
GLUCOSE SERPL-MCNC: 113 MG/DL — HIGH (ref 70–99)
HCT VFR BLD CALC: 32 % — LOW (ref 39–50)
HGB A MFR BLD: 97.4 % — SIGNIFICANT CHANGE UP
HGB A2 MFR BLD: 2.4 % — SIGNIFICANT CHANGE UP (ref 2.4–3.5)
HGB BLD-MCNC: 9.9 G/DL — LOW (ref 13–17)
HGB ELECT COMMENT: SIGNIFICANT CHANGE UP
HGB F MFR BLD: < 1 % — SIGNIFICANT CHANGE UP (ref 0–1.5)
MAGNESIUM SERPL-MCNC: 2 MG/DL — SIGNIFICANT CHANGE UP (ref 1.6–2.6)
MCHC RBC-ENTMCNC: 19.8 PG — LOW (ref 27–34)
MCHC RBC-ENTMCNC: 30.9 % — LOW (ref 32–36)
MCV RBC AUTO: 64.1 FL — LOW (ref 80–100)
NRBC # FLD: 0 — SIGNIFICANT CHANGE UP
PHOSPHATE SERPL-MCNC: 3.2 MG/DL — SIGNIFICANT CHANGE UP (ref 2.5–4.5)
PLATELET # BLD AUTO: 295 K/UL — SIGNIFICANT CHANGE UP (ref 150–400)
PMV BLD: 10.3 FL — SIGNIFICANT CHANGE UP (ref 7–13)
POTASSIUM SERPL-MCNC: 4.3 MMOL/L — SIGNIFICANT CHANGE UP (ref 3.5–5.3)
POTASSIUM SERPL-SCNC: 4.3 MMOL/L — SIGNIFICANT CHANGE UP (ref 3.5–5.3)
RBC # BLD: 4.99 M/UL — SIGNIFICANT CHANGE UP (ref 4.2–5.8)
RBC # FLD: 19.3 % — HIGH (ref 10.3–14.5)
SODIUM SERPL-SCNC: 130 MMOL/L — LOW (ref 135–145)
WBC # BLD: 8.39 K/UL — SIGNIFICANT CHANGE UP (ref 3.8–10.5)
WBC # FLD AUTO: 8.39 K/UL — SIGNIFICANT CHANGE UP (ref 3.8–10.5)

## 2017-10-10 PROCEDURE — 99233 SBSQ HOSP IP/OBS HIGH 50: CPT | Mod: GC

## 2017-10-10 RX ORDER — CLOPIDOGREL BISULFATE 75 MG/1
75 TABLET, FILM COATED ORAL DAILY
Qty: 0 | Refills: 0 | Status: DISCONTINUED | OUTPATIENT
Start: 2017-10-10 | End: 2017-10-12

## 2017-10-10 RX ORDER — SENNA PLUS 8.6 MG/1
2 TABLET ORAL AT BEDTIME
Qty: 0 | Refills: 0 | Status: DISCONTINUED | OUTPATIENT
Start: 2017-10-10 | End: 2017-10-12

## 2017-10-10 RX ORDER — DOCUSATE SODIUM 100 MG
100 CAPSULE ORAL THREE TIMES A DAY
Qty: 0 | Refills: 0 | Status: DISCONTINUED | OUTPATIENT
Start: 2017-10-10 | End: 2017-10-12

## 2017-10-10 RX ADMIN — CLOPIDOGREL BISULFATE 75 MILLIGRAM(S): 75 TABLET, FILM COATED ORAL at 12:36

## 2017-10-10 RX ADMIN — TAMSULOSIN HYDROCHLORIDE 0.4 MILLIGRAM(S): 0.4 CAPSULE ORAL at 21:46

## 2017-10-10 RX ADMIN — HEPARIN SODIUM 5000 UNIT(S): 5000 INJECTION INTRAVENOUS; SUBCUTANEOUS at 13:54

## 2017-10-10 RX ADMIN — LOSARTAN POTASSIUM 100 MILLIGRAM(S): 100 TABLET, FILM COATED ORAL at 06:23

## 2017-10-10 RX ADMIN — Medication 81 MILLIGRAM(S): at 12:37

## 2017-10-10 RX ADMIN — SIMVASTATIN 40 MILLIGRAM(S): 20 TABLET, FILM COATED ORAL at 21:46

## 2017-10-10 RX ADMIN — SENNA PLUS 2 TABLET(S): 8.6 TABLET ORAL at 21:46

## 2017-10-10 RX ADMIN — Medication 100 MILLIGRAM(S): at 06:23

## 2017-10-10 RX ADMIN — FINASTERIDE 5 MILLIGRAM(S): 5 TABLET, FILM COATED ORAL at 12:37

## 2017-10-10 RX ADMIN — Medication 100 MILLIGRAM(S): at 21:46

## 2017-10-10 RX ADMIN — Medication 30 MILLILITER(S): at 21:46

## 2017-10-10 RX ADMIN — HEPARIN SODIUM 5000 UNIT(S): 5000 INJECTION INTRAVENOUS; SUBCUTANEOUS at 06:23

## 2017-10-10 NOTE — PROGRESS NOTE ADULT - SUBJECTIVE AND OBJECTIVE BOX
Patient is a 77y old  Male who presents with a chief complaint of headaches and nausea persistent for the last week (07 Oct 2017 20:03) found to the be severely hyponatremic in setting of recent hospitalization for sigmoid diverticulitis/ pelvis abscess s/p I & D and nontraumatic intraparenchymal hemorrhage      INTERVAL HPI/OVERNIGHT EVENTS:  no acute events reported o/n, patient resting comfortably on bedside examination. Pt reports that his pain is controlled and that his headaches and nausea have improved o/n. On examination the pt denies fever, chills, vomiting, diarrhea, SOB, CP. Pt reports constipation but endorses minimal PO intake because he doesnt like the food. Pt endorses that the hiccups have improved.       T(C): 36.7 (10-10-17 @ 05:59), Max: 37.2 (10-09-17 @ 12:43)  HR: 57 (10-10-17 @ 05:59) (57 - 60)  BP: 134/70 (10-10-17 @ 05:59) (134/70 - 157/65)  RR: 16 (10-10-17 @ 05:59) (16 - 18)  SpO2: 100% (10-10-17 @ 05:59) (96% - 100%)                          9.9    8.39  )-----------( 295      ( 10 Oct 2017 06:00 )             32.0     10-10    130<L>  |  92<L>  |  10  ----------------------------<  113<H>  4.3   |  24  |  0.88    Ca    8.6      10 Oct 2017 06:00  Phos  3.2     10-10  Mg     2.0     10-10      CAPILLARY BLOOD GLUCOSE  133 (10 Oct 2017 08:26)  198 (09 Oct 2017 22:13)  119 (09 Oct 2017 17:24)  108 (09 Oct 2017 12:13)      Blood culture     NO ORGANISMS ISOLATED  NO ORGANISMS ISOLATED AT 48 HRS.    aluminum hydroxide/magnesium hydroxide/simethicone Suspension 30 milliLiter(s) Oral every 6 hours PRN  aspirin enteric coated 81 milliGRAM(s) Oral daily  clopidogrel Tablet 75 milliGRAM(s) Oral daily  dextrose 5%. 1000 milliLiter(s) IV Continuous <Continuous>  dextrose 50% Injectable 12.5 Gram(s) IV Push once  dextrose 50% Injectable 25 Gram(s) IV Push once  dextrose 50% Injectable 25 Gram(s) IV Push once  dextrose Gel 1 Dose(s) Oral once PRN  docusate sodium 100 milliGRAM(s) Oral two times a day  finasteride 5 milliGRAM(s) Oral daily  glucagon  Injectable 1 milliGRAM(s) IntraMuscular once PRN  heparin  Injectable 5000 Unit(s) SubCutaneous every 8 hours  influenza   Vaccine 0.5 milliLiter(s) IntraMuscular once  insulin lispro (HumaLOG) corrective regimen sliding scale   SubCutaneous three times a day before meals  insulin lispro (HumaLOG) corrective regimen sliding scale   SubCutaneous at bedtime  losartan 100 milliGRAM(s) Oral daily  metoprolol succinate  milliGRAM(s) Oral daily  oxyCODONE    IR 5 milliGRAM(s) Oral every 6 hours PRN  simvastatin 40 milliGRAM(s) Oral at bedtime  tamsulosin 0.4 milliGRAM(s) Oral at bedtime      REVIEW OF SYSTEMS:  CONSTITUTIONAL: No fever, weight loss, or fatigue  EYES: No eye pain, visual disturbances, or discharge  ENMT: reports difficulty hearing, no tinnitus, vertigo; No sinus or throat pain  NECK: No pain or stiffness  RESPIRATORY: No cough, wheezing, chills or hemoptysis; No shortness of breath  CARDIOVASCULAR: No chest pain, palpitations, dizziness, or leg swelling  GASTROINTESTINAL: No abdominal or epigastric pain. No nausea, vomiting, or hematemesis; No diarrhea or constipation. No melena or hematochezia.  GENITOURINARY: + frequency, no dysuria, hematuria, or incontinence  NEUROLOGICAL: No headaches, memory loss, loss of strength, numbness, or tremors  SKIN: No itching, burning, rashes, or lesions, BRYANT drain abdomen    LYMPH NODES: No enlarged glands  ENDOCRINE: No heat or cold intolerance; No hair loss  MUSCULOSKELETAL: No joint pain or swelling; No muscle, back, or extremity pain  PSYCHIATRIC: No depression, anxiety, mood swings, or difficulty sleeping  HEME/LYMPH: No easy bruising, or bleeding gums  ALLERY AND IMMUNOLOGIC: No hives or eczema    RADIOLOGY & ADDITIONAL TESTS:    Imaging Personally Reviewed:  [x ] YES  [ ] NO    Consultant(s) Notes Reviewed:  [x ] YES  [ ] NO    PHYSICAL EXAM:  GENERAL: NAD, well-groomed, well-developed  HEAD:  Atraumatic, Normocephalic  EYES: EOMI, PERRLA, conjunctiva and sclera clear, (+) cataracts b/l, vision grossly intact  ENMT: No tonsillar erythema, exudates, or enlargement; Moist mucous membranes, Good dentition, No lesions  NECK: Supple, No JVD, Normal thyroid  NERVOUS SYSTEM:  Alert & Oriented X3, Good concentration; Motor Strength 5/5 B/L upper and lower extremities, (+) hiccups improved  CHEST/LUNG: Clear to ascultation bilaterally; No rales, rhonchi, wheezing, or rubs  HEART: Regular rate and rhythm; No murmurs, rubs, or gallops, S1/S2  ABDOMEN: Soft, Nontender, Nondistended; Bowel sounds present, BRYANT drain present, dressing c/d/i, (-) erythema, minor TTP, (+) serosanguinous drainage  EXTREMITIES:  2+ Peripheral Pulses, No clubbing, cyanosis, or edema  LYMPH: No lymphadenopathy noted  SKIN: No rashes or lesions    Care Discussed with Consultants/Other Providers [ x] YES  [ ] NO

## 2017-10-10 NOTE — CONSULT NOTE ADULT - ASSESSMENT
78 yo M with a PMH DM, HTN, HLD, BPH, intraparenchymal hemorrhage (07/2017), CAD s/p RIGO (12/2016), and recent diverticulitis c/b abscess with BRYANT drain placed (9/26/17) presents with several days of headache, cold sweats, nausea, and vomiting in the setting of hyponatremia.    #Neuro - headaches, although worsening, appear to be chronic. May also be 2/2 hyponatremia. Nausea/vomiting may be related. Would recommend neuro consult in the setting of unchanged CT head (no signs of mass effect or acute/worsening hemorrhage). Has history of susceptibility to microhemorrhages and possible cavernoma that may be better viewed on MRI. Dizziness most likely vertiginous based on history, likely related to prior intracranial history    #Pulm - no issues    #CV - no issues. C/w Plavix, statin. Should only be on Losartan 100 (not Losartan/HCTZ 100/25) for BP.    #Renal - patient needs to stop HCTZ, contributing to hyponatremia. Hyponatremia may be contributing to headaches as well. BMPs Q4. Gentle hydration with NS.    #ID - afebrile, normal heart rate. No WBC or signs of infection. No sign of infection at abdominal site.    #Heme - Hb 9.8, baseline appears ~11. No evidence of bleeding at this time, continue to monitor    #Endo - DM. SSI    #DVT PPX - SCDs in the setting of history of hemorrhages
77M w/recent hospitalization for acute diverticulitis w/abscess s/p abx and IR drainage. Currently tolerating diet, afebrile, WBC nml, benign abdominal exam, minimal drain output and resolution of collection on CT.    -recommend IR tube check to evaluate for removal of drain (placed by Dr. Cortez of IR on 9/21 at [facility])   -patient may follow up with Dr. Wilson after discharge from rehab as he will need follow-up colonoscopy  -d/w Dr. Wilson  -please call with questions ([facility], [id number])

## 2017-10-10 NOTE — PROGRESS NOTE ADULT - PROBLEM SELECTOR PLAN 6
- No acute changes seen on repeat CT   - previous imaging sig for non-traumatic intraparaparenchymal hemorrhage planned for repeat imaging as out patient in 2-4wks from 10/3 as per neurology  - BP goal on previous admission <140/90   - Cont w/ ASA, hold plavix at this time f/u w/ neurology

## 2017-10-10 NOTE — PROGRESS NOTE ADULT - PROBLEM SELECTOR PLAN 7
No signs of angina.  - Cont ASA, hold plavix at this time (stenting 4/2017) to f/u with neurologist if okay to restart at this time

## 2017-10-10 NOTE — CONSULT NOTE ADULT - SUBJECTIVE AND OBJECTIVE BOX
77M w/recent hospitalization at [facility] for acute sigmoid diverticulitis w/abscess (9/21-9/26) s/p abx txt and IR drainage, subsequent hospitalization for syncope at [facility] 9/, now admitted with hyponatremia on 10/7/17. Patient is to be discharged to rehab -- we are consulted regarding management of the drain placed 9/21. The patient denies abdominal pain, F/C, N/V. He is tolerating a regular diet. +Flatus, +BM. Drain output has been minimal, needing to be emptied only every other day, <5cc.    PMH  GERD (gastroesophageal reflux disease)  Essential hypertension  HLD (hyperlipidemia)  Intraparenchymal hemorrhage of brain  DM (diabetes mellitus)  CAD (coronary artery disease)  TIA (transient ischemic attack)  Anemia  BPH (benign prostatic hyperplasia)    PSH  History of appendectomy  History of coronary artery stent placement  H/O hernia repair    MEDS  · 	clopidogrel 75 mg oral tablet: 1 tab(s) orally once a day  · 	atorvastatin: 100 milligram(s) orally once a day  · 	metFORMIN 850 mg oral tablet: 1 tab(s) orally 2 times a day  · 	losartan-hydrochlorothiazide 100mg-25mg oral tablet: 1 tab(s) orally once a day  · 	oxyCODONE 5 mg oral tablet: 1 tab(s) orally every 6 hours, As Needed  · 	metoprolol succinate 100 mg oral tablet, extended release: 1 tab(s) orally once a day  · 	dutasteride 0.5 mg oral capsule: 1 cap(s) orally once a day   · 	tamsulosin 0.4 mg oral capsule: 1 cap(s) orally once a day  · 	Dulcolax Stool Softener 100 mg oral capsule: 1 cap(s) orally 2 times a day    Allergies  No Known Allergies    Social  Denies ETOH abuse or tobacco use.    Physical Exam  T(C): 36.4 (10-10-17 @ 10:00), Max: 37.2 (10-09-17 @ 12:43)  HR: 63 (10-10-17 @ 10:00) (57 - 63)  BP: 137/55 (10-10-17 @ 10:00) (134/70 - 157/65)  RR: 16 (10-10-17 @ 10:00) (16 - 18)  SpO2: 100% (10-10-17 @ 10:00) (96% - 100%)  Tmax: T(C): , Max: 37.2 (10-09-17 @ 12:43)    Gen: NAD  HEENT: normocephalic, atraumatic, no scleral icterus  CV: S1, S2, RRR  Pulm: no increased WOB  Abd: abdomen soft, NT, ND, RLQ drain with minimal seropurulent drainage  Ext: warm, no edema, palp dp/pt      Labs:                        9.9    8.39  )-----------( 295      ( 10 Oct 2017 06:00 )             32.0     10-10    130<L>  |  92<L>  |  10  ----------------------------<  113<H>  4.3   |  24  |  0.88    Ca    8.6      10 Oct 2017 06:00  Phos  3.2     10-10  Mg     2.0     10-10    Imaging  < from: CT Abdomen and Pelvis w/ Oral Cont and w/ IV Cont (10.07.17 @ 12:23) >  FINDINGS:    LOWER CHEST: Coronary artery calcifications.    LIVER: Normal in size. Calcifiedleft hepatic granuloma.  BILE DUCTS: Normal caliber.  GALLBLADDER: Within normal limits.  SPLEEN: Calcified granuloma.  PANCREAS: Within normal limits.  ADRENALS: Within normal limits.  KIDNEYS/URETERS: Within normal limits.    BLADDER: Within normallimits.  REPRODUCTIVE ORGANS: The prostate is enlarged.    BOWEL: There is severe colonic diverticulosis with wall thickening and   mild adjacent fat stranding in the mid sigmoid colon. A percutaneous   drainage catheter has its tip adjacent to the sigmoid. Small hiatal   hernia. No bowel obstruction. The appendix is not definitively   visualized, however there are no regional inflammatory changes. Duodenal   diverticulum.  PERITONEUM:  No fluid collection is seen. Trace ascites in a small left   inguinal hernia.  VESSELS:  Atherosclerotic calcifications of the aorta.  RETROPERITONEUM: No lymphadenopathy.    ABDOMINAL WALL: Ventral percutaneous pigtail drainage catheter as above.  BONES: Degenerative changes of the spine.    IMPRESSION:     Acute/subacute sigmoid diverticulitis. No free air. Percutaneous drain   terminates adjacent to the sigmoid colon without evidence of a fluid   collection. Correlation with prior imaging and recent medical history   would be helpful.    < end of copied text >
CHIEF COMPLAINT: headache and dizziness    HPI:  76 yo M with a PMH DM, HTN, HLD, BPH, intraparenchymal hemorrhage (2017), CAD s/p RIGO (2016), and recent diverticulitis c/b abscess with BRYANT drain placed (17) presents with several days of headache, cold sweats, nausea, and vomiting. He states the headaches have been chronic since a fall in 2017 but appear to be worsening and becoming more frequent. He is unable to describe the headaches other than being "severe, constant" and "across the front, spreading to the back." He denies blurry vision or dysphagia but notes nausea with NBNB emesis. He also notes dizziness that occurs mostly when he sits/stands up. He feels like "the room is spinning." Of note, his physician recently changed his Losartan/HCTZ to Losartan but his daughter has not changed the medicine yet (wanted to continue it for a couple more days). Does note minimal abdominal pain that worsened in the ED. Denies fevers but notes occasional cold sweats. Endorses constipation. No issues with BRYANT drain.    Of note, he was admitted with headaches 1 week ago. Found to have non-traumatic L posterior caudate intraparenchymal hemorrhage. Previously had hemorrhages with susceptibility at deep nuclei and posterior fossa, and could not exclude cavernomas.    PAST MEDICAL & SURGICAL HISTORY:  HLD (hyperlipidemia)  Intraparenchymal hemorrhage of brain  DM (diabetes mellitus)  CAD (coronary artery disease)  History of coronary artery stent placement      FAMILY HISTORY:      SOCIAL HISTORY:      Allergies    No Known Allergies    Intolerances        HOME MEDICATIONS:  As per [place]      REVIEW OF SYSTEMS:  Gen: + cold seats. Negative for fevers, chills, anorexia, weight loss, weight gain, malaise, fatigue  Eyes: no blurred vision or lacrimation  ENT: + vertigo. No tinnitus, or difficulty hearing  Resp: no wheezing, dyspnea, pleuritic chest pain, hemoptysis, or orthopnea  CV: no chest pain, dyspnea on exertion, or palpitations  GI: + nausea, vomiting, abdominal pain, constipation. No constipation, melena, or hematochezia  : no dysuria, hematuria, discharge, or incontinence  MSK: no arthralgias, joint stiffness, joint swelling, or myalgias  Neuro: + weakness, dizziness. No focal deficits, confusion, tremors, or seizures  Skin: no rash, lesions, or edema  [ ] All other systems negative    OBJECTIVE:  ICU Vital Signs Last 24 Hrs  T(C): 36.3 (07 Oct 2017 10:50), Max: 36.7 (07 Oct 2017 09:54)  T(F): 97.3 (07 Oct 2017 10:50), Max: 98 (07 Oct 2017 09:54)  HR: 72 (07 Oct 2017 12:59) (68 - 72)  BP: 134/68 (07 Oct 2017 12:59) (134/68 - 156/102)  BP(mean): --  ABP: --  ABP(mean): --  RR: 17 (07 Oct 2017 12:59) (17 - 20)  SpO2: 99% (07 Oct 2017 12:59) (99% - 100%)        CAPILLARY BLOOD GLUCOSE  183 (07 Oct 2017 09:54)            GENERAL: No acute distress  HEENT: R pupil 2mm, L pupil 3mm, both reactive to light. EOMI, MMM, no oropharyngeal lesions, no nystagmus. CN II - XII intact  LYMPH: no anterior cervical, posterior cervical, supraclavicular, or inguinal lymphadenopathy  NECK: supple, no stiffness, no JVD, no thyromegaly  Respiratory: respirations non-labored, clear to auscultation bilaterally, no rales, rhonchi, or wheezes  CV: regular rate and rhythm, no murmurs, gallops, or rubs  Abdomen: abdomen with BRYANT drain and TTP near site. No evidence of swelling, erythema or warmth. BRYANT drain draining small amount of serosanguinous fluid. Abdomen otherwise NT, soft, nondistended, no masses felt, normal bowel sounds  : no genital lesions or ulcers  Extremities: strength 5/5 bilateral upper/lower extremities. No joint swelling, erythema, or warmth.  NEURO: A&Ox3, no tremors, sensation intact. Finger-to-nose intact  SKIN: no rashes, lesions, or edema    HOSPITAL MEDICATIONS:  MEDICATIONS  (STANDING):    MEDICATIONS  (PRN):      LABS:                        9.8    8.77  )-----------( 313      ( 07 Oct 2017 10:41 )             30.3     10-    119<LL>  |  80<L>  |  10  ----------------------------<  132<H>  3.9   |  23  |  0.79    Ca    8.0<L>      07 Oct 2017 12:38    TPro  6.7  /  Alb  3.9  /  TBili  0.7  /  DBili  x   /  AST  11  /  ALT  13  /  AlkPhos  72  10-07      Urinalysis Basic - ( 07 Oct 2017 11:35 )    Color: YELLOW / Appearance: CLEAR / S.015 / pH: 6.5  Gluc: 50 / Ketone: TRACE  / Bili: NEGATIVE / Urobili: NORMAL E.U.   Blood: NEGATIVE / Protein: 10 / Nitrite: NEGATIVE   Leuk Esterase: LARGE / RBC: 2-5 / WBC 10-25   Sq Epi: OCC / Non Sq Epi: x / Bacteria: x        Venous Blood Gas:  10-07 @ 10:41  7.45/36/46/25/81.9  VBG Lactate: 2.0      MICROBIOLOGY: none    RADIOLOGY:  [X] Reviewed and interpreted by me    CXR: clear lungs    CT Head:  There is no acute intracranial mass-effect, hemorrhage, midline shift, or   abnormal extra-axial fluid collection.     There is no hydrocephalus. The basal cisterns are patent.     Dilated ventricles and proportionally prominent sulci are consistent with   age-appropriate cerebral volume loss. Periventricular hypodensities   likely represent chronic white matter microvascular ischemic changes. A   linear hypodensity is seen within the left lentiform nucleus likely the   sequelae of prior lacunar infarction or hemorrhage.      CT A/P:  Calcified liver granuloma. No free air.  There is severe colonic diverticulosis with wall thickening and   mild adjacent fat stranding in the mid sigmoid colon. A percutaneous   drainage catheter has its tip adjacent to the sigmoid. Small hiatal   hernia. No bowel obstruction. The appendix is not definitively   visualized, however there are no regional inflammatory changes. Duodenal   diverticulum.    EKG: Normal sinus rhythm. Normal rate, normal axis. Normal QTc

## 2017-10-10 NOTE — PROGRESS NOTE ADULT - PROBLEM SELECTOR PLAN 2
Perhaps related to hyponatremia, will continue to address as in problem #1. Will also provide empiric dyspepsia regimen. No acute structural pathology noted on CTH low suspicion for central cause of hiccups).  - Hiccups improving at this time, to cont to monitor

## 2017-10-11 LAB
BUN SERPL-MCNC: 12 MG/DL — SIGNIFICANT CHANGE UP (ref 7–23)
CALCIUM SERPL-MCNC: 8.6 MG/DL — SIGNIFICANT CHANGE UP (ref 8.4–10.5)
CHLORIDE SERPL-SCNC: 91 MMOL/L — LOW (ref 98–107)
CO2 SERPL-SCNC: 22 MMOL/L — SIGNIFICANT CHANGE UP (ref 22–31)
CREAT SERPL-MCNC: 0.91 MG/DL — SIGNIFICANT CHANGE UP (ref 0.5–1.3)
GLUCOSE SERPL-MCNC: 126 MG/DL — HIGH (ref 70–99)
HCT VFR BLD CALC: 34.4 % — LOW (ref 39–50)
HGB BLD-MCNC: 10.8 G/DL — LOW (ref 13–17)
MAGNESIUM SERPL-MCNC: 2 MG/DL — SIGNIFICANT CHANGE UP (ref 1.6–2.6)
MCHC RBC-ENTMCNC: 20.3 PG — LOW (ref 27–34)
MCHC RBC-ENTMCNC: 31.4 % — LOW (ref 32–36)
MCV RBC AUTO: 64.8 FL — LOW (ref 80–100)
NRBC # FLD: 0 — SIGNIFICANT CHANGE UP
PHOSPHATE SERPL-MCNC: 3.1 MG/DL — SIGNIFICANT CHANGE UP (ref 2.5–4.5)
PLATELET # BLD AUTO: 322 K/UL — SIGNIFICANT CHANGE UP (ref 150–400)
PMV BLD: 10.3 FL — SIGNIFICANT CHANGE UP (ref 7–13)
POTASSIUM SERPL-MCNC: 4.7 MMOL/L — SIGNIFICANT CHANGE UP (ref 3.5–5.3)
POTASSIUM SERPL-SCNC: 4.7 MMOL/L — SIGNIFICANT CHANGE UP (ref 3.5–5.3)
RBC # BLD: 5.31 M/UL — SIGNIFICANT CHANGE UP (ref 4.2–5.8)
RBC # FLD: 20.1 % — HIGH (ref 10.3–14.5)
SODIUM SERPL-SCNC: 129 MMOL/L — LOW (ref 135–145)
WBC # BLD: 8.55 K/UL — SIGNIFICANT CHANGE UP (ref 3.8–10.5)
WBC # FLD AUTO: 8.55 K/UL — SIGNIFICANT CHANGE UP (ref 3.8–10.5)

## 2017-10-11 PROCEDURE — 49424 ASSESS CYST CONTRAST INJECT: CPT

## 2017-10-11 PROCEDURE — 70450 CT HEAD/BRAIN W/O DYE: CPT | Mod: 26

## 2017-10-11 PROCEDURE — 76080 X-RAY EXAM OF FISTULA: CPT | Mod: 26

## 2017-10-11 PROCEDURE — 99233 SBSQ HOSP IP/OBS HIGH 50: CPT | Mod: GC

## 2017-10-11 RX ORDER — MECLIZINE HCL 12.5 MG
1 TABLET ORAL
Qty: 0 | Refills: 0 | DISCHARGE
Start: 2017-10-11

## 2017-10-11 RX ORDER — CLOPIDOGREL BISULFATE 75 MG/1
1 TABLET, FILM COATED ORAL
Qty: 0 | Refills: 0 | COMMUNITY
Start: 2017-10-11

## 2017-10-11 RX ORDER — FERROUS SULFATE 325(65) MG
1 TABLET ORAL
Qty: 0 | Refills: 0 | DISCHARGE
Start: 2017-10-11

## 2017-10-11 RX ORDER — FERROUS SULFATE 325(65) MG
325 TABLET ORAL DAILY
Qty: 0 | Refills: 0 | Status: DISCONTINUED | OUTPATIENT
Start: 2017-10-11 | End: 2017-10-12

## 2017-10-11 RX ORDER — DOCUSATE SODIUM 100 MG
1 CAPSULE ORAL
Qty: 0 | Refills: 0 | COMMUNITY

## 2017-10-11 RX ORDER — DOCUSATE SODIUM 100 MG
1 CAPSULE ORAL
Qty: 90 | Refills: 0
Start: 2017-10-11 | End: 2017-11-10

## 2017-10-11 RX ORDER — LIDOCAINE 4 G/100G
1 CREAM TOPICAL DAILY
Qty: 0 | Refills: 0 | Status: COMPLETED | OUTPATIENT
Start: 2017-10-11 | End: 2017-10-11

## 2017-10-11 RX ORDER — MECLIZINE HCL 12.5 MG
12.5 TABLET ORAL ONCE
Qty: 0 | Refills: 0 | Status: DISCONTINUED | OUTPATIENT
Start: 2017-10-11 | End: 2017-10-12

## 2017-10-11 RX ORDER — SENNA PLUS 8.6 MG/1
2 TABLET ORAL
Qty: 0 | Refills: 0 | DISCHARGE
Start: 2017-10-11

## 2017-10-11 RX ADMIN — Medication 1: at 08:49

## 2017-10-11 RX ADMIN — LOSARTAN POTASSIUM 100 MILLIGRAM(S): 100 TABLET, FILM COATED ORAL at 05:18

## 2017-10-11 RX ADMIN — HEPARIN SODIUM 5000 UNIT(S): 5000 INJECTION INTRAVENOUS; SUBCUTANEOUS at 22:10

## 2017-10-11 RX ADMIN — Medication 100 MILLIGRAM(S): at 05:18

## 2017-10-11 RX ADMIN — Medication 325 MILLIGRAM(S): at 13:04

## 2017-10-11 RX ADMIN — HEPARIN SODIUM 5000 UNIT(S): 5000 INJECTION INTRAVENOUS; SUBCUTANEOUS at 13:05

## 2017-10-11 RX ADMIN — FINASTERIDE 5 MILLIGRAM(S): 5 TABLET, FILM COATED ORAL at 13:04

## 2017-10-11 RX ADMIN — Medication 30 MILLILITER(S): at 20:38

## 2017-10-11 RX ADMIN — SENNA PLUS 2 TABLET(S): 8.6 TABLET ORAL at 22:10

## 2017-10-11 RX ADMIN — CLOPIDOGREL BISULFATE 75 MILLIGRAM(S): 75 TABLET, FILM COATED ORAL at 13:04

## 2017-10-11 RX ADMIN — TAMSULOSIN HYDROCHLORIDE 0.4 MILLIGRAM(S): 0.4 CAPSULE ORAL at 22:11

## 2017-10-11 RX ADMIN — Medication 100 MILLIGRAM(S): at 22:10

## 2017-10-11 RX ADMIN — HEPARIN SODIUM 5000 UNIT(S): 5000 INJECTION INTRAVENOUS; SUBCUTANEOUS at 05:18

## 2017-10-11 RX ADMIN — SIMVASTATIN 40 MILLIGRAM(S): 20 TABLET, FILM COATED ORAL at 22:10

## 2017-10-11 RX ADMIN — Medication 1: at 17:15

## 2017-10-11 RX ADMIN — Medication 81 MILLIGRAM(S): at 13:04

## 2017-10-11 NOTE — PROGRESS NOTE ADULT - PROBLEM SELECTOR PLAN 5
- Hgb improving. No signs of overt hemorrhage.  - reported chronic microcytic anemia on previous admission, previous iron panel sig for iron deficiency, electrophoresis confirm iron deficiency anemia, to start daily iron  - cont to trend CBC

## 2017-10-11 NOTE — PROGRESS NOTE ADULT - SUBJECTIVE AND OBJECTIVE BOX
Patient is a 77y old  Male who presents with a chief complaint of headaches and nausea persistent for the last week (07 Oct 2017 20:03) found to the be severely hyponatremic in setting of recent hospitalization for sigmoid diverticulitis/ pelvis abscess s/p I & D and nontraumatic intraparenchymal hemorrhage      INTERVAL HPI/OVERNIGHT EVENTS:  no acute events reported o/n, patient reports worsening HA and dizziness on bedside examination. On examination the pt denies fever, chills, nausea, vomiting, diarrhea, SOB, CP. Pt reports constipation resolved, (+) BM this AM but endorses minimal PO intake because he doesnt like the food. Pt endorses that the hiccups have resolved. Pt reports continued R flank/chest pain in the AM upon waking up that resolves within one hour.       T(C): 36.8 (10-11-17 @ 05:13), Max: 36.8 (10-11-17 @ 05:13)  HR: 74 (10-11-17 @ 05:13) (63 - 74)  BP: 143/95 (10-11-17 @ 05:13) (137/55 - 154/68)  RR: 18 (10-11-17 @ 05:13) (16 - 18)  SpO2: 98% (10-11-17 @ 05:13) (97% - 100%)    10-10-17 @ 07:01  -  10-11-17 @ 07:00  --------------------------------------------------------  IN: 0 mL / OUT: 507.5 mL / NET: -507.5 mL                            10.8   8.55  )-----------( 322      ( 11 Oct 2017 06:00 )             34.4     10-11    129<L>  |  91<L>  |  12  ----------------------------<  126<H>  4.7   |  22  |  0.91    Ca    8.6      11 Oct 2017 06:00  Phos  3.1     10-11  Mg     2.0     10-11      CAPILLARY BLOOD GLUCOSE  158 (11 Oct 2017 08:44)  159 (10 Oct 2017 22:23)  149 (10 Oct 2017 17:06)  125 (10 Oct 2017 12:57)    aluminum hydroxide/magnesium hydroxide/simethicone Suspension 30 milliLiter(s) Oral every 6 hours PRN  aspirin enteric coated 81 milliGRAM(s) Oral daily  clopidogrel Tablet 75 milliGRAM(s) Oral daily  dextrose 5%. 1000 milliLiter(s) IV Continuous <Continuous>  dextrose 50% Injectable 12.5 Gram(s) IV Push once  dextrose 50% Injectable 25 Gram(s) IV Push once  dextrose 50% Injectable 25 Gram(s) IV Push once  dextrose Gel 1 Dose(s) Oral once PRN  docusate sodium 100 milliGRAM(s) Oral three times a day  finasteride 5 milliGRAM(s) Oral daily  glucagon  Injectable 1 milliGRAM(s) IntraMuscular once PRN  heparin  Injectable 5000 Unit(s) SubCutaneous every 8 hours  influenza   Vaccine 0.5 milliLiter(s) IntraMuscular once  insulin lispro (HumaLOG) corrective regimen sliding scale   SubCutaneous three times a day before meals  insulin lispro (HumaLOG) corrective regimen sliding scale   SubCutaneous at bedtime  losartan 100 milliGRAM(s) Oral daily  metoprolol succinate  milliGRAM(s) Oral daily  oxyCODONE    IR 5 milliGRAM(s) Oral every 6 hours PRN  senna 2 Tablet(s) Oral at bedtime  simvastatin 40 milliGRAM(s) Oral at bedtime  tamsulosin 0.4 milliGRAM(s) Oral at bedtime      REVIEW OF SYSTEMS:  CONSTITUTIONAL: No fever, weight loss, or fatigue  EYES: No eye pain, visual disturbances, or discharge  ENMT: reports difficulty hearing, no tinnitus, vertigo; No sinus or throat pain  NECK: No pain or stiffness  RESPIRATORY: No cough, wheezing, chills or hemoptysis; No shortness of breath  CARDIOVASCULAR: No chest pain, palpitations, dizziness, or leg swelling  GASTROINTESTINAL: No abdominal or epigastric pain. No nausea, vomiting, or hematemesis; No diarrhea or constipation. No melena or hematochezia.  GENITOURINARY: + frequency, no dysuria, hematuria, or incontinence  NEUROLOGICAL: (+) HA and dizziness,no memory loss, loss of strength, numbness, or tremors  SKIN: No itching, burning, rashes, or lesions, BRYANT drain abdomen    LYMPH NODES: No enlarged glands  ENDOCRINE: No heat or cold intolerance; No hair loss  MUSCULOSKELETAL: No joint pain or swelling; No muscle, back, or extremity pain, R flank/chest pain in AM only   PSYCHIATRIC: No depression, anxiety, mood swings, or difficulty sleeping  HEME/LYMPH: No easy bruising, or bleeding gums  ALLERY AND IMMUNOLOGIC: No hives or eczema    RADIOLOGY & ADDITIONAL TESTS:    Imaging Personally Reviewed:  [x ] YES  [ ] NO    Consultant(s) Notes Reviewed:  [x ] YES  [ ] NO    PHYSICAL EXAM:  GENERAL: NAD, well-groomed, well-developed  HEAD:  Atraumatic, Normocephalic  EYES: EOMI, PERRLA, conjunctiva and sclera clear, (+) cataracts b/l, vision grossly intact  ENMT: No tonsillar erythema, exudates, or enlargement; Moist mucous membranes, Good dentition, No lesions  NECK: Supple, No JVD, Normal thyroid  NERVOUS SYSTEM:  Alert & Oriented X3, Good concentration; Motor Strength 5/5 B/L upper and lower extremities, (+) hiccups resolved, neurologic exam in significant   CHEST/LUNG: Clear to ascultation bilaterally; No rales, rhonchi, wheezing, or rubs  HEART: Regular rate and rhythm; No murmurs, rubs, or gallops, S1/S2  ABDOMEN: Soft, Nontender, Nondistended; Bowel sounds present, BRYANT drain present, dressing c/d/i, (-) erythema, minor TTP, (+) minimal  serosanguinous drainage  EXTREMITIES:  2+ Peripheral Pulses, No clubbing, cyanosis, or edema  LYMPH: No lymphadenopathy noted  SKIN: No rashes or lesions    Care Discussed with Consultants/Other Providers [ x] YES  [ ] NO

## 2017-10-11 NOTE — PROGRESS NOTE ADULT - PROBLEM SELECTOR PLAN 2
- serum sodium stabilized following d/c HCTZ  - pt documented to be hyponatremic on previous admissions Na: 120s w/ response to IVF  - AM Na: 129  - cont to monitor

## 2017-10-11 NOTE — PROGRESS NOTE ADULT - PROBLEM SELECTOR PLAN 6
Mild fat stranding noted on CT. Patient recently completed antibiotics and continues to have BRYANT drain for previous abscess a/w diverticulitis.  - Plan for IR drain removal today   - Pt to f/u with colorectal surgeon Dr. Wilson as outpatient   - Cont oxycodone IR 5mg q6h prn pain

## 2017-10-11 NOTE — PROGRESS NOTE ADULT - PROBLEM SELECTOR PLAN 3
- resolved following stabilization of sodium levels  - No acute structural pathology noted on CTH low suspicion for central cause of hiccups).  - If continues to continue empiric dyspepsia therapy w/ Maalox

## 2017-10-12 VITALS
SYSTOLIC BLOOD PRESSURE: 152 MMHG | RESPIRATION RATE: 17 BRPM | OXYGEN SATURATION: 100 % | HEART RATE: 61 BPM | DIASTOLIC BLOOD PRESSURE: 72 MMHG | TEMPERATURE: 99 F

## 2017-10-12 LAB
BACTERIA BLD CULT: SIGNIFICANT CHANGE UP
BUN SERPL-MCNC: 14 MG/DL — SIGNIFICANT CHANGE UP (ref 7–23)
BUN SERPL-MCNC: 14 MG/DL — SIGNIFICANT CHANGE UP (ref 7–23)
CALCIUM SERPL-MCNC: 8.2 MG/DL — LOW (ref 8.4–10.5)
CALCIUM SERPL-MCNC: 8.8 MG/DL — SIGNIFICANT CHANGE UP (ref 8.4–10.5)
CHLORIDE SERPL-SCNC: 92 MMOL/L — LOW (ref 98–107)
CHLORIDE SERPL-SCNC: 92 MMOL/L — LOW (ref 98–107)
CO2 SERPL-SCNC: 23 MMOL/L — SIGNIFICANT CHANGE UP (ref 22–31)
CO2 SERPL-SCNC: 25 MMOL/L — SIGNIFICANT CHANGE UP (ref 22–31)
CREAT SERPL-MCNC: 0.95 MG/DL — SIGNIFICANT CHANGE UP (ref 0.5–1.3)
CREAT SERPL-MCNC: 1.01 MG/DL — SIGNIFICANT CHANGE UP (ref 0.5–1.3)
GLUCOSE SERPL-MCNC: 154 MG/DL — HIGH (ref 70–99)
GLUCOSE SERPL-MCNC: 173 MG/DL — HIGH (ref 70–99)
POTASSIUM SERPL-MCNC: 4.6 MMOL/L — SIGNIFICANT CHANGE UP (ref 3.5–5.3)
POTASSIUM SERPL-MCNC: 4.6 MMOL/L — SIGNIFICANT CHANGE UP (ref 3.5–5.3)
POTASSIUM SERPL-SCNC: 4.6 MMOL/L — SIGNIFICANT CHANGE UP (ref 3.5–5.3)
POTASSIUM SERPL-SCNC: 4.6 MMOL/L — SIGNIFICANT CHANGE UP (ref 3.5–5.3)
SODIUM SERPL-SCNC: 128 MMOL/L — LOW (ref 135–145)
SODIUM SERPL-SCNC: 129 MMOL/L — LOW (ref 135–145)

## 2017-10-12 PROCEDURE — 99239 HOSP IP/OBS DSCHRG MGMT >30: CPT

## 2017-10-12 RX ORDER — SODIUM CHLORIDE 9 MG/ML
500 INJECTION INTRAMUSCULAR; INTRAVENOUS; SUBCUTANEOUS
Qty: 0 | Refills: 0 | Status: DISCONTINUED | OUTPATIENT
Start: 2017-10-12 | End: 2017-10-12

## 2017-10-12 RX ADMIN — CLOPIDOGREL BISULFATE 75 MILLIGRAM(S): 75 TABLET, FILM COATED ORAL at 12:26

## 2017-10-12 RX ADMIN — LOSARTAN POTASSIUM 100 MILLIGRAM(S): 100 TABLET, FILM COATED ORAL at 05:01

## 2017-10-12 RX ADMIN — Medication 325 MILLIGRAM(S): at 12:26

## 2017-10-12 RX ADMIN — Medication 100 MILLIGRAM(S): at 05:01

## 2017-10-12 RX ADMIN — FINASTERIDE 5 MILLIGRAM(S): 5 TABLET, FILM COATED ORAL at 12:26

## 2017-10-12 RX ADMIN — SODIUM CHLORIDE 75 MILLILITER(S): 9 INJECTION INTRAMUSCULAR; INTRAVENOUS; SUBCUTANEOUS at 11:36

## 2017-10-12 RX ADMIN — HEPARIN SODIUM 5000 UNIT(S): 5000 INJECTION INTRAVENOUS; SUBCUTANEOUS at 05:01

## 2017-10-12 RX ADMIN — Medication 81 MILLIGRAM(S): at 12:26

## 2017-10-12 RX ADMIN — Medication 1: at 17:42

## 2017-10-12 RX ADMIN — Medication 30 MILLILITER(S): at 11:37

## 2017-10-12 RX ADMIN — Medication 1: at 08:46

## 2017-10-12 NOTE — PROGRESS NOTE ADULT - PROBLEM SELECTOR PLAN 1
- self correcting following d/c HCTZ  - pt documented to be hyponatremic on previous admissions Na: 120s w/ response to IVF  - pt given 500cc NS in ED, Na: 119 ->125, goal <126 for first 24hrs   - Continue trending q12h BMP, w/ goal of no greater than 9meq q24hrs    - AM Na: 130- cont to monitor
- Worsening HA and dizziness this AM  - No acute changes seen on repeat CT 10/7  - previous imaging sig for non-traumatic intraparaparenchymal hemorrhage planned for repeat imaging as out patient in 2-4wks from 10/3 as per neurology  - BP goal on previous admission <140/90   - restarted ASA and Plavix  - F/u repeat brain CT for acute changes
- likely 2/2 to continued HCTZ despite PMD recommending discontinuation; may be contributing to worsening HA  - pt documented to be hyponatremic on previous admissions Na: 120s w/ response to IVF  - pt given 500cc NS in ED, Na: 119 ->125, goal <126 for first 24hrs   - Continue trending BMP q6h w/ goal note to increase more than 10meq w/in 24hrs   - If repeat BMP Na: >126meq start d5w at 50cc/hr and recheck BMP  - AM Na: 124- cont to monitor
- self correcting following d/c HCTZ  - pt documented to be hyponatremic on previous admissions Na: 120s w/ response to IVF  - pt given 500cc NS in ED, Na: 119 ->125, goal <126 for first 24hrs   - Continue trending q12h BMP, w/ goal of no greater than 9meq q24hrs    - AM Na: 130- cont to monitor  - Plan to discharge today to rehab
- HA/dizziness resolved  - No acute changes seen on repeat CT 10/7  - previous imaging sig for non-traumatic intraparaparenchymal hemorrhage planned for repeat imaging as out patient in 2-4wks from 10/3 as per neurology  - BP goal on previous admission <140/90   - Cont ASA and Plavix  - Repeat brain CT for acute changes 10/11 , no acute changes or hemorrhage

## 2017-10-12 NOTE — PROGRESS NOTE ADULT - PROBLEM SELECTOR PLAN 6
Mild fat stranding noted on CT. Patient recently completed antibiotics and continues to have BRYANT drain for previous abscess a/w diverticulitis.  - Drain removed by IR 10/11, no complications   - Pt to f/u with colorectal surgeon Dr. Wilson as outpatient   - Cont oxycodone IR 5mg q6h prn pain

## 2017-10-12 NOTE — PROGRESS NOTE ADULT - PROBLEM SELECTOR PROBLEM 1
Hyponatremia
Intraparenchymal hemorrhage of brain
Hyponatremia
Hyponatremia
Intraparenchymal hemorrhage of brain

## 2017-10-12 NOTE — PROGRESS NOTE ADULT - ASSESSMENT
This 76 y/o M with a PMH sig for DMII (noncomplicated and controlled), HTN, HLD, BPH, GERD, chronic anemia, hyponatremia on prior admissions, h/o intraparenchymal hemorrhage (07/2017), CAD s/p RIGO (12/2016) and recent sigmoid diverticulitis w/ pelvic abscess s/p IR drainage and BRYANT placement (9/20/17- 9/26/17) and subsequent admission 9/29-10/3 s/p syncopal episode found to have non-traumatic subcortical intraparachymal hemorrhage w/ suspected etiology of hypertension vs underlying vascular lesion cavenoma/AVM who presented with persistent nausea and headaches admitted for severe hyponatremia, which is self correcting following discontinuation of HCTZ.
This 78 y/o M with a PMH sig for DMII (noncomplicated and controlled), HTN, HLD, BPH, GERD, chronic anemia, hyponatremia on prior admissions, h/o intraparenchymal hemorrhage (07/2017), CAD s/p RIGO (12/2016), tension HA and recent sigmoid diverticulitis w/ pelvic abscess s/p IR drainage and BRYANT placement (9/20/17- 9/26/17) and subsequent admission 9/29-10/3 s/p syncopal episode found to have non-traumatic subcortical intraparachymal hemorrhage w/ suspected etiology of hypertension vs underlying vascular lesion cavenoma/AVM who presented with persistent nausea and headaches admitted for severe hyponatremia, which is self correcting following discontinuation of HCTZ planned for discharge to rehab facility w/ close outpatient follow up now with worsening HA and dizziness overnight
This 76 y/o M with a PMH sig for DMII (noncomplicated and controlled), HTN, HLD, BPH, GERD, chronic anemia, hyponatremia on prior admissions, h/o intraparenchymal hemorrhage (07/2017), CAD s/p RIGO (12/2016) and recent sigmoid diverticulitis w/ pelvic abscess s/p IR drainage and BRYANT placement (9/20/17- 9/26/17) and subsequent admission 9/29-10/3 s/p syncopal episode found to have non-traumatic subcortical intraparachymal hemorrhage w/ suspected etiology of hypertension vs underlying vascular lesion cavenoma/AVM who presented with persistent nausea and headaches admitted for severe hyponatremia, found to euvolemic suspected to be 2/2 continued HCTZ use.
This 76 y/o M with a PMH sig for DMII (noncomplicated and controlled), HTN, HLD, BPH, GERD, chronic anemia, hyponatremia on prior admissions, h/o intraparenchymal hemorrhage (07/2017), CAD s/p RIGO (12/2016) and recent sigmoid diverticulitis w/ pelvic abscess s/p IR drainage and BRYANT placement (9/20/17- 9/26/17) and subsequent admission 9/29-10/3 s/p syncopal episode found to have non-traumatic subcortical intraparachymal hemorrhage w/ suspected etiology of hypertension vs underlying vascular lesion cavenoma/AVM who presented with persistent nausea and headaches admitted for severe hyponatremia, which is self correcting following discontinuation of HCTZ planned for discharge to rehab facility w/ close outpatient follow up
This 78 y/o M with a PMH sig for DMII (noncomplicated and controlled), HTN, HLD, BPH, GERD, chronic anemia, hyponatremia on prior admissions, h/o intraparenchymal hemorrhage (07/2017), CAD s/p RIGO (12/2016), tension HA and recent sigmoid diverticulitis w/ pelvic abscess s/p IR drainage and BRYANT placement (9/20/17- 9/26/17) and subsequent admission 9/29-10/3 s/p syncopal episode found to have non-traumatic subcortical intraparachymal hemorrhage w/ suspected etiology of hypertension vs underlying vascular lesion cavenoma/AVM who presented with persistent nausea and headaches admitted for severe hyponatremia, which is self correcting following discontinuation of HCTZ planned for discharge to rehab facility, HA/dizziness resolved.

## 2017-10-12 NOTE — PROGRESS NOTE ADULT - PROBLEM SELECTOR PLAN 5
- Hgb improving. No signs of overt hemorrhage.  - reported chronic microcytic anemia on previous admission, previous iron panel sig for iron deficiency, electrophoresis confirm iron deficiency anemia, to start daily iron supplementation   - cont to trend CBC

## 2017-10-12 NOTE — PROGRESS NOTE ADULT - ATTENDING COMMENTS
Patient was seen and examined personally by me. I have discussed the plan and reviewed the resident's note and agree with the above physical exam findings including assessment and plan except as indicated below.    Complained of worsening headache and dizziness today which improved with meclizine. Feels much better this afternoon.  Check orthostatics.  Repeat CT head unremarkable  Hyponatremia now improved, off HCTZ. Tolerating PO diet. Can check labs every other day  Continue ASA, Plavix given recent stent placement for CAD  Tolerated BRYANT drain removal well today by IR  PT recs: rehab .
Patient was seen and examined personally by me. I have discussed the plan and reviewed the resident's note and agree with the above physical exam findings including assessment and plan except as indicated below.    Hyponatremia now improved, off HCTZ  Continue ASA, resume Plavix if OK with neuro  continues to have BRYANT drain for previous abscess, surgery followup for when to remove drain  PT recs: rehab
Patient was seen and examined personally by me. I have discussed the plan and reviewed the resident's note and agree with the above physical exam findings including assessment and plan except as indicated below.    Hyponatremia now improved, off HCTZ. Tolerating PO diet. Can check labs every other day  Continue ASA, resume Plavix as patient with recent stent placement within this year and CT head on admission stable.   Reviewed neuro note regarding antithrombotics from 10/3  continues to have BRYANT drain for previous abscess with minimal output, surgery followup appreciated. IR to remove BRYANT drain  PT recs: rehab .
Patient was seen and examined personally by me. I have discussed the plan and reviewed the resident's note and agree with the above physical exam findings including assessment and plan except as indicated below.    Feeling better today, dizziness resolved, no headache  Na slightly decreased today and orthostatic positive. Will hydrate with NS and repeat BMP at 1pm.  CT Head with no acute changes  Plan for discharge today to rehab.    DC time: 35 min

## 2017-10-12 NOTE — PROGRESS NOTE ADULT - PROBLEM SELECTOR PLAN 10
Heparin 5000U SQ q8h    -PT recommend rehabilitation facility s/p d/c     Olu Elizondo D.M.D # [id number]
Heparin 5000U SQ q8h    PT/OT evaluation     Olu Elizondo D.M.D # [id number]
Heparin 5000U SQ q8h    -PT recommend rehabilitation facility s/p d/c     - Topical lidocaine 2% prn shoulder pain     Olu Elizondo D.M.D # [id number]
Heparin 5000U SQ q8h    -PT recommend rehabilitation facility s/p d/c     Olu Elizondo D.M.D # [id number]
Heparin 5000U SQ q8h    -PT recommend rehabilitation facility s/p d/c     Olu Elizondo D.M.D # [id number]

## 2017-10-12 NOTE — PROGRESS NOTE ADULT - SUBJECTIVE AND OBJECTIVE BOX
Patient is a 77y old  Male who presents with a chief complaint of headaches and nausea persistent for the last week (07 Oct 2017 20:03) found to the be severely hyponatremic in setting of recent hospitalization for sigmoid diverticulitis/ pelvis abscess s/p I & D and nontraumatic intraparenchymal hemorrhage      INTERVAL HPI/OVERNIGHT EVENTS:  No acute events reported o/n, pt reports to be feeling very well this AM, reports minimal HA and no dizziness. On examination the pt denies fever, chills, nausea, vomiting, diarrhea, SOB, CP. Pt states that he is looking forward to going to the rehab facility to get his strength back.     T(C): 37.1 (10-12-17 @ 04:53), Max: 37.1 (10-11-17 @ 18:35)  HR: 98 (10-12-17 @ 04:53) (60 - 98)  BP: 126/66 (10-12-17 @ 04:53) (126/66 - 165/89)  RR: 18 (10-12-17 @ 04:53) (18 - 18)  SpO2: 99% (10-12-17 @ 04:53) (99% - 100%)                          10.8   8.55  )-----------( 322      ( 11 Oct 2017 06:00 )             34.4     10-12    128<L>  |  92<L>  |  14  ----------------------------<  154<H>  4.6   |  23  |  0.95    Ca    8.8      12 Oct 2017 05:54  Phos  3.1     10-11  Mg     2.0     10-11      CAPILLARY BLOOD GLUCOSE  152 (11 Oct 2017 17:10)  158 (11 Oct 2017 08:44)      POCT Blood Glucose.: 198 mg/dL (11 Oct 2017 22:30)  POCT Blood Glucose.: 125 mg/dL (11 Oct 2017 12:01)      aluminum hydroxide/magnesium hydroxide/simethicone Suspension 30 milliLiter(s) Oral every 6 hours PRN  aspirin enteric coated 81 milliGRAM(s) Oral daily  clopidogrel Tablet 75 milliGRAM(s) Oral daily  dextrose 5%. 1000 milliLiter(s) IV Continuous <Continuous>  dextrose 50% Injectable 12.5 Gram(s) IV Push once  dextrose 50% Injectable 25 Gram(s) IV Push once  dextrose 50% Injectable 25 Gram(s) IV Push once  dextrose Gel 1 Dose(s) Oral once PRN  docusate sodium 100 milliGRAM(s) Oral three times a day  ferrous    sulfate 325 milliGRAM(s) Oral daily  finasteride 5 milliGRAM(s) Oral daily  glucagon  Injectable 1 milliGRAM(s) IntraMuscular once PRN  heparin  Injectable 5000 Unit(s) SubCutaneous every 8 hours  influenza   Vaccine 0.5 milliLiter(s) IntraMuscular once  insulin lispro (HumaLOG) corrective regimen sliding scale   SubCutaneous three times a day before meals  insulin lispro (HumaLOG) corrective regimen sliding scale   SubCutaneous at bedtime  losartan 100 milliGRAM(s) Oral daily  meclizine 12.5 milliGRAM(s) Oral once PRN  metoprolol succinate  milliGRAM(s) Oral daily  oxyCODONE    IR 5 milliGRAM(s) Oral every 6 hours PRN  senna 2 Tablet(s) Oral at bedtime  simvastatin 40 milliGRAM(s) Oral at bedtime  tamsulosin 0.4 milliGRAM(s) Oral at bedtime        REVIEW OF SYSTEMS:  CONSTITUTIONAL: No fever, weight loss, or fatigue  EYES: No eye pain, visual disturbances, or discharge  ENMT: reports difficulty hearing, no tinnitus, vertigo; No sinus or throat pain  NECK: No pain or stiffness  RESPIRATORY: No cough, wheezing, chills or hemoptysis; No shortness of breath  CARDIOVASCULAR: No chest pain, palpitations, dizziness, or leg swelling  GASTROINTESTINAL: No abdominal or epigastric pain. No nausea, vomiting, or hematemesis; No diarrhea or constipation. No melena or hematochezia.  GENITOURINARY: + frequency, no dysuria, hematuria, or incontinence  NEUROLOGICAL: no HA, dizziness, memory loss, loss of strength, numbness, or tremors  SKIN: No itching, burning, rashes, or lesions, BRYANT drain abdomen    LYMPH NODES: No enlarged glands  ENDOCRINE: No heat or cold intolerance; No hair loss  MUSCULOSKELETAL: No joint pain or swelling; No muscle, back, or extremity pain, R flank/chest pain in AM only   PSYCHIATRIC: No depression, anxiety, mood swings, or difficulty sleeping  HEME/LYMPH: No easy bruising, or bleeding gums  ALLERY AND IMMUNOLOGIC: No hives or eczema    RADIOLOGY & ADDITIONAL TESTS:    Imaging Personally Reviewed:  [x ] YES  [ ] NO    Consultant(s) Notes Reviewed:  [x ] YES  [ ] NO    PHYSICAL EXAM:  GENERAL: NAD, well-groomed, well-developed  HEAD:  Atraumatic, Normocephalic  EYES: EOMI, PERRLA, conjunctiva and sclera clear, (+) cataracts b/l, vision grossly intact  ENMT: No tonsillar erythema, exudates, or enlargement; Moist mucous membranes, Good dentition, No lesions  NECK: Supple, No JVD, Normal thyroid  NERVOUS SYSTEM:  Alert & Oriented X3, Good concentration; Motor Strength 5/5 B/L upper and lower extremities, (+) hiccups resolved, neurologic exam in significant   CHEST/LUNG: Clear to ascultation bilaterally; No rales, rhonchi, wheezing, or rubs  HEART: Regular rate and rhythm; No murmurs, rubs, or gallops, S1/S2  ABDOMEN: Soft, Nontender, Nondistended; Bowel sounds present, BRYANT drain present, dressing c/d/i, (-) erythema, minor TTP, (+) minimal  serosanguinous drainage  EXTREMITIES:  2+ Peripheral Pulses, No clubbing, cyanosis, or edema  LYMPH: No lymphadenopathy noted  SKIN: No rashes or lesions    Care Discussed with Consultants/Other Providers [ x] YES  [ ] NO Patient is a 77y old  Male who presents with a chief complaint of headaches and nausea persistent for the last week (07 Oct 2017 20:03) found to the be severely hyponatremic in setting of recent hospitalization for sigmoid diverticulitis/ pelvis abscess s/p I & D and nontraumatic intraparenchymal hemorrhage      INTERVAL HPI/OVERNIGHT EVENTS:  No acute events reported o/n, pt reports to be feeling very well this AM, reports minimal HA and no dizziness. On examination the pt denies fever, chills, nausea, vomiting, diarrhea, SOB, CP. Pt states that he is looking forward to going to the rehab facility to get his strength back.     T(C): 37.1 (10-12-17 @ 04:53), Max: 37.1 (10-11-17 @ 18:35)  HR: 98 (10-12-17 @ 04:53) (60 - 98)  BP: 126/66 (10-12-17 @ 04:53) (126/66 - 165/89)  RR: 18 (10-12-17 @ 04:53) (18 - 18)  SpO2: 99% (10-12-17 @ 04:53) (99% - 100%)                          10.8   8.55  )-----------( 322      ( 11 Oct 2017 06:00 )             34.4     10-12    128<L>  |  92<L>  |  14  ----------------------------<  154<H>  4.6   |  23  |  0.95    Ca    8.8      12 Oct 2017 05:54  Phos  3.1     10-11  Mg     2.0     10-11      CAPILLARY BLOOD GLUCOSE  152 (11 Oct 2017 17:10)  158 (11 Oct 2017 08:44)      POCT Blood Glucose.: 198 mg/dL (11 Oct 2017 22:30)  POCT Blood Glucose.: 125 mg/dL (11 Oct 2017 12:01)      aluminum hydroxide/magnesium hydroxide/simethicone Suspension 30 milliLiter(s) Oral every 6 hours PRN  aspirin enteric coated 81 milliGRAM(s) Oral daily  clopidogrel Tablet 75 milliGRAM(s) Oral daily  dextrose 5%. 1000 milliLiter(s) IV Continuous <Continuous>  dextrose 50% Injectable 12.5 Gram(s) IV Push once  dextrose 50% Injectable 25 Gram(s) IV Push once  dextrose 50% Injectable 25 Gram(s) IV Push once  dextrose Gel 1 Dose(s) Oral once PRN  docusate sodium 100 milliGRAM(s) Oral three times a day  ferrous    sulfate 325 milliGRAM(s) Oral daily  finasteride 5 milliGRAM(s) Oral daily  glucagon  Injectable 1 milliGRAM(s) IntraMuscular once PRN  heparin  Injectable 5000 Unit(s) SubCutaneous every 8 hours  influenza   Vaccine 0.5 milliLiter(s) IntraMuscular once  insulin lispro (HumaLOG) corrective regimen sliding scale   SubCutaneous three times a day before meals  insulin lispro (HumaLOG) corrective regimen sliding scale   SubCutaneous at bedtime  losartan 100 milliGRAM(s) Oral daily  meclizine 12.5 milliGRAM(s) Oral once PRN  metoprolol succinate  milliGRAM(s) Oral daily  oxyCODONE    IR 5 milliGRAM(s) Oral every 6 hours PRN  senna 2 Tablet(s) Oral at bedtime  simvastatin 40 milliGRAM(s) Oral at bedtime  tamsulosin 0.4 milliGRAM(s) Oral at bedtime        REVIEW OF SYSTEMS:  CONSTITUTIONAL: No fever, weight loss, or fatigue  EYES: No eye pain, visual disturbances, or discharge  ENMT: reports difficulty hearing, no tinnitus, vertigo; No sinus or throat pain  NECK: No pain or stiffness  RESPIRATORY: No cough, wheezing, chills or hemoptysis; No shortness of breath  CARDIOVASCULAR: No chest pain, palpitations, dizziness, or leg swelling  GASTROINTESTINAL: No abdominal or epigastric pain. No nausea, vomiting, or hematemesis; No diarrhea or constipation. No melena or hematochezia.  GENITOURINARY: + frequency, no dysuria, hematuria, or incontinence  NEUROLOGICAL: no HA, dizziness, memory loss, loss of strength, numbness, or tremors  SKIN: No itching, burning, rashes, or lesions, BRYANT drain abdomen    LYMPH NODES: No enlarged glands  ENDOCRINE: No heat or cold intolerance; No hair loss  MUSCULOSKELETAL: No joint pain or swelling; No muscle, back, or extremity pain, R flank/chest pain in AM only   PSYCHIATRIC: No depression, anxiety, mood swings, or difficulty sleeping  HEME/LYMPH: No easy bruising, or bleeding gums  ALLERY AND IMMUNOLOGIC: No hives or eczema    RADIOLOGY & ADDITIONAL TESTS:    Imaging Personally Reviewed:  [x ] YES  [ ] NO    Consultant(s) Notes Reviewed:  [x ] YES  [ ] NO    PHYSICAL EXAM:  GENERAL: NAD, well-groomed, well-developed  HEAD:  Atraumatic, Normocephalic  EYES: EOMI, PERRLA, conjunctiva and sclera clear, (+) cataracts b/l, vision grossly intact  ENMT: No tonsillar erythema, exudates, or enlargement; Moist mucous membranes, Good dentition, No lesions  NECK: Supple, No JVD, Normal thyroid  NERVOUS SYSTEM:  Alert & Oriented X3, Good concentration; Motor Strength 5/5 B/L upper and lower extremities, (+) hiccups resolved, neurologic exam in significant   CHEST/LUNG: Clear to ascultation bilaterally; No rales, rhonchi, wheezing, or rubs  HEART: Regular rate and rhythm; No murmurs, rubs, or gallops, S1/S2  ABDOMEN: Soft, Nontender, Nondistended; Bowel sounds present, BRYANT drain no longer present, dressing c/d/i, nontender  EXTREMITIES:  2+ Peripheral Pulses, No clubbing, cyanosis, or edema  LYMPH: No lymphadenopathy noted  SKIN: No rashes or lesions    Care Discussed with Consultants/Other Providers [ x] YES  [ ] NO

## 2017-10-16 PROBLEM — I61.9 NONTRAUMATIC INTRACEREBRAL HEMORRHAGE, UNSPECIFIED: Chronic | Status: ACTIVE | Noted: 2017-10-07

## 2017-10-16 PROBLEM — E78.5 HYPERLIPIDEMIA, UNSPECIFIED: Chronic | Status: ACTIVE | Noted: 2017-10-07

## 2017-10-16 PROBLEM — E11.9 TYPE 2 DIABETES MELLITUS WITHOUT COMPLICATIONS: Chronic | Status: ACTIVE | Noted: 2017-10-07

## 2017-10-16 PROBLEM — I25.10 ATHEROSCLEROTIC HEART DISEASE OF NATIVE CORONARY ARTERY WITHOUT ANGINA PECTORIS: Chronic | Status: ACTIVE | Noted: 2017-10-07

## 2017-10-19 PROCEDURE — 84443 ASSAY THYROID STIM HORMONE: CPT

## 2017-10-19 PROCEDURE — 87205 SMEAR GRAM STAIN: CPT

## 2017-10-19 PROCEDURE — 85027 COMPLETE CBC AUTOMATED: CPT

## 2017-10-19 PROCEDURE — 85014 HEMATOCRIT: CPT

## 2017-10-19 PROCEDURE — 87086 URINE CULTURE/COLONY COUNT: CPT

## 2017-10-19 PROCEDURE — C1769: CPT

## 2017-10-19 PROCEDURE — 87075 CULTR BACTERIA EXCEPT BLOOD: CPT

## 2017-10-19 PROCEDURE — 93005 ELECTROCARDIOGRAM TRACING: CPT

## 2017-10-19 PROCEDURE — 82803 BLOOD GASES ANY COMBINATION: CPT

## 2017-10-19 PROCEDURE — 97161 PT EVAL LOW COMPLEX 20 MIN: CPT

## 2017-10-19 PROCEDURE — 84132 ASSAY OF SERUM POTASSIUM: CPT

## 2017-10-19 PROCEDURE — 82330 ASSAY OF CALCIUM: CPT

## 2017-10-19 PROCEDURE — 83036 HEMOGLOBIN GLYCOSYLATED A1C: CPT

## 2017-10-19 PROCEDURE — 80048 BASIC METABOLIC PNL TOTAL CA: CPT

## 2017-10-19 PROCEDURE — 85610 PROTHROMBIN TIME: CPT

## 2017-10-19 PROCEDURE — 71045 X-RAY EXAM CHEST 1 VIEW: CPT

## 2017-10-19 PROCEDURE — 85730 THROMBOPLASTIN TIME PARTIAL: CPT

## 2017-10-19 PROCEDURE — 83935 ASSAY OF URINE OSMOLALITY: CPT

## 2017-10-19 PROCEDURE — 82550 ASSAY OF CK (CPK): CPT

## 2017-10-19 PROCEDURE — 82947 ASSAY GLUCOSE BLOOD QUANT: CPT

## 2017-10-19 PROCEDURE — 83605 ASSAY OF LACTIC ACID: CPT

## 2017-10-19 PROCEDURE — 83930 ASSAY OF BLOOD OSMOLALITY: CPT

## 2017-10-19 PROCEDURE — 84133 ASSAY OF URINE POTASSIUM: CPT

## 2017-10-19 PROCEDURE — 84300 ASSAY OF URINE SODIUM: CPT

## 2017-10-19 PROCEDURE — 84100 ASSAY OF PHOSPHORUS: CPT

## 2017-10-19 PROCEDURE — 81001 URINALYSIS AUTO W/SCOPE: CPT

## 2017-10-19 PROCEDURE — 82553 CREATINE MB FRACTION: CPT

## 2017-10-19 PROCEDURE — 80053 COMPREHEN METABOLIC PANEL: CPT

## 2017-10-19 PROCEDURE — 83550 IRON BINDING TEST: CPT

## 2017-10-19 PROCEDURE — 87070 CULTURE OTHR SPECIMN AEROBIC: CPT

## 2017-10-19 PROCEDURE — 99285 EMERGENCY DEPT VISIT HI MDM: CPT | Mod: 25

## 2017-10-19 PROCEDURE — 84540 ASSAY OF URINE/UREA-N: CPT

## 2017-10-19 PROCEDURE — C1729: CPT

## 2017-10-19 PROCEDURE — 84295 ASSAY OF SERUM SODIUM: CPT

## 2017-10-19 PROCEDURE — 84484 ASSAY OF TROPONIN QUANT: CPT

## 2017-10-19 PROCEDURE — 49406 IMAGE CATH FLUID PERI/RETRO: CPT

## 2017-10-19 PROCEDURE — 86901 BLOOD TYPING SEROLOGIC RH(D): CPT

## 2017-10-19 PROCEDURE — 82746 ASSAY OF FOLIC ACID SERUM: CPT

## 2017-10-19 PROCEDURE — 83690 ASSAY OF LIPASE: CPT

## 2017-10-19 PROCEDURE — 86850 RBC ANTIBODY SCREEN: CPT

## 2017-10-19 PROCEDURE — 82607 VITAMIN B-12: CPT

## 2017-10-19 PROCEDURE — 87040 BLOOD CULTURE FOR BACTERIA: CPT

## 2017-10-19 PROCEDURE — 86900 BLOOD TYPING SEROLOGIC ABO: CPT

## 2017-10-19 PROCEDURE — 82728 ASSAY OF FERRITIN: CPT

## 2017-10-19 PROCEDURE — 82435 ASSAY OF BLOOD CHLORIDE: CPT

## 2017-10-19 PROCEDURE — 83735 ASSAY OF MAGNESIUM: CPT

## 2017-10-20 ENCOUNTER — APPOINTMENT (OUTPATIENT)
Dept: SURGERY | Facility: CLINIC | Age: 77
End: 2017-10-20
Payer: MEDICARE

## 2017-10-20 VITALS
WEIGHT: 135 LBS | TEMPERATURE: 97.6 F | HEART RATE: 74 BPM | BODY MASS INDEX: 23.92 KG/M2 | DIASTOLIC BLOOD PRESSURE: 75 MMHG | HEIGHT: 63 IN | RESPIRATION RATE: 16 BRPM | OXYGEN SATURATION: 97 % | SYSTOLIC BLOOD PRESSURE: 121 MMHG

## 2017-10-20 DIAGNOSIS — I63.9 CEREBRAL INFARCTION, UNSPECIFIED: ICD-10-CM

## 2017-10-20 DIAGNOSIS — E78.5 HYPERLIPIDEMIA, UNSPECIFIED: ICD-10-CM

## 2017-10-20 DIAGNOSIS — K21.9 GASTRO-ESOPHAGEAL REFLUX DISEASE W/OUT ESOPHAGITIS: ICD-10-CM

## 2017-10-20 DIAGNOSIS — D64.9 ANEMIA, UNSPECIFIED: ICD-10-CM

## 2017-10-20 DIAGNOSIS — I25.10 ATHEROSCLEROTIC HEART DISEASE OF NATIVE CORONARY ARTERY W/OUT ANGINA PECTORIS: ICD-10-CM

## 2017-10-20 DIAGNOSIS — R41.3 OTHER AMNESIA: ICD-10-CM

## 2017-10-20 DIAGNOSIS — G45.9 TRANSIENT CEREBRAL ISCHEMIC ATTACK, UNSPECIFIED: ICD-10-CM

## 2017-10-20 DIAGNOSIS — I10 ESSENTIAL (PRIMARY) HYPERTENSION: ICD-10-CM

## 2017-10-20 DIAGNOSIS — Z82.49 FAMILY HISTORY OF ISCHEMIC HEART DISEASE AND OTHER DISEASES OF THE CIRCULATORY SYSTEM: ICD-10-CM

## 2017-10-20 DIAGNOSIS — E11.9 TYPE 2 DIABETES MELLITUS W/OUT COMPLICATIONS: ICD-10-CM

## 2017-10-20 PROCEDURE — 99214 OFFICE O/P EST MOD 30 MIN: CPT

## 2017-10-20 PROCEDURE — 99205 OFFICE O/P NEW HI 60 MIN: CPT

## 2017-10-20 RX ORDER — DONEPEZIL HYDROCHLORIDE 5 MG/1
5 TABLET ORAL
Qty: 30 | Refills: 0 | Status: DISCONTINUED | COMMUNITY
Start: 2017-06-16

## 2017-10-20 RX ORDER — PILOCARPINE HYDROCHLORIDE OPHTHALMIC SOLUTION 10 MG/ML
1 SOLUTION/ DROPS OPHTHALMIC
Qty: 15 | Refills: 0 | Status: ACTIVE | COMMUNITY
Start: 2017-06-15

## 2017-10-20 RX ORDER — METOPROLOL SUCCINATE 100 MG/1
100 TABLET, EXTENDED RELEASE ORAL
Qty: 30 | Refills: 0 | Status: ACTIVE | COMMUNITY
Start: 2017-10-04

## 2017-10-20 RX ORDER — METFORMIN HYDROCHLORIDE 500 MG/1
500 TABLET, COATED ORAL
Refills: 0 | Status: ACTIVE | COMMUNITY

## 2017-10-20 RX ORDER — PREDNISOLONE ACETATE 10 MG/ML
1 SUSPENSION/ DROPS OPHTHALMIC
Qty: 15 | Refills: 0 | Status: DISCONTINUED | COMMUNITY
Start: 2017-05-24

## 2017-10-20 RX ORDER — MECLIZINE HYDROCHLORIDE 25 MG/1
TABLET ORAL
Refills: 0 | Status: ACTIVE | COMMUNITY

## 2017-10-20 RX ORDER — CETIRIZINE HYDROCHLORIDE 10 MG/1
10 TABLET, COATED ORAL
Qty: 30 | Refills: 0 | Status: ACTIVE | COMMUNITY
Start: 2017-06-12

## 2017-10-20 RX ORDER — FLUTICASONE PROPIONATE 50 UG/1
50 SPRAY, METERED NASAL
Qty: 48 | Refills: 0 | Status: DISCONTINUED | COMMUNITY
Start: 2017-01-18

## 2017-10-20 RX ORDER — FINASTERIDE 1 MG/1
TABLET ORAL
Refills: 0 | Status: ACTIVE | COMMUNITY

## 2017-10-20 RX ORDER — OFLOXACIN 3 MG/ML
0.3 SOLUTION/ DROPS OPHTHALMIC
Qty: 10 | Refills: 0 | Status: DISCONTINUED | COMMUNITY
Start: 2017-04-18

## 2017-10-20 RX ORDER — LOSARTAN POTASSIUM 25 MG/1
25 TABLET, FILM COATED ORAL
Refills: 0 | Status: DISCONTINUED | COMMUNITY
End: 2017-10-20

## 2017-10-20 RX ORDER — DICLOFENAC SODIUM 1 MG/ML
0.1 SOLUTION OPHTHALMIC
Qty: 5 | Refills: 0 | Status: DISCONTINUED | COMMUNITY
Start: 2017-04-18

## 2017-10-20 RX ORDER — OMEPRAZOLE MAGNESIUM 20 MG/1
20 CAPSULE, DELAYED RELEASE ORAL
Refills: 0 | Status: ACTIVE | COMMUNITY

## 2017-10-20 RX ORDER — DOCUSATE SODIUM 100 MG/1
100 CAPSULE, LIQUID FILLED ORAL
Refills: 0 | Status: ACTIVE | COMMUNITY

## 2017-10-20 RX ORDER — TAMSULOSIN HYDROCHLORIDE 0.4 MG/1
0.4 CAPSULE ORAL
Refills: 0 | Status: ACTIVE | COMMUNITY

## 2017-10-20 RX ORDER — AMOXICILLIN AND CLAVULANATE POTASSIUM 875; 125 MG/1; MG/1
875-125 TABLET, COATED ORAL
Qty: 12 | Refills: 0 | Status: DISCONTINUED | COMMUNITY
Start: 2017-09-26

## 2017-10-20 RX ORDER — ASPIRIN 81 MG
81 TABLET, DELAYED RELEASE (ENTERIC COATED) ORAL
Refills: 0 | Status: ACTIVE | COMMUNITY

## 2017-10-20 RX ORDER — DOCUSATE SODIUM 100 MG/1
100 CAPSULE ORAL
Qty: 90 | Refills: 0 | Status: DISCONTINUED | COMMUNITY
Start: 2017-10-04

## 2017-10-20 RX ORDER — LOSARTAN POTASSIUM 100 MG/1
100 TABLET, FILM COATED ORAL
Qty: 30 | Refills: 0 | Status: ACTIVE | COMMUNITY
Start: 2017-10-04

## 2017-10-20 RX ORDER — SIMVASTATIN 20 MG/1
20 TABLET, FILM COATED ORAL
Refills: 0 | Status: ACTIVE | COMMUNITY

## 2017-10-20 RX ORDER — SENNOSIDES 8.6 MG TABLETS 8.6 MG/1
8.6 TABLET ORAL
Qty: 60 | Refills: 0 | Status: ACTIVE | COMMUNITY
Start: 2017-10-04

## 2017-10-20 RX ORDER — CLOPIDOGREL BISULFATE 75 MG/1
75 TABLET, FILM COATED ORAL
Qty: 90 | Refills: 0 | Status: ACTIVE | COMMUNITY
Start: 2016-12-14

## 2017-10-20 RX ORDER — DUTASTERIDE 0.5 MG/1
0.5 CAPSULE, LIQUID FILLED ORAL
Refills: 0 | Status: ACTIVE | COMMUNITY

## 2017-10-20 RX ORDER — FERROUS SULFATE 220 (44)/5
220 (44 FE) ELIXIR ORAL
Refills: 0 | Status: ACTIVE | COMMUNITY

## 2017-11-12 ENCOUNTER — EMERGENCY (EMERGENCY)
Facility: HOSPITAL | Age: 77
LOS: 1 days | Discharge: HOME | End: 2017-11-12
Attending: EMERGENCY MEDICINE | Admitting: EMERGENCY MEDICINE
Payer: MEDICARE

## 2017-11-12 VITALS
HEART RATE: 66 BPM | DIASTOLIC BLOOD PRESSURE: 81 MMHG | SYSTOLIC BLOOD PRESSURE: 162 MMHG | TEMPERATURE: 98 F | OXYGEN SATURATION: 100 % | RESPIRATION RATE: 20 BRPM

## 2017-11-12 DIAGNOSIS — Z95.5 PRESENCE OF CORONARY ANGIOPLASTY IMPLANT AND GRAFT: Chronic | ICD-10-CM

## 2017-11-12 DIAGNOSIS — Z98.890 OTHER SPECIFIED POSTPROCEDURAL STATES: Chronic | ICD-10-CM

## 2017-11-12 DIAGNOSIS — Z90.49 ACQUIRED ABSENCE OF OTHER SPECIFIED PARTS OF DIGESTIVE TRACT: Chronic | ICD-10-CM

## 2017-11-12 LAB
ALBUMIN SERPL ELPH-MCNC: 4 G/DL — SIGNIFICANT CHANGE UP (ref 3.3–5)
ALP SERPL-CCNC: 83 U/L — SIGNIFICANT CHANGE UP (ref 40–120)
ALT FLD-CCNC: 13 U/L RC — SIGNIFICANT CHANGE UP (ref 10–45)
ANION GAP SERPL CALC-SCNC: 14 MMOL/L — SIGNIFICANT CHANGE UP (ref 5–17)
ANISOCYTOSIS BLD QL: SLIGHT — SIGNIFICANT CHANGE UP
APPEARANCE UR: ABNORMAL
AST SERPL-CCNC: 13 U/L — SIGNIFICANT CHANGE UP (ref 10–40)
BACTERIA # UR AUTO: ABNORMAL /HPF
BASE EXCESS BLDV CALC-SCNC: 1.9 MMOL/L — SIGNIFICANT CHANGE UP (ref -2–2)
BILIRUB SERPL-MCNC: 0.2 MG/DL — SIGNIFICANT CHANGE UP (ref 0.2–1.2)
BILIRUB UR-MCNC: NEGATIVE — SIGNIFICANT CHANGE UP
BUN SERPL-MCNC: 14 MG/DL — SIGNIFICANT CHANGE UP (ref 7–23)
CA-I SERPL-SCNC: 1.19 MMOL/L — SIGNIFICANT CHANGE UP (ref 1.12–1.3)
CALCIUM SERPL-MCNC: 9.2 MG/DL — SIGNIFICANT CHANGE UP (ref 8.4–10.5)
CHLORIDE BLDV-SCNC: 92 MMOL/L — LOW (ref 96–108)
CHLORIDE SERPL-SCNC: 89 MMOL/L — LOW (ref 96–108)
CO2 BLDV-SCNC: 29 MMOL/L — SIGNIFICANT CHANGE UP (ref 22–30)
CO2 SERPL-SCNC: 25 MMOL/L — SIGNIFICANT CHANGE UP (ref 22–31)
COLOR SPEC: YELLOW — SIGNIFICANT CHANGE UP
COMMENT - URINE: SIGNIFICANT CHANGE UP
COMMENT - URINE: SIGNIFICANT CHANGE UP
CREAT SERPL-MCNC: 0.83 MG/DL — SIGNIFICANT CHANGE UP (ref 0.5–1.3)
DACRYOCYTES BLD QL SMEAR: SLIGHT — SIGNIFICANT CHANGE UP
DIFF PNL FLD: NEGATIVE — SIGNIFICANT CHANGE UP
ELLIPTOCYTES BLD QL SMEAR: SLIGHT — SIGNIFICANT CHANGE UP
EOSINOPHIL # BLD AUTO: 0.1 K/UL — SIGNIFICANT CHANGE UP (ref 0–0.5)
EOSINOPHIL NFR BLD AUTO: 2 % — SIGNIFICANT CHANGE UP (ref 0–6)
EPI CELLS # UR: SIGNIFICANT CHANGE UP /HPF
GAS PNL BLDV: 123 MMOL/L — LOW (ref 136–145)
GAS PNL BLDV: SIGNIFICANT CHANGE UP
GLUCOSE BLDV-MCNC: 157 MG/DL — HIGH (ref 70–99)
GLUCOSE SERPL-MCNC: 150 MG/DL — HIGH (ref 70–99)
GLUCOSE UR QL: NEGATIVE — SIGNIFICANT CHANGE UP
HCO3 BLDV-SCNC: 27 MMOL/L — SIGNIFICANT CHANGE UP (ref 21–29)
HCT VFR BLD CALC: 32.9 % — LOW (ref 39–50)
HCT VFR BLDA CALC: SIGNIFICANT CHANGE UP % (ref 39–50)
HGB BLD CALC-MCNC: SIGNIFICANT CHANGE UP G/DL (ref 13–17)
HGB BLD-MCNC: 10.2 G/DL — LOW (ref 13–17)
KETONES UR-MCNC: NEGATIVE — SIGNIFICANT CHANGE UP
LACTATE BLDV-MCNC: 1.9 MMOL/L — SIGNIFICANT CHANGE UP (ref 0.7–2)
LEUKOCYTE ESTERASE UR-ACNC: ABNORMAL
LYMPHOCYTES # BLD AUTO: 1.5 K/UL — SIGNIFICANT CHANGE UP (ref 1–3.3)
LYMPHOCYTES # BLD AUTO: 16 % — SIGNIFICANT CHANGE UP (ref 13–44)
MCHC RBC-ENTMCNC: 21 PG — LOW (ref 27–34)
MCHC RBC-ENTMCNC: 31.2 GM/DL — LOW (ref 32–36)
MCV RBC AUTO: 67.5 FL — LOW (ref 80–100)
METAMYELOCYTES # FLD: 2 % — HIGH (ref 0–0)
MICROCYTES BLD QL: SIGNIFICANT CHANGE UP
MONOCYTES # BLD AUTO: 1.2 K/UL — HIGH (ref 0–0.9)
MONOCYTES NFR BLD AUTO: 10 % — SIGNIFICANT CHANGE UP (ref 2–14)
MYELOCYTES NFR BLD: 2 % — HIGH (ref 0–0)
NEUTROPHILS # BLD AUTO: 8.9 K/UL — HIGH (ref 1.8–7.4)
NEUTROPHILS NFR BLD AUTO: 68 % — SIGNIFICANT CHANGE UP (ref 43–77)
NITRITE UR-MCNC: NEGATIVE — SIGNIFICANT CHANGE UP
OVALOCYTES BLD QL SMEAR: SLIGHT — SIGNIFICANT CHANGE UP
PCO2 BLDV: 51 MMHG — HIGH (ref 35–50)
PH BLDV: 7.34 — LOW (ref 7.35–7.45)
PH UR: 6 — SIGNIFICANT CHANGE UP (ref 5–8)
PLAT MORPH BLD: NORMAL — SIGNIFICANT CHANGE UP
PLATELET # BLD AUTO: 333 K/UL — SIGNIFICANT CHANGE UP (ref 150–400)
PO2 BLDV: 24 MMHG — LOW (ref 25–45)
POIKILOCYTOSIS BLD QL AUTO: SLIGHT — SIGNIFICANT CHANGE UP
POTASSIUM BLDV-SCNC: 4.2 MMOL/L — SIGNIFICANT CHANGE UP (ref 3.5–5)
POTASSIUM SERPL-MCNC: 4.5 MMOL/L — SIGNIFICANT CHANGE UP (ref 3.5–5.3)
POTASSIUM SERPL-SCNC: 4.5 MMOL/L — SIGNIFICANT CHANGE UP (ref 3.5–5.3)
PROT SERPL-MCNC: 7.2 G/DL — SIGNIFICANT CHANGE UP (ref 6–8.3)
PROT UR-MCNC: SIGNIFICANT CHANGE UP
RBC # BLD: 4.87 M/UL — SIGNIFICANT CHANGE UP (ref 4.2–5.8)
RBC # FLD: 17.4 % — HIGH (ref 10.3–14.5)
RBC BLD AUTO: ABNORMAL
RBC CASTS # UR COMP ASSIST: ABNORMAL /HPF (ref 0–2)
SAO2 % BLDV: 32 % — LOW (ref 67–88)
SCHISTOCYTES BLD QL AUTO: SLIGHT — SIGNIFICANT CHANGE UP
SODIUM SERPL-SCNC: 128 MMOL/L — LOW (ref 135–145)
SP GR SPEC: 1.01 — SIGNIFICANT CHANGE UP (ref 1.01–1.02)
TARGETS BLD QL SMEAR: SLIGHT — SIGNIFICANT CHANGE UP
UROBILINOGEN FLD QL: NEGATIVE — SIGNIFICANT CHANGE UP
WBC # BLD: 11.7 K/UL — HIGH (ref 3.8–10.5)
WBC # FLD AUTO: 11.7 K/UL — HIGH (ref 3.8–10.5)
WBC UR QL: >50 /HPF (ref 0–5)

## 2017-11-12 PROCEDURE — 93975 VASCULAR STUDY: CPT | Mod: 26

## 2017-11-12 PROCEDURE — 99285 EMERGENCY DEPT VISIT HI MDM: CPT

## 2017-11-12 PROCEDURE — 76870 US EXAM SCROTUM: CPT | Mod: 26

## 2017-11-12 RX ORDER — SODIUM CHLORIDE 9 MG/ML
1000 INJECTION INTRAMUSCULAR; INTRAVENOUS; SUBCUTANEOUS ONCE
Qty: 0 | Refills: 0 | Status: COMPLETED | OUTPATIENT
Start: 2017-11-12 | End: 2017-11-12

## 2017-11-12 RX ADMIN — SODIUM CHLORIDE 1000 MILLILITER(S): 9 INJECTION INTRAMUSCULAR; INTRAVENOUS; SUBCUTANEOUS at 21:56

## 2017-11-12 NOTE — ED PROVIDER NOTE - ATTENDING CONTRIBUTION TO CARE
Patient with recent complex medical history - diverticulitis complicated by stroke, recurrence of diverticulitis requiring drainage, then developed hyponatremia.  Recently returned home after rehab stay.  Just completed keflex course for UTI.  Saw PMD and had follow up labs checked, found to have elevated WBC count and hyponatremia and PMD sent patient to hospital.  Patient reporting testicular pain ongoing for past few weeks, otherwise denying new fevers, chest pains, cough, abdominal pains, nausea/vomiting/diarrhea or sick contacts.    On exam patient non toxic appearing, vital signs within normal limits, RRR S1/S2, lungs clear to ascultation bilaterally, abdomen soft no focal tenderness, bilateral testicular edema with tenderness to palpation.    Possible orchitis given ongoing testicular pains and elevated WBC count, otherwise exam/history without focal symptoms or findings, will repeat labs in ED, testicular ultrasound, re-evaluate.

## 2017-11-12 NOTE — ED PROVIDER NOTE - OBJECTIVE STATEMENT
77 y.o. male hx DM, HLD, GERD, HTN, perforated diverticulitis sp BRYANT drain cb CVA, pw generalized weakness which is intermittent and bilateral testicle pain sp treatment with keflex finished 2 days ago. sent in by PCP for elevated WBC count of 13, concern for worsening infection. Pt states that currently pain is mild in testicles, only when touching them. No ab pain. No fever or chills.    PCP - Cash ([facility])

## 2017-11-12 NOTE — ED PROVIDER NOTE - NS ED ROS FT
ROS: denies HA, dizziness, fevers/chills, nausea/vomiting, chest pain, SOB, diaphoresis, abdominal pain, back/neck pain, dysuria/hematuria, or rash  +testicle pain

## 2017-11-12 NOTE — ED PROVIDER NOTE - MEDICAL DECISION MAKING DETAILS
77 y.o. male pw possible orchitis vs epididymitis as cause of elevated WBC. Will check labs, UA, US testicles, fluids, reevaluate.

## 2017-11-12 NOTE — ED ADULT NURSE NOTE - PSH
H/O hernia repair    History of appendectomy    History of appendectomy    History of coronary artery stent placement    Stented coronary artery

## 2017-11-12 NOTE — ED ADULT NURSE NOTE - PMH
Anemia    BPH (benign prostatic hyperplasia)    CAD (coronary artery disease)    Diabetes    DM (diabetes mellitus)    Essential hypertension    GERD (gastroesophageal reflux disease)    GERD (gastroesophageal reflux disease)    HLD (hyperlipidemia)    HLD (hyperlipidemia)    HTN (hypertension)    Intraparenchymal hemorrhage of brain    TIA (transient ischemic attack)

## 2017-11-12 NOTE — ED ADULT NURSE NOTE - OBJECTIVE STATEMENT
78 y/o M, A&Ox3, enters ED w/ c/o elevated WBC. Hx. of DM, HLD, TARAN, HTN, stroke and perforated diverticulitis. Pt. seen at PCP a couple of days ago and instructed to come to ED for WBC of 13.1. Pt. afebrile - 98.2 rectal temp. Pt. also presents w/ c/o generalized weakness. Pt. recently treated for UTI - finished course of Keflex 2 days ago, but pt. reports bilateral testicle pain and swelling. No abdominal pain. No SOB/chest pain. Skin warm, dry and intact. 78 y/o M, A&Ox3, enters ED w/ c/o elevated WBC. Hx. of DM, HLD, TARAN, HTN, stroke and perforated diverticulitis. Pt. seen at PCP a couple of days ago and instructed to come to ED for WBC of 13.1. Pt. afebrile - 98.2 rectal temp. Pt. also presents w/ c/o generalized weakness. Pt. recently treated for UTI - finished course of Keflex 2 days ago, but pt. reports bilateral testicle pain and swelling. Scrotum swollen and painful to touch. No abdominal pain but suprapubic pain. No SOB/chest pain. Skin warm, dry and intact.

## 2017-11-12 NOTE — ED PROVIDER NOTE - PHYSICAL EXAMINATION
Gen: Well appearing, NAD, AOx3  Head: NCAT  HEENT:  MMM, normal conjunctiva  Lung: CTAB, no rales, rhonchi or wheezing  CV: S1/S2, no murmurs, rubs or gallops  Abd: soft, NTND, no rebound or guarding  : bilateral testicular tenderness, not swollen or red.   MSK: No CVA tenderness. No edema, no visible deformities  Neuro: No focal neurologic deficits. CN II-XII intact. Normal strength and sensation x 4  Skin: Warm and dry, no evidence of rash  Psych: normal mood and affect

## 2017-11-13 VITALS — SYSTOLIC BLOOD PRESSURE: 166 MMHG | DIASTOLIC BLOOD PRESSURE: 66 MMHG

## 2017-11-13 PROCEDURE — 82435 ASSAY OF BLOOD CHLORIDE: CPT

## 2017-11-13 PROCEDURE — 87186 SC STD MICRODIL/AGAR DIL: CPT

## 2017-11-13 PROCEDURE — 82803 BLOOD GASES ANY COMBINATION: CPT

## 2017-11-13 PROCEDURE — 84132 ASSAY OF SERUM POTASSIUM: CPT

## 2017-11-13 PROCEDURE — 87086 URINE CULTURE/COLONY COUNT: CPT

## 2017-11-13 PROCEDURE — 99284 EMERGENCY DEPT VISIT MOD MDM: CPT | Mod: 25

## 2017-11-13 PROCEDURE — 82947 ASSAY GLUCOSE BLOOD QUANT: CPT

## 2017-11-13 PROCEDURE — 93975 VASCULAR STUDY: CPT

## 2017-11-13 PROCEDURE — 85014 HEMATOCRIT: CPT

## 2017-11-13 PROCEDURE — 84295 ASSAY OF SERUM SODIUM: CPT

## 2017-11-13 PROCEDURE — 76870 US EXAM SCROTUM: CPT

## 2017-11-13 PROCEDURE — 85027 COMPLETE CBC AUTOMATED: CPT

## 2017-11-13 PROCEDURE — 83605 ASSAY OF LACTIC ACID: CPT

## 2017-11-13 PROCEDURE — 82330 ASSAY OF CALCIUM: CPT

## 2017-11-13 PROCEDURE — 80053 COMPREHEN METABOLIC PANEL: CPT

## 2017-11-13 PROCEDURE — 81001 URINALYSIS AUTO W/SCOPE: CPT

## 2017-11-13 RX ORDER — CIPROFLOXACIN LACTATE 400MG/40ML
1 VIAL (ML) INTRAVENOUS
Qty: 10 | Refills: 0 | OUTPATIENT
Start: 2017-11-13 | End: 2017-11-23

## 2017-11-13 RX ORDER — PHENAZOPYRIDINE HCL 100 MG
1 TABLET ORAL
Qty: 7 | Refills: 0 | OUTPATIENT
Start: 2017-11-13 | End: 2017-11-20

## 2017-11-14 LAB
-  AMIKACIN: SIGNIFICANT CHANGE UP
-  AMIKACIN: SIGNIFICANT CHANGE UP
-  AMPICILLIN/SULBACTAM: SIGNIFICANT CHANGE UP
-  AMPICILLIN: SIGNIFICANT CHANGE UP
-  AZTREONAM: SIGNIFICANT CHANGE UP
-  AZTREONAM: SIGNIFICANT CHANGE UP
-  CEFAZOLIN: SIGNIFICANT CHANGE UP
-  CEFEPIME: SIGNIFICANT CHANGE UP
-  CEFEPIME: SIGNIFICANT CHANGE UP
-  CEFOXITIN: SIGNIFICANT CHANGE UP
-  CEFTAZIDIME: SIGNIFICANT CHANGE UP
-  CEFTAZIDIME: SIGNIFICANT CHANGE UP
-  CEFTRIAXONE: SIGNIFICANT CHANGE UP
-  CIPROFLOXACIN: SIGNIFICANT CHANGE UP
-  CIPROFLOXACIN: SIGNIFICANT CHANGE UP
-  ERTAPENEM: SIGNIFICANT CHANGE UP
-  GENTAMICIN: SIGNIFICANT CHANGE UP
-  GENTAMICIN: SIGNIFICANT CHANGE UP
-  IMIPENEM: SIGNIFICANT CHANGE UP
-  IMIPENEM: SIGNIFICANT CHANGE UP
-  LEVOFLOXACIN: SIGNIFICANT CHANGE UP
-  LEVOFLOXACIN: SIGNIFICANT CHANGE UP
-  MEROPENEM: SIGNIFICANT CHANGE UP
-  MEROPENEM: SIGNIFICANT CHANGE UP
-  NITROFURANTOIN: SIGNIFICANT CHANGE UP
-  PIPERACILLIN/TAZOBACTAM: SIGNIFICANT CHANGE UP
-  PIPERACILLIN/TAZOBACTAM: SIGNIFICANT CHANGE UP
-  TOBRAMYCIN: SIGNIFICANT CHANGE UP
-  TOBRAMYCIN: SIGNIFICANT CHANGE UP
-  TRIMETHOPRIM/SULFAMETHOXAZOLE: SIGNIFICANT CHANGE UP
CULTURE RESULTS: SIGNIFICANT CHANGE UP
METHOD TYPE: SIGNIFICANT CHANGE UP
METHOD TYPE: SIGNIFICANT CHANGE UP
ORGANISM # SPEC MICROSCOPIC CNT: SIGNIFICANT CHANGE UP
SPECIMEN SOURCE: SIGNIFICANT CHANGE UP

## 2017-11-15 RX ORDER — CEPHALEXIN 500 MG
1 CAPSULE ORAL
Qty: 20 | Refills: 0 | OUTPATIENT
Start: 2017-11-15 | End: 2017-11-25

## 2017-11-15 NOTE — ED POST DISCHARGE NOTE - OTHER COMMUNICATION
Spoke to patient and patients wife  regarding results. Informed that current abx only covers one bacteria that was found in urine and that pt will need to start 2nd abx to treat infection. Pt and wife understand to take both abx to completion. Patient advised to follow up w/ PCP-Christina Lees PA-C

## 2017-11-15 NOTE — ED POST DISCHARGE NOTE - RESULT SUMMARY
ucx w/ >100k ecoli and >100k pseudomonas, pt d/c on levaquin which covers pseudomonas. Ecoli is resistant

## 2017-12-19 PROCEDURE — 97165 OT EVAL LOW COMPLEX 30 MIN: CPT

## 2017-12-19 PROCEDURE — 85027 COMPLETE CBC AUTOMATED: CPT

## 2017-12-19 PROCEDURE — 93005 ELECTROCARDIOGRAM TRACING: CPT

## 2017-12-19 PROCEDURE — 70544 MR ANGIOGRAPHY HEAD W/O DYE: CPT

## 2017-12-19 PROCEDURE — A9585: CPT

## 2017-12-19 PROCEDURE — 97161 PT EVAL LOW COMPLEX 20 MIN: CPT

## 2017-12-19 PROCEDURE — 85610 PROTHROMBIN TIME: CPT

## 2017-12-19 PROCEDURE — 84484 ASSAY OF TROPONIN QUANT: CPT

## 2017-12-19 PROCEDURE — 85730 THROMBOPLASTIN TIME PARTIAL: CPT

## 2017-12-19 PROCEDURE — 82435 ASSAY OF BLOOD CHLORIDE: CPT

## 2017-12-19 PROCEDURE — 84100 ASSAY OF PHOSPHORUS: CPT

## 2017-12-19 PROCEDURE — 87086 URINE CULTURE/COLONY COUNT: CPT

## 2017-12-19 PROCEDURE — 85014 HEMATOCRIT: CPT

## 2017-12-19 PROCEDURE — 80053 COMPREHEN METABOLIC PANEL: CPT

## 2017-12-19 PROCEDURE — 70450 CT HEAD/BRAIN W/O DYE: CPT

## 2017-12-19 PROCEDURE — 80048 BASIC METABOLIC PNL TOTAL CA: CPT

## 2017-12-19 PROCEDURE — 97530 THERAPEUTIC ACTIVITIES: CPT

## 2017-12-19 PROCEDURE — 71045 X-RAY EXAM CHEST 1 VIEW: CPT

## 2017-12-19 PROCEDURE — 80061 LIPID PANEL: CPT

## 2017-12-19 PROCEDURE — 83605 ASSAY OF LACTIC ACID: CPT

## 2017-12-19 PROCEDURE — 84132 ASSAY OF SERUM POTASSIUM: CPT

## 2017-12-19 PROCEDURE — 70553 MRI BRAIN STEM W/O & W/DYE: CPT

## 2017-12-19 PROCEDURE — 81001 URINALYSIS AUTO W/SCOPE: CPT

## 2017-12-19 PROCEDURE — 82550 ASSAY OF CK (CPK): CPT

## 2017-12-19 PROCEDURE — 82553 CREATINE MB FRACTION: CPT

## 2017-12-19 PROCEDURE — 97116 GAIT TRAINING THERAPY: CPT

## 2017-12-19 PROCEDURE — 82947 ASSAY GLUCOSE BLOOD QUANT: CPT

## 2017-12-19 PROCEDURE — 93306 TTE W/DOPPLER COMPLETE: CPT

## 2017-12-19 PROCEDURE — 84295 ASSAY OF SERUM SODIUM: CPT

## 2017-12-19 PROCEDURE — 70548 MR ANGIOGRAPHY NECK W/DYE: CPT

## 2017-12-19 PROCEDURE — 82330 ASSAY OF CALCIUM: CPT

## 2017-12-19 PROCEDURE — 83880 ASSAY OF NATRIURETIC PEPTIDE: CPT

## 2017-12-19 PROCEDURE — 82803 BLOOD GASES ANY COMBINATION: CPT

## 2017-12-19 PROCEDURE — 92610 EVALUATE SWALLOWING FUNCTION: CPT

## 2017-12-19 PROCEDURE — 83735 ASSAY OF MAGNESIUM: CPT

## 2017-12-19 PROCEDURE — 99285 EMERGENCY DEPT VISIT HI MDM: CPT | Mod: 25

## 2017-12-19 PROCEDURE — 87040 BLOOD CULTURE FOR BACTERIA: CPT

## 2017-12-28 ENCOUNTER — APPOINTMENT (OUTPATIENT)
Dept: SURGERY | Facility: CLINIC | Age: 77
End: 2017-12-28
Payer: MEDICARE

## 2017-12-28 VITALS
SYSTOLIC BLOOD PRESSURE: 150 MMHG | OXYGEN SATURATION: 99 % | RESPIRATION RATE: 16 BRPM | DIASTOLIC BLOOD PRESSURE: 81 MMHG | TEMPERATURE: 97.6 F | HEART RATE: 76 BPM

## 2017-12-28 DIAGNOSIS — K57.30 DIVERTICULOSIS OF LARGE INTESTINE W/OUT PERFORATION OR ABSCESS W/OUT BLEEDING: ICD-10-CM

## 2017-12-28 PROCEDURE — 99214 OFFICE O/P EST MOD 30 MIN: CPT

## 2017-12-28 RX ORDER — CHLORHEXIDINE GLUCONATE 4 %
325 (65 FE) LIQUID (ML) TOPICAL
Qty: 30 | Refills: 0 | Status: DISCONTINUED | COMMUNITY
Start: 2017-11-03

## 2017-12-28 RX ORDER — OXYCODONE 5 MG/1
5 TABLET ORAL
Qty: 15 | Refills: 0 | Status: DISCONTINUED | COMMUNITY
Start: 2017-09-26 | End: 2017-12-28

## 2017-12-28 RX ORDER — PHENAZOPYRIDINE HYDROCHLORIDE 100 MG/1
100 TABLET ORAL
Qty: 10 | Refills: 0 | Status: DISCONTINUED | COMMUNITY
Start: 2017-11-08

## 2018-02-22 ENCOUNTER — APPOINTMENT (OUTPATIENT)
Dept: UROLOGY | Facility: CLINIC | Age: 78
End: 2018-02-22
Payer: MEDICARE

## 2018-02-22 PROCEDURE — 99204 OFFICE O/P NEW MOD 45 MIN: CPT

## 2018-03-02 ENCOUNTER — APPOINTMENT (OUTPATIENT)
Dept: UROLOGY | Facility: CLINIC | Age: 78
End: 2018-03-02

## 2018-03-16 ENCOUNTER — APPOINTMENT (OUTPATIENT)
Dept: UROLOGY | Facility: CLINIC | Age: 78
End: 2018-03-16
Payer: MEDICARE

## 2018-03-16 VITALS
RESPIRATION RATE: 16 BRPM | WEIGHT: 140 LBS | TEMPERATURE: 97.6 F | BODY MASS INDEX: 24.8 KG/M2 | HEIGHT: 63 IN | DIASTOLIC BLOOD PRESSURE: 78 MMHG | HEART RATE: 78 BPM | SYSTOLIC BLOOD PRESSURE: 148 MMHG

## 2018-03-16 DIAGNOSIS — N40.0 BENIGN PROSTATIC HYPERPLASIA WITHOUT LOWER URINARY TRACT SYMPMS: ICD-10-CM

## 2018-03-16 DIAGNOSIS — N39.0 URINARY TRACT INFECTION, SITE NOT SPECIFIED: ICD-10-CM

## 2018-03-16 PROCEDURE — 99213 OFFICE O/P EST LOW 20 MIN: CPT | Mod: 25

## 2018-03-16 PROCEDURE — 52000 CYSTOURETHROSCOPY: CPT

## 2018-03-20 LAB — BACTERIA UR CULT: NORMAL

## 2018-04-09 ENCOUNTER — APPOINTMENT (OUTPATIENT)
Dept: UROLOGY | Facility: HOSPITAL | Age: 78
End: 2018-04-09

## 2018-05-01 ENCOUNTER — OUTPATIENT (OUTPATIENT)
Dept: OUTPATIENT SERVICES | Facility: HOSPITAL | Age: 78
LOS: 1 days | End: 2018-05-01
Payer: MEDICAID

## 2018-05-01 DIAGNOSIS — Z95.5 PRESENCE OF CORONARY ANGIOPLASTY IMPLANT AND GRAFT: Chronic | ICD-10-CM

## 2018-05-01 DIAGNOSIS — Z90.49 ACQUIRED ABSENCE OF OTHER SPECIFIED PARTS OF DIGESTIVE TRACT: Chronic | ICD-10-CM

## 2018-05-01 DIAGNOSIS — Z98.890 OTHER SPECIFIED POSTPROCEDURAL STATES: Chronic | ICD-10-CM

## 2018-05-01 PROCEDURE — G9001: CPT

## 2018-05-02 ENCOUNTER — INPATIENT (INPATIENT)
Facility: HOSPITAL | Age: 78
LOS: 5 days | Discharge: HOME | DRG: 392 | End: 2018-05-08
Attending: INTERNAL MEDICINE | Admitting: INTERNAL MEDICINE
Payer: MEDICARE

## 2018-05-02 VITALS
TEMPERATURE: 98 F | RESPIRATION RATE: 18 BRPM | OXYGEN SATURATION: 97 % | HEART RATE: 64 BPM | WEIGHT: 130.07 LBS | DIASTOLIC BLOOD PRESSURE: 81 MMHG | SYSTOLIC BLOOD PRESSURE: 163 MMHG

## 2018-05-02 DIAGNOSIS — Z90.49 ACQUIRED ABSENCE OF OTHER SPECIFIED PARTS OF DIGESTIVE TRACT: Chronic | ICD-10-CM

## 2018-05-02 DIAGNOSIS — Z98.890 OTHER SPECIFIED POSTPROCEDURAL STATES: Chronic | ICD-10-CM

## 2018-05-02 DIAGNOSIS — Z95.5 PRESENCE OF CORONARY ANGIOPLASTY IMPLANT AND GRAFT: Chronic | ICD-10-CM

## 2018-05-02 DIAGNOSIS — R53.1 WEAKNESS: ICD-10-CM

## 2018-05-02 LAB
ALBUMIN SERPL ELPH-MCNC: 4.1 G/DL — SIGNIFICANT CHANGE UP (ref 3.3–5)
ALP SERPL-CCNC: 77 U/L — SIGNIFICANT CHANGE UP (ref 40–120)
ALT FLD-CCNC: 7 U/L — LOW (ref 10–45)
ANION GAP SERPL CALC-SCNC: 11 MMOL/L — SIGNIFICANT CHANGE UP (ref 5–17)
APPEARANCE UR: CLEAR — SIGNIFICANT CHANGE UP
AST SERPL-CCNC: 7 U/L — LOW (ref 10–40)
BACTERIA # UR AUTO: ABNORMAL /HPF
BASOPHILS # BLD AUTO: 0.1 K/UL — SIGNIFICANT CHANGE UP (ref 0–0.2)
BASOPHILS NFR BLD AUTO: 0.9 % — SIGNIFICANT CHANGE UP (ref 0–2)
BILIRUB SERPL-MCNC: 0.3 MG/DL — SIGNIFICANT CHANGE UP (ref 0.2–1.2)
BILIRUB UR-MCNC: NEGATIVE — SIGNIFICANT CHANGE UP
BUN SERPL-MCNC: 12 MG/DL — SIGNIFICANT CHANGE UP (ref 7–23)
CALCIUM SERPL-MCNC: 8.9 MG/DL — SIGNIFICANT CHANGE UP (ref 8.4–10.5)
CHLORIDE SERPL-SCNC: 93 MMOL/L — LOW (ref 96–108)
CO2 SERPL-SCNC: 25 MMOL/L — SIGNIFICANT CHANGE UP (ref 22–31)
COLOR SPEC: YELLOW — SIGNIFICANT CHANGE UP
CREAT SERPL-MCNC: 0.93 MG/DL — SIGNIFICANT CHANGE UP (ref 0.5–1.3)
DIFF PNL FLD: NEGATIVE — SIGNIFICANT CHANGE UP
EOSINOPHIL # BLD AUTO: 0.1 K/UL — SIGNIFICANT CHANGE UP (ref 0–0.5)
EOSINOPHIL NFR BLD AUTO: 0.8 % — SIGNIFICANT CHANGE UP (ref 0–6)
EPI CELLS # UR: SIGNIFICANT CHANGE UP /HPF
GLUCOSE SERPL-MCNC: 104 MG/DL — HIGH (ref 70–99)
GLUCOSE UR QL: NEGATIVE — SIGNIFICANT CHANGE UP
HCT VFR BLD CALC: 33.1 % — LOW (ref 39–50)
HGB BLD-MCNC: 10.5 G/DL — LOW (ref 13–17)
INR BLD: 0.93 RATIO — SIGNIFICANT CHANGE UP (ref 0.88–1.16)
KETONES UR-MCNC: NEGATIVE — SIGNIFICANT CHANGE UP
LEUKOCYTE ESTERASE UR-ACNC: ABNORMAL
LYMPHOCYTES # BLD AUTO: 1.8 K/UL — SIGNIFICANT CHANGE UP (ref 1–3.3)
LYMPHOCYTES # BLD AUTO: 23.3 % — SIGNIFICANT CHANGE UP (ref 13–44)
MAGNESIUM SERPL-MCNC: 1.8 MG/DL — SIGNIFICANT CHANGE UP (ref 1.6–2.6)
MCHC RBC-ENTMCNC: 21.5 PG — LOW (ref 27–34)
MCHC RBC-ENTMCNC: 31.9 GM/DL — LOW (ref 32–36)
MCV RBC AUTO: 67.4 FL — LOW (ref 80–100)
MONOCYTES # BLD AUTO: 0.8 K/UL — SIGNIFICANT CHANGE UP (ref 0–0.9)
MONOCYTES NFR BLD AUTO: 10.2 % — SIGNIFICANT CHANGE UP (ref 2–14)
NEUTROPHILS # BLD AUTO: 5.1 K/UL — SIGNIFICANT CHANGE UP (ref 1.8–7.4)
NEUTROPHILS NFR BLD AUTO: 64.8 % — SIGNIFICANT CHANGE UP (ref 43–77)
NITRITE UR-MCNC: NEGATIVE — SIGNIFICANT CHANGE UP
PH UR: 6.5 — SIGNIFICANT CHANGE UP (ref 5–8)
PHOSPHATE SERPL-MCNC: 2.5 MG/DL — SIGNIFICANT CHANGE UP (ref 2.5–4.5)
PLATELET # BLD AUTO: 282 K/UL — SIGNIFICANT CHANGE UP (ref 150–400)
POTASSIUM SERPL-MCNC: 5.1 MMOL/L — SIGNIFICANT CHANGE UP (ref 3.5–5.3)
POTASSIUM SERPL-SCNC: 5.1 MMOL/L — SIGNIFICANT CHANGE UP (ref 3.5–5.3)
PROT SERPL-MCNC: 6.9 G/DL — SIGNIFICANT CHANGE UP (ref 6–8.3)
PROT UR-MCNC: SIGNIFICANT CHANGE UP
PROTHROM AB SERPL-ACNC: 10.1 SEC — SIGNIFICANT CHANGE UP (ref 9.8–12.7)
RAPID RVP RESULT: SIGNIFICANT CHANGE UP
RBC # BLD: 4.9 M/UL — SIGNIFICANT CHANGE UP (ref 4.2–5.8)
RBC # FLD: 19.7 % — HIGH (ref 10.3–14.5)
RBC CASTS # UR COMP ASSIST: SIGNIFICANT CHANGE UP /HPF (ref 0–2)
SODIUM SERPL-SCNC: 129 MMOL/L — LOW (ref 135–145)
SP GR SPEC: 1.02 — SIGNIFICANT CHANGE UP (ref 1.01–1.02)
UROBILINOGEN FLD QL: NEGATIVE — SIGNIFICANT CHANGE UP
WBC # BLD: 7.8 K/UL — SIGNIFICANT CHANGE UP (ref 3.8–10.5)
WBC # FLD AUTO: 7.8 K/UL — SIGNIFICANT CHANGE UP (ref 3.8–10.5)
WBC UR QL: SIGNIFICANT CHANGE UP /HPF (ref 0–5)

## 2018-05-02 PROCEDURE — 71046 X-RAY EXAM CHEST 2 VIEWS: CPT | Mod: 26

## 2018-05-02 PROCEDURE — 93010 ELECTROCARDIOGRAM REPORT: CPT

## 2018-05-02 PROCEDURE — 70450 CT HEAD/BRAIN W/O DYE: CPT | Mod: 26

## 2018-05-02 PROCEDURE — 99285 EMERGENCY DEPT VISIT HI MDM: CPT | Mod: 25

## 2018-05-02 RX ORDER — PANTOPRAZOLE SODIUM 20 MG/1
40 TABLET, DELAYED RELEASE ORAL
Qty: 0 | Refills: 0 | Status: DISCONTINUED | OUTPATIENT
Start: 2018-05-02 | End: 2018-05-08

## 2018-05-02 RX ORDER — DOCUSATE SODIUM 100 MG
100 CAPSULE ORAL THREE TIMES A DAY
Qty: 0 | Refills: 0 | Status: DISCONTINUED | OUTPATIENT
Start: 2018-05-02 | End: 2018-05-02

## 2018-05-02 RX ORDER — FINASTERIDE 5 MG/1
5 TABLET, FILM COATED ORAL DAILY
Qty: 0 | Refills: 0 | Status: DISCONTINUED | OUTPATIENT
Start: 2018-05-02 | End: 2018-05-08

## 2018-05-02 RX ORDER — TAMSULOSIN HYDROCHLORIDE 0.4 MG/1
0.4 CAPSULE ORAL DAILY
Qty: 0 | Refills: 0 | Status: DISCONTINUED | OUTPATIENT
Start: 2018-05-02 | End: 2018-05-08

## 2018-05-02 RX ORDER — OXYCODONE HYDROCHLORIDE 5 MG/1
5 TABLET ORAL EVERY 6 HOURS
Qty: 0 | Refills: 0 | Status: DISCONTINUED | OUTPATIENT
Start: 2018-05-02 | End: 2018-05-08

## 2018-05-02 RX ORDER — SENNA PLUS 8.6 MG/1
2 TABLET ORAL AT BEDTIME
Qty: 0 | Refills: 0 | Status: DISCONTINUED | OUTPATIENT
Start: 2018-05-02 | End: 2018-05-08

## 2018-05-02 RX ORDER — METFORMIN HYDROCHLORIDE 850 MG/1
850 TABLET ORAL
Qty: 0 | Refills: 0 | Status: DISCONTINUED | OUTPATIENT
Start: 2018-05-02 | End: 2018-05-03

## 2018-05-02 RX ORDER — SENNA PLUS 8.6 MG/1
2 TABLET ORAL AT BEDTIME
Qty: 0 | Refills: 0 | Status: DISCONTINUED | OUTPATIENT
Start: 2018-05-02 | End: 2018-05-02

## 2018-05-02 RX ORDER — LOSARTAN POTASSIUM 100 MG/1
100 TABLET, FILM COATED ORAL DAILY
Qty: 0 | Refills: 0 | Status: DISCONTINUED | OUTPATIENT
Start: 2018-05-02 | End: 2018-05-08

## 2018-05-02 RX ORDER — DOCUSATE SODIUM 100 MG
100 CAPSULE ORAL THREE TIMES A DAY
Qty: 0 | Refills: 0 | Status: DISCONTINUED | OUTPATIENT
Start: 2018-05-02 | End: 2018-05-08

## 2018-05-02 RX ORDER — METOPROLOL TARTRATE 50 MG
100 TABLET ORAL DAILY
Qty: 0 | Refills: 0 | Status: DISCONTINUED | OUTPATIENT
Start: 2018-05-02 | End: 2018-05-08

## 2018-05-02 RX ORDER — SODIUM CHLORIDE 9 MG/ML
1000 INJECTION INTRAMUSCULAR; INTRAVENOUS; SUBCUTANEOUS
Qty: 0 | Refills: 0 | Status: DISCONTINUED | OUTPATIENT
Start: 2018-05-02 | End: 2018-05-04

## 2018-05-02 RX ORDER — ASPIRIN/CALCIUM CARB/MAGNESIUM 324 MG
81 TABLET ORAL DAILY
Qty: 0 | Refills: 0 | Status: DISCONTINUED | OUTPATIENT
Start: 2018-05-02 | End: 2018-05-08

## 2018-05-02 RX ORDER — PILOCARPINE HCL 4 %
1 DROPS OPHTHALMIC (EYE)
Qty: 0 | Refills: 0 | Status: DISCONTINUED | OUTPATIENT
Start: 2018-05-02 | End: 2018-05-08

## 2018-05-02 RX ORDER — CLOPIDOGREL BISULFATE 75 MG/1
75 TABLET, FILM COATED ORAL DAILY
Qty: 0 | Refills: 0 | Status: DISCONTINUED | OUTPATIENT
Start: 2018-05-02 | End: 2018-05-08

## 2018-05-02 RX ORDER — MECLIZINE HCL 12.5 MG
12.5 TABLET ORAL ONCE
Qty: 0 | Refills: 0 | Status: DISCONTINUED | OUTPATIENT
Start: 2018-05-02 | End: 2018-05-02

## 2018-05-02 RX ORDER — SIMVASTATIN 20 MG/1
40 TABLET, FILM COATED ORAL AT BEDTIME
Qty: 0 | Refills: 0 | Status: DISCONTINUED | OUTPATIENT
Start: 2018-05-02 | End: 2018-05-08

## 2018-05-02 NOTE — H&P ADULT - NSHPPHYSICALEXAM_GEN_ALL_CORE
PHYSICAL EXAMINATION:  Vital Signs Last 24 Hrs  T(C): 36.7 (02 May 2018 17:22), Max: 36.7 (02 May 2018 17:22)  T(F): 98 (02 May 2018 17:22), Max: 98 (02 May 2018 17:22)  HR: 60 (02 May 2018 17:22) (60 - 64)  BP: 184/81 (02 May 2018 17:22) (163/81 - 184/81)  BP(mean): --  RR: 20 (02 May 2018 17:22) (18 - 20)  SpO2: 98% (02 May 2018 17:22) (97% - 98%)  CAPILLARY BLOOD GLUCOSE          GENERAL: NAD, well-groomed, well-developed  HEAD:  atraumatic, normocephalic  EYES: sclera anicteric  ENMT: mucous membranes moist  NECK: supple, No JVD  CHEST/LUNG: clear to auscultation bilaterally; no rales, rhonchi, or wheezing b/l  HEART: normal S1, S2  ABDOMEN: BS+, soft, ND, NT   EXTREMITIES:  pulses palpable; no clubbing, cyanosis, or edema b/l LEs  NEURO: awake, alert, interactive; moves all extremities  SKIN: no rashes or lesions PHYSICAL EXAMINATION:  Vital Signs Last 24 Hrs  T(C): 36.7 (02 May 2018 17:22), Max: 36.7 (02 May 2018 17:22)  T(F): 98 (02 May 2018 17:22), Max: 98 (02 May 2018 17:22)  HR: 60 (02 May 2018 17:22) (60 - 64)  BP: 184/81 (02 May 2018 17:22) (163/81 - 184/81)  BP(mean): --  RR: 20 (02 May 2018 17:22) (18 - 20)  SpO2: 98% (02 May 2018 17:22) (97% - 98%)  CAPILLARY BLOOD GLUCOSE          GENERAL: NAD, seen in ER, no CP, SOB or fevers  HEAD:  atraumatic, normocephalic  EYES: sclera anicteric  ENMT: mucous membranes moist  NECK: supple, No JVD  CHEST/LUNG: clear to auscultation bilaterally; no rales, rhonchi, or wheezing b/l  HEART: normal S1, S2  ABDOMEN: BS+, soft, ND, NT   EXTREMITIES:  pulses palpable; no clubbing, cyanosis, or edema b/l LEs  NEURO: awake, alert, interactive; moves all extremities  SKIN: no rashes or lesions

## 2018-05-02 NOTE — ED PROVIDER NOTE - OBJECTIVE STATEMENT
79 yo M h/o stroke 77 yo M h/o DMType2, HTN, HLD, BPH, GERD, chronic anemia, CAD with stents, CVA 1 year ago with R side deficit, recent sigmoid diverticulitis w/ pelvic abscess s/p IR drainage and BRYANT placement presents with 2 weeks generalized weakness, cold sweats, cough, and recent hyponatremia at PCP. Pt endorses HA and gait instability. Recently pt took 3 weeks of previously discontinued HCTZ rx.  Denies fever, N/V/D, abdominal pain, fall/trauma, motor or sensory changes, bowel or bladder dysfunction. 79 yo M h/o alzheimer's, DMType2, HTN, HLD, BPH, GERD, chronic anemia, CAD with stents, CVA 1 year ago with R side deficit, recent sigmoid diverticulitis w/ pelvic abscess s/p IR drainage and BRYANT placement presents with 2 weeks generalized weakness, cold sweats, cough, and recent hyponatremia at PCP. Pt endorses HA and gait instability. Recently pt took 3 weeks of previously discontinued HCTZ rx.  Denies fever, N/V/D, abdominal pain, fall/trauma, motor or sensory changes, bowel or bladder dysfunction.

## 2018-05-02 NOTE — ED PROVIDER NOTE - PHYSICAL EXAMINATION
A&Ox3 no apparent distress. R LE weakness reported as baseline 2/2 CVA. Otherwise CN 2-12 grossly intact without focal neurologic defict. PERRLA, EOMI. Heart RRR no murmur. No JVD. No peripheral edema. Lungs CTA b/l no wheezes, rhonchi, rales. Abdomen soft nontender nondistended. Peripheral pulses present and equal b/l. Head NCAT. Skin normal for race.

## 2018-05-02 NOTE — ED PROVIDER NOTE - PROGRESS NOTE DETAILS
Attending MD Calvin.  Pt with mild hyponatremia although not impressively changed from pt's baseline.  H/H also depressed but at pt's baseline.  No dark tarry/bright red stools. Unclear etiology of pt's generalized weakness.  Sig cards risk factors and [place] heritage with previous stenting.  Warrants admisson for ACS eval.

## 2018-05-02 NOTE — ED PROVIDER NOTE - ATTENDING CONTRIBUTION TO CARE
Attending MD Calvin.  Agree with above.  Pt is a [place] 78 yr old male with hx of DMII, CVA, CAD/stenting 2 yrs ago endorsing 'cold sweats', dizziness, weakness and presenting now from PCP after being seen for same complaint and found to be hyponatremic.  Was on losartan/HCTZ and then stoppd diuretic and then restarted on diuretic. Pt denies CP/SOB. Attending MD Calvin.  Agree with above.  Pt is a [place] 78 yr old male with hx of DMII, CVA, CAD/stenting 2 yrs ago endorsing 'cold sweats', dizziness, weakness and presenting now from PCP after being seen for same complaint and found to be hyponatremic.  Was on losartan/HCTZ and then stoppd diuretic and then restarted on diuretic by accident x 2 wks.  Pt reportedly with hyponatremia at PCP. Pt denies CP/SOB however endorses generalized weakness and 'sweats'.

## 2018-05-02 NOTE — H&P ADULT - PMH
Anemia    BPH (benign prostatic hyperplasia)    CAD (coronary artery disease)    Diabetes    DM (diabetes mellitus)    Essential hypertension    GERD (gastroesophageal reflux disease)    HLD (hyperlipidemia)    Intraparenchymal hemorrhage of brain    TIA (transient ischemic attack)

## 2018-05-02 NOTE — H&P ADULT - NSHPLABSRESULTS_GEN_ALL_CORE
LABS:                        10.5   7.8   )-----------( 282      ( 02 May 2018 18:22 )             33.1     05-02    129<L>  |  93<L>  |  12  ----------------------------<  104<H>  5.1   |  25  |  0.93    Ca    8.9      02 May 2018 18:22  Phos  2.5     05-  Mg     1.8     -    TPro  6.9  /  Alb  4.1  /  TBili  0.3  /  DBili  x   /  AST  7<L>  /  ALT  7<L>  /  AlkPhos  77  05-02    PT/INR - ( 02 May 2018 19:36 )   PT: 10.1 sec;   INR: 0.93 ratio           Urinalysis Basic - ( 02 May 2018 18:22 )    Color: Yellow / Appearance: Clear / S.019 / pH: x  Gluc: x / Ketone: Negative  / Bili: Negative / Urobili: Negative   Blood: x / Protein: Trace / Nitrite: Negative   Leuk Esterase: Trace / RBC: 0-2 /HPF / WBC 3-5 /HPF   Sq Epi: x / Non Sq Epi: OCC /HPF / Bacteria: Few /HPF          RADIOLOGY & ADDITIONAL TESTS:

## 2018-05-02 NOTE — H&P ADULT - HISTORY OF PRESENT ILLNESS
77 yo male PMH alzheimer's disease, DM(II), HTN, HLD, BPH, GERD, chronic anemia, CAD with stents, CVA 1 year ago with R side deficit, recent sigmoid diverticulitis w/ pelvic abscess s/p IR drainage and BRYANT placement presents with 2 weeks generalized weakness, cold sweats, cough, and recent hyponatremia at PCP. Pt endorses HA and gait instability. Recently pt took 3 weeks of previously discontinued HCTZ.     Denies fever, N/V/D, abdominal pain, fall/trauma, motor or sensory changes, bowel or bladder dysfunction.

## 2018-05-02 NOTE — ED ADULT NURSE NOTE - OBJECTIVE STATEMENT
Patient  is  alert  and  oriented x3.   Color is  good  and  skin warm to  touch.  He  is  c/o  weakness  and  dizziness.  He  denies   pain.

## 2018-05-02 NOTE — H&P ADULT - ASSESSMENT
79 yo male PMH alzheimer's disease, DM(II), HTN, HLD, BPH, GERD, chronic anemia, CAD with stents, CVA 1 year ago with R side deficit, recent sigmoid diverticulitis w/ pelvic abscess s/p IR drainage and BRYANT placement presents with 2 weeks generalized weakness, cold sweats, cough, and recent hyponatremia at PCP. Pt endorses HA and gait instability. Recently pt took 3 weeks of previously discontinued HCTZ. 79 yo male PMH alzheimer's disease, DM(II), HTN, HLD, BPH, GERD, chronic anemia, CAD with stents, CVA 1 year ago with R side deficit, recent sigmoid diverticulitis w/ pelvic abscess s/p IR drainage and BRYANT placement presents with 2 weeks generalized weakness, cold sweats, cough, and recent hyponatremia at PCP. Pt endorses HA and gait instability. Recently pt took 3 weeks of previously discontinued HCTZ.     Renal: Hyponatremia of 129 might be secondary to recent HCTZ use. Check urine for NA and osmolarity. Serum TSH. Trial of  normal saline 75/h. SMA-7 in AM.     CV: Continue Cozaar 100 mg/day and Toprol  mg/day for HTN, ASA 81 mg/day, Plavix 75 mg/day and Zocur 40 mg/day  for HLD and prior stents.     Urology: Continue flomax .4 mg/day for BPH.     DM: Continue Metformen 850 mg bid, finger sticks AC and HS. 77 yo male PMH alzheimer's disease, DM(II), HTN, HLD, BPH, GERD, chronic anemia, CAD with stents, CVA 1 year ago with R side deficit, recent sigmoid diverticulitis w/ pelvic abscess s/p IR drainage and BRYANT placement presents with 2 weeks generalized weakness, cold sweats, cough, and recent hyponatremia at PCP. Pt endorses HA and gait instability. Recently pt took 3 weeks of previously discontinued HCTZ.     Renal: Hyponatremia of 129 might be secondary to recent HCTZ use. Check urine for NA and osmolarity. Serum TSH. Trial of  normal saline 75/h. SMA-7 in AM.     CV: Continue Cozaar 100 mg/day and Toprol  mg/day for HTN, ASA 81 mg/day, Plavix 75 mg/day and Zocur 40 mg/day  for HLD and prior stents.     Urology: Continue flomax .4 mg/day and Proscar 5 mg/day for BPH.     DM: Continue Metformen 850 mg bid, finger sticks AC and HS.

## 2018-05-03 LAB
ANION GAP SERPL CALC-SCNC: 11 MMOL/L — SIGNIFICANT CHANGE UP (ref 5–17)
BUN SERPL-MCNC: 8 MG/DL — SIGNIFICANT CHANGE UP (ref 7–23)
CALCIUM SERPL-MCNC: 9.1 MG/DL — SIGNIFICANT CHANGE UP (ref 8.4–10.5)
CHLORIDE SERPL-SCNC: 94 MMOL/L — LOW (ref 96–108)
CO2 SERPL-SCNC: 25 MMOL/L — SIGNIFICANT CHANGE UP (ref 22–31)
CREAT SERPL-MCNC: 0.8 MG/DL — SIGNIFICANT CHANGE UP (ref 0.5–1.3)
CULTURE RESULTS: SIGNIFICANT CHANGE UP
GLUCOSE BLDC GLUCOMTR-MCNC: 125 MG/DL — HIGH (ref 70–99)
GLUCOSE BLDC GLUCOMTR-MCNC: 130 MG/DL — HIGH (ref 70–99)
GLUCOSE BLDC GLUCOMTR-MCNC: 143 MG/DL — HIGH (ref 70–99)
GLUCOSE BLDC GLUCOMTR-MCNC: 169 MG/DL — HIGH (ref 70–99)
GLUCOSE BLDC GLUCOMTR-MCNC: 191 MG/DL — HIGH (ref 70–99)
GLUCOSE SERPL-MCNC: 122 MG/DL — HIGH (ref 70–99)
HCT VFR BLD CALC: 33 % — LOW (ref 39–50)
HGB BLD-MCNC: 10.5 G/DL — LOW (ref 13–17)
MCHC RBC-ENTMCNC: 20.1 PG — LOW (ref 27–34)
MCHC RBC-ENTMCNC: 31.8 GM/DL — LOW (ref 32–36)
MCV RBC AUTO: 63.2 FL — LOW (ref 80–100)
OSMOLALITY UR: 273 MOS/KG — LOW (ref 300–900)
PLATELET # BLD AUTO: 254 K/UL — SIGNIFICANT CHANGE UP (ref 150–400)
POTASSIUM SERPL-MCNC: 4 MMOL/L — SIGNIFICANT CHANGE UP (ref 3.5–5.3)
POTASSIUM SERPL-SCNC: 4 MMOL/L — SIGNIFICANT CHANGE UP (ref 3.5–5.3)
RBC # BLD: 5.22 M/UL — SIGNIFICANT CHANGE UP (ref 4.2–5.8)
RBC # FLD: 20.6 % — HIGH (ref 10.3–14.5)
SODIUM SERPL-SCNC: 130 MMOL/L — LOW (ref 135–145)
SODIUM UR-SCNC: 102 MMOL/L — SIGNIFICANT CHANGE UP
SPECIMEN SOURCE: SIGNIFICANT CHANGE UP
TSH SERPL-MCNC: 1.28 UIU/ML — SIGNIFICANT CHANGE UP (ref 0.27–4.2)
WBC # BLD: 14.15 K/UL — HIGH (ref 3.8–10.5)
WBC # FLD AUTO: 14.15 K/UL — HIGH (ref 3.8–10.5)

## 2018-05-03 PROCEDURE — 74177 CT ABD & PELVIS W/CONTRAST: CPT | Mod: 26

## 2018-05-03 RX ORDER — INSULIN LISPRO 100/ML
VIAL (ML) SUBCUTANEOUS AT BEDTIME
Qty: 0 | Refills: 0 | Status: DISCONTINUED | OUTPATIENT
Start: 2018-05-03 | End: 2018-05-08

## 2018-05-03 RX ORDER — INSULIN LISPRO 100/ML
VIAL (ML) SUBCUTANEOUS
Qty: 0 | Refills: 0 | Status: DISCONTINUED | OUTPATIENT
Start: 2018-05-03 | End: 2018-05-08

## 2018-05-03 RX ORDER — METOPROLOL TARTRATE 50 MG
25 TABLET ORAL ONCE
Qty: 0 | Refills: 0 | Status: COMPLETED | OUTPATIENT
Start: 2018-05-03 | End: 2018-05-03

## 2018-05-03 RX ADMIN — SODIUM CHLORIDE 75 MILLILITER(S): 9 INJECTION INTRAMUSCULAR; INTRAVENOUS; SUBCUTANEOUS at 14:47

## 2018-05-03 RX ADMIN — Medication 1: at 13:28

## 2018-05-03 RX ADMIN — LOSARTAN POTASSIUM 100 MILLIGRAM(S): 100 TABLET, FILM COATED ORAL at 00:09

## 2018-05-03 RX ADMIN — Medication 1 DROP(S): at 06:13

## 2018-05-03 RX ADMIN — LOSARTAN POTASSIUM 100 MILLIGRAM(S): 100 TABLET, FILM COATED ORAL at 06:47

## 2018-05-03 RX ADMIN — SODIUM CHLORIDE 75 MILLILITER(S): 9 INJECTION INTRAMUSCULAR; INTRAVENOUS; SUBCUTANEOUS at 01:21

## 2018-05-03 RX ADMIN — Medication 100 MILLIGRAM(S): at 21:53

## 2018-05-03 RX ADMIN — CLOPIDOGREL BISULFATE 75 MILLIGRAM(S): 75 TABLET, FILM COATED ORAL at 11:29

## 2018-05-03 RX ADMIN — Medication 100 MILLIGRAM(S): at 00:09

## 2018-05-03 RX ADMIN — SIMVASTATIN 40 MILLIGRAM(S): 20 TABLET, FILM COATED ORAL at 21:53

## 2018-05-03 RX ADMIN — Medication 100 MILLIGRAM(S): at 06:12

## 2018-05-03 RX ADMIN — PANTOPRAZOLE SODIUM 40 MILLIGRAM(S): 20 TABLET, DELAYED RELEASE ORAL at 06:13

## 2018-05-03 RX ADMIN — SODIUM CHLORIDE 75 MILLILITER(S): 9 INJECTION INTRAMUSCULAR; INTRAVENOUS; SUBCUTANEOUS at 06:13

## 2018-05-03 RX ADMIN — FINASTERIDE 5 MILLIGRAM(S): 5 TABLET, FILM COATED ORAL at 11:29

## 2018-05-03 RX ADMIN — Medication 1 DROP(S): at 01:21

## 2018-05-03 RX ADMIN — Medication 25 MILLIGRAM(S): at 14:47

## 2018-05-03 RX ADMIN — Medication 1 DROP(S): at 11:30

## 2018-05-03 RX ADMIN — SENNA PLUS 2 TABLET(S): 8.6 TABLET ORAL at 21:53

## 2018-05-03 RX ADMIN — Medication 81 MILLIGRAM(S): at 11:29

## 2018-05-03 RX ADMIN — TAMSULOSIN HYDROCHLORIDE 0.4 MILLIGRAM(S): 0.4 CAPSULE ORAL at 11:29

## 2018-05-03 RX ADMIN — Medication 1 DROP(S): at 16:32

## 2018-05-03 NOTE — CONSULT NOTE ADULT - ASSESSMENT
pt with hx of cva, s/p lad stent in 20-16 with generalized weakness.  will add norvasc 5 mg daily if increase bp  continue losartan and lopresser  lipitor  echo  tsh  asa daily

## 2018-05-03 NOTE — CONSULT NOTE ADULT - SUBJECTIVE AND OBJECTIVE BOX
Patient is a 78y old  Male who presents with a chief complaint of generalized weakness, cough    HPI:  79 yo male PMH alzheimer's disease, DM(II), HTN, HLD, BPH, GERD, chronic anemia, CAD with stents, CVA 1 year ago with R side deficit, recent sigmoid diverticulitis w/ pelvic abscess s/p IR drainage and BRYANT placement presents with 2 weeks generalized weakness, cold sweats, cough, and recent hyponatremia at PCP. Pt endorses HA and gait instability. Recently pt took 3 weeks of previously discontinued HCTZ.     Denies fever, N/V/D, fall/trauma, motor or sensory changes, bowel or bladder dysfunction. (02 May 2018 22:28)    No diarrhea, rectal bleeding, fever or chills. Tolerating PO diet. He states intermittent lower abdominal discomfort and some urinary hesitancy. no pelvic pain or pressure  Does admit to "chest congestion" with "clear" sputum      PAST MEDICAL & SURGICAL HISTORY:  Essential hypertension  HLD (hyperlipidemia)  Intraparenchymal hemorrhage of brain  DM (diabetes mellitus)  CAD (coronary artery disease)  TIA (transient ischemic attack)  GERD (gastroesophageal reflux disease)  Anemia  Diabetes  BPH (benign prostatic hyperplasia)  History of appendectomy  History of coronary artery stent placement  Stented coronary artery  H/O hernia repair  History of appendectomy      Allergies  No Known Allergies      MEDICATIONS  (STANDING):  aspirin enteric coated 81 milliGRAM(s) Oral daily  clopidogrel Tablet 75 milliGRAM(s) Oral daily  docusate sodium 100 milliGRAM(s) Oral three times a day  finasteride 5 milliGRAM(s) Oral daily  insulin lispro (HumaLOG) corrective regimen sliding scale   SubCutaneous three times a day before meals  insulin lispro (HumaLOG) corrective regimen sliding scale   SubCutaneous at bedtime  losartan 100 milliGRAM(s) Oral daily  metoprolol succinate  milliGRAM(s) Oral daily  metoprolol tartrate 25 milliGRAM(s) Oral once  pantoprazole    Tablet 40 milliGRAM(s) Oral before breakfast  pilocarpine 1% Solution 1 Drop(s) Both EYES four times a day  senna 2 Tablet(s) Oral at bedtime  simvastatin 40 milliGRAM(s) Oral at bedtime  sodium chloride 0.9%. 1000 milliLiter(s) (75 mL/Hr) IV Continuous <Continuous>  tamsulosin 0.4 milliGRAM(s) Oral daily    MEDICATIONS  (PRN):  oxyCODONE    IR 5 milliGRAM(s) Oral every 6 hours PRN Mild Pain (1 - 3)      Social History:  no current tobacco  denies ETOH abuse    Family History   IBD (  ) Yes   (X  ) No  GI Malignancy (  )  Yes    ( X ) No    Health Management  Last Colonoscopy - pt unable to recall      Advanced Directives: (   X  ) None    (      ) DNR    (     ) DNI    (     ) Health Care Proxy:     Review of Systems:    General:  No wt loss, fevers, chills, +generalized weakness +"sweats"  CV:  No pain, palpitations, hypo/hypertension +Hx CVA  Resp:  +cough +chest congestion - clear sputum  GI:  see HPI  :  No pain, bleeding, incontinence +urinary hesitancy  Muscle:  ambulates with cane assistance  Neuro:  no focal weakness +HA no vision changes +hx CVA TIA +Hx IPH  Psych:  No fatigue, insomnia, mood problems, depression  Endocrine:  No polyuria, polydypsia, cold/heat intolerance  Heme:  No petechiae, ecchymosis, easy bruisability  Skin:  No rash, tattoos, scars, edema      Vital Signs Last 24 Hrs  T(C): 36.9 (03 May 2018 13:53), Max: 37.2 (03 May 2018 06:10)  T(F): 98.5 (03 May 2018 13:53), Max: 99 (03 May 2018 06:10)  HR: 62 (03 May 2018 14:28) (58 - 66)  BP: 174/71 (03 May 2018 14:28) (153/73 - 195/80)  BP(mean): --  RR: 18 (03 May 2018 13:53) (16 - 20)  SpO2: 97% (03 May 2018 13:53) (94% - 98%)    PHYSICAL EXAM:    Constitutional: NAD, well-developed West  male, non-toxic appearing  Neck: No LAD, supple  Respiratory: grossly clear bilaterally  Cardiovascular: S1 and S2, RRR,  Gastrointestinal: BS+, soft, ND +mild suprapubic tenderness without rebound, guarding or rigidity, neg HSM, WH suprapubic scar (cw prior drain site)  Extremities: No peripheral edema, neg clubbing, cyanosis  Vascular: 2+ peripheral pulses  Neurological: no focal asymmetry  Psychiatric: Normal mood, normal affect  Skin: No rashes        LABS:                        10.5   14.15 )-----------( 254      ( 03 May 2018 07:53 )             33.0     05-03    130<L>  |  94<L>  |  8   ----------------------------<  122<H>  4.0   |  25  |  0.80    Ca    9.1      03 May 2018 05:51  Phos  2.5     05-  Mg     1.8     05-    TPro  6.9  /  Alb  4.1  /  TBili  0.3  /  DBili  x   /  AST  7<L>  /  ALT  7<L>  /  AlkPhos  77  05-02    PT/INR - ( 02 May 2018 19:36 )   PT: 10.1 sec;   INR: 0.93 ratio         Urinalysis Basic - ( 02 May 2018 18:22 )    Color: Yellow / Appearance: Clear / S.019 / pH: x  Gluc: x / Ketone: Negative  / Bili: Negative / Urobili: Negative   Blood: x / Protein: Trace / Nitrite: Negative   Leuk Esterase: Trace / RBC: 0-2 /HPF / WBC 3-5 /HPF   Sq Epi: x / Non Sq Epi: OCC /HPF / Bacteria: Few /HPF      RADIOLOGY & ADDITIONAL TESTS:  < from: CT Head No Cont (18 @ 18:32) >  INTERPRETATION:  CLINICAL INFORMATION: Weakness, gait instability.   Headache. High blood pressure.    TECHNIQUE: Noncontrast axial CT images were acquired through the head.   Two-dimensional sagittal and coronal reformats were generated.    COMPARISON STUDY: Head CT from 10/11/2017.    FINDINGS:     There is no CT evidence of acute intracranial hemorrhage, extra-axial   collection, vasogenic edema, mass effect, midline shift, central   herniation, or hydrocephalus. Scattered old lacunar infarcts are   unchanged.    There is age-appropriate cerebral volume loss and patchy white matter   hypoattenuation which is nonspecific in etiology but likely related to   microvascular disease.    The visualized paranasal sinuses are clear. The mastoid air cells and   middle ear cavities are clear. The patient is status post left-sided   cataract surgery.    The soft tissues of the scalp are unremarkable. The calvarium is intact.    IMPRESSION:     No CT evidence of acute intracranial hemorrhage or acute infarct. If   symptoms persist, consider short interval follow-up head CT or brain MRI   follow-up.      < from: Xray Chest 2 Views PA/Lat (18 @ 18:48) >  INTERPRETATION:  CLINICAL INFORMATION: 3 months of congestion. Rule out   pneumonia.    TECHNIQUE: PA and lateral views of the chest    COMPARISON: Chest x-ray 10/7/2017.    FINDINGS:     No focal consolidations, pleural effusions, or pneumothorax.  The cardiomediastinal silhouette is unremarkable.  The visualized osseous structures demonstrate no acute pathology.    IMPRESSION:  Clear lungs. Patient is a 78y old  Male who presents with a chief complaint of generalized weakness, cough    HPI:  77 yo male PMH alzheimer's disease, DM(II), HTN, HLD, BPH, GERD, chronic anemia, CAD with stents, CVA 1 year ago with R side deficit, recent sigmoid diverticulitis w/ pelvic abscess s/p IR drainage and BRYANT placement presents with 2 weeks generalized weakness, cold sweats, cough, and recent hyponatremia at PCP. Pt endorses HA and gait instability. Recently pt took 3 weeks of previously discontinued HCTZ.     Denies fever, N/V/D, fall/trauma, motor or sensory changes, bowel or bladder dysfunction. (02 May 2018 22:28)    No diarrhea, rectal bleeding, fever or chills. Tolerating PO diet. He states intermittent lower abdominal discomfort and some urinary hesitancy. no pelvic pain or pressure  Does admit to "chest congestion" with "clear" sputum    PAST MEDICAL & SURGICAL HISTORY:  Essential hypertension  HLD (hyperlipidemia)  Intraparenchymal hemorrhage of brain  DM (diabetes mellitus)  CAD (coronary artery disease)  TIA (transient ischemic attack)  GERD (gastroesophageal reflux disease)  Anemia  Diabetes  BPH (benign prostatic hyperplasia)  History of appendectomy  History of coronary artery stent placement  Stented coronary artery  H/O hernia repair  History of appendectomy    Allergies  No Known Allergies    MEDICATIONS  (STANDING):  aspirin enteric coated 81 milliGRAM(s) Oral daily  clopidogrel Tablet 75 milliGRAM(s) Oral daily  docusate sodium 100 milliGRAM(s) Oral three times a day  finasteride 5 milliGRAM(s) Oral daily  insulin lispro (HumaLOG) corrective regimen sliding scale   SubCutaneous three times a day before meals  insulin lispro (HumaLOG) corrective regimen sliding scale   SubCutaneous at bedtime  losartan 100 milliGRAM(s) Oral daily  metoprolol succinate  milliGRAM(s) Oral daily  metoprolol tartrate 25 milliGRAM(s) Oral once  pantoprazole    Tablet 40 milliGRAM(s) Oral before breakfast  pilocarpine 1% Solution 1 Drop(s) Both EYES four times a day  senna 2 Tablet(s) Oral at bedtime  simvastatin 40 milliGRAM(s) Oral at bedtime  sodium chloride 0.9%. 1000 milliLiter(s) (75 mL/Hr) IV Continuous <Continuous>  tamsulosin 0.4 milliGRAM(s) Oral daily    MEDICATIONS  (PRN):  oxyCODONE    IR 5 milliGRAM(s) Oral every 6 hours PRN Mild Pain (1 - 3)    Social History:  no current tobacco  denies ETOH abuse    Family History   IBD (  ) Yes   (X  ) No  GI Malignancy (  )  Yes    ( X ) No    Health Management  Last Colonoscopy - pt unable to recall    Advanced Directives: (   X  ) None    (      ) DNR    (     ) DNI    (     ) Health Care Proxy:     Review of Systems:  General:  No wt loss, fevers, chills, +generalized weakness +"sweats"  CV:  No pain, palpitations, hypo/hypertension +Hx CVA  Resp:  +cough +chest congestion - clear sputum  GI:  see HPI  :  No pain, bleeding, incontinence +urinary hesitancy  Muscle:  ambulates with cane assistance  Neuro:  no focal weakness +HA no vision changes +hx CVA TIA +Hx IPH  Psych:  No fatigue, insomnia, mood problems, depression  Endocrine:  No polyuria, polydypsia, cold/heat intolerance  Heme:  No petechiae, ecchymosis, easy bruisability  Skin:  No rash, tattoos, scars, edema    Vital Signs Last 24 Hrs  T(C): 36.9 (03 May 2018 13:53), Max: 37.2 (03 May 2018 06:10)  T(F): 98.5 (03 May 2018 13:53), Max: 99 (03 May 2018 06:10)  HR: 62 (03 May 2018 14:28) (58 - 66)  BP: 174/71 (03 May 2018 14:28) (153/73 - 195/80)  BP(mean): --  RR: 18 (03 May 2018 13:53) (16 - 20)  SpO2: 97% (03 May 2018 13:53) (94% - 98%)    PHYSICAL EXAM:  Constitutional: NAD, well-developed West  male, non-toxic appearing  Neck: No LAD, supple  Respiratory: grossly clear bilaterally  Cardiovascular: S1 and S2, RRR,  Gastrointestinal: BS+, soft, ND +mild suprapubic tenderness without rebound, guarding or rigidity, neg HSM, WH suprapubic scar (cw prior drain site)  Extremities: No peripheral edema, neg clubbing, cyanosis  Vascular: 2+ peripheral pulses  Neurological: no focal asymmetry  Psychiatric: Normal mood, normal affect  Skin: No rashes    LABS:                      10.5   14.15 )-----------( 254      ( 03 May 2018 07:53 )             33.0     05-03    130<L>  |  94<L>  |  8   ----------------------------<  122<H>  4.0   |  25  |  0.80    Ca    9.1      03 May 2018 05:51  Phos  2.5     05-  Mg     1.8     05-    TPro  6.9  /  Alb  4.1  /  TBili  0.3  /  DBili  x   /  AST  7<L>  /  ALT  7<L>  /  AlkPhos  77  05-02    PT/INR - ( 02 May 2018 19:36 )   PT: 10.1 sec;   INR: 0.93 ratio       Urinalysis Basic - ( 02 May 2018 18:22 )  Color: Yellow / Appearance: Clear / S.019 / pH: x  Gluc: x / Ketone: Negative  / Bili: Negative / Urobili: Negative   Blood: x / Protein: Trace / Nitrite: Negative   Leuk Esterase: Trace / RBC: 0-2 /HPF / WBC 3-5 /HPF   Sq Epi: x / Non Sq Epi: OCC /HPF / Bacteria: Few /HPF    RADIOLOGY & ADDITIONAL TESTS:  < from: CT Head No Cont (18 @ 18:32) >  INTERPRETATION:  CLINICAL INFORMATION: Weakness, gait instability.   Headache. High blood pressure.    TECHNIQUE: Noncontrast axial CT images were acquired through the head.   Two-dimensional sagittal and coronal reformats were generated.    COMPARISON STUDY: Head CT from 10/11/2017.    FINDINGS:     There is no CT evidence of acute intracranial hemorrhage, extra-axial   collection, vasogenic edema, mass effect, midline shift, central   herniation, or hydrocephalus. Scattered old lacunar infarcts are   unchanged.    There is age-appropriate cerebral volume loss and patchy white matter   hypoattenuation which is nonspecific in etiology but likely related to   microvascular disease.    The visualized paranasal sinuses are clear. The mastoid air cells and   middle ear cavities are clear. The patient is status post left-sided   cataract surgery.    The soft tissues of the scalp are unremarkable. The calvarium is intact.    IMPRESSION:     No CT evidence of acute intracranial hemorrhage or acute infarct. If   symptoms persist, consider short interval follow-up head CT or brain MRI   follow-up.      < from: Xray Chest 2 Views PA/Lat (18 @ 18:48) >  INTERPRETATION:  CLINICAL INFORMATION: 3 months of congestion. Rule out   pneumonia.    TECHNIQUE: PA and lateral views of the chest    COMPARISON: Chest x-ray 10/7/2017.    FINDINGS:     No focal consolidations, pleural effusions, or pneumothorax.  The cardiomediastinal silhouette is unremarkable.  The visualized osseous structures demonstrate no acute pathology.    IMPRESSION:  Clear lungs.

## 2018-05-03 NOTE — CONSULT NOTE ADULT - SUBJECTIVE AND OBJECTIVE BOX
HPI:   Patient is a 78y male who in 2017 had perforated diverticulitis managed with antibiotics and IR drain then soon after had hypertensive intraparenchymal brain bleed. He now returns to hospital with complaint of worsening gait, cold sweats, some lower abdomen pain. He does not recall any recent evaluation for diverticulitis. He has headache since the original bleed. He denies fever, nausea, vomiting. He reports feeling some chest congestion and occasional green phlegm production for weeks. No fever . he has chronic urinary hesitancy and some dysuria    REVIEW OF SYSTEMS:  All other review of systems negative (Comprehensive ROS)    PAST MEDICAL & SURGICAL HISTORY:  Essential hypertension  HLD (hyperlipidemia)  Intraparenchymal hemorrhage of brain  DM (diabetes mellitus)  CAD (coronary artery disease)  TIA (transient ischemic attack)  GERD (gastroesophageal reflux disease)  Anemia  Diabetes  BPH (benign prostatic hyperplasia)  History of appendectomy  History of coronary artery stent placement  Stented coronary artery  H/O hernia repair  History of appendectomy      Allergies    No Known Allergies    Intolerances        Antimicrobials Day #  :    Other Medications:  aspirin enteric coated 81 milliGRAM(s) Oral daily  clopidogrel Tablet 75 milliGRAM(s) Oral daily  docusate sodium 100 milliGRAM(s) Oral three times a day  finasteride 5 milliGRAM(s) Oral daily  insulin lispro (HumaLOG) corrective regimen sliding scale   SubCutaneous three times a day before meals  insulin lispro (HumaLOG) corrective regimen sliding scale   SubCutaneous at bedtime  losartan 100 milliGRAM(s) Oral daily  metoprolol succinate  milliGRAM(s) Oral daily  oxyCODONE    IR 5 milliGRAM(s) Oral every 6 hours PRN  pantoprazole    Tablet 40 milliGRAM(s) Oral before breakfast  pilocarpine 1% Solution 1 Drop(s) Both EYES four times a day  senna 2 Tablet(s) Oral at bedtime  simvastatin 40 milliGRAM(s) Oral at bedtime  sodium chloride 0.9%. 1000 milliLiter(s) IV Continuous <Continuous>  tamsulosin 0.4 milliGRAM(s) Oral daily      FAMILY HISTORY:  No pertinent family history in first degree relatives  Family history of heart disease      SOCIAL HISTORY:  Smoking: [ ]Yes [ x]No  ETOH: [ ]Yes [ xx]No  Drug Use: [ ]Yes [ ]No   [x ] Single[ ]    T(F): 98.2 (18 @ 07:55), Max: 99 (18 @ 06:10)  HR: 65 (18 @ 07:55)  BP: 181/82 (18 @ 07:55)  RR: 18 (18 @ 07:55)  SpO2: 94% (18 @ 07:55)  Wt(kg): --    PHYSICAL EXAM:  General: alert, no acute distress  Eyes:  anicteric, no conjunctival injection, no discharge  Oropharynx: no lesions or injection 	  Neck: supple, without adenopathy  Lungs: clear to auscultation  Heart: regular rate and rhythm; no murmur, rubs or gallops  Abdomen: soft, nondistended, slight lower abdo tender, without mass or organomegaly  Skin: no lesions  Extremities: no clubbing, cyanosis, or edema  Neurologic: alert,  moves all extremities    LAB RESULTS:                        10.5   14.15 )-----------( 254      ( 03 May 2018 07:53 )             33.0     05-03    130<L>  |  94<L>  |  8   ----------------------------<  122<H>  4.0   |  25  |  0.80    Ca    9.1      03 May 2018 05:51  Phos  2.5     05-  Mg     1.8     -    TPro  6.9  /  Alb  4.1  /  TBili  0.3  /  DBili  x   /  AST  7<L>  /  ALT  7<L>  /  AlkPhos  77  05-02    LIVER FUNCTIONS - ( 02 May 2018 18:22 )  Alb: 4.1 g/dL / Pro: 6.9 g/dL / ALK PHOS: 77 U/L / ALT: 7 U/L / AST: 7 U/L / GGT: x           Urinalysis Basic - ( 02 May 2018 18:22 )    Color: Yellow / Appearance: Clear / S.019 / pH: x  Gluc: x / Ketone: Negative  / Bili: Negative / Urobili: Negative   Blood: x / Protein: Trace / Nitrite: Negative   Leuk Esterase: Trace / RBC: 0-2 /HPF / WBC 3-5 /HPF   Sq Epi: x / Non Sq Epi: OCC /HPF / Bacteria: Few /HPF        MICROBIOLOGY:  RECENT CULTURES:        RADIOLOGY REVIEWED:    < from: CT Head No Cont (18 @ 18:32) >  IMPRESSION:     No CT evidence of acute intracranial hemorrhage or acute infarct. If   symptoms persist, consider short interval follow-up head CT or brain MRI   follow-up.      < from: Xray Chest 2 Views PA/Lat (18 @ 18:48) >    IMPRESSION:    < end of copied text >      LUIS ANTONIO PERERA M.D., RADIOLOGY RESIDENT    < end of copied text >    Impression:  Patient with history of perforated diverticulitis and iph in the past few months returns for further evaluation of sweats , unsteady gait, hypertension. He has some leukocytosis. His exam shows mild abdomen tenderness. He could have residual or relapse of intra abdomen infection, prostatitis is a consideration as well. He has some congestion but pneumonia is not apparent.     Recommendations: Monitor off antibiotics for now  Culture blood and urine  ct abdo and pelvis  neuro w/u in progress

## 2018-05-03 NOTE — PROGRESS NOTE ADULT - SUBJECTIVE AND OBJECTIVE BOX
no  cp/sob,  weakness  REVIEW OF SYSTEMS:  CONSTITUTIONAL: No weakness,  no   fevers      RESPIRATORY:  No    shortness of breath  , no  wheezing  CARDIOVASCULAR: No chest pain,   no  palpitations, no  cough  GASTROINTESTINAL: No abdominal  pain, no  vomiting, no  diarrhea  NEUROLOGICAL: No  focal  weakness    MEDICATIONS  (STANDING):  aspirin enteric coated 81 milliGRAM(s) Oral daily  clopidogrel Tablet 75 milliGRAM(s) Oral daily  docusate sodium 100 milliGRAM(s) Oral three times a day  finasteride 5 milliGRAM(s) Oral daily  insulin lispro (HumaLOG) corrective regimen sliding scale   SubCutaneous three times a day before meals  insulin lispro (HumaLOG) corrective regimen sliding scale   SubCutaneous at bedtime  losartan 100 milliGRAM(s) Oral daily  metoprolol succinate  milliGRAM(s) Oral daily  pantoprazole    Tablet 40 milliGRAM(s) Oral before breakfast  pilocarpine 1% Solution 1 Drop(s) Both EYES four times a day  senna 2 Tablet(s) Oral at bedtime  simvastatin 40 milliGRAM(s) Oral at bedtime  sodium chloride 0.9%. 1000 milliLiter(s) (75 mL/Hr) IV Continuous <Continuous>  tamsulosin 0.4 milliGRAM(s) Oral daily    MEDICATIONS  (PRN):  oxyCODONE    IR 5 milliGRAM(s) Oral every 6 hours PRN Mild Pain (1 - 3)      Vital Signs Last 24 Hrs  T(C): 37.2 (03 May 2018 06:10), Max: 37.2 (03 May 2018 06:10)  T(F): 99 (03 May 2018 06:10), Max: 99 (03 May 2018 06:10)  HR: 64 (03 May 2018 06:10) (60 - 66)  BP: 153/73 (03 May 2018 06:10) (153/73 - 195/80)  BP(mean): --  RR: 18 (03 May 2018 06:10) (16 - 20)  SpO2: 95% (03 May 2018 06:10) (95% - 98%)  CAPILLARY BLOOD GLUCOSE      POCT Blood Glucose.: 125 mg/dL (03 May 2018 05:50)    I&O's Summary      PHYSICAL EXAM:  HEAD:  Atraumatic, Normocephalic  NECK: Supple, No   JVD  CHEST/LUNG:   no     rales,     no,    rhonchi  HEART: Regular rate and rhythm;         murmur  ABDOMEN: Soft, Nontender, ;   EXTREMITIES:        edema  NEUROLOGY:  alert    LABS:                        10.5   7.8   )-----------( 282      ( 02 May 2018 18:22 )             33.1     05-03    130<L>  |  94<L>  |  8   ----------------------------<  122<H>  4.0   |  25  |  0.80    Ca    9.1      03 May 2018 05:51  Phos  2.5     05-  Mg     1.8     -    TPro  6.9  /  Alb  4.1  /  TBili  0.3  /  DBili  x   /  AST  7<L>  /  ALT  7<L>  /  AlkPhos  77  -    PT/INR - ( 02 May 2018 19:36 )   PT: 10.1 sec;   INR: 0.93 ratio           CARDIAC MARKERS ( 02 May 2018 18:22 )  x     / <0.01 ng/mL / 29 U/L / x     / 2.1 ng/mL      Urinalysis Basic - ( 02 May 2018 18:22 )    Color: Yellow / Appearance: Clear / S.019 / pH: x  Gluc: x / Ketone: Negative  / Bili: Negative / Urobili: Negative   Blood: x / Protein: Trace / Nitrite: Negative   Leuk Esterase: Trace / RBC: 0-2 /HPF / WBC 3-5 /HPF   Sq Epi: x / Non Sq Epi: OCC /HPF / Bacteria: Few /HPF                      Consultant(s) Notes Reviewed:      Care Discussed with Consultants/Other Providers: no  cp/sob,  weakness   gait  instability   REVIEW OF SYSTEMS:  CONSTITUTIONAL: No weakness,  no   fevers      RESPIRATORY:  No    shortness of breath  , no  wheezing  CARDIOVASCULAR: No chest pain,   no  palpitations, no  cough  GASTROINTESTINAL: No abdominal  pain, no  vomiting, no  diarrhea  NEUROLOGICAL: No  focal  weakness    MEDICATIONS  (STANDING):  aspirin enteric coated 81 milliGRAM(s) Oral daily  clopidogrel Tablet 75 milliGRAM(s) Oral daily  docusate sodium 100 milliGRAM(s) Oral three times a day  finasteride 5 milliGRAM(s) Oral daily  insulin lispro (HumaLOG) corrective regimen sliding scale   SubCutaneous three times a day before meals  insulin lispro (HumaLOG) corrective regimen sliding scale   SubCutaneous at bedtime  losartan 100 milliGRAM(s) Oral daily  metoprolol succinate  milliGRAM(s) Oral daily  pantoprazole    Tablet 40 milliGRAM(s) Oral before breakfast  pilocarpine 1% Solution 1 Drop(s) Both EYES four times a day  senna 2 Tablet(s) Oral at bedtime  simvastatin 40 milliGRAM(s) Oral at bedtime  sodium chloride 0.9%. 1000 milliLiter(s) (75 mL/Hr) IV Continuous <Continuous>  tamsulosin 0.4 milliGRAM(s) Oral daily    MEDICATIONS  (PRN):  oxyCODONE    IR 5 milliGRAM(s) Oral every 6 hours PRN Mild Pain (1 - 3)      Vital Signs Last 24 Hrs  T(C): 37.2 (03 May 2018 06:10), Max: 37.2 (03 May 2018 06:10)  T(F): 99 (03 May 2018 06:10), Max: 99 (03 May 2018 06:10)  HR: 64 (03 May 2018 06:10) (60 - 66)  BP: 153/73 (03 May 2018 06:10) (153/73 - 195/80)  BP(mean): --  RR: 18 (03 May 2018 06:10) (16 - 20)  SpO2: 95% (03 May 2018 06:10) (95% - 98%)  CAPILLARY BLOOD GLUCOSE      POCT Blood Glucose.: 125 mg/dL (03 May 2018 05:50)    I&O's Summary      PHYSICAL EXAM:  HEAD:  Atraumatic, Normocephalic  NECK: Supple, No   JVD  CHEST/LUNG:   no     rales,     no,    rhonchi  HEART: Regular rate and rhythm;         murmur  ABDOMEN: Soft, Nontender, ;   EXTREMITIES:        edema  NEUROLOGY:  alert    LABS:                        10.5   7.8   )-----------( 282      ( 02 May 2018 18:22 )             33.1     05-03    130<L>  |  94<L>  |  8   ----------------------------<  122<H>  4.0   |  25  |  0.80    Ca    9.1      03 May 2018 05:51  Phos  2.5     05-  Mg     1.8     -    TPro  6.9  /  Alb  4.1  /  TBili  0.3  /  DBili  x   /  AST  7<L>  /  ALT  7<L>  /  AlkPhos  77  -    PT/INR - ( 02 May 2018 19:36 )   PT: 10.1 sec;   INR: 0.93 ratio           CARDIAC MARKERS ( 02 May 2018 18:22 )  x     / <0.01 ng/mL / 29 U/L / x     / 2.1 ng/mL      Urinalysis Basic - ( 02 May 2018 18:22 )    Color: Yellow / Appearance: Clear / S.019 / pH: x  Gluc: x / Ketone: Negative  / Bili: Negative / Urobili: Negative   Blood: x / Protein: Trace / Nitrite: Negative   Leuk Esterase: Trace / RBC: 0-2 /HPF / WBC 3-5 /HPF   Sq Epi: x / Non Sq Epi: OCC /HPF / Bacteria: Few /HPF                      Consultant(s) Notes Reviewed:      Care Discussed with Consultants/Other Providers:

## 2018-05-03 NOTE — PROGRESS NOTE ADULT - ASSESSMENT
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    Recently pt took 3 weeks of previously discontinued HCTZ.     : Hyponatremia of 129 might be secondary to recent HCTZ   sodium ha s increased  today  . Serum TSH.    Cozaar / Toprol XL / ASA 81 mg/day, ,Plavix 75 mg/day and Zocur 40 mg/day  for HLD and prior stents..  h.o diastolicc dysfunction     DM:finger sticks AC and HS. 79 yo male PMH alzheimer's disease,   DM(II), HTN,   HLD, BPH,   GERD, chronic anemia,   CAD with stents,,    CVA 1 year ago with R side deficit,   h/o  sigmoid diverticulitis w/ pelvic abscess s/p IR drainage and BRYANT placement, last year   presents with 2 weeks generalized weakness, cold sweats,  hyponatremia at PCP.    Recently pt took 3 weeks of previously discontinued HCTZ.     : Hyponatremia of 129 might be secondary to recent HCTZ   sodium ha s increased  today  . Serum TSH.    Cozaar / Toprol XL / ASA 81 mg/day, ,Plavix 75 mg/day and Zocur 40 mg/day  for HLD and prior stents..  h.o diastolicc dysfunction     DM:finger sticks AC and HS.  neuro  eval, gait  instability,  high risk for  falls

## 2018-05-03 NOTE — CONSULT NOTE ADULT - SUBJECTIVE AND OBJECTIVE BOX
CHIEF COMPLAINT:Patient is a 78y old  Male who presents with a chief complaint of weakness.    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 Pt endorses HA and gait instability. Recently pt took 3 weeks of previously discontinued HCTZ.   no recent cardiac visit since his cardiologist passed away.  Denies fever, N/V/D, abdominal pain, fall/trauma, motor or sensory changes, bowel or bladder dysfunction. (02 May 2018 22:28)      PAST MEDICAL & SURGICAL HISTORY:  Essential hypertension  HLD (hyperlipidemia)  Intraparenchymal hemorrhage of brain  DM (diabetes mellitus)  CAD (coronary artery disease)  TIA (transient ischemic attack)  GERD (gastroesophageal reflux disease)  Anemia  Diabetes  BPH (benign prostatic hyperplasia)  History of appendectomy  History of coronary artery stent placement  Stented coronary artery  H/O hernia repair  History of appendectomy      MEDICATIONS  (STANDING):  aspirin enteric coated 81 milliGRAM(s) Oral daily  clopidogrel Tablet 75 milliGRAM(s) Oral daily  docusate sodium 100 milliGRAM(s) Oral three times a day  finasteride 5 milliGRAM(s) Oral daily  insulin lispro (HumaLOG) corrective regimen sliding scale   SubCutaneous three times a day before meals  insulin lispro (HumaLOG) corrective regimen sliding scale   SubCutaneous at bedtime  losartan 100 milliGRAM(s) Oral daily  metoprolol succinate  milliGRAM(s) Oral daily  pantoprazole    Tablet 40 milliGRAM(s) Oral before breakfast  pilocarpine 1% Solution 1 Drop(s) Both EYES four times a day  senna 2 Tablet(s) Oral at bedtime  simvastatin 40 milliGRAM(s) Oral at bedtime  sodium chloride 0.9%. 1000 milliLiter(s) (75 mL/Hr) IV Continuous <Continuous>  tamsulosin 0.4 milliGRAM(s) Oral daily    MEDICATIONS  (PRN):  oxyCODONE    IR 5 milliGRAM(s) Oral every 6 hours PRN Mild Pain (1 - 3)      FAMILY HISTORY:  No pertinent family history in first degree relatives  Family history of heart disease      SOCIAL HISTORY:    [ ] Non-smoker  [ ] Smoker  [ ] Alcohol    Allergies    No Known Allergies    Intolerances    	    REVIEW OF SYSTEMS:  CONSTITUTIONAL: No fever, weight loss, or fatigue  EYES: No eye pain, visual disturbances, or discharge  ENT:  No difficulty hearing, tinnitus, vertigo; No sinus or throat pain  NECK: No pain or stiffness  RESPIRATORY: No cough, wheezing, chills or hemoptysis; + Shortness of Breath  CARDIOVASCULAR: No chest pain, palpitations, passing out, dizziness, or leg swelling  GASTROINTESTINAL: No abdominal or epigastric pain. No nausea, vomiting, or hematemesis; No diarrhea or constipation. No melena or hematochezia.  GENITOURINARY: No dysuria, frequency, hematuria, or incontinence  NEUROLOGICAL: No headaches, memory loss, loss of strength, numbness, or tremors  SKIN: No itching, burning, rashes, or lesions   LYMPH Nodes: No enlarged glands  ENDOCRINE: No heat or cold intolerance; No hair loss  MUSCULOSKELETAL: No joint pain or swelling; No muscle, back, or extremity pain  PSYCHIATRIC: No depression, anxiety, mood swings, or difficulty sleeping  HEME/LYMPH: No easy bruising, or bleeding gums  ALLERGY AND IMMUNOLOGIC: No hives or eczema	    [ ] All others negative	  [ ] Unable to obtain    PHYSICAL EXAM:  T(C): 36.8 (05-03-18 @ 07:55), Max: 37.2 (05-03-18 @ 06:10)  HR: 65 (05-03-18 @ 07:55) (60 - 66)  BP: 181/82 (05-03-18 @ 07:55) (153/73 - 195/80)  RR: 18 (05-03-18 @ 07:55) (16 - 20)  SpO2: 94% (05-03-18 @ 07:55) (94% - 98%)  Wt(kg): --  I&O's Summary    03 May 2018 07:01  -  03 May 2018 08:37  --------------------------------------------------------  IN: 0 mL / OUT: 400 mL / NET: -400 mL        Appearance: Normal	  HEENT:   Normal oral mucosa, PERRL, EOMI	  Lymphatic: No lymphadenopathy  Cardiovascular: Normal S1 S2, No JVD, + murmurs, No edema  Respiratory: Lungs clear to auscultation	  Psychiatry: A & O x 3, Mood & affect appropriate  Gastrointestinal:  Soft, Non-tender, + BS	  Skin: No rashes, No ecchymoses, No cyanosis	  Neurologic: Non-focal  Extremities: Normal range of motion, No clubbing, cyanosis or edema  Vascular: Peripheral pulses palpable 2+ bilaterally    TELEMETRY: 	    ECG:  	  RADIOLOGY:  OTHER: 	  	  LABS:	 	    CARDIAC MARKERS:  CARDIAC MARKERS ( 02 May 2018 18:22 )  x     / <0.01 ng/mL / 29 U/L / x     / 2.1 ng/mL                              10.5   7.8   )-----------( 282      ( 02 May 2018 18:22 )             33.1     05-03    130<L>  |  94<L>  |  8   ----------------------------<  122<H>  4.0   |  25  |  0.80    Ca    9.1      03 May 2018 05:51  Phos  2.5     05-02  Mg     1.8     05-02    TPro  6.9  /  Alb  4.1  /  TBili  0.3  /  DBili  x   /  AST  7<L>  /  ALT  7<L>  /  AlkPhos  77  05-02    proBNP: Serum Pro-Brain Natriuretic Peptide: 630 pg/mL (05-02 @ 18:22)    Lipid Profile:   HgA1c:   TSH:   PT/INR - ( 02 May 2018 19:36 )   PT: 10.1 sec;   INR: 0.93 ratio             PREVIOUS DIAGNOSTIC TESTING:      < from: Transthoracic Echocardiogram (10.03.17 @ 16:08) >  1. Increased relative wall thickness with normal left  ventricular mass index, consistent with concentric left  ventricular remodeling.  2. Hyperdynamic left ventricle.  3. Mild diastolic dysfunction (Stage I).    < from: Cardiac Cath Lab - Adult (12.13.16 @ 15:40) >  CORONARY VESSELS: The coronary circulation is right dominant.  LM:   --  LM: Angiography showed minor luminal irregularities with no flow  limiting lesions.  LAD:   --  Proximal LAD: There was a 90 % stenosis.  CX:   --  Distal circumflex: There was a 80 % stenosis. The reference  vessel diameter was 1.5 mm. Lesion severity was assessed by visual  estimate.  RCA:   --  RCA: Angiographyshowed minor luminal irregularities with no  flow limiting lesions.  COMPLICATIONS: There were no complications.    < from: Xray Chest 2 Views PA/Lat (05.02.18 @ 18:48) >  no urgent/emergent findings.      < end of copied text >

## 2018-05-03 NOTE — CONSULT NOTE ADULT - ASSESSMENT
77 yo male with PMH CVA, TIA, IPH, HTN, DM, CAD, BPH, diverticulosis/itis s/p IR perc drain of pelvic abscess 9/2017 now presents to ER with generalized weakness, sweats and unsteady gait and found to have mild leukocytosis. he does report intermittent lower abdominal discomfort. +cough but CXR with clear lungs    PLAN  -Given complicated history of diverticulitis with pelvic abscess that required IR drain placement, would err on the side of caution and check CT abdomen and pelvis.    -ID input noted and appreciated  -neuro/medical evaluation in progress  -Monitor and correct electrolytes  -Ok for PO diet at this time, will await CT scan    Thank you for the courtesy of this consult.  Jalil Fong PA-C    [facility]  [phone]

## 2018-05-04 LAB
ANION GAP SERPL CALC-SCNC: 14 MMOL/L — SIGNIFICANT CHANGE UP (ref 5–17)
BUN SERPL-MCNC: 8 MG/DL — SIGNIFICANT CHANGE UP (ref 7–23)
CALCIUM SERPL-MCNC: 9.2 MG/DL — SIGNIFICANT CHANGE UP (ref 8.4–10.5)
CHLORIDE SERPL-SCNC: 97 MMOL/L — SIGNIFICANT CHANGE UP (ref 96–108)
CO2 SERPL-SCNC: 23 MMOL/L — SIGNIFICANT CHANGE UP (ref 22–31)
CREAT SERPL-MCNC: 0.87 MG/DL — SIGNIFICANT CHANGE UP (ref 0.5–1.3)
GLUCOSE BLDC GLUCOMTR-MCNC: 122 MG/DL — HIGH (ref 70–99)
GLUCOSE BLDC GLUCOMTR-MCNC: 140 MG/DL — HIGH (ref 70–99)
GLUCOSE BLDC GLUCOMTR-MCNC: 153 MG/DL — HIGH (ref 70–99)
GLUCOSE BLDC GLUCOMTR-MCNC: 188 MG/DL — HIGH (ref 70–99)
GLUCOSE SERPL-MCNC: 114 MG/DL — HIGH (ref 70–99)
HCT VFR BLD CALC: 31.7 % — LOW (ref 39–50)
HGB BLD-MCNC: 9.9 G/DL — LOW (ref 13–17)
MCHC RBC-ENTMCNC: 19.7 PG — LOW (ref 27–34)
MCHC RBC-ENTMCNC: 31.2 GM/DL — LOW (ref 32–36)
MCV RBC AUTO: 63 FL — LOW (ref 80–100)
PLATELET # BLD AUTO: 237 K/UL — SIGNIFICANT CHANGE UP (ref 150–400)
POTASSIUM SERPL-MCNC: 4.2 MMOL/L — SIGNIFICANT CHANGE UP (ref 3.5–5.3)
POTASSIUM SERPL-SCNC: 4.2 MMOL/L — SIGNIFICANT CHANGE UP (ref 3.5–5.3)
RBC # BLD: 5.03 M/UL — SIGNIFICANT CHANGE UP (ref 4.2–5.8)
RBC # FLD: 21 % — HIGH (ref 10.3–14.5)
SODIUM SERPL-SCNC: 134 MMOL/L — LOW (ref 135–145)
WBC # BLD: 6.08 K/UL — SIGNIFICANT CHANGE UP (ref 3.8–10.5)
WBC # FLD AUTO: 6.08 K/UL — SIGNIFICANT CHANGE UP (ref 3.8–10.5)

## 2018-05-04 PROCEDURE — 72170 X-RAY EXAM OF PELVIS: CPT | Mod: 26

## 2018-05-04 PROCEDURE — 70450 CT HEAD/BRAIN W/O DYE: CPT | Mod: 26

## 2018-05-04 RX ORDER — AMLODIPINE BESYLATE 2.5 MG/1
5 TABLET ORAL DAILY
Qty: 0 | Refills: 0 | Status: DISCONTINUED | OUTPATIENT
Start: 2018-05-04 | End: 2018-05-05

## 2018-05-04 RX ORDER — METRONIDAZOLE 500 MG
500 TABLET ORAL EVERY 8 HOURS
Qty: 0 | Refills: 0 | Status: DISCONTINUED | OUTPATIENT
Start: 2018-05-04 | End: 2018-05-04

## 2018-05-04 RX ORDER — HEPARIN SODIUM 5000 [USP'U]/ML
5000 INJECTION INTRAVENOUS; SUBCUTANEOUS EVERY 12 HOURS
Qty: 0 | Refills: 0 | Status: DISCONTINUED | OUTPATIENT
Start: 2018-05-04 | End: 2018-05-08

## 2018-05-04 RX ORDER — CIPROFLOXACIN LACTATE 400MG/40ML
400 VIAL (ML) INTRAVENOUS EVERY 12 HOURS
Qty: 0 | Refills: 0 | Status: DISCONTINUED | OUTPATIENT
Start: 2018-05-04 | End: 2018-05-04

## 2018-05-04 RX ORDER — CIPROFLOXACIN LACTATE 400MG/40ML
VIAL (ML) INTRAVENOUS
Qty: 0 | Refills: 0 | Status: DISCONTINUED | OUTPATIENT
Start: 2018-05-04 | End: 2018-05-04

## 2018-05-04 RX ORDER — CIPROFLOXACIN LACTATE 400MG/40ML
400 VIAL (ML) INTRAVENOUS ONCE
Qty: 0 | Refills: 0 | Status: COMPLETED | OUTPATIENT
Start: 2018-05-04 | End: 2018-05-04

## 2018-05-04 RX ORDER — METRONIDAZOLE 500 MG
TABLET ORAL
Qty: 0 | Refills: 0 | Status: DISCONTINUED | OUTPATIENT
Start: 2018-05-04 | End: 2018-05-04

## 2018-05-04 RX ORDER — PIPERACILLIN AND TAZOBACTAM 4; .5 G/20ML; G/20ML
3.38 INJECTION, POWDER, LYOPHILIZED, FOR SOLUTION INTRAVENOUS EVERY 8 HOURS
Qty: 0 | Refills: 0 | Status: DISCONTINUED | OUTPATIENT
Start: 2018-05-04 | End: 2018-05-08

## 2018-05-04 RX ORDER — METRONIDAZOLE 500 MG
500 TABLET ORAL ONCE
Qty: 0 | Refills: 0 | Status: COMPLETED | OUTPATIENT
Start: 2018-05-04 | End: 2018-05-04

## 2018-05-04 RX ADMIN — FINASTERIDE 5 MILLIGRAM(S): 5 TABLET, FILM COATED ORAL at 11:28

## 2018-05-04 RX ADMIN — Medication 100 MILLIGRAM(S): at 05:36

## 2018-05-04 RX ADMIN — TAMSULOSIN HYDROCHLORIDE 0.4 MILLIGRAM(S): 0.4 CAPSULE ORAL at 11:28

## 2018-05-04 RX ADMIN — Medication 1 DROP(S): at 11:27

## 2018-05-04 RX ADMIN — Medication 1: at 11:49

## 2018-05-04 RX ADMIN — Medication 1 DROP(S): at 17:04

## 2018-05-04 RX ADMIN — LOSARTAN POTASSIUM 100 MILLIGRAM(S): 100 TABLET, FILM COATED ORAL at 01:40

## 2018-05-04 RX ADMIN — Medication 200 MILLIGRAM(S): at 11:26

## 2018-05-04 RX ADMIN — SODIUM CHLORIDE 75 MILLILITER(S): 9 INJECTION INTRAMUSCULAR; INTRAVENOUS; SUBCUTANEOUS at 07:21

## 2018-05-04 RX ADMIN — AMLODIPINE BESYLATE 5 MILLIGRAM(S): 2.5 TABLET ORAL at 11:27

## 2018-05-04 RX ADMIN — HEPARIN SODIUM 5000 UNIT(S): 5000 INJECTION INTRAVENOUS; SUBCUTANEOUS at 17:04

## 2018-05-04 RX ADMIN — PANTOPRAZOLE SODIUM 40 MILLIGRAM(S): 20 TABLET, DELAYED RELEASE ORAL at 05:36

## 2018-05-04 RX ADMIN — CLOPIDOGREL BISULFATE 75 MILLIGRAM(S): 75 TABLET, FILM COATED ORAL at 11:27

## 2018-05-04 RX ADMIN — Medication 1 DROP(S): at 07:21

## 2018-05-04 RX ADMIN — Medication 100 MILLIGRAM(S): at 11:26

## 2018-05-04 RX ADMIN — Medication 81 MILLIGRAM(S): at 11:28

## 2018-05-04 RX ADMIN — PIPERACILLIN AND TAZOBACTAM 25 GRAM(S): 4; .5 INJECTION, POWDER, LYOPHILIZED, FOR SOLUTION INTRAVENOUS at 21:50

## 2018-05-04 RX ADMIN — SIMVASTATIN 40 MILLIGRAM(S): 20 TABLET, FILM COATED ORAL at 21:50

## 2018-05-04 NOTE — PROGRESS NOTE ADULT - SUBJECTIVE AND OBJECTIVE BOX
CC: f/u for diverticulitis    Patient reports he fell, has sweats and mild abdomen tenderness    REVIEW OF SYSTEMS:  All other review of systems negative (Comprehensive ROS)    Antimicrobials Day #  :  ciprofloxacin   IVPB      ciprofloxacin   IVPB 400 milliGRAM(s) IV Intermittent every 12 hours  metroNIDAZOLE  IVPB      metroNIDAZOLE  IVPB 500 milliGRAM(s) IV Intermittent every 8 hours    Other Medications Reviewed    T(F): 97.5 (18 @ 11:17), Max: 98.3 (18 @ 01:20)  HR: 58 (18 @ 11:17)  BP: 185/75 (18 @ 11:17)  RR: 20 (18 @ 11:17)  SpO2: 97% (18 @ 11:17)  Wt(kg): --    PHYSICAL EXAM:  General: alert, no acute distress  Eyes:  anicteric, no conjunctival injection, no discharge  Oropharynx: no lesions or injection 	  Neck: supple, without adenopathy  Lungs: clear to auscultation  Heart: regular rate and rhythm; no murmur, rubs or gallops  Abdomen: soft, nondistended, mild suprapubic tender, without mass or organomegaly  Skin: no lesions  Extremities: no clubbing, cyanosis, or edema  Neurologic: alert, oriented, moves all extremities    LAB RESULTS:                        9.9    6.08  )-----------( 237      ( 04 May 2018 08:40 )             31.7     05-04    134<L>  |  97  |  8   ----------------------------<  114<H>  4.2   |  23  |  0.87    Ca    9.2      04 May 2018 07:21  Phos  2.5     05-02  Mg     1.8     05-02    TPro  6.9  /  Alb  4.1  /  TBili  0.3  /  DBili  x   /  AST  7<L>  /  ALT  7<L>  /  AlkPhos  77  05-02    LIVER FUNCTIONS - ( 02 May 2018 18:22 )  Alb: 4.1 g/dL / Pro: 6.9 g/dL / ALK PHOS: 77 U/L / ALT: 7 U/L / AST: 7 U/L / GGT: x           Urinalysis Basic - ( 02 May 2018 18:22 )    Color: Yellow / Appearance: Clear / S.019 / pH: x  Gluc: x / Ketone: Negative  / Bili: Negative / Urobili: Negative   Blood: x / Protein: Trace / Nitrite: Negative   Leuk Esterase: Trace / RBC: 0-2 /HPF / WBC 3-5 /HPF   Sq Epi: x / Non Sq Epi: OCC /HPF / Bacteria: Few /HPF      MICROBIOLOGY:  RECENT CULTURES:   @ 23:08 .Urine Clean Catch (Midstream)     <10,000 CFU/ml  Normal Urogenital kaylyn present          RADIOLOGY REVIEWED:    < from: CT Abdomen and Pelvis w/ Oral Cont and w/ IV Cont (18 @ 19:21) >    IMPRESSION:   Focal opacity at the left lung base, possibly infection.    Mural thickening of the distal descending/proximal sigmoid colon,   probably chronic diverticulitis. Mild acute diverticulitis is difficult   to exclude.          < end of copied text >    Assessment:  Patient with diverticulitis with abscess back in the fall managed with drain and antibiotics, had brain hypertensive bleed around the time now returns with some unsteady gait, some cold sweats and mild lower abdomen tenderness on exam. He may have a flare of diverticulitis.   Plan: Favor change to zosyn  monitor exam and wbc, f/u culture  gi follows, consider surgery re evaluation

## 2018-05-04 NOTE — PROGRESS NOTE ADULT - SUBJECTIVE AND OBJECTIVE BOX
Patient is a 78y old  Male who presents with a chief complaint of     INTERVAL HPI/OVERNIGHT EVENTS:    MEDICATIONS  (STANDING):  aspirin enteric coated 81 milliGRAM(s) Oral daily  ciprofloxacin   IVPB      ciprofloxacin   IVPB 400 milliGRAM(s) IV Intermittent once  ciprofloxacin   IVPB 400 milliGRAM(s) IV Intermittent every 12 hours  clopidogrel Tablet 75 milliGRAM(s) Oral daily  docusate sodium 100 milliGRAM(s) Oral three times a day  finasteride 5 milliGRAM(s) Oral daily  insulin lispro (HumaLOG) corrective regimen sliding scale   SubCutaneous three times a day before meals  insulin lispro (HumaLOG) corrective regimen sliding scale   SubCutaneous at bedtime  losartan 100 milliGRAM(s) Oral daily  metoprolol succinate  milliGRAM(s) Oral daily  metroNIDAZOLE  IVPB      metroNIDAZOLE  IVPB 500 milliGRAM(s) IV Intermittent once  metroNIDAZOLE  IVPB 500 milliGRAM(s) IV Intermittent every 8 hours  pantoprazole    Tablet 40 milliGRAM(s) Oral before breakfast  pilocarpine 1% Solution 1 Drop(s) Both EYES four times a day  senna 2 Tablet(s) Oral at bedtime  simvastatin 40 milliGRAM(s) Oral at bedtime  sodium chloride 0.9%. 1000 milliLiter(s) (75 mL/Hr) IV Continuous <Continuous>  tamsulosin 0.4 milliGRAM(s) Oral daily    MEDICATIONS  (PRN):  oxyCODONE    IR 5 milliGRAM(s) Oral every 6 hours PRN Mild Pain (1 - 3)      Allergies    No Known Allergies    Intolerances        Review of Systems:      Vital Signs Last 24 Hrs  T(C): 36.4 (04 May 2018 09:15), Max: 36.9 (03 May 2018 13:53)  T(F): 97.6 (04 May 2018 09:15), Max: 98.5 (03 May 2018 13:53)  HR: 60 (04 May 2018 09:15) (58 - 64)  BP: 173/71 (04 May 2018 09:15) (162/72 - 190/80)  BP(mean): --  RR: 18 (04 May 2018 09:15) (18 - 20)  SpO2: 96% (04 May 2018 09:15) (96% - 97%)    PHYSICAL EXAM:      LABS:                        9.9    6.08  )-----------( 237      ( 04 May 2018 08:40 )             31.7     05-04    134<L>  |  97  |  8   ----------------------------<  114<H>  4.2   |  23  |  0.87    Ca    9.2      04 May 2018 07:21  Phos  2.5     05-  Mg     1.8     05-    TPro  6.9  /  Alb  4.1  /  TBili  0.3  /  DBili  x   /  AST  7<L>  /  ALT  7<L>  /  AlkPhos  77  05-02    PT/INR - ( 02 May 2018 19:36 )   PT: 10.1 sec;   INR: 0.93 ratio           Urinalysis Basic - ( 02 May 2018 18:22 )    Color: Yellow / Appearance: Clear / S.019 / pH: x  Gluc: x / Ketone: Negative  / Bili: Negative / Urobili: Negative   Blood: x / Protein: Trace / Nitrite: Negative   Leuk Esterase: Trace / RBC: 0-2 /HPF / WBC 3-5 /HPF   Sq Epi: x / Non Sq Epi: OCC /HPF / Bacteria: Few /HPF      LIVER FUNCTIONS - ( 02 May 2018 18:22 )  Alb: 4.1 g/dL / Pro: 6.9 g/dL / ALK PHOS: 77 U/L / ALT: 7 U/L / AST: 7 U/L / GGT: x             RADIOLOGY & ADDITIONAL TESTS: INTERVAL HPI/OVERNIGHT EVENTS:  had CT overnight- preliminary report +acute diverticulitis without free air or abscess   RN reported pt fell overnight - no head trauma or LOC  at present denies any abdominal pain, nausea or vomiting  no fever or chills        MEDICATIONS  (STANDING):  aspirin enteric coated 81 milliGRAM(s) Oral daily  ciprofloxacin   IVPB      ciprofloxacin   IVPB 400 milliGRAM(s) IV Intermittent once  ciprofloxacin   IVPB 400 milliGRAM(s) IV Intermittent every 12 hours  clopidogrel Tablet 75 milliGRAM(s) Oral daily  docusate sodium 100 milliGRAM(s) Oral three times a day  finasteride 5 milliGRAM(s) Oral daily  insulin lispro (HumaLOG) corrective regimen sliding scale   SubCutaneous three times a day before meals  insulin lispro (HumaLOG) corrective regimen sliding scale   SubCutaneous at bedtime  losartan 100 milliGRAM(s) Oral daily  metoprolol succinate  milliGRAM(s) Oral daily  metroNIDAZOLE  IVPB      metroNIDAZOLE  IVPB 500 milliGRAM(s) IV Intermittent once  metroNIDAZOLE  IVPB 500 milliGRAM(s) IV Intermittent every 8 hours  pantoprazole    Tablet 40 milliGRAM(s) Oral before breakfast  pilocarpine 1% Solution 1 Drop(s) Both EYES four times a day  senna 2 Tablet(s) Oral at bedtime  simvastatin 40 milliGRAM(s) Oral at bedtime  sodium chloride 0.9%. 1000 milliLiter(s) (75 mL/Hr) IV Continuous <Continuous>  tamsulosin 0.4 milliGRAM(s) Oral daily    MEDICATIONS  (PRN):  oxyCODONE    IR 5 milliGRAM(s) Oral every 6 hours PRN Mild Pain (1 - 3)      Allergies  No Known Allergies      Review of Systems:  General:  No wt loss, fevers, chills, +generalized weakness   CV:  No pain, palpitations, hypo/hypertension +Hx CVA  Resp:  +cough +chest congestion - clear sputum  :  No pain, bleeding, incontinence +urinary hesitancy  Muscle:  ambulates with cane assistance  Neuro:  no focal weakness +HA no vision changes +hx CVA TIA +Hx IPH  Heme:  No petechiae, ecchymosis, easy bruisability  Skin:  No rash, tattoos, scars, edema    Vital Signs Last 24 Hrs  T(C): 36.4 (04 May 2018 09:15), Max: 36.9 (03 May 2018 13:53)  T(F): 97.6 (04 May 2018 09:15), Max: 98.5 (03 May 2018 13:53)  HR: 60 (04 May 2018 09:15) (58 - 64)  BP: 173/71 (04 May 2018 09:15) (162/72 - 190/80)  BP(mean): --  RR: 18 (04 May 2018 09:15) (18 - 20)  SpO2: 96% (04 May 2018 09:15) (96% - 97%)    PHYSICAL EXAM:  Constitutional: NAD, well-developed [place] male, non-toxic appearing sitting, to use commode for BM  Neck: No LAD, supple  Respiratory: grossly clear bilaterally  Cardiovascular: S1 and S2, RRR,  Gastrointestinal: BS+, soft, ND +mild suprapubic tenderness to deep palpation without rebound, guarding or rigidity, neg HSM, WH suprapubic scar (c/w prior drain site)  Extremities: No peripheral edema, neg clubbing, cyanosis  Vascular: 2+ peripheral pulses  Neurological: no focal asymmetry  Psychiatric: Normal mood, normal affect  Skin: No rashes    LABS:                        9.9    6.08  )-----------( 237      ( 04 May 2018 08:40 )             31.7     05-04    134<L>  |  97  |  8   ----------------------------<  114<H>  4.2   |  23  |  0.87    Ca    9.2      04 May 2018 07:21  Phos  2.5     05-02  Mg     1.8     05-02    TPro  6.9  /  Alb  4.1  /  TBili  0.3  /  DBili  x   /  AST  7<L>  /  ALT  7<L>  /  AlkPhos  77  05-02    PT/INR - ( 02 May 2018 19:36 )   PT: 10.1 sec;   INR: 0.93 ratio           Urinalysis Basic - ( 02 May 2018 18:22 )    Color: Yellow / Appearance: Clear / S.019 / pH: x  Gluc: x / Ketone: Negative  / Bili: Negative / Urobili: Negative   Blood: x / Protein: Trace / Nitrite: Negative   Leuk Esterase: Trace / RBC: 0-2 /HPF / WBC 3-5 /HPF   Sq Epi: x / Non Sq Epi: OCC /HPF / Bacteria: Few /HPF            RADIOLOGY & ADDITIONAL TESTS:  < from: CT Abdomen and Pelvis w/ Oral Cont and w/ IV Cont (18 @ 19:21) >  INTERPRETATION:  CLINICAL INFORMATION: 78-year-old male status post   antibiotics and IR drain for perforated diverticulitis in 2017   presents with complaint of worsening gait, cold sweats and lower   abdominal pain.    COMPARISON: CT abdomen and pelvis dated 10/7/2017.    PROCEDURE:   CT of the Abdomen and Pelvis was performed with intravenous contrast.   Intravenous contrast: 90 ml Omnipaque 350. 10 ml discarded.  Oral contrast: positive contrast was administered.  Sagittal and coronal reformats were performed.    FINDINGS:    LOWER CHEST: Focal opacity at the left lung base, likely infectious.    LIVER: Calcified granuloma in the superior left hepatic lobe. Otherwise,   within normal limits.  BILE DUCTS: Normal caliber.  GALLBLADDER: Within normal limits.  SPLEEN: Calcified granuloma in the spleen. Otherwise, within normal   limits.  PANCREAS: Within normal limits.  ADRENALS: Within normal limits.  KIDNEYS/URETERS: Too small to characterize hypodensity in the posterior   left kidney. Otherwise, within normal limits.    BLADDER: Mild circumferential bladder wall thickening.  REPRODUCTIVE ORGANS: Heterogeneous appearing and enlarged prostate   measuring 4.6 x 4.5 cm.    BOWEL: Marked colonic diverticulosis. Mural thickening of the distal   descending/proximal sigmoid colon, probably chronic diverticulitis. No   bowel obstruction. Appendix not definitely visualized. Duodenal   diverticulum again noted.  PERITONEUM: No ascites.  VESSELS:  Atherosclerotic calcification involving the aorta and its   branch vessels.  RETROPERITONEUM: No lymphadenopathy.    ABDOMINAL WALL: Within normal limits.  BONES: Degenerative changes of the spine.    IMPRESSION:   Focal opacity at the left lung base, possibly infection.    Mural thickening of the distal descending/proximal sigmoid colon,   probably chronic diverticulitis. Mild acute diverticulitis is difficult   to exclude.      CT Head No Cont (18 @ 09:26)   INTERPRETATION:  History: Unsteady gait. Status post fall. Weakness.   Diverticulitis.    Description: A noncontrast head CT was performed. Axial images were   performed from the skull base to the vertex with coronal/sagittal   reconstructions.    Comparison is made to a prior CT study from 2018.    There is no evidence for acute infarct, acute hemorrhage, mass effect,   calvarial fracture, or hydrocephalus.An old lacunar infarct involving   left lentiform nucleus is unchanged.    Mild patchy hypodensity without mass effect is noted in the   periventricular white matter which most likely represents chronic   microvascular ischemic changes given the patient's age.    Cerebral volume loss is present with secondary proportional prominence of   the sulci and ventricles.    No lytic or blastic calvarial lesions are noted. The visualized portions   of the paranasal sinuses and mastoid air cells are clear.    IMPRESSION:    No acute intracranial pathology is noted. If the patient has new and   persistent symptoms, consider short interval follow-up head CT or brain   MRI follow-up if there are no MRI contraindications.

## 2018-05-04 NOTE — CHART NOTE - NSCHARTNOTEFT_GEN_A_CORE
Interval events    Notified by RN that patient fell on the floor, unwitnessed fall. Per RN she heard a big noise from the patient's room and Patient was seen lying in the floor in supine position, alert and awake, aware of the surroundings. Seen and examined the patient at bedside. Patient lying in the bed, a&ox4. Patient states that he walked over to the bedside drawer to get his Vicks (balm) and while returning to the bed, he felt dizzy and lost his balance and fell on his buttocks. Per patient he had cloudy consciousness after he got dizzy and couldn't remember whether he hit the head on the floor or not. c/o generalized weakness. Denies HA, palpitations, visual changes, SOB, CP, fever, chills, N/V/D, abdominal pain, hematemesis, hemoptysis, Melena, dysuria.    Vital Signs Last 24 Hrs  T(C): 36.3 (04 May 2018 05:43), Max: 37.2 (03 May 2018 06:10)  T(F): 97.4 (04 May 2018 05:43), Max: 99 (03 May 2018 06:10)  HR: 64 (04 May 2018 05:43) (58 - 65)  BP: 190/80 (04 May 2018 05:43) (153/73 - 190/80)  BP(mean): --  RR: 20 (04 May 2018 05:43) (18 - 20)  SpO2: 97% (04 May 2018 05:43) (94% - 97%)      Labs:                          10.5   14.15 )-----------( 254      ( 03 May 2018 07:53 )             33.0     05-03    130<L>  |  94<L>  |  8   ----------------------------<  122<H>  4.0   |  25  |  0.80    Ca    9.1      03 May 2018 05:51  Phos  2.5     05-02  Mg     1.8     05-02    TPro  6.9  /  Alb  4.1  /  TBili  0.3  /  DBili  x   /  AST  7<L>  /  ALT  7<L>  /  AlkPhos  77  05-02      CARDIAC MARKERS ( 02 May 2018 18:22 )  x     / <0.01 ng/mL / 29 U/L / x     / 2.1 ng/mL    Radiology:[facility] on 05/02/2018    No CT evidence of acute intracranial hemorrhage or acute infarct. If   symptoms persist, consider short interval follow-up head CT or brain MRI   follow-up.    MEDICATIONS  (STANDING):  aspirin enteric coated 81 milliGRAM(s) Oral daily  clopidogrel Tablet 75 milliGRAM(s) Oral daily  docusate sodium 100 milliGRAM(s) Oral three times a day  finasteride 5 milliGRAM(s)   insulin lispro (HumaLOG) corrective regimen sliding scale   SubCutaneous three times a day before meals  insulin lispro (HumaLOG) corrective regimen sliding scale   SubCutaneous at bedtime  losartan 100 milliGRAM(s) Oral daily  metoprolol succinate  milliGRAM(s) Oral daily  pantoprazole    Tablet 40 milliGRAM(s) Oral before breakfast  pilocarpine 1% Solution 1 Drop(s) Both EYES four times a day  senna 2 Tablet(s) Oral at bedtime  simvastatin 40 milliGRAM(s) Oral at bedtime  sodium chloride 0.9%. 1000 milliLiter(s) (75 mL/Hr) IV Continuous <Continuous>  tamsulosin 0.4 milliGRAM(s) Oral daily    MEDICATIONS  (PRN):  oxyCODONE    IR 5 milliGRAM(s) Oral every 6 hours PRN Mild Pain (1 - 3)      Physical Exam:  General: WN/WD NAD  Neurology: A&Ox3, nonfocal, MATTHEWS x 4  Neck; Supple,  ENT: No active bleeding  Head:  Normocephalic, atraumatic, no external injury/bleeding noted  Respiratory: CTA B/L  CV: RRR, S1S2, no murmur  Abdominal: Soft, NT, ND no palpable mass  MSK: No edema, + peripheral pulses, FROM all 4 extremity    Assessment & Plan:    77 yo male PMH alzheimer's disease, DM(II), HTN, HLD, BPH, GERD, chronic anemia, CAD with stents, CVA 1 year ago with R side deficit, recent sigmoid diverticulitis w/ pelvic abscess s/p IR drainage and BRYANT placement presents with 2 weeks generalized weakness, cold sweats, cough, and recent hyponatremia at PCP. Pt endorses HA and gait instability. Recently pt took 3 weeks of previously discontinued HCTZ. patient admitted with generalized weakness, elevated BP, on losartan and Metoprolol  Now s/p fall after feeling Dizzy. Patient on aspirin/ Plavix. not on FD AC, patient on opioids, but didn't receive it in kast 12 hours.  Patient with elevated Bp after MN, received am dose of Losartan and metoprolol.     CT head ordered stat  Xray pelvis ordered stat  Fall precautions  Orthostatics [id number]  Repeat BP in one hour after giving antihypertensives  CBCD, BMP, PT/PTT stat  Will continue to monitor  per patient request, did not call the family, patients prefers to update the family while they are here physically  Will update with primary tem    Sarah Ascencio P BC  Spectra# [id number]                Follow up with Attending in AM.

## 2018-05-04 NOTE — PROGRESS NOTE ADULT - ASSESSMENT
77 yo male with PMH CVA, TIA, IPH, HTN, DM, CAD, BPH, diverticulosis/itis s/p IR perc drain of pelvic abscess 9/2017 now presents to ER with generalized weakness, sweats and unsteady gait and found to have mild leukocytosis. he does report intermittent lower abdominal discomfort. +cough but CXR with clear lungs    CT +acute diverticulitis, focal opacity at left lung base  hyponatremia - improved    PLAN  -Started IV Cipro and Flagyl this morning  -OK for PO diet (changed to LRD)  -ID follow-up  -neuro/medical evaluation in progress    Discussed with pt, medical team and attending    Jalil Fong PA-C    [facility]  [phone]

## 2018-05-04 NOTE — PROGRESS NOTE ADULT - SUBJECTIVE AND OBJECTIVE BOX
no  abd pain.  vomiting  s/p  fall,   ct head,  normal   xray  pelxis, pending    REVIEW OF SYSTEMS:  CONSTITUTIONAL: No weakness,  no   fevers      RESPIRATORY:  No    shortness of breath  , no  wheezing  CARDIOVASCULAR: No chest pain,   no  palpitations, no  cough  GASTROINTESTINAL: No abdominal  pain, no  vomiting, no  diarrhea  NEUROLOGICAL: No  focal  weakness    MEDICATIONS  (STANDING):  aspirin enteric coated 81 milliGRAM(s) Oral daily  ciprofloxacin   IVPB      ciprofloxacin   IVPB 400 milliGRAM(s) IV Intermittent once  ciprofloxacin   IVPB 400 milliGRAM(s) IV Intermittent every 12 hours  clopidogrel Tablet 75 milliGRAM(s) Oral daily  docusate sodium 100 milliGRAM(s) Oral three times a day  finasteride 5 milliGRAM(s) Oral daily  insulin lispro (HumaLOG) corrective regimen sliding scale   SubCutaneous three times a day before meals  insulin lispro (HumaLOG) corrective regimen sliding scale   SubCutaneous at bedtime  losartan 100 milliGRAM(s) Oral daily  metoprolol succinate  milliGRAM(s) Oral daily  metroNIDAZOLE  IVPB      metroNIDAZOLE  IVPB 500 milliGRAM(s) IV Intermittent once  metroNIDAZOLE  IVPB 500 milliGRAM(s) IV Intermittent every 8 hours  pantoprazole    Tablet 40 milliGRAM(s) Oral before breakfast  pilocarpine 1% Solution 1 Drop(s) Both EYES four times a day  senna 2 Tablet(s) Oral at bedtime  simvastatin 40 milliGRAM(s) Oral at bedtime  sodium chloride 0.9%. 1000 milliLiter(s) (75 mL/Hr) IV Continuous <Continuous>  tamsulosin 0.4 milliGRAM(s) Oral daily    MEDICATIONS  (PRN):  oxyCODONE    IR 5 milliGRAM(s) Oral every 6 hours PRN Mild Pain (1 - 3)      Vital Signs Last 24 Hrs  T(C): 36.4 (04 May 2018 09:15), Max: 36.9 (03 May 2018 13:53)  T(F): 97.6 (04 May 2018 09:15), Max: 98.5 (03 May 2018 13:53)  HR: 60 (04 May 2018 09:15) (58 - 64)  BP: 173/71 (04 May 2018 09:15) (162/72 - 190/80)  BP(mean): --  RR: 18 (04 May 2018 09:15) (18 - 20)  SpO2: 96% (04 May 2018 09:15) (96% - 97%)  CAPILLARY BLOOD GLUCOSE      POCT Blood Glucose.: 140 mg/dL (04 May 2018 07:45)  POCT Blood Glucose.: 169 mg/dL (03 May 2018 21:33)  POCT Blood Glucose.: 143 mg/dL (03 May 2018 16:30)  POCT Blood Glucose.: 191 mg/dL (03 May 2018 13:25)    I&O's Summary    03 May 2018 07:01  -  04 May 2018 07:00  --------------------------------------------------------  IN: 2690 mL / OUT: 1840 mL / NET: 850 mL    04 May 2018 07:01  -  04 May 2018 09:53  --------------------------------------------------------  IN: 240 mL / OUT: 400 mL / NET: -160 mL        PHYSICAL EXAM:  HEAD:  Atraumatic, Normocephalic  NECK: Supple, No   JVD  CHEST/LUNG:   no     rales,     no,    rhonchi  HEART: Regular rate and rhythm;         murmur  ABDOMEN: Soft, Nontender, ;   EXTREMITIES:        edema  NEUROLOGY:  alert    LABS:                        9.9    6.08  )-----------( 237      ( 04 May 2018 08:40 )             31.7     05-04    134<L>  |  97  |  8   ----------------------------<  114<H>  4.2   |  23  |  0.87    Ca    9.2      04 May 2018 07:21  Phos  2.5     05-02  Mg     1.8     05-02    TPro  6.9  /  Alb  4.1  /  TBili  0.3  /  DBili  x   /  AST  7<L>  /  ALT  7<L>  /  AlkPhos  77  05-02    PT/INR - ( 02 May 2018 19:36 )   PT: 10.1 sec;   INR: 0.93 ratio           CARDIAC MARKERS ( 02 May 2018 18:22 )  x     / <0.01 ng/mL / 29 U/L / x     / 2.1 ng/mL      Urinalysis Basic - ( 02 May 2018 18:22 )    Color: Yellow / Appearance: Clear / S.019 / pH: x  Gluc: x / Ketone: Negative  / Bili: Negative / Urobili: Negative   Blood: x / Protein: Trace / Nitrite: Negative   Leuk Esterase: Trace / RBC: 0-2 /HPF / WBC 3-5 /HPF   Sq Epi: x / Non Sq Epi: OCC /HPF / Bacteria: Few /HPF              Thyroid Stimulating Hormone, Serum: 1.28 uIU/mL ( @ 08:22)          Consultant(s) Notes Reviewed:      Care Discussed with Consultants/Other Providers:

## 2018-05-04 NOTE — PROGRESS NOTE ADULT - ASSESSMENT
77 yo male PMH alzheimer's disease,   DM(II), HTN,   HLD, BPH,   GERD, chronic anemia,   CAD with stents,,    CVA 1 year ago with R side deficit,   h/o  sigmoid diverticulitis w/ pelvic abscess s/p IR drainage and BRYANT placement, last year   presents with 2 weeks generalized weakness, cold sweats,  hyponatremia at PCP.    Recently pt took 3 weeks of previously discontinued HCTZ.     : Hyponatremia of 129  from  HCTZ , now  is  13    Cozaar / Toprol XL / ASA 81 mg/day, ,Plavix 75 mg/day and Zocur 40 mg/day  for HLD and prior stents..  h.o diastolic  dysfunction     DM:finger sticks AC and HS.  neuro  eval called  from er,  , gait  instability,  high risk for  falls  and  pt  had  afall today  pelvic  xray, pending  ct  with  a/c  diverticulitis,  cipro/flagyl

## 2018-05-04 NOTE — PROGRESS NOTE ADULT - SUBJECTIVE AND OBJECTIVE BOX
- Patient seen and examined.  - In summary, patient is a 78 year old man who presented with weakness.   - Today, patient is without complaints.         *****MEDICATIONS:    MEDICATIONS  (STANDING):  amLODIPine   Tablet 5 milliGRAM(s) Oral daily  aspirin enteric coated 81 milliGRAM(s) Oral daily  ciprofloxacin   IVPB      ciprofloxacin   IVPB 400 milliGRAM(s) IV Intermittent once  ciprofloxacin   IVPB 400 milliGRAM(s) IV Intermittent every 12 hours  clopidogrel Tablet 75 milliGRAM(s) Oral daily  docusate sodium 100 milliGRAM(s) Oral three times a day  finasteride 5 milliGRAM(s) Oral daily  heparin  Injectable 5000 Unit(s) SubCutaneous every 12 hours  insulin lispro (HumaLOG) corrective regimen sliding scale   SubCutaneous three times a day before meals  insulin lispro (HumaLOG) corrective regimen sliding scale   SubCutaneous at bedtime  losartan 100 milliGRAM(s) Oral daily  metoprolol succinate  milliGRAM(s) Oral daily  metroNIDAZOLE  IVPB      metroNIDAZOLE  IVPB 500 milliGRAM(s) IV Intermittent once  metroNIDAZOLE  IVPB 500 milliGRAM(s) IV Intermittent every 8 hours  pantoprazole    Tablet 40 milliGRAM(s) Oral before breakfast  pilocarpine 1% Solution 1 Drop(s) Both EYES four times a day  senna 2 Tablet(s) Oral at bedtime  simvastatin 40 milliGRAM(s) Oral at bedtime  tamsulosin 0.4 milliGRAM(s) Oral daily    MEDICATIONS  (PRN):  oxyCODONE    IR 5 milliGRAM(s) Oral every 6 hours PRN Mild Pain (1 - 3)           ***** REVIEW OF SYSTEM:  GEN: no fever, no chills, no pain  RESP: no SOB, no cough, no sputum  CVS: no chest pain, no palpitations, no edema  GI: no abdominal pain, no nausea, no vomiting, no constipation, no diarrhea  : no dysuria, no frequency  NEURO: no headache, no dizziness  PSYCH: no depression, not anxious  Derm : no itching, no rash         ***** VITAL SIGNS:  T(F): 97.6 (18 @ 09:15), Max: 98.5 (18 @ 13:53)  HR: 60 (18 @ 09:15) (58 - 64)  BP: 173/71 (18 @ 09:15) (162/72 - 190/80)  RR: 18 (18 @ 09:15) (18 - 20)  SpO2: 96% (18 @ 09:15) (96% - 97%)  Wt(kg): --  ,   I&O's Summary    03 May 2018 07:  -  04 May 2018 07:00  --------------------------------------------------------  IN: 2690 mL / OUT: 1840 mL / NET: 850 mL    04 May 2018 07:01  -  04 May 2018 10:05  --------------------------------------------------------  IN: 240 mL / OUT: 400 mL / NET: -160 mL             *****PHYSICAL EXAM:  GEN: A&O X 3 , NAD , comfortable  HEENT: NCAT, EOMI, MMM, no icterus  NECK: Supple, No JVD  CVS: S1S2 , regular , No M/R/G appreciated  PULM: CTA B/L,  no W/R/R appreciated  ABD.: soft. non tender, non distended,  bowel sounds present  Extrem: intact pulses , no edema noted  Derm: No rash or ecchymosis noted  PSYCH: normal mood, no depression, not anxious         *****LAB AND IMAGIN.9    6.08  )-----------( 237      ( 04 May 2018 08:40 )             31.7               05-04    134<L>  |  97  |  8   ----------------------------<  114<H>  4.2   |  23  |  0.87    Ca    9.2      04 May 2018 07:21  Phos  2.5     05-02  Mg     1.8     05-02    TPro  6.9  /  Alb  4.1  /  TBili  0.3  /  DBili  x   /  AST  7<L>  /  ALT  7<L>  /  AlkPhos  77  05-02    PT/INR - ( 02 May 2018 19:36 )   PT: 10.1 sec;   INR: 0.93 ratio                CARDIAC MARKERS ( 02 May 2018 18:22 )  x     / <0.01 ng/mL / 29 U/L / x     / 2.1 ng/mL              Urinalysis Basic - ( 02 May 2018 18:22 )    Color: Yellow / Appearance: Clear / S.019 / pH: x  Gluc: x / Ketone: Negative  / Bili: Negative / Urobili: Negative   Blood: x / Protein: Trace / Nitrite: Negative   Leuk Esterase: Trace / RBC: 0-2 /HPF / WBC 3-5 /HPF   Sq Epi: x / Non Sq Epi: OCC /HPF / Bacteria: Few /HPF      [All pertinent recent Imaging/Reports reviewed]         *****A S S E S S M E N T   A N D   P L A N :  78M with hx of cva and CAD adm with generalized weakness.  CT shows diverticulitis  GI following  abx per ID  started on norvasc 5 mg daily  continue losartan and lopresser  will adjust BP meds prn  check echo    __________________________  AChato Erwin D.O.

## 2018-05-04 NOTE — CHART NOTE - NSCHARTNOTEFT_GEN_A_CORE
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    Recently patient took 3 weeks of previously discontinued HC, now with elevated BP    Continue Toprol and Losartan  F/U CTH results  Discussed with Attending'  Awaiting Attending recommendations  Will update with primary team    Sarah ROQUE BC  Spectra# [id number]

## 2018-05-05 DIAGNOSIS — G45.9 TRANSIENT CEREBRAL ISCHEMIC ATTACK, UNSPECIFIED: ICD-10-CM

## 2018-05-05 DIAGNOSIS — R53.1 WEAKNESS: ICD-10-CM

## 2018-05-05 LAB
ANION GAP SERPL CALC-SCNC: 11 MMOL/L — SIGNIFICANT CHANGE UP (ref 5–17)
BUN SERPL-MCNC: 12 MG/DL — SIGNIFICANT CHANGE UP (ref 7–23)
CALCIUM SERPL-MCNC: 9.1 MG/DL — SIGNIFICANT CHANGE UP (ref 8.4–10.5)
CHLORIDE SERPL-SCNC: 96 MMOL/L — SIGNIFICANT CHANGE UP (ref 96–108)
CO2 SERPL-SCNC: 26 MMOL/L — SIGNIFICANT CHANGE UP (ref 22–31)
CREAT SERPL-MCNC: 0.86 MG/DL — SIGNIFICANT CHANGE UP (ref 0.5–1.3)
GLUCOSE BLDC GLUCOMTR-MCNC: 132 MG/DL — HIGH (ref 70–99)
GLUCOSE BLDC GLUCOMTR-MCNC: 146 MG/DL — HIGH (ref 70–99)
GLUCOSE BLDC GLUCOMTR-MCNC: 173 MG/DL — HIGH (ref 70–99)
GLUCOSE BLDC GLUCOMTR-MCNC: 219 MG/DL — HIGH (ref 70–99)
GLUCOSE SERPL-MCNC: 120 MG/DL — HIGH (ref 70–99)
HCT VFR BLD CALC: 31.6 % — LOW (ref 39–50)
HGB BLD-MCNC: 10 G/DL — LOW (ref 13–17)
MCHC RBC-ENTMCNC: 19.9 PG — LOW (ref 27–34)
MCHC RBC-ENTMCNC: 31.6 GM/DL — LOW (ref 32–36)
MCV RBC AUTO: 62.9 FL — LOW (ref 80–100)
PLATELET # BLD AUTO: 284 K/UL — SIGNIFICANT CHANGE UP (ref 150–400)
POTASSIUM SERPL-MCNC: 4.2 MMOL/L — SIGNIFICANT CHANGE UP (ref 3.5–5.3)
POTASSIUM SERPL-SCNC: 4.2 MMOL/L — SIGNIFICANT CHANGE UP (ref 3.5–5.3)
RBC # BLD: 5.02 M/UL — SIGNIFICANT CHANGE UP (ref 4.2–5.8)
RBC # FLD: 20.2 % — HIGH (ref 10.3–14.5)
SODIUM SERPL-SCNC: 133 MMOL/L — LOW (ref 135–145)
WBC # BLD: 7.71 K/UL — SIGNIFICANT CHANGE UP (ref 3.8–10.5)
WBC # FLD AUTO: 7.71 K/UL — SIGNIFICANT CHANGE UP (ref 3.8–10.5)

## 2018-05-05 RX ORDER — AMLODIPINE BESYLATE 2.5 MG/1
10 TABLET ORAL DAILY
Qty: 0 | Refills: 0 | Status: DISCONTINUED | OUTPATIENT
Start: 2018-05-05 | End: 2018-05-05

## 2018-05-05 RX ORDER — ISOSORBIDE MONONITRATE 60 MG/1
30 TABLET, EXTENDED RELEASE ORAL DAILY
Qty: 0 | Refills: 0 | Status: DISCONTINUED | OUTPATIENT
Start: 2018-05-05 | End: 2018-05-08

## 2018-05-05 RX ORDER — AMLODIPINE BESYLATE 2.5 MG/1
10 TABLET ORAL DAILY
Qty: 0 | Refills: 0 | Status: DISCONTINUED | OUTPATIENT
Start: 2018-05-05 | End: 2018-05-08

## 2018-05-05 RX ADMIN — Medication 100 MILLIGRAM(S): at 06:20

## 2018-05-05 RX ADMIN — AMLODIPINE BESYLATE 5 MILLIGRAM(S): 2.5 TABLET ORAL at 06:20

## 2018-05-05 RX ADMIN — PANTOPRAZOLE SODIUM 40 MILLIGRAM(S): 20 TABLET, DELAYED RELEASE ORAL at 06:20

## 2018-05-05 RX ADMIN — Medication 1 DROP(S): at 17:03

## 2018-05-05 RX ADMIN — CLOPIDOGREL BISULFATE 75 MILLIGRAM(S): 75 TABLET, FILM COATED ORAL at 12:06

## 2018-05-05 RX ADMIN — HEPARIN SODIUM 5000 UNIT(S): 5000 INJECTION INTRAVENOUS; SUBCUTANEOUS at 06:20

## 2018-05-05 RX ADMIN — Medication 81 MILLIGRAM(S): at 12:07

## 2018-05-05 RX ADMIN — SIMVASTATIN 40 MILLIGRAM(S): 20 TABLET, FILM COATED ORAL at 21:26

## 2018-05-05 RX ADMIN — Medication 1 DROP(S): at 06:20

## 2018-05-05 RX ADMIN — Medication 1: at 16:54

## 2018-05-05 RX ADMIN — Medication 1 DROP(S): at 12:07

## 2018-05-05 RX ADMIN — PIPERACILLIN AND TAZOBACTAM 25 GRAM(S): 4; .5 INJECTION, POWDER, LYOPHILIZED, FOR SOLUTION INTRAVENOUS at 06:20

## 2018-05-05 RX ADMIN — PIPERACILLIN AND TAZOBACTAM 25 GRAM(S): 4; .5 INJECTION, POWDER, LYOPHILIZED, FOR SOLUTION INTRAVENOUS at 14:30

## 2018-05-05 RX ADMIN — HEPARIN SODIUM 5000 UNIT(S): 5000 INJECTION INTRAVENOUS; SUBCUTANEOUS at 16:53

## 2018-05-05 RX ADMIN — FINASTERIDE 5 MILLIGRAM(S): 5 TABLET, FILM COATED ORAL at 12:07

## 2018-05-05 RX ADMIN — TAMSULOSIN HYDROCHLORIDE 0.4 MILLIGRAM(S): 0.4 CAPSULE ORAL at 12:11

## 2018-05-05 RX ADMIN — PIPERACILLIN AND TAZOBACTAM 25 GRAM(S): 4; .5 INJECTION, POWDER, LYOPHILIZED, FOR SOLUTION INTRAVENOUS at 21:26

## 2018-05-05 RX ADMIN — LOSARTAN POTASSIUM 100 MILLIGRAM(S): 100 TABLET, FILM COATED ORAL at 06:20

## 2018-05-05 RX ADMIN — ISOSORBIDE MONONITRATE 30 MILLIGRAM(S): 60 TABLET, EXTENDED RELEASE ORAL at 12:10

## 2018-05-05 NOTE — PHYSICAL THERAPY INITIAL EVALUATION ADULT - PERTINENT HX OF CURRENT PROBLEM, REHAB EVAL
Pt is a 78 y.o. male presents with 2 weeks generalized weakness, cold sweats, cough, and recent hyponatremia at PCP. PMH includes alzheimer's disease, DM(II), HTN, HLD, BPH, GERD, chronic anemia, CAD with stents, CVA 1 year ago with R side deficit, recent sigmoid diverticulitis w/ pelvic abscess s/p IR drainage and BRYANT placement

## 2018-05-05 NOTE — PROGRESS NOTE ADULT - SUBJECTIVE AND OBJECTIVE BOX
CC: f/u for diverticulitis    Patient reports no further abdomen pain    REVIEW OF SYSTEMS:  All other review of systems negative (Comprehensive ROS)    Antimicrobials Day #  :2  piperacillin/tazobactam IVPB. 3.375 Gram(s) IV Intermittent every 8 hours    Other Medications Reviewed    T(F): 97.8 (05-05-18 @ 12:09), Max: 97.8 (05-05-18 @ 12:09)  HR: 63 (05-05-18 @ 12:09)  BP: 164/74 (05-05-18 @ 12:09)  RR: 18 (05-05-18 @ 12:09)  SpO2: 96% (05-05-18 @ 12:09)  Wt(kg): --    PHYSICAL EXAM:  General: alert, no acute distress  Eyes:  anicteric, no conjunctival injection, no discharge  Oropharynx: no lesions or injection 	  Neck: supple, without adenopathy  Lungs: clear to auscultation  Heart: regular rate and rhythm; no murmur, rubs or gallops  Abdomen: soft, nondistended, nontender, without mass or organomegaly  Skin: no lesions  Extremities: no clubbing, cyanosis, or edema  Neurologic: alert,  moves all extremities    LAB RESULTS:                        10.0   7.71  )-----------( 284      ( 05 May 2018 11:05 )             31.6     05-05    133<L>  |  96  |  12  ----------------------------<  120<H>  4.2   |  26  |  0.86    Ca    9.1      05 May 2018 08:16          MICROBIOLOGY:  RECENT CULTURES:  05-03 @ 22:14 .Blood Blood-Peripheral     No growth to date.      05-02 @ 23:08 .Urine Clean Catch (Midstream)     <10,000 CFU/ml  Normal Urogenital kaylyn present          RADIOLOGY REVIEWED:    < from: CT Abdomen and Pelvis w/ Oral Cont and w/ IV Cont (05.03.18 @ 19:21) >    IMPRESSION:   Focal opacity at the left lung base, possibly infection.    Mural thickening of the distal descending/proximal sigmoid colon,   probably chronic diverticulitis. Mild acute diverticulitis is difficult   to exclude.        < end of copied text >    Assessment:  Patient with h/o diverticulitis and abscess this past fall managed with drain and antibiotics and intraparenchymal hypertensive brain bleed came in for unsteady gait, tia per neuro. Had leukocytosis that essentially resolved itself. He had some mild abdomen tenderness which now resolved and may be from a mild flare of chronic diverticulitis. He may have a mild pneumonia give c/o congestion but that is better a bit sooner than would expect from just a day of zosyn  Plan:  Continue zosyn  If continues to feel well tomorrow will change to po augmentin to complete another week of antibiotics  will need surgical follow up in near future

## 2018-05-05 NOTE — PROGRESS NOTE ADULT - SUBJECTIVE AND OBJECTIVE BOX
no complaints  tele, nsr    REVIEW OF SYSTEMS:  CONSTITUTIONAL: No weakness,  no   fevers      RESPIRATORY:  No    shortness of breath  , no  wheezing  CARDIOVASCULAR: No chest pain,   no  palpitations, no  cough  GASTROINTESTINAL: No abdominal  pain, no  vomiting, no  diarrhea  NEUROLOGICAL: No  focal  weakness    MEDICATIONS  (STANDING):  amLODIPine   Tablet 10 milliGRAM(s) Oral daily  aspirin enteric coated 81 milliGRAM(s) Oral daily  clopidogrel Tablet 75 milliGRAM(s) Oral daily  docusate sodium 100 milliGRAM(s) Oral three times a day  finasteride 5 milliGRAM(s) Oral daily  heparin  Injectable 5000 Unit(s) SubCutaneous every 12 hours  insulin lispro (HumaLOG) corrective regimen sliding scale   SubCutaneous three times a day before meals  insulin lispro (HumaLOG) corrective regimen sliding scale   SubCutaneous at bedtime  isosorbide   mononitrate ER Tablet (IMDUR) 30 milliGRAM(s) Oral daily  losartan 100 milliGRAM(s) Oral daily  metoprolol succinate  milliGRAM(s) Oral daily  pantoprazole    Tablet 40 milliGRAM(s) Oral before breakfast  pilocarpine 1% Solution 1 Drop(s) Both EYES four times a day  piperacillin/tazobactam IVPB. 3.375 Gram(s) IV Intermittent every 8 hours  senna 2 Tablet(s) Oral at bedtime  simvastatin 40 milliGRAM(s) Oral at bedtime  tamsulosin 0.4 milliGRAM(s) Oral daily    MEDICATIONS  (PRN):  oxyCODONE    IR 5 milliGRAM(s) Oral every 6 hours PRN Mild Pain (1 - 3)      Vital Signs Last 24 Hrs  T(C): 36.4 (05 May 2018 03:52), Max: 36.4 (04 May 2018 11:17)  T(F): 97.5 (05 May 2018 03:52), Max: 97.5 (04 May 2018 11:17)  HR: 65 (05 May 2018 03:52) (58 - 65)  BP: 181/75 (05 May 2018 03:52) (138/72 - 185/75)  BP(mean): --  RR: 18 (05 May 2018 03:52) (18 - 20)  SpO2: 96% (05 May 2018 03:52) (96% - 98%)  CAPILLARY BLOOD GLUCOSE      POCT Blood Glucose.: 146 mg/dL (05 May 2018 07:39)  POCT Blood Glucose.: 188 mg/dL (04 May 2018 21:08)  POCT Blood Glucose.: 122 mg/dL (04 May 2018 16:17)  POCT Blood Glucose.: 153 mg/dL (04 May 2018 11:42)    I&O's Summary    04 May 2018 07:01  -  05 May 2018 07:00  --------------------------------------------------------  IN: 1690 mL / OUT: 2450 mL / NET: -760 mL        PHYSICAL EXAM:  HEAD:  Atraumatic, Normocephalic  NECK: Supple, No   JVD  CHEST/LUNG:   no     rales,     no,    rhonchi  HEART: Regular rate and rhythm;         murmur  ABDOMEN: Soft, Nontender, ;   EXTREMITIES:        edema  NEUROLOGY:  alert    LABS:                        9.9    6.08  )-----------( 237      ( 04 May 2018 08:40 )             31.7     05-05    133<L>  |  96  |  12  ----------------------------<  120<H>  4.2   |  26  |  0.86    Ca    9.1      05 May 2018 08:16                      Thyroid Stimulating Hormone, Serum: 1.28 uIU/mL (05-03 @ 08:22)          Consultant(s) Notes Reviewed:      Care Discussed with Consultants/Other Providers:

## 2018-05-05 NOTE — PHYSICAL THERAPY INITIAL EVALUATION ADULT - PLANNED THERAPY INTERVENTIONS, PT EVAL
gait training/bed mobility training/GOAL: Pt will perform 3 stairs with or without U HR as needed within 3-4weeks./transfer training

## 2018-05-05 NOTE — PROGRESS NOTE ADULT - SUBJECTIVE AND OBJECTIVE BOX
- Patient seen and examined.  - In summary, patient is a 78 year old man who presented with weakness.   - Today, patient is without complaints.         *****MEDICATIONS:    MEDICATIONS  (STANDING):  amLODIPine   Tablet 10 milliGRAM(s) Oral daily  aspirin enteric coated 81 milliGRAM(s) Oral daily  clopidogrel Tablet 75 milliGRAM(s) Oral daily  docusate sodium 100 milliGRAM(s) Oral three times a day  finasteride 5 milliGRAM(s) Oral daily  heparin  Injectable 5000 Unit(s) SubCutaneous every 12 hours  insulin lispro (HumaLOG) corrective regimen sliding scale   SubCutaneous three times a day before meals  insulin lispro (HumaLOG) corrective regimen sliding scale   SubCutaneous at bedtime  isosorbide   mononitrate ER Tablet (IMDUR) 30 milliGRAM(s) Oral daily  losartan 100 milliGRAM(s) Oral daily  metoprolol succinate  milliGRAM(s) Oral daily  pantoprazole    Tablet 40 milliGRAM(s) Oral before breakfast  pilocarpine 1% Solution 1 Drop(s) Both EYES four times a day  piperacillin/tazobactam IVPB. 3.375 Gram(s) IV Intermittent every 8 hours  senna 2 Tablet(s) Oral at bedtime  simvastatin 40 milliGRAM(s) Oral at bedtime  tamsulosin 0.4 milliGRAM(s) Oral daily    MEDICATIONS  (PRN):  oxyCODONE    IR 5 milliGRAM(s) Oral every 6 hours PRN Mild Pain (1 - 3)             ***** REVIEW OF SYSTEM:  GEN: no fever, no chills, no pain  RESP: no SOB, no cough, no sputum  CVS: no chest pain, no palpitations, no edema  GI: no abdominal pain, no nausea, no vomiting, no constipation, no diarrhea  : no dysuria, no frequency  NEURO: no headache, no dizziness  PSYCH: no depression, not anxious  Derm : no itching, no rash         ***** VITAL SIGNS:    T(F): 97.5 (18 @ 03:52), Max: 97.5 (18 @ 11:17)  HR: 65 (18 @ 03:52) (58 - 65)  BP: 181/75 (18 @ 03:52) (138/72 - 185/75)  RR: 18 (18 @ 03:52) (18 - 20)  SpO2: 96% (18 @ 03:52) (96% - 98%)  Wt(kg): --  ,   I&O's Summary    04 May 2018 07:01  -  05 May 2018 07:00  --------------------------------------------------------  IN: 1690 mL / OUT: 2450 mL / NET: -760 mL                 *****PHYSICAL EXAM:  GEN: A&O X 3 , NAD , comfortable  HEENT: NCAT, EOMI, MMM, no icterus  NECK: Supple, No JVD  CVS: S1S2 , regular , No M/R/G appreciated  PULM: CTA B/L,  no W/R/R appreciated  ABD.: soft. non tender, non distended,  bowel sounds present  Extrem: intact pulses , no edema noted  Derm: No rash or ecchymosis noted  PSYCH: normal mood, no depression, not anxious         *****LAB AND IMAGIN.9    6.08  )-----------( 237      ( 04 May 2018 08:40 )             31.7               05-    133<L>  |  96  |  12  ----------------------------<  120<H>  4.2   |  26  |  0.86    Ca    9.1      05 May 2018 08:16      [All pertinent recent Imaging/Reports reviewed]         *****A S S E S S M E N T   A N D   P L A N :  78M with hx of cva and CAD adm with generalized weakness.  CT shows diverticulitis  GI following  abx per ID  BP remains elevated  incr norvasc to 10  continue losartan and lopresser  echo pending  neuro input noted      __________________________  RADHA Erwin D.O.

## 2018-05-05 NOTE — PHYSICAL THERAPY INITIAL EVALUATION ADULT - ADDITIONAL COMMENTS
As per pt, pt lives in a private home w/ 3 steps to enter UHR w/ ex- wife and daughter.  Pt has HHA 10 hrs a day 7 days a week. PTA patient required assistance with all ADLs and pt able to ambulate short distances w/ cane.

## 2018-05-05 NOTE — PROGRESS NOTE ADULT - SUBJECTIVE AND OBJECTIVE BOX
INTERVAL HPI/OVERNIGHT EVENTS:  Pt reports feeling better.  Tolerating Po.    amLODIPine   Tablet 10 milliGRAM(s) Oral daily  aspirin enteric coated 81 milliGRAM(s) Oral daily  clopidogrel Tablet 75 milliGRAM(s) Oral daily  docusate sodium 100 milliGRAM(s) Oral three times a day  finasteride 5 milliGRAM(s) Oral daily  heparin  Injectable 5000 Unit(s) SubCutaneous every 12 hours  insulin lispro (HumaLOG) corrective regimen sliding scale   SubCutaneous three times a day before meals  insulin lispro (HumaLOG) corrective regimen sliding scale   SubCutaneous at bedtime  isosorbide   mononitrate ER Tablet (IMDUR) 30 milliGRAM(s) Oral daily  losartan 100 milliGRAM(s) Oral daily  metoprolol succinate  milliGRAM(s) Oral daily  oxyCODONE    IR 5 milliGRAM(s) Oral every 6 hours PRN  pantoprazole    Tablet 40 milliGRAM(s) Oral before breakfast  pilocarpine 1% Solution 1 Drop(s) Both EYES four times a day  piperacillin/tazobactam IVPB. 3.375 Gram(s) IV Intermittent every 8 hours  senna 2 Tablet(s) Oral at bedtime  simvastatin 40 milliGRAM(s) Oral at bedtime  tamsulosin 0.4 milliGRAM(s) Oral daily                          T(C): 36.6 (05-05-18 @ 12:09), Max: 36.6 (05-05-18 @ 12:09)  HR: 63 (05-05-18 @ 12:09) (63 - 65)  BP: 164/74 (05-05-18 @ 12:09) (138/72 - 181/75)  RR: 18 (05-05-18 @ 12:09) (18 - 18)  SpO2: 96% (05-05-18 @ 12:09) (96% - 98%)  Wt(kg): --    I&O's Summary    04 May 2018 07:01  -  05 May 2018 07:00  --------------------------------------------------------  IN: 1690 mL / OUT: 2450 mL / NET: -760 mL    05 May 2018 07:01  -  05 May 2018 15:00  --------------------------------------------------------  IN: 480 mL / OUT: 600 mL / NET: -120 mL          Allergies  No Known Allergies      Review of Systems:  General:  No wt loss, fevers, chills, +generalized weakness   CV:  No pain, palpitations, hypo/hypertension +Hx CVA  Resp:  +cough +chest congestion - clear sputum  :  No pain, bleeding, incontinence +urinary hesitancy  Muscle:  ambulates with cane assistance  Neuro:  no focal weakness +HA no vision changes +hx CVA TIA +Hx IPH  Heme:  No petechiae, ecchymosis, easy bruisability  Skin:  No rash, tattoos, scars, edema      PHYSICAL EXAM:  Constitutional: NAD, well-developed [place] male, non-toxic appearing sitting, to use commode for BM  Neck: No LAD, supple  Respiratory: grossly clear bilaterally  Cardiovascular: S1 and S2, RRR,  Gastrointestinal: BS+, soft, ND +mild suprapubic tenderness to deep palpation without rebound, guarding or rigidity, neg HSM, WH suprapubic scar (c/w prior drain site)  Extremities: No peripheral edema, neg clubbing, cyanosis  Vascular: 2+ peripheral pulses  Neurological: no focal asymmetry  Psychiatric: Normal mood, normal affect  Skin: No rashes    LABS:                       10.0   7.71  )-----------( 284      ( 05 May 2018 11:05 )             31.6       Hemoglobin: 10.0 g/dL (05-05 @ 11:05)  Hemoglobin: 9.9 g/dL (05-04 @ 08:40)  Hemoglobin: 10.5 g/dL (05-03 @ 07:53)  Hemoglobin: 10.5 g/dL (05-02 @ 18:22)      05-05    133<L>  |  96  |  12  ----------------------------<  120<H>  4.2   |  26  |  0.86    Ca    9.1      05 May 2018 08:16      Creatinine Trend: 0.86<--, 0.87<--, 0.80<--, 0.93<--    COAGS:         RADIOLOGY & ADDITIONAL TESTS:  < from: CT Abdomen and Pelvis w/ Oral Cont and w/ IV Cont (05.03.18 @ 19:21) >  INTERPRETATION:  CLINICAL INFORMATION: 78-year-old male status post   antibiotics and IR drain for perforated diverticulitis in September 2017   presents with complaint of worsening gait, cold sweats and lower   abdominal pain.    COMPARISON: CT abdomen and pelvis dated 10/7/2017.    PROCEDURE:   CT of the Abdomen and Pelvis was performed with intravenous contrast.   Intravenous contrast: 90 ml Omnipaque 350. 10 ml discarded.  Oral contrast: positive contrast was administered.  Sagittal and coronal reformats were performed.    FINDINGS:    LOWER CHEST: Focal opacity at the left lung base, likely infectious.    LIVER: Calcified granuloma in the superior left hepatic lobe. Otherwise,   within normal limits.  BILE DUCTS: Normal caliber.  GALLBLADDER: Within normal limits.  SPLEEN: Calcified granuloma in the spleen. Otherwise, within normal   limits.  PANCREAS: Within normal limits.  ADRENALS: Within normal limits.  KIDNEYS/URETERS: Too small to characterize hypodensity in the posterior   left kidney. Otherwise, within normal limits.    BLADDER: Mild circumferential bladder wall thickening.  REPRODUCTIVE ORGANS: Heterogeneous appearing and enlarged prostate   measuring 4.6 x 4.5 cm.    BOWEL: Marked colonic diverticulosis. Mural thickening of the distal   descending/proximal sigmoid colon, probably chronic diverticulitis. No   bowel obstruction. Appendix not definitely visualized. Duodenal   diverticulum again noted.  PERITONEUM: No ascites.  VESSELS:  Atherosclerotic calcification involving the aorta and its   branch vessels.  RETROPERITONEUM: No lymphadenopathy.    ABDOMINAL WALL: Within normal limits.  BONES: Degenerative changes of the spine.    IMPRESSION:   Focal opacity at the left lung base, possibly infection.    Mural thickening of the distal descending/proximal sigmoid colon,   probably chronic diverticulitis. Mild acute diverticulitis is difficult   to exclude.      CT Head No Cont (05.04.18 @ 09:26)   INTERPRETATION:  History: Unsteady gait. Status post fall. Weakness.   Diverticulitis.    Description: A noncontrast head CT was performed. Axial images were   performed from the skull base to the vertex with coronal/sagittal   reconstructions.    Comparison is made to a prior CT study from 05/02/2018.    There is no evidence for acute infarct, acute hemorrhage, mass effect,   calvarial fracture, or hydrocephalus.An old lacunar infarct involving   left lentiform nucleus is unchanged.    Mild patchy hypodensity without mass effect is noted in the   periventricular white matter which most likely represents chronic   microvascular ischemic changes given the patient's age.    Cerebral volume loss is present with secondary proportional prominence of   the sulci and ventricles.    No lytic or blastic calvarial lesions are noted. The visualized portions   of the paranasal sinuses and mastoid air cells are clear.    IMPRESSION:    No acute intracranial pathology is noted. If the patient has new and   persistent symptoms, consider short interval follow-up head CT or brain   MRI follow-up if there are no MRI contraindications.

## 2018-05-05 NOTE — PHYSICAL THERAPY INITIAL EVALUATION ADULT - TRANSFER TRAINING, PT EVAL
GOAL: Pt will perform sit to/from stand transfers independently with/without AD as needed within 3-4weeks.

## 2018-05-05 NOTE — PROGRESS NOTE ADULT - PROBLEM SELECTOR PLAN 1
CT Brain both initial and repeat reviewed  Fall Precautions  PT  May also consider an  orthotics eval for a splint for the right foot to help with spacticity and dorsiflex the foot. May also consider Botox as outpt  Continue meidcal work up  Check CK to rule out myopathy

## 2018-05-05 NOTE — PROGRESS NOTE ADULT - ASSESSMENT
77 yo male with PMH CVA, TIA, IPH, HTN, DM, CAD, BPH, diverticulosis/itis s/p IR perc drain of pelvic abscess 9/2017 now presents to ER with generalized weakness, sweats and unsteady gait and found to have mild leukocytosis. he does report intermittent lower abdominal discomfort. +cough but CXR with clear lungs    CT +acute diverticulitis, focal opacity at left lung base.  Improving.    PLAN  -Continue IV Cipro and Flagyl   -PO diet (LRD)  -ID follow-up  -neuro/medical evaluation in progress

## 2018-05-05 NOTE — PROGRESS NOTE ADULT - ASSESSMENT
77 yo male PMH alzheimer's disease,   DM(II), HTN,   HLD, BPH,   GERD, chronic anemia,   CAD with stents,,    CVA 1 year ago with R side deficit,   h/o  sigmoid diverticulitis w/ pelvic abscess s/p IR drainage and BRYANT placement, last year   presents with 2 weeks generalized weakness, cold sweats,  hyponatremia at PCP.    Recently pt took 3 weeks of previously discontinued HCTZ.     : Hyponatremia of 129  from  HCTZ , now  is  13    Cozaar / Toprol XL / ASA 81 mg/day, ,Plavix 75 mg/day and Zocur 40 mg/day  for HLD and prior stents..  h.o diastolic  dysfunction     DM:finger sticks AC and HS.  neuro  eval called  from er,  right  hemiparesis h/o , gait  instability,  high risk for  falls  and  pt  had  a  fall,  pelvic  xray,  no fx, ct head, normal  ct  with  a/c  diverticulitis,   on  zosyn  splint right foot, per neuro

## 2018-05-05 NOTE — PROGRESS NOTE ADULT - PROBLEM SELECTOR PLAN 2
Continue ASA  Continue Plavix   Continue simvistatin  If patient continue to fall may have to consider DCng planr

## 2018-05-05 NOTE — PROGRESS NOTE ADULT - SUBJECTIVE AND OBJECTIVE BOX
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 Pt endorses HA and gait instability. Recently pt took 3 weeks of previously discontinued HCTZ.     Denies fever, N/V/D, abdominal pain, fall/trauma, motor or sensory changes, bowel or bladder dysfunction. (02 May 2018 22:28)    Overnight no issues. Patient is currently alert and states that he has had weakness over the past few weeks. He has also had difficulty walking. He has particular difficulty with dragging the right foot since the stroke. He did have a fall fetting on to the floor. As per the patient he fell backward but did not hit his head. He had some dizziness but no ailyn veritgo. No headache. No nausea or vomiting. No back or neck pain.      PAST MEDICAL & SURGICAL HISTORY:  Essential hypertension  HLD (hyperlipidemia)  Intraparenchymal hemorrhage of brain  DM (diabetes mellitus)  CAD (coronary artery disease)  TIA (transient ischemic attack)  GERD (gastroesophageal reflux disease)  Anemia  Diabetes  BPH (benign prostatic hyperplasia)  History of appendectomy  History of coronary artery stent placement  Stented coronary artery  H/O hernia repair  History of appendectomy      REVIEW OF SYSTEMS:  As per HPI, otherwise negative for Constitutional, Eyes, Ears/Nose/Mouth/Throat, Neck, Cardiovascular, Respiratory, Gastrointestinal, Genitourinary, Skin, Endocrine, Musculoskeletal, Psychiatric, and Hematologic/Lymphatic.    MEDICATIONS  (STANDING):  amLODIPine   Tablet 5 milliGRAM(s) Oral daily  aspirin enteric coated 81 milliGRAM(s) Oral daily  clopidogrel Tablet 75 milliGRAM(s) Oral daily  docusate sodium 100 milliGRAM(s) Oral three times a day  finasteride 5 milliGRAM(s) Oral daily  heparin  Injectable 5000 Unit(s) SubCutaneous every 12 hours  insulin lispro (HumaLOG) corrective regimen sliding scale   SubCutaneous three times a day before meals  insulin lispro (HumaLOG) corrective regimen sliding scale   SubCutaneous at bedtime  losartan 100 milliGRAM(s) Oral daily  metoprolol succinate  milliGRAM(s) Oral daily  pantoprazole    Tablet 40 milliGRAM(s) Oral before breakfast  pilocarpine 1% Solution 1 Drop(s) Both EYES four times a day  piperacillin/tazobactam IVPB. 3.375 Gram(s) IV Intermittent every 8 hours  senna 2 Tablet(s) Oral at bedtime  simvastatin 40 milliGRAM(s) Oral at bedtime  tamsulosin 0.4 milliGRAM(s) Oral daily    MEDICATIONS  (PRN):  oxyCODONE    IR 5 milliGRAM(s) Oral every 6 hours PRN Mild Pain (1 - 3)      Allergies    No Known Allergies    Intolerances        FAMILY HISTORY:  No pertinent family history in first degree relatives  Family history of heart disease      SOCIAL HISTORY:  Living Situation: Lives at home  Occupation: Retired  Tobacco: None  Alcohol: None  Drug use:  None    VITAL SIGNS:  Vital Signs Last 24 Hrs  T(C): 36.4 (05 May 2018 03:52), Max: 36.7 (04 May 2018 06:41)  T(F): 97.5 (05 May 2018 03:52), Max: 98 (04 May 2018 06:41)  HR: 65 (05 May 2018 03:52) (58 - 65)  BP: 181/75 (05 May 2018 03:52) (138/72 - 185/75)  BP(mean): --  RR: 18 (05 May 2018 03:52) (18 - 20)  SpO2: 96% (05 May 2018 03:52) (96% - 98%)    PHYSICAL EXAMINATION:  General: Well-developed, well nourished, in no acute distress.  Eyes: Conjunctiva and sclera clear.  Cardiovascular: Regular rate and rhythm; S1 and S2 Normal; No murmurs, gallops or rubs.  Neurologic:  - Mental Status:  Alert, awake, oriented to person, place, and time; Speech is fluent with intact naming, repetition, and comprehension;   - Cranial Nerves II-XII:    II:  Visual acuity is 20/20 bilaterally; Visual fields are full to confrontation;Pupils are equal, round, and reactive to light.  III, IV, VI:  Extraocular movements are intact without nystagmus.  V:  Facial sensation is intact in the V1-V3 distribution bilaterally.  VII:  Face is symmetric with normal eye closure and smile  VIII:  Hearing is intact to finger rub.  IX, X:  Uvula is midline and soft palate rises symmetrically  XI:  Head turning and shoulder shrug are intact.  XII:  Tongue protrudes in the midline.  - Motor:  Strength Patient has a spastic hemiparesis on the right with wasting and contracture in the right leg  - Reflexes:  Absent bilateral with upgoing plantar on the right  - Sensory: Diminished pain and temp bilaterally  - Coordination:  Decreased D-N and H-N on the right  - Gait:  Limited assesment    LABS:                        9.9    6.08  )-----------( 237      ( 04 May 2018 08:40 )             31.7     05-04    134<L>  |  97  |  8   ----------------------------<  114<H>  4.2   |  23  |  0.87    Ca    9.2      04 May 2018 07:21            RADIOLOGY & ADDITIONAL STUDIES:      < from: CT Head No Cont (05.04.18 @ 09:26) >  IMPRESSION:    No acute intracranial pathology is noted. If the patient has new and   persistent symptoms, consider short interval follow-up head CT or brain   MRI follow-up if there are no MRI contraindications.          < end of copied text >      IMPRESSION & PLAN:

## 2018-05-06 LAB
ANION GAP SERPL CALC-SCNC: 14 MMOL/L — SIGNIFICANT CHANGE UP (ref 5–17)
BUN SERPL-MCNC: 21 MG/DL — SIGNIFICANT CHANGE UP (ref 7–23)
CALCIUM SERPL-MCNC: 8.6 MG/DL — SIGNIFICANT CHANGE UP (ref 8.4–10.5)
CHLORIDE SERPL-SCNC: 95 MMOL/L — LOW (ref 96–108)
CK SERPL-CCNC: 14 U/L — LOW (ref 30–200)
CO2 SERPL-SCNC: 23 MMOL/L — SIGNIFICANT CHANGE UP (ref 22–31)
CREAT SERPL-MCNC: 1.25 MG/DL — SIGNIFICANT CHANGE UP (ref 0.5–1.3)
GLUCOSE BLDC GLUCOMTR-MCNC: 149 MG/DL — HIGH (ref 70–99)
GLUCOSE BLDC GLUCOMTR-MCNC: 166 MG/DL — HIGH (ref 70–99)
GLUCOSE BLDC GLUCOMTR-MCNC: 194 MG/DL — HIGH (ref 70–99)
GLUCOSE BLDC GLUCOMTR-MCNC: 213 MG/DL — HIGH (ref 70–99)
GLUCOSE SERPL-MCNC: 118 MG/DL — HIGH (ref 70–99)
POTASSIUM SERPL-MCNC: 4.2 MMOL/L — SIGNIFICANT CHANGE UP (ref 3.5–5.3)
POTASSIUM SERPL-SCNC: 4.2 MMOL/L — SIGNIFICANT CHANGE UP (ref 3.5–5.3)
SODIUM SERPL-SCNC: 132 MMOL/L — LOW (ref 135–145)

## 2018-05-06 RX ORDER — POLYETHYLENE GLYCOL 3350 17 G/17G
17 POWDER, FOR SOLUTION ORAL DAILY
Qty: 0 | Refills: 0 | Status: DISCONTINUED | OUTPATIENT
Start: 2018-05-06 | End: 2018-05-08

## 2018-05-06 RX ADMIN — Medication 1 DROP(S): at 12:15

## 2018-05-06 RX ADMIN — PANTOPRAZOLE SODIUM 40 MILLIGRAM(S): 20 TABLET, DELAYED RELEASE ORAL at 05:33

## 2018-05-06 RX ADMIN — Medication 1: at 12:13

## 2018-05-06 RX ADMIN — LOSARTAN POTASSIUM 100 MILLIGRAM(S): 100 TABLET, FILM COATED ORAL at 05:33

## 2018-05-06 RX ADMIN — AMLODIPINE BESYLATE 10 MILLIGRAM(S): 2.5 TABLET ORAL at 05:33

## 2018-05-06 RX ADMIN — POLYETHYLENE GLYCOL 3350 17 GRAM(S): 17 POWDER, FOR SOLUTION ORAL at 17:14

## 2018-05-06 RX ADMIN — Medication 81 MILLIGRAM(S): at 12:14

## 2018-05-06 RX ADMIN — Medication 100 MILLIGRAM(S): at 05:33

## 2018-05-06 RX ADMIN — HEPARIN SODIUM 5000 UNIT(S): 5000 INJECTION INTRAVENOUS; SUBCUTANEOUS at 17:15

## 2018-05-06 RX ADMIN — SIMVASTATIN 40 MILLIGRAM(S): 20 TABLET, FILM COATED ORAL at 21:16

## 2018-05-06 RX ADMIN — PIPERACILLIN AND TAZOBACTAM 25 GRAM(S): 4; .5 INJECTION, POWDER, LYOPHILIZED, FOR SOLUTION INTRAVENOUS at 05:33

## 2018-05-06 RX ADMIN — PIPERACILLIN AND TAZOBACTAM 25 GRAM(S): 4; .5 INJECTION, POWDER, LYOPHILIZED, FOR SOLUTION INTRAVENOUS at 13:20

## 2018-05-06 RX ADMIN — CLOPIDOGREL BISULFATE 75 MILLIGRAM(S): 75 TABLET, FILM COATED ORAL at 12:14

## 2018-05-06 RX ADMIN — TAMSULOSIN HYDROCHLORIDE 0.4 MILLIGRAM(S): 0.4 CAPSULE ORAL at 12:15

## 2018-05-06 RX ADMIN — Medication 100 MILLIGRAM(S): at 16:55

## 2018-05-06 RX ADMIN — Medication 1 DROP(S): at 17:14

## 2018-05-06 RX ADMIN — Medication 1: at 17:15

## 2018-05-06 RX ADMIN — Medication 100 MILLIGRAM(S): at 21:16

## 2018-05-06 RX ADMIN — HEPARIN SODIUM 5000 UNIT(S): 5000 INJECTION INTRAVENOUS; SUBCUTANEOUS at 05:33

## 2018-05-06 RX ADMIN — FINASTERIDE 5 MILLIGRAM(S): 5 TABLET, FILM COATED ORAL at 12:14

## 2018-05-06 RX ADMIN — SENNA PLUS 2 TABLET(S): 8.6 TABLET ORAL at 21:16

## 2018-05-06 RX ADMIN — PIPERACILLIN AND TAZOBACTAM 25 GRAM(S): 4; .5 INJECTION, POWDER, LYOPHILIZED, FOR SOLUTION INTRAVENOUS at 21:16

## 2018-05-06 RX ADMIN — Medication 1 DROP(S): at 23:03

## 2018-05-06 RX ADMIN — ISOSORBIDE MONONITRATE 30 MILLIGRAM(S): 60 TABLET, EXTENDED RELEASE ORAL at 12:14

## 2018-05-06 RX ADMIN — Medication 1 DROP(S): at 05:33

## 2018-05-06 NOTE — PROGRESS NOTE ADULT - SUBJECTIVE AND OBJECTIVE BOX
- Patient seen and examined.  - In summary, patient is a 78 year old man who presented with weakness.   - Today, patient is without complaints.         *****MEDICATIONS:    MEDICATIONS  (STANDING):  amLODIPine   Tablet 10 milliGRAM(s) Oral daily  aspirin enteric coated 81 milliGRAM(s) Oral daily  clopidogrel Tablet 75 milliGRAM(s) Oral daily  docusate sodium 100 milliGRAM(s) Oral three times a day  finasteride 5 milliGRAM(s) Oral daily  heparin  Injectable 5000 Unit(s) SubCutaneous every 12 hours  insulin lispro (HumaLOG) corrective regimen sliding scale   SubCutaneous three times a day before meals  insulin lispro (HumaLOG) corrective regimen sliding scale   SubCutaneous at bedtime  isosorbide   mononitrate ER Tablet (IMDUR) 30 milliGRAM(s) Oral daily  losartan 100 milliGRAM(s) Oral daily  metoprolol succinate  milliGRAM(s) Oral daily  pantoprazole    Tablet 40 milliGRAM(s) Oral before breakfast  pilocarpine 1% Solution 1 Drop(s) Both EYES four times a day  piperacillin/tazobactam IVPB. 3.375 Gram(s) IV Intermittent every 8 hours  senna 2 Tablet(s) Oral at bedtime  simvastatin 40 milliGRAM(s) Oral at bedtime  tamsulosin 0.4 milliGRAM(s) Oral daily    MEDICATIONS  (PRN):  oxyCODONE    IR 5 milliGRAM(s) Oral every 6 hours PRN Mild Pain (1 - 3)               ***** REVIEW OF SYSTEM:  GEN: no fever, no chills, no pain  RESP: no SOB, no cough, no sputum  CVS: no chest pain, no palpitations, no edema  GI: no abdominal pain, no nausea, no vomiting, no constipation, no diarrhea  : no dysuria, no frequency  NEURO: no headache, no dizziness  PSYCH: no depression, not anxious  Derm : no itching, no rash         ***** VITAL SIGNS:    T(F): 97.8 (05-06-18 @ 03:58), Max: 97.8 (05-05-18 @ 12:09)  HR: 69 (05-06-18 @ 03:58) (63 - 73)  BP: 118/63 (05-06-18 @ 03:58) (118/63 - 164/74)  RR: 18 (05-06-18 @ 03:58) (18 - 18)  SpO2: 96% (05-06-18 @ 03:58) (94% - 96%)  Wt(kg): --  ,   I&O's Summary    05 May 2018 07:01  -  06 May 2018 07:00  --------------------------------------------------------  IN: 730 mL / OUT: 1050 mL / NET: -320 mL    06 May 2018 07:01  -  06 May 2018 09:35  --------------------------------------------------------  IN: 260 mL / OUT: 0 mL / NET: 260 mL                   *****PHYSICAL EXAM:  GEN: A&O X 3 , NAD , comfortable  HEENT: NCAT, EOMI, MMM, no icterus  NECK: Supple, No JVD  CVS: S1S2 , regular , No M/R/G appreciated  PULM: CTA B/L,  no W/R/R appreciated  ABD.: soft. non tender, non distended,  bowel sounds present  Extrem: intact pulses , no edema noted  Derm: No rash or ecchymosis noted  PSYCH: normal mood, no depression, not anxious         *****LAB AND IMAGING:                                          10.0   7.71  )-----------( 284      ( 05 May 2018 11:05 )             31.6               05-06    132<L>  |  95<L>  |  21  ----------------------------<  118<H>  4.2   |  23  |  1.25    Ca    8.6      06 May 2018 07:26         CARDIAC MARKERS ( 06 May 2018 07:26 )  x     / x     / 14 U/L / x     / x        [All pertinent recent Imaging/Reports reviewed]         *****A S S E S S M E N T   A N D   P L A N :  78M with hx of cva and CAD adm with generalized weakness.  CT shows diverticulitis  GI following  abx per ID  BP has improved  continue current meds  echo pending  neuro input noted      __________________________  RADHA Erwin D.O.

## 2018-05-06 NOTE — PROGRESS NOTE ADULT - SUBJECTIVE AND OBJECTIVE BOX
INTERVAL HPI/OVERNIGHT EVENTS:  Pt seen and examined at bedside.     Allergies/Intolerance: No Known Allergies      MEDICATIONS  (STANDING):  amLODIPine   Tablet 10 milliGRAM(s) Oral daily  aspirin enteric coated 81 milliGRAM(s) Oral daily  clopidogrel Tablet 75 milliGRAM(s) Oral daily  docusate sodium 100 milliGRAM(s) Oral three times a day  finasteride 5 milliGRAM(s) Oral daily  heparin  Injectable 5000 Unit(s) SubCutaneous every 12 hours  insulin lispro (HumaLOG) corrective regimen sliding scale   SubCutaneous three times a day before meals  insulin lispro (HumaLOG) corrective regimen sliding scale   SubCutaneous at bedtime  isosorbide   mononitrate ER Tablet (IMDUR) 30 milliGRAM(s) Oral daily  losartan 100 milliGRAM(s) Oral daily  metoprolol succinate  milliGRAM(s) Oral daily  pantoprazole    Tablet 40 milliGRAM(s) Oral before breakfast  pilocarpine 1% Solution 1 Drop(s) Both EYES four times a day  piperacillin/tazobactam IVPB. 3.375 Gram(s) IV Intermittent every 8 hours  senna 2 Tablet(s) Oral at bedtime  simvastatin 40 milliGRAM(s) Oral at bedtime  tamsulosin 0.4 milliGRAM(s) Oral daily    MEDICATIONS  (PRN):  bisacodyl 5 milliGRAM(s) Oral every 12 hours PRN Constipation  oxyCODONE    IR 5 milliGRAM(s) Oral every 6 hours PRN Mild Pain (1 - 3)  polyethylene glycol 3350 17 Gram(s) Oral daily PRN Constipation        ROS: all systems reviewed and wnl      PHYSICAL EXAMINATION:  Vital Signs Last 24 Hrs  T(C): 36.6 (06 May 2018 11:53), Max: 36.6 (06 May 2018 03:58)  T(F): 97.9 (06 May 2018 11:53), Max: 97.9 (06 May 2018 11:53)  HR: 60 (06 May 2018 11:53) (59 - 69)  BP: 129/67 (06 May 2018 11:53) (118/63 - 129/67)  BP(mean): --  RR: 18 (06 May 2018 11:53) (18 - 18)  SpO2: 97% (06 May 2018 11:53) (95% - 97%)  CAPILLARY BLOOD GLUCOSE      POCT Blood Glucose.: 194 mg/dL (06 May 2018 11:21)  POCT Blood Glucose.: 149 mg/dL (06 May 2018 07:43)  POCT Blood Glucose.: 219 mg/dL (05 May 2018 21:17)  POCT Blood Glucose.: 173 mg/dL (05 May 2018 16:43)      05-05 @ 07:01  -  05-06 @ 07:00  --------------------------------------------------------  IN: 730 mL / OUT: 1050 mL / NET: -320 mL    05-06 @ 07:01  -  05-06 @ 16:09  --------------------------------------------------------  IN: 540 mL / OUT: 700 mL / NET: -160 mL        GENERAL:   NECK: supple, No JVD  CHEST/LUNG: clear to auscultation bilaterally; no rales, rhonchi, or wheezing b/l  HEART: normal S1, S2  ABDOMEN: BS+, soft, ND, NT   EXTREMITIES:  pulses palpable; no clubbing, cyanosis, or edema b/l LEs  SKIN: no rashes or lesions      LABS:                        10.0   7.71  )-----------( 284      ( 05 May 2018 11:05 )             31.6     05-06    132<L>  |  95<L>  |  21  ----------------------------<  118<H>  4.2   |  23  |  1.25    Ca    8.6      06 May 2018 07:26 INTERVAL HPI/OVERNIGHT EVENTS:  Pt seen and examined at bedside.     Allergies/Intolerance: No Known Allergies      MEDICATIONS  (STANDING):  amLODIPine   Tablet 10 milliGRAM(s) Oral daily  aspirin enteric coated 81 milliGRAM(s) Oral daily  clopidogrel Tablet 75 milliGRAM(s) Oral daily  docusate sodium 100 milliGRAM(s) Oral three times a day  finasteride 5 milliGRAM(s) Oral daily  heparin  Injectable 5000 Unit(s) SubCutaneous every 12 hours  insulin lispro (HumaLOG) corrective regimen sliding scale   SubCutaneous three times a day before meals  insulin lispro (HumaLOG) corrective regimen sliding scale   SubCutaneous at bedtime  isosorbide   mononitrate ER Tablet (IMDUR) 30 milliGRAM(s) Oral daily  losartan 100 milliGRAM(s) Oral daily  metoprolol succinate  milliGRAM(s) Oral daily  pantoprazole    Tablet 40 milliGRAM(s) Oral before breakfast  pilocarpine 1% Solution 1 Drop(s) Both EYES four times a day  piperacillin/tazobactam IVPB. 3.375 Gram(s) IV Intermittent every 8 hours  senna 2 Tablet(s) Oral at bedtime  simvastatin 40 milliGRAM(s) Oral at bedtime  tamsulosin 0.4 milliGRAM(s) Oral daily    MEDICATIONS  (PRN):  bisacodyl 5 milliGRAM(s) Oral every 12 hours PRN Constipation  oxyCODONE    IR 5 milliGRAM(s) Oral every 6 hours PRN Mild Pain (1 - 3)  polyethylene glycol 3350 17 Gram(s) Oral daily PRN Constipation        ROS: all systems reviewed and wnl      PHYSICAL EXAMINATION:  Vital Signs Last 24 Hrs  T(C): 36.6 (06 May 2018 11:53), Max: 36.6 (06 May 2018 03:58)  T(F): 97.9 (06 May 2018 11:53), Max: 97.9 (06 May 2018 11:53)  HR: 60 (06 May 2018 11:53) (59 - 69)  BP: 129/67 (06 May 2018 11:53) (118/63 - 129/67)  BP(mean): --  RR: 18 (06 May 2018 11:53) (18 - 18)  SpO2: 97% (06 May 2018 11:53) (95% - 97%)  CAPILLARY BLOOD GLUCOSE      POCT Blood Glucose.: 194 mg/dL (06 May 2018 11:21)  POCT Blood Glucose.: 149 mg/dL (06 May 2018 07:43)  POCT Blood Glucose.: 219 mg/dL (05 May 2018 21:17)  POCT Blood Glucose.: 173 mg/dL (05 May 2018 16:43)      05-05 @ 07:01  -  05-06 @ 07:00  --------------------------------------------------------  IN: 730 mL / OUT: 1050 mL / NET: -320 mL    05-06 @ 07:01  -  05-06 @ 16:09  --------------------------------------------------------  IN: 540 mL / OUT: 700 mL / NET: -160 mL        GENERAL: stable, tolerates regular diet well, no abdominal pain or fevers  NECK: supple, No JVD  CHEST/LUNG: clear to auscultation bilaterally; no rales, rhonchi, or wheezing b/l  HEART: normal S1, S2  ABDOMEN: BS+, soft, ND, NT   EXTREMITIES:  pulses palpable; no clubbing, cyanosis, or edema b/l LEs  SKIN: no rashes or lesions      LABS:                        10.0   7.71  )-----------( 284      ( 05 May 2018 11:05 )             31.6     05-06    132<L>  |  95<L>  |  21  ----------------------------<  118<H>  4.2   |  23  |  1.25    Ca    8.6      06 May 2018 07:26

## 2018-05-06 NOTE — PROGRESS NOTE ADULT - ASSESSMENT
77 yo male PMH alzheimer's disease,   DM(II), HTN,   HLD, BPH,   GERD, chronic anemia,   CAD with stents,,    CVA 1 year ago with R side deficit,   h/o  sigmoid diverticulitis w/ pelvic abscess s/p IR drainage and BRYANT placement, last year   presents with 2 weeks generalized weakness, cold sweats,  hyponatremia at PCP.    Recently pt took 3 weeks of previously discontinued HCTZ.     : Hyponatremia of 129  from  HCTZ , now  is  13    Cozaar / Toprol XL / ASA 81 mg/day, ,Plavix 75 mg/day and Zocur 40 mg/day  for HLD and prior stents..  h.o diastolic  dysfunction     DM:finger sticks AC and HS. 149-219  neuro  eval called  from er,  right  hemiparesis h/o , gait  instability,  high risk for  falls  and  pt  had  a  fall,  pelvic  xray,  no fx, ct head, normal  ct  with  a/c  diverticulitis,   on  zosyn  splint right foot, per neuro 77 yo male PMH alzheimer's disease,   DM(II), HTN,   HLD, BPH,   GERD, chronic anemia,   CAD with stents,,    CVA 1 year ago with R side deficit,   h/o  sigmoid diverticulitis w/ pelvic abscess s/p IR drainage and BRYANT placement, last year   presents with 2 weeks generalized weakness, cold sweats,  hyponatremia at PCP.    Recently pt took 3 weeks of previously discontinued HCTZ.     : Hyponatremia of 129  from  HCTZ , now  is  132    Cozaar / Toprol XL / ASA 81 mg/day, ,Plavix 75 mg/day and Zocur 40 mg/day  for HLD and prior stents..  h.o diastolic  dysfunction     DM:finger sticks AC and HS. 149-219  neuro  eval called  from er,  right  hemiparesis h/o , gait  instability,  high risk for  falls  and  pt  had  a  fall,  pelvic  xray,  no fx, ct head, normal  ct  with  a/c  diverticulitis,   on  zosyn, likely change to po soon  splint right foot, per neuro

## 2018-05-07 ENCOUNTER — TRANSCRIPTION ENCOUNTER (OUTPATIENT)
Age: 78
End: 2018-05-07

## 2018-05-07 LAB
ANION GAP SERPL CALC-SCNC: 11 MMOL/L — SIGNIFICANT CHANGE UP (ref 5–17)
BUN SERPL-MCNC: 15 MG/DL — SIGNIFICANT CHANGE UP (ref 7–23)
CALCIUM SERPL-MCNC: 9.2 MG/DL — SIGNIFICANT CHANGE UP (ref 8.4–10.5)
CHLORIDE SERPL-SCNC: 98 MMOL/L — SIGNIFICANT CHANGE UP (ref 96–108)
CO2 SERPL-SCNC: 25 MMOL/L — SIGNIFICANT CHANGE UP (ref 22–31)
CREAT SERPL-MCNC: 1.01 MG/DL — SIGNIFICANT CHANGE UP (ref 0.5–1.3)
GLUCOSE BLDC GLUCOMTR-MCNC: 120 MG/DL — HIGH (ref 70–99)
GLUCOSE BLDC GLUCOMTR-MCNC: 151 MG/DL — HIGH (ref 70–99)
GLUCOSE BLDC GLUCOMTR-MCNC: 154 MG/DL — HIGH (ref 70–99)
GLUCOSE BLDC GLUCOMTR-MCNC: 225 MG/DL — HIGH (ref 70–99)
GLUCOSE SERPL-MCNC: 111 MG/DL — HIGH (ref 70–99)
POTASSIUM SERPL-MCNC: 4.3 MMOL/L — SIGNIFICANT CHANGE UP (ref 3.5–5.3)
POTASSIUM SERPL-SCNC: 4.3 MMOL/L — SIGNIFICANT CHANGE UP (ref 3.5–5.3)
SODIUM SERPL-SCNC: 134 MMOL/L — LOW (ref 135–145)

## 2018-05-07 RX ORDER — AMLODIPINE BESYLATE 2.5 MG/1
1 TABLET ORAL
Qty: 30 | Refills: 0 | OUTPATIENT
Start: 2018-05-07 | End: 2018-06-05

## 2018-05-07 RX ORDER — AMLODIPINE BESYLATE 2.5 MG/1
1 TABLET ORAL
Qty: 30 | Refills: 0
Start: 2018-05-07 | End: 2018-06-05

## 2018-05-07 RX ORDER — CETIRIZINE HYDROCHLORIDE 10 MG/1
0 TABLET ORAL
Qty: 0 | Refills: 0 | COMMUNITY

## 2018-05-07 RX ORDER — ISOSORBIDE MONONITRATE 60 MG/1
1 TABLET, EXTENDED RELEASE ORAL
Qty: 0 | Refills: 0 | COMMUNITY
Start: 2018-05-07

## 2018-05-07 RX ORDER — ISOSORBIDE MONONITRATE 60 MG/1
1 TABLET, EXTENDED RELEASE ORAL
Qty: 30 | Refills: 0
Start: 2018-05-07 | End: 2018-06-05

## 2018-05-07 RX ADMIN — TAMSULOSIN HYDROCHLORIDE 0.4 MILLIGRAM(S): 0.4 CAPSULE ORAL at 11:48

## 2018-05-07 RX ADMIN — PANTOPRAZOLE SODIUM 40 MILLIGRAM(S): 20 TABLET, DELAYED RELEASE ORAL at 05:26

## 2018-05-07 RX ADMIN — Medication 81 MILLIGRAM(S): at 11:47

## 2018-05-07 RX ADMIN — LOSARTAN POTASSIUM 100 MILLIGRAM(S): 100 TABLET, FILM COATED ORAL at 05:26

## 2018-05-07 RX ADMIN — FINASTERIDE 5 MILLIGRAM(S): 5 TABLET, FILM COATED ORAL at 11:53

## 2018-05-07 RX ADMIN — HEPARIN SODIUM 5000 UNIT(S): 5000 INJECTION INTRAVENOUS; SUBCUTANEOUS at 17:12

## 2018-05-07 RX ADMIN — Medication 1 DROP(S): at 11:53

## 2018-05-07 RX ADMIN — ISOSORBIDE MONONITRATE 30 MILLIGRAM(S): 60 TABLET, EXTENDED RELEASE ORAL at 11:48

## 2018-05-07 RX ADMIN — Medication 1: at 17:10

## 2018-05-07 RX ADMIN — Medication 1 DROP(S): at 05:26

## 2018-05-07 RX ADMIN — PIPERACILLIN AND TAZOBACTAM 25 GRAM(S): 4; .5 INJECTION, POWDER, LYOPHILIZED, FOR SOLUTION INTRAVENOUS at 21:36

## 2018-05-07 RX ADMIN — PIPERACILLIN AND TAZOBACTAM 25 GRAM(S): 4; .5 INJECTION, POWDER, LYOPHILIZED, FOR SOLUTION INTRAVENOUS at 05:28

## 2018-05-07 RX ADMIN — Medication 1 DROP(S): at 17:10

## 2018-05-07 RX ADMIN — Medication 1: at 11:52

## 2018-05-07 RX ADMIN — AMLODIPINE BESYLATE 10 MILLIGRAM(S): 2.5 TABLET ORAL at 05:26

## 2018-05-07 RX ADMIN — Medication 100 MILLIGRAM(S): at 05:26

## 2018-05-07 RX ADMIN — CLOPIDOGREL BISULFATE 75 MILLIGRAM(S): 75 TABLET, FILM COATED ORAL at 11:47

## 2018-05-07 RX ADMIN — HEPARIN SODIUM 5000 UNIT(S): 5000 INJECTION INTRAVENOUS; SUBCUTANEOUS at 05:26

## 2018-05-07 RX ADMIN — SIMVASTATIN 40 MILLIGRAM(S): 20 TABLET, FILM COATED ORAL at 20:15

## 2018-05-07 RX ADMIN — PIPERACILLIN AND TAZOBACTAM 25 GRAM(S): 4; .5 INJECTION, POWDER, LYOPHILIZED, FOR SOLUTION INTRAVENOUS at 14:38

## 2018-05-07 NOTE — PROGRESS NOTE ADULT - ASSESSMENT
77 yo male with PMH CVA, TIA, IPH, HTN, DM, CAD, BPH, diverticulosis/itis s/p IR perc drain of pelvic abscess 9/2017 now presents to ER with generalized weakness, sweats and unsteady gait and found to have mild leukocytosis. he does report intermittent lower abdominal discomfort. +cough but CXR with clear lungs    CT +acute diverticulitis, focal opacity at left lung base.  Improving.    PLAN  -Antibiotics per ID; planned 14 days total, can switch to PO Augmentin upon discharge  -PO diet (LRD)  -ID following    No GI objection to discharge planning    Jalil Fong PA-C    [facility]  [phone]

## 2018-05-07 NOTE — OCCUPATIONAL THERAPY INITIAL EVALUATION ADULT - LIGHT TOUCH SENSATION, RLE, REHAB EVAL
within normal limits/as per pt he has preexisting off/on peripheral neuropathy distal to proximal toes to mid calf

## 2018-05-07 NOTE — DISCHARGE NOTE ADULT - CARE PLAN
Principal Discharge DX:	Weakness  Goal:	to remain without reoccurring falling  Assessment and plan of treatment:	Brace to R ankle  Follow up with PMD after rehab  Secondary Diagnosis:	Diverticulitis  Secondary Diagnosis:	Hyponatremia  Secondary Diagnosis:	BPH (benign prostatic hyperplasia)  Secondary Diagnosis:	Essential hypertension  Secondary Diagnosis:	Alzheimer's dementia Principal Discharge DX:	Weakness  Goal:	to remain without reoccurring falling  Assessment and plan of treatment:	Splint for the right foot to help with spacticity and dorsiflex the foot. Possible Botox as outpt  Follow up with PMD after rehab  Secondary Diagnosis:	Diverticulitis  Assessment and plan of treatment:	Continue augmentin for 4 more days, Follow up with GI next week  Secondary Diagnosis:	Hyponatremia  Assessment and plan of treatment:	Continue current medications, follow up with PMD for routine labs in 2 weeks  Secondary Diagnosis:	BPH (benign prostatic hyperplasia)  Assessment and plan of treatment:	You have an enlarged prostate gland which gets bigger as men get older - it is a very common problem and has nothing to do with prostate cancer  Call your doctor if you are urinating more frequently, have trouble starting to urinate, have weak stream, urine leaking or dribbling, and feeling as though bladder is not empty after urination  Your doctor will monitor your prostate with a rectal exam as well as urine or blood testing  You can help yourself by reducing the amount of fluid you drink before going to bed, limiting the amount of alcohol & caffeine you drink   Avoid cold & allergy medication that contain decongestants or antihistamines which make BPH symptoms worse  You can also "double void" by waiting a moment after urinating & trying again  Take your medication as prescribed - one medication helps to relax the muscle around the urethra and the other medication you may take prevents the prostate from growing more or even shrinking the prostate  Secondary Diagnosis:	Essential hypertension  Assessment and plan of treatment:	Follow up with your medical doctor to establish long term blood pressure treatment goals.  Secondary Diagnosis:	Alzheimer's dementia  Assessment and plan of treatment:	Continue current medications, follow up with PMD for management  Secondary Diagnosis:	TIA (transient ischemic attack)  Assessment and plan of treatment:	Continue current medications , follow up with Dr. Ulloa Principal Discharge DX:	Weakness  Goal:	to remain without reoccurring falling  Assessment and plan of treatment:	Splint for the right foot to help with spacticity and dorsiflex the foot. Possible Botox as outpt  Follow up with PMD after rehab  Secondary Diagnosis:	Diverticulitis  Assessment and plan of treatment:	Continue augmentin for 11 more days as directed. Follow up with GI next week  Secondary Diagnosis:	Hyponatremia  Assessment and plan of treatment:	Continue current medications, follow up with PMD for routine labs in 2 weeks  Secondary Diagnosis:	BPH (benign prostatic hyperplasia)  Assessment and plan of treatment:	You have an enlarged prostate gland which gets bigger as men get older - it is a very common problem and has nothing to do with prostate cancer  Call your doctor if you are urinating more frequently, have trouble starting to urinate, have weak stream, urine leaking or dribbling, and feeling as though bladder is not empty after urination  Your doctor will monitor your prostate with a rectal exam as well as urine or blood testing  You can help yourself by reducing the amount of fluid you drink before going to bed, limiting the amount of alcohol & caffeine you drink   Avoid cold & allergy medication that contain decongestants or antihistamines which make BPH symptoms worse  You can also "double void" by waiting a moment after urinating & trying again  Take your medication as prescribed - one medication helps to relax the muscle around the urethra and the other medication you may take prevents the prostate from growing more or even shrinking the prostate  Secondary Diagnosis:	Essential hypertension  Assessment and plan of treatment:	Follow up with your medical doctor to establish long term blood pressure treatment goals.  Secondary Diagnosis:	Alzheimer's dementia  Assessment and plan of treatment:	Continue current medications, follow up with PMD for management  Secondary Diagnosis:	TIA (transient ischemic attack)  Assessment and plan of treatment:	Continue current medications , follow up with Dr. Ulloa Principal Discharge DX:	Weakness  Goal:	to remain without reoccurring falling  Assessment and plan of treatment:	Splint for the right foot to help with spacticity and dorsiflex the foot. Possible Botox as outpt  Follow up with PMD and neurology after rehab  Secondary Diagnosis:	Diverticulitis  Assessment and plan of treatment:	Continue augmentin for 11 more days as directed. Follow up with GI next week  Secondary Diagnosis:	Hyponatremia  Assessment and plan of treatment:	Continue current medications, follow up with PMD for routine labs in 2 weeks  Secondary Diagnosis:	BPH (benign prostatic hyperplasia)  Assessment and plan of treatment:	You have an enlarged prostate gland which gets bigger as men get older - it is a very common problem and has nothing to do with prostate cancer  Call your doctor if you are urinating more frequently, have trouble starting to urinate, have weak stream, urine leaking or dribbling, and feeling as though bladder is not empty after urination  Your doctor will monitor your prostate with a rectal exam as well as urine or blood testing  You can help yourself by reducing the amount of fluid you drink before going to bed, limiting the amount of alcohol & caffeine you drink   Avoid cold & allergy medication that contain decongestants or antihistamines which make BPH symptoms worse  You can also "double void" by waiting a moment after urinating & trying again  Take your medication as prescribed - one medication helps to relax the muscle around the urethra and the other medication you may take prevents the prostate from growing more or even shrinking the prostate  Secondary Diagnosis:	Essential hypertension  Assessment and plan of treatment:	Follow up with your medical doctor to establish long term blood pressure treatment goals.  Secondary Diagnosis:	Alzheimer's dementia  Assessment and plan of treatment:	Continue current medications, follow up with PMD for management  Secondary Diagnosis:	TIA (transient ischemic attack)  Assessment and plan of treatment:	Continue current medications , follow up with Dr. Ulloa Principal Discharge DX:	Weakness  Goal:	to remain without reoccurring falling  Assessment and plan of treatment:	Splint for the right foot to help with spacticity and dorsiflex the foot. Possible Botox as outpt  Follow up with PMD and neurology after rehab  Secondary Diagnosis:	Diverticulitis  Assessment and plan of treatment:	Continue Augmentin for 10 more days as directed. Follow up with GI next week  Secondary Diagnosis:	Hyponatremia  Assessment and plan of treatment:	resolved.  Continue current medications, follow up with PMD for routine labs in 2 weeks  Secondary Diagnosis:	BPH (benign prostatic hyperplasia)  Assessment and plan of treatment:	You have an enlarged prostate gland which gets bigger as men get older - it is a very common problem and has nothing to do with prostate cancer.  Call your doctor if you are urinating more frequently, have trouble starting to urinate, have weak stream, urine leaking or dribbling, and feeling as though bladder is not empty after urination  Your doctor will monitor your prostate with a rectal exam as well as urine or blood testing  You can help yourself by reducing the amount of fluid you drink before going to bed, limiting the amount of alcohol & caffeine you drink   Avoid cold & allergy medication that contain decongestants or antihistamines which make BPH symptoms worse  You can also "double void" by waiting a moment after urinating & trying again  Take your medication as prescribed - one medication helps to relax the muscle around the urethra and the other medication you may take prevents the prostate from growing more or even shrinking the prostate  Secondary Diagnosis:	Essential hypertension  Assessment and plan of treatment:	Follow up with your medical doctor to establish long term blood pressure treatment goals.  Secondary Diagnosis:	Alzheimer's dementia  Assessment and plan of treatment:	Continue current medications, follow up with PMD for management  Secondary Diagnosis:	TIA (transient ischemic attack)  Assessment and plan of treatment:	Continue current medications, follow up with Dr. Ulloa

## 2018-05-07 NOTE — DISCHARGE NOTE ADULT - PATIENT PORTAL LINK FT
You can access the [facility] Patient Portal, offered by [facility], by registering with the following website: [email or web address]

## 2018-05-07 NOTE — DISCHARGE NOTE ADULT - ADDITIONAL INSTRUCTIONS
please make an appointment with your PMD in 1 week.  follow with GI, cardiology and neurology in 2 ~ 4weeks. please make an appointment with your PMD (Dr. Jeffery) in 1 week.  follow with GI, cardiology and neurology in 2 ~ 4weeks.

## 2018-05-07 NOTE — OCCUPATIONAL THERAPY INITIAL EVALUATION ADULT - ADL RETRAINING, OT EVAL
Goal: Pt will perform bathing with supervision and shower chair, grab bar and long handled sponge within 4 weeks

## 2018-05-07 NOTE — DISCHARGE NOTE ADULT - CARE PROVIDERS DIRECT ADDRESSES
[email or web address],DirectAddress_Unknown [email or web address],DirectAddress_Unknown,DirectAddress_Unknown,DirectAddress_Unknown

## 2018-05-07 NOTE — DISCHARGE NOTE ADULT - PATIENT PORTAL LINK FT
Right orchiopexy “You can access the [facility] Patient Portal, offered by [facility], by registering with the following website: [email or web address]”

## 2018-05-07 NOTE — DISCHARGE NOTE ADULT - HOSPITAL COURSE
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 Pt endorses HA and gait instability. Recently pt took 3 weeks of previously discontinued HCTZ.     DX: Unsteady gait s/p fall on 5/4          ? Mild Acute Diverticulitis & focal opacity at the left lung base on IV Zosyn          Hyponatremia          TIA     Pt evaluated by ID, GI, cardiology and neurology . IV zosyn to augmentin. Pt discharge home with home PT. 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 Pt endorses HA and gait instability. Recently pt took 3 weeks of previously discontinued HCTZ.     DX: Unsteady gait s/p fall on 5/4          ? Mild Acute Diverticulitis & focal opacity at the left lung base on IV Zosyn          Hyponatremia          TIA     Pt evaluated by ID, GI, cardiology and neurology . IV zosyn to augmentin. patient seen by orthotics for r foot splint to help spacticity Pt discharge home with home PT. 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 Pt endorses HA and gait instability. Recently pt took 3 weeks of previously discontinued HCTZ.     DX: Unsteady gait s/p fall on 5/4          ? Mild Acute Diverticulitis & focal opacity at the left lung base on IV Zosyn          Hyponatremia          TIA     Pt evaluated by ID, GI, cardiology and neurology . IV zosyn to augmentin for total 14days. patient seen by orthotics for r foot splint to help spacticity Pt discharge home with home PT.

## 2018-05-07 NOTE — OCCUPATIONAL THERAPY INITIAL EVALUATION ADULT - ANTICIPATED DISCHARGE DISPOSITION, OT EVAL
Home with Home Occupational Therapy for home safety evaluation, functional mobility, balance and ADLs. Home with supervision/assist for all functional mobility and ADLs./home w/ OT/home w/ level of assist

## 2018-05-07 NOTE — DISCHARGE NOTE ADULT - MEDICATION SUMMARY - MEDICATIONS TO STOP TAKING
I will STOP taking the medications listed below when I get home from the hospital:    Levaquin 500 mg oral tablet  -- 1 tab(s) by mouth once a day for 10 days  -- Avoid prolonged or excessive exposure to direct and/or artificial sunlight while taking this medication.  Do not take dairy products, antacids, or iron preparations within one hour of this medication.  Finish all this medication unless otherwise directed by prescriber.  May cause drowsiness or dizziness.  Medication should be taken with plenty of water.    Keflex 500 mg oral capsule  -- 1 cap(s) by mouth 2 times a day   -- Finish all this medication unless otherwise directed by prescriber.

## 2018-05-07 NOTE — PROGRESS NOTE ADULT - ASSESSMENT
Assessment:  Patient with h/o diverticulitis and abscess this past fall managed with drain and antibiotics and intraparenchymal hypertensive brain bleed came in for unsteady gait, tia per neuro. He had some mild abdomen tenderness which now resolved and may be from a mild flare of chronic diverticulitis.   no abdominal pain   no fever   Bcx NGTD   UCx normal kaylyn   last cbc wbc was normal     Plan:    day 4 of 14  Continue IV zosyn while in hospital   when he is ready for DC can Switch to Augmentin 875 mg po bid to complete remaining days as planned

## 2018-05-07 NOTE — DISCHARGE NOTE ADULT - PLAN OF CARE
to remain without reoccurring falling Brace to R ankle  Follow up with PMD after rehab Splint for the right foot to help with spacticity and dorsiflex the foot. Possible Botox as outpt  Follow up with PMD after rehab Continue augmentin for 4 more days, Follow up with GI next week Continue current medications, follow up with PMD for routine labs in 2 weeks You have an enlarged prostate gland which gets bigger as men get older - it is a very common problem and has nothing to do with prostate cancer  Call your doctor if you are urinating more frequently, have trouble starting to urinate, have weak stream, urine leaking or dribbling, and feeling as though bladder is not empty after urination  Your doctor will monitor your prostate with a rectal exam as well as urine or blood testing  You can help yourself by reducing the amount of fluid you drink before going to bed, limiting the amount of alcohol & caffeine you drink   Avoid cold & allergy medication that contain decongestants or antihistamines which make BPH symptoms worse  You can also "double void" by waiting a moment after urinating & trying again  Take your medication as prescribed - one medication helps to relax the muscle around the urethra and the other medication you may take prevents the prostate from growing more or even shrinking the prostate Follow up with your medical doctor to establish long term blood pressure treatment goals. Continue current medications, follow up with PMD for management Continue current medications , follow up with Dr. Ulloa Continue augmentin for 11 more days as directed. Follow up with GI next week Splint for the right foot to help with spacticity and dorsiflex the foot. Possible Botox as outpt  Follow up with PMD and neurology after rehab Continue Augmentin for 10 more days as directed. Follow up with GI next week resolved.  Continue current medications, follow up with PMD for routine labs in 2 weeks You have an enlarged prostate gland which gets bigger as men get older - it is a very common problem and has nothing to do with prostate cancer.  Call your doctor if you are urinating more frequently, have trouble starting to urinate, have weak stream, urine leaking or dribbling, and feeling as though bladder is not empty after urination  Your doctor will monitor your prostate with a rectal exam as well as urine or blood testing  You can help yourself by reducing the amount of fluid you drink before going to bed, limiting the amount of alcohol & caffeine you drink   Avoid cold & allergy medication that contain decongestants or antihistamines which make BPH symptoms worse  You can also "double void" by waiting a moment after urinating & trying again  Take your medication as prescribed - one medication helps to relax the muscle around the urethra and the other medication you may take prevents the prostate from growing more or even shrinking the prostate Continue current medications, follow up with Dr. Ulloa

## 2018-05-07 NOTE — PROGRESS NOTE ADULT - SUBJECTIVE AND OBJECTIVE BOX
no  complaints  no   a bd pain    REVIEW OF SYSTEMS:  CONSTITUTIONAL: No weakness,  no   fevers      RESPIRATORY:  No    shortness of breath  , no  wheezing  CARDIOVASCULAR: No chest pain,   no  palpitations, no  cough  GASTROINTESTINAL: No abdominal  pain, no  vomiting, no  diarrhea  NEUROLOGICAL: No  focal  weakness    MEDICATIONS  (STANDING):  amLODIPine   Tablet 10 milliGRAM(s) Oral daily  aspirin enteric coated 81 milliGRAM(s) Oral daily  clopidogrel Tablet 75 milliGRAM(s) Oral daily  docusate sodium 100 milliGRAM(s) Oral three times a day  finasteride 5 milliGRAM(s) Oral daily  heparin  Injectable 5000 Unit(s) SubCutaneous every 12 hours  insulin lispro (HumaLOG) corrective regimen sliding scale   SubCutaneous three times a day before meals  insulin lispro (HumaLOG) corrective regimen sliding scale   SubCutaneous at bedtime  isosorbide   mononitrate ER Tablet (IMDUR) 30 milliGRAM(s) Oral daily  losartan 100 milliGRAM(s) Oral daily  metoprolol succinate  milliGRAM(s) Oral daily  pantoprazole    Tablet 40 milliGRAM(s) Oral before breakfast  pilocarpine 1% Solution 1 Drop(s) Both EYES four times a day  piperacillin/tazobactam IVPB. 3.375 Gram(s) IV Intermittent every 8 hours  senna 2 Tablet(s) Oral at bedtime  simvastatin 40 milliGRAM(s) Oral at bedtime  tamsulosin 0.4 milliGRAM(s) Oral daily    MEDICATIONS  (PRN):  bisacodyl 5 milliGRAM(s) Oral every 12 hours PRN Constipation  oxyCODONE    IR 5 milliGRAM(s) Oral every 6 hours PRN Mild Pain (1 - 3)  polyethylene glycol 3350 17 Gram(s) Oral daily PRN Constipation      Vital Signs Last 24 Hrs  T(C): 36.7 (07 May 2018 04:10), Max: 36.7 (07 May 2018 04:10)  T(F): 98 (07 May 2018 04:10), Max: 98 (07 May 2018 04:10)  HR: 67 (07 May 2018 04:10) (59 - 67)  BP: 155/62 (07 May 2018 04:10) (129/67 - 162/68)  BP(mean): --  RR: 18 (07 May 2018 04:10) (18 - 18)  SpO2: 96% (07 May 2018 04:10) (95% - 97%)  CAPILLARY BLOOD GLUCOSE      POCT Blood Glucose.: 120 mg/dL (07 May 2018 07:43)  POCT Blood Glucose.: 213 mg/dL (06 May 2018 21:01)  POCT Blood Glucose.: 166 mg/dL (06 May 2018 16:34)  POCT Blood Glucose.: 194 mg/dL (06 May 2018 11:21)    I&O's Summary    06 May 2018 07:01  -  07 May 2018 07:00  --------------------------------------------------------  IN: 980 mL / OUT: 2400 mL / NET: -1420 mL        PHYSICAL EXAM:  HEAD:  Atraumatic, Normocephalic  NECK: Supple, No   JVD  CHEST/LUNG:   no     rales,     no,    rhonchi  HEART: Regular rate and rhythm;         murmur  ABDOMEN: Soft, Nontender, ;   EXTREMITIES:        edema  NEUROLOGY:  alert    LABS:                        10.0   7.71  )-----------( 284      ( 05 May 2018 11:05 )             31.6     05-07    134<L>  |  98  |  15  ----------------------------<  111<H>  4.3   |  25  |  1.01    Ca    9.2      07 May 2018 06:47        CARDIAC MARKERS ( 06 May 2018 07:26 )  x     / x     / 14 U/L / x     / x                    Thyroid Stimulating Hormone, Serum: 1.28 uIU/mL (05-03 @ 08:22)          Consultant(s) Notes Reviewed:      Care Discussed with Consultants/Other Providers:

## 2018-05-07 NOTE — DISCHARGE NOTE ADULT - PROVIDER TOKENS
TOKEN:'[id number]',TOKEN:'[id number]',TOKEN:'[id number]' TOKEN:'[id number]:'[id number]:'[id number]',TOKEN:'[id number]'

## 2018-05-07 NOTE — PROGRESS NOTE ADULT - SUBJECTIVE AND OBJECTIVE BOX
Patient is a 78y old  Male who presented with a chief complaint of weakness (07 May 2018 09:45)      INTERVAL HPI/OVERNIGHT EVENTS:  no abdominal pain  tolerating PO - appetite good  no diarrhea    MEDICATIONS  (STANDING):  amLODIPine   Tablet 10 milliGRAM(s) Oral daily  aspirin enteric coated 81 milliGRAM(s) Oral daily  clopidogrel Tablet 75 milliGRAM(s) Oral daily  docusate sodium 100 milliGRAM(s) Oral three times a day  finasteride 5 milliGRAM(s) Oral daily  heparin  Injectable 5000 Unit(s) SubCutaneous every 12 hours  insulin lispro (HumaLOG) corrective regimen sliding scale   SubCutaneous three times a day before meals  insulin lispro (HumaLOG) corrective regimen sliding scale   SubCutaneous at bedtime  isosorbide   mononitrate ER Tablet (IMDUR) 30 milliGRAM(s) Oral daily  losartan 100 milliGRAM(s) Oral daily  metoprolol succinate  milliGRAM(s) Oral daily  pantoprazole    Tablet 40 milliGRAM(s) Oral before breakfast  pilocarpine 1% Solution 1 Drop(s) Both EYES four times a day  piperacillin/tazobactam IVPB. 3.375 Gram(s) IV Intermittent every 8 hours  senna 2 Tablet(s) Oral at bedtime  simvastatin 40 milliGRAM(s) Oral at bedtime  tamsulosin 0.4 milliGRAM(s) Oral daily    MEDICATIONS  (PRN):  bisacodyl 5 milliGRAM(s) Oral every 12 hours PRN Constipation  oxyCODONE    IR 5 milliGRAM(s) Oral every 6 hours PRN Mild Pain (1 - 3)  polyethylene glycol 3350 17 Gram(s) Oral daily PRN Constipation      Allergies  No Known Allergies    Review of Systems:  General:  No wt loss, fevers, chills, +generalized weakness   CV:  No pain, palpitations, hypo/hypertension +Hx CVA  Resp:  +cough +chest congestion - clear sputum  :  No pain, bleeding, incontinence +urinary hesitancy  Muscle:  ambulates with cane assistance  Neuro:  no focal weakness +HA no vision changes +hx CVA TIA +Hx IPH  Heme:  No petechiae, ecchymosis, easy bruisability  Skin:  No rash, tattoos, scars, edema    Vital Signs Last 24 Hrs  T(C): 36.7 (07 May 2018 11:44), Max: 36.7 (07 May 2018 04:10)  T(F): 98 (07 May 2018 11:44), Max: 98 (07 May 2018 04:10)  HR: 63 (07 May 2018 11:44) (59 - 68)  BP: 138/67 (07 May 2018 11:44) (138/67 - 162/68)  BP(mean): --  RR: 18 (07 May 2018 11:44) (18 - 18)  SpO2: 97% (07 May 2018 11:44) (95% - 97%)    PHYSICAL EXAM:  Constitutional: NAD, well-developed [place]  male, non-toxic appearing sitting OOB to chair  Neck: No LAD, supple  Respiratory: grossly clear bilaterally  Cardiovascular: S1 and S2, RRR,  Gastrointestinal: BS+, soft, ND / NT without rebound, guarding or rigidity, neg HSM, WH suprapubic scar (c/w prior drain site)  Extremities: No peripheral edema, neg clubbing, cyanosis  Vascular: 2+ peripheral pulses  Neurological: no focal asymmetry  Psychiatric: Normal mood, normal affect  Skin: No rashes    LABS:    05-07    134<L>  |  98  |  15  ----------------------------<  111<H>  4.3   |  25  |  1.01    Ca    9.2      07 May 2018 06:47          LIVER FUNCTIONS - ( 02 May 2018 18:22 )  Alb: 4.1 g/dL / Pro: 6.9 g/dL / ALK PHOS: 77 U/L / ALT: 7 U/L / AST: 7 U/L / GGT: x             RADIOLOGY & ADDITIONAL TESTS:

## 2018-05-07 NOTE — DISCHARGE NOTE ADULT - SECONDARY DIAGNOSIS.
Diverticulitis Hyponatremia BPH (benign prostatic hyperplasia) Essential hypertension Alzheimer's dementia TIA (transient ischemic attack)

## 2018-05-07 NOTE — OCCUPATIONAL THERAPY INITIAL EVALUATION ADULT - LIVES WITH, PROFILE
Pt lives with ex wife and adult daughter in ranch style home +4 steps to enter with rail. Pt bedroom and bathroom located on main floor. +shower without grab bars

## 2018-05-07 NOTE — DISCHARGE NOTE ADULT - MEDICATION SUMMARY - MEDICATIONS TO TAKE
I will START or STAY ON the medications listed below when I get home from the hospital:    dutasteride 0.5 mg oral capsule  -- 1 cap(s) by mouth once a day  -- Indication: For BPH (benign prostatic hyperplasia)    oxyCODONE 5 mg oral tablet  -- 1 tab(s) by mouth every 6 hours, As Needed  -- Indication: For Pain    aspirin 81 mg oral delayed release tablet  -- 1 tab(s) by mouth once a day  -- Indication: For CAD    losartan 100 mg oral tablet  -- 1 tab(s) by mouth once a day  -- Indication: For HTN    tamsulosin 0.4 mg oral capsule  -- 1 cap(s) by mouth once a day  -- Indication: For BPH (benign prostatic hyperplasia)    isosorbide mononitrate 30 mg oral tablet, extended release  -- 1 tab(s) by mouth once a day  -- Indication: For HTN    metFORMIN 850 mg oral tablet  -- 1 tab(s) by mouth 2 times a day  -- Indication: For DMII     meclizine 12.5 mg oral tablet  -- 1 tab(s) by mouth once, As needed, Dizziness  -- Indication: For Dizziness    cetirizine  -- 10 milligram(s) by mouth once a day, As Needed  -- Indication: For Allergy    simvastatin 40 mg oral tablet  -- 1 tab(s) by mouth once a day (at bedtime)  -- Indication: For HLD    clopidogrel 75 mg oral tablet  -- 1 tab(s) by mouth once a day  -- Indication: For CAD    metoprolol succinate 100 mg oral tablet, extended release  -- 1 tab(s) by mouth once a day  -- Indication: For HTN    amLODIPine 10 mg oral tablet  -- 1 tab(s) by mouth once a day  -- Indication: For HTN    FeroSul 325 mg (65 mg elemental iron) oral tablet  -- 1 tab(s) by mouth 3 times a day  -- Indication: For iron supplement     senna oral tablet  -- 2 tab(s) by mouth once a day (at bedtime), As Needed for constipation    -- Indication: For constipation    docusate sodium 100 mg oral capsule  -- 1 cap(s) by mouth 3 times a day, As Needed -for constipation   -- Indication: For constipation     bisacodyl 5 mg oral delayed release tablet  -- 1 tab(s) by mouth every 12 hours, As needed, Constipation  -- Indication: For constipation     pilocarpine 1% ophthalmic solution  -- 1 drop(s) to each affected eye 4 times a day  -- Indication: For Dry eyes    amoxicillin-clavulanate 875 mg-125 mg oral tablet  -- 1 tab(s) by mouth every 12 hours   -- Finish all this medication unless otherwise directed by prescriber.  Take with food or milk.    -- Indication: For Diverticulitis    PriLOSEC 20 mg oral delayed release capsule  -- 1 cap(s) by mouth once a day  -- Indication: For GERD

## 2018-05-07 NOTE — CHART NOTE - NSCHARTNOTEFT_GEN_A_CORE
Patient seen and evaluated for right AFO to control foot drop. Shoe wear not present. Instructed patient to have laced shoes or sneakers brought in from home. Will evaluate gait with orthosis applied 5/8 if shoes are present.  Bonilla LIN  [facility]  [phone]

## 2018-05-07 NOTE — PROGRESS NOTE ADULT - SUBJECTIVE AND OBJECTIVE BOX
- Patient seen and examined.  - In summary, patient is a 78 year old man who presented with weakness.   - Today, patient is without complaints.         *****MEDICATIONS:    MEDICATIONS  (STANDING):  amLODIPine   Tablet 10 milliGRAM(s) Oral daily  aspirin enteric coated 81 milliGRAM(s) Oral daily  clopidogrel Tablet 75 milliGRAM(s) Oral daily  docusate sodium 100 milliGRAM(s) Oral three times a day  finasteride 5 milliGRAM(s) Oral daily  heparin  Injectable 5000 Unit(s) SubCutaneous every 12 hours  insulin lispro (HumaLOG) corrective regimen sliding scale   SubCutaneous three times a day before meals  insulin lispro (HumaLOG) corrective regimen sliding scale   SubCutaneous at bedtime  isosorbide   mononitrate ER Tablet (IMDUR) 30 milliGRAM(s) Oral daily  losartan 100 milliGRAM(s) Oral daily  metoprolol succinate  milliGRAM(s) Oral daily  pantoprazole    Tablet 40 milliGRAM(s) Oral before breakfast  pilocarpine 1% Solution 1 Drop(s) Both EYES four times a day  piperacillin/tazobactam IVPB. 3.375 Gram(s) IV Intermittent every 8 hours  senna 2 Tablet(s) Oral at bedtime  simvastatin 40 milliGRAM(s) Oral at bedtime  tamsulosin 0.4 milliGRAM(s) Oral daily    MEDICATIONS  (PRN):  bisacodyl 5 milliGRAM(s) Oral every 12 hours PRN Constipation  oxyCODONE    IR 5 milliGRAM(s) Oral every 6 hours PRN Mild Pain (1 - 3)  polyethylene glycol 3350 17 Gram(s) Oral daily PRN Constipation                 ***** REVIEW OF SYSTEM:  GEN: no fever, no chills, no pain  RESP: no SOB, no cough, no sputum  CVS: no chest pain, no palpitations, no edema  GI: no abdominal pain, no nausea, no vomiting, no constipation, no diarrhea  : no dysuria, no frequency  NEURO: no headache, no dizziness  PSYCH: no depression, not anxious  Derm : no itching, no rash         ***** VITAL SIGNS:    T(F): 97.7 (05-07-18 @ 09:24), Max: 98 (05-07-18 @ 04:10)  HR: 68 (05-07-18 @ 09:24) (59 - 68)  BP: 138/70 (05-07-18 @ 09:24) (129/67 - 162/68)  RR: 18 (05-07-18 @ 09:24) (18 - 18)  SpO2: 95% (05-07-18 @ 09:24) (95% - 97%)  Wt(kg): --  ,   I&O's Summary    06 May 2018 07:01  -  07 May 2018 07:00  --------------------------------------------------------  IN: 980 mL / OUT: 2400 mL / NET: -1420 mL    07 May 2018 07:01  -  07 May 2018 09:33  --------------------------------------------------------  IN: 0 mL / OUT: 250 mL / NET: -250 mL             *****PHYSICAL EXAM:  GEN: A&O X 3 , NAD , comfortable  HEENT: NCAT, EOMI, MMM, no icterus  NECK: Supple, No JVD  CVS: S1S2 , regular , No M/R/G appreciated  PULM: CTA B/L,  no W/R/R appreciated  ABD.: soft. non tender, non distended,  bowel sounds present  Extrem: intact pulses , no edema noted  Derm: No rash or ecchymosis noted  PSYCH: normal mood, no depression, not anxious         *****LAB AND IMAGING:                                        10.0   7.71  )-----------( 284      ( 05 May 2018 11:05 )             31.6               05-07    134<L>  |  98  |  15  ----------------------------<  111<H>  4.3   |  25  |  1.01    Ca    9.2      07 May 2018 06:47      [All pertinent recent Imaging/Reports reviewed]         *****A S S E S S M E N T   A N D   P L A N :  78M with hx of cva and CAD adm with generalized weakness.  CT shows diverticulitis  GI following  abx per ID  BP improved  continue current meds  echo pending  f/u neuro    __________________________  A. REAGAN Erwin.

## 2018-05-07 NOTE — PROGRESS NOTE ADULT - ASSESSMENT
79 yo male PMH alzheimer's disease,   DM(II), HTN,   HLD, BPH,   GERD, chronic anemia,   CAD with stents,,    CVA 1 year ago with R side deficit,   h/o  sigmoid diverticulitis w/ pelvic abscess s/p IR drainage and BRYANT placement, last year   presents with 2 weeks generalized weakness, cold sweats,  hyponatremia at PCP.    Recently pt took 3 weeks of previously discontinued HCTZ.     : Hyponatremia of 129  from  HCTZ , now  is  134    Cozaar / Toprol XL / ASA 81 mg/day, ,Plavix 75 mg/day and Zocur 40 mg/day  for HLD and prior stents..  h/.o diastolic  dysfunction     DM:finger sticks AC and HS.  neuro  eval called  from er,  right  hemiparesis h/o , gait  instability,  high risk for  falls  and  pt  had  a  fall,  pelvic  xray,  no fx, ct head, normal  ct  with  a/c  diverticulitis,   on  zosyn, per  ID  splint right foot. seen by  OT today,

## 2018-05-07 NOTE — OCCUPATIONAL THERAPY INITIAL EVALUATION ADULT - PERTINENT HX OF CURRENT PROBLEM, REHAB EVAL
78 y.o. male presents with 2 weeks generalized weakness, cold sweats, cough, and recent hyponatremia at PCP. PMH includes alzheimer's disease, DM(II), HTN, HLD, BPH, GERD, chronic anemia, CAD with stents, CVA 1 year ago with R side deficit, recent sigmoid diverticulitis w/ pelvic abscess s/p IR drainage and BRYANT placement

## 2018-05-07 NOTE — DISCHARGE NOTE ADULT - HOME CARE AGENCY
[facility] - [phone] - Registered Nurse will contact you to arrange initial visit.  Physical Therapy to follow.  Home Health Aide via [facility] - [phone].

## 2018-05-07 NOTE — PROGRESS NOTE ADULT - SUBJECTIVE AND OBJECTIVE BOX
CC: f/u for diverticulitis improved     Patient reports no  abdomen pain    REVIEW OF SYSTEMS:  All other review of systems negative (Comprehensive ROS)    Antimicrobials Day #  :4  piperacillin/tazobactam IVPB. 3.375 Gram(s) IV Intermittent every 8 hours    Other Medications Reviewed    Vital Signs Last 24 Hrs  T(C): 36.5 (07 May 2018 09:24), Max: 36.7 (07 May 2018 04:10)  T(F): 97.7 (07 May 2018 09:24), Max: 98 (07 May 2018 04:10)  HR: 68 (07 May 2018 09:32) (59 - 68)  BP: 138/70 (07 May 2018 09:32) (129/67 - 162/68)  BP(mean): --  RR: 18 (07 May 2018 09:24) (18 - 18)  SpO2: 95% (07 May 2018 09:32) (95% - 97%)    PHYSICAL EXAM:  General: alert, no acute distress  Eyes:  anicteric, no conjunctival injection, no discharge  Oropharynx: no lesions or injection 	  Neck: supple, without adenopathy  Lungs: clear to auscultation  Heart: regular rate and rhythm; no murmur, rubs or gallops  Abdomen: soft, nondistended, nontender, without mass or organomegaly  Skin: no lesions  Extremities: no clubbing, cyanosis, or edema  Neurologic: alert,  moves all extremities    LAB RESULTS:                                     10.0   7.71  )-----------( 284      ( 05 May 2018 11:05 )             31.6     05-05    133<L>  |  96  |  12  ----------------------------<  120<H>  4.2   |  26  |  0.86    Ca    9.1      05 May 2018 08:16          MICROBIOLOGY:  RECENT CULTURES:  05-03 @ 22:14 .Blood Blood-Peripheral     No growth to date.      05-02 @ 23:08 .Urine Clean Catch (Midstream)     <10,000 CFU/ml  Normal Urogenital kaylyn present          RADIOLOGY REVIEWED:    < from: CT Abdomen and Pelvis w/ Oral Cont and w/ IV Cont (05.03.18 @ 19:21) >    IMPRESSION:   Focal opacity at the left lung base, possibly infection.    Mural thickening of the distal descending/proximal sigmoid colon,   probably chronic diverticulitis. Mild acute diverticulitis is difficult   to exclude.        < end of copied text >

## 2018-05-07 NOTE — DISCHARGE NOTE ADULT - CARE PROVIDER_API CALL
Jalil Zhao), Gastroenterology; Internal Medicine  [address]  [address]  [place], [place] [address]  Phone: [phone]  Fax: [phone]    Merlin Erwin (DO), Cardiology; Interventional Cardiology  [facility] [place] [address]  Phone: [phone]  Fax: [phone]    Carlos Ulloa), Clinical Neurophysiology; Neurology  [address] [place] [address]  Phone: [phone]  Fax: [phone] Jalil Zhao), Gastroenterology; Internal Medicine  [address]  [address]  [place], [place] [address]  Phone: [phone]  Fax: [phone]    Merlin Erwin (DO), Cardiology; Interventional Cardiology  [facility] [place] [address]  Phone: [phone]  Fax: [phone]    Carlos Ulloa), Clinical Neurophysiology; Neurology  [address] [place] [address]  Phone: [phone]  Fax: [phone]    Norma Jeffery), Internal Medicine  [address]  [place] [address]  Phone: [phone]  Fax: [phone]

## 2018-05-08 VITALS
HEART RATE: 68 BPM | DIASTOLIC BLOOD PRESSURE: 67 MMHG | OXYGEN SATURATION: 95 % | TEMPERATURE: 98 F | SYSTOLIC BLOOD PRESSURE: 112 MMHG | RESPIRATION RATE: 18 BRPM

## 2018-05-08 LAB
ANION GAP SERPL CALC-SCNC: 13 MMOL/L — SIGNIFICANT CHANGE UP (ref 5–17)
BUN SERPL-MCNC: 17 MG/DL — SIGNIFICANT CHANGE UP (ref 7–23)
CALCIUM SERPL-MCNC: 9.2 MG/DL — SIGNIFICANT CHANGE UP (ref 8.4–10.5)
CHLORIDE SERPL-SCNC: 99 MMOL/L — SIGNIFICANT CHANGE UP (ref 96–108)
CO2 SERPL-SCNC: 24 MMOL/L — SIGNIFICANT CHANGE UP (ref 22–31)
CREAT SERPL-MCNC: 1.07 MG/DL — SIGNIFICANT CHANGE UP (ref 0.5–1.3)
CULTURE RESULTS: SIGNIFICANT CHANGE UP
CULTURE RESULTS: SIGNIFICANT CHANGE UP
GLUCOSE BLDC GLUCOMTR-MCNC: 122 MG/DL — HIGH (ref 70–99)
GLUCOSE BLDC GLUCOMTR-MCNC: 124 MG/DL — HIGH (ref 70–99)
GLUCOSE BLDC GLUCOMTR-MCNC: 194 MG/DL — HIGH (ref 70–99)
GLUCOSE SERPL-MCNC: 115 MG/DL — HIGH (ref 70–99)
HCT VFR BLD CALC: 33 % — LOW (ref 39–50)
HGB BLD-MCNC: 10.5 G/DL — LOW (ref 13–17)
MCHC RBC-ENTMCNC: 20.7 PG — LOW (ref 27–34)
MCHC RBC-ENTMCNC: 31.8 GM/DL — LOW (ref 32–36)
MCV RBC AUTO: 65.1 FL — LOW (ref 80–100)
PLATELET # BLD AUTO: 305 K/UL — SIGNIFICANT CHANGE UP (ref 150–400)
POTASSIUM SERPL-MCNC: 4.3 MMOL/L — SIGNIFICANT CHANGE UP (ref 3.5–5.3)
POTASSIUM SERPL-SCNC: 4.3 MMOL/L — SIGNIFICANT CHANGE UP (ref 3.5–5.3)
RBC # BLD: 5.07 M/UL — SIGNIFICANT CHANGE UP (ref 4.2–5.8)
RBC # FLD: 20.1 % — HIGH (ref 10.3–14.5)
SODIUM SERPL-SCNC: 136 MMOL/L — SIGNIFICANT CHANGE UP (ref 135–145)
SPECIMEN SOURCE: SIGNIFICANT CHANGE UP
SPECIMEN SOURCE: SIGNIFICANT CHANGE UP
WBC # BLD: 6.95 K/UL — SIGNIFICANT CHANGE UP (ref 3.8–10.5)
WBC # FLD AUTO: 6.95 K/UL — SIGNIFICANT CHANGE UP (ref 3.8–10.5)

## 2018-05-08 PROCEDURE — 99285 EMERGENCY DEPT VISIT HI MDM: CPT

## 2018-05-08 PROCEDURE — 84443 ASSAY THYROID STIM HORMONE: CPT

## 2018-05-08 PROCEDURE — 87581 M.PNEUMON DNA AMP PROBE: CPT

## 2018-05-08 PROCEDURE — 72170 X-RAY EXAM OF PELVIS: CPT

## 2018-05-08 PROCEDURE — 97165 OT EVAL LOW COMPLEX 30 MIN: CPT

## 2018-05-08 PROCEDURE — 83735 ASSAY OF MAGNESIUM: CPT

## 2018-05-08 PROCEDURE — 71046 X-RAY EXAM CHEST 2 VIEWS: CPT

## 2018-05-08 PROCEDURE — 87633 RESP VIRUS 12-25 TARGETS: CPT

## 2018-05-08 PROCEDURE — 85610 PROTHROMBIN TIME: CPT

## 2018-05-08 PROCEDURE — 82553 CREATINE MB FRACTION: CPT

## 2018-05-08 PROCEDURE — 82962 GLUCOSE BLOOD TEST: CPT

## 2018-05-08 PROCEDURE — 87086 URINE CULTURE/COLONY COUNT: CPT

## 2018-05-08 PROCEDURE — 87486 CHLMYD PNEUM DNA AMP PROBE: CPT

## 2018-05-08 PROCEDURE — 83935 ASSAY OF URINE OSMOLALITY: CPT

## 2018-05-08 PROCEDURE — 84300 ASSAY OF URINE SODIUM: CPT

## 2018-05-08 PROCEDURE — 85027 COMPLETE CBC AUTOMATED: CPT

## 2018-05-08 PROCEDURE — 84484 ASSAY OF TROPONIN QUANT: CPT

## 2018-05-08 PROCEDURE — 82550 ASSAY OF CK (CPK): CPT

## 2018-05-08 PROCEDURE — 74177 CT ABD & PELVIS W/CONTRAST: CPT

## 2018-05-08 PROCEDURE — 81001 URINALYSIS AUTO W/SCOPE: CPT

## 2018-05-08 PROCEDURE — 87798 DETECT AGENT NOS DNA AMP: CPT

## 2018-05-08 PROCEDURE — 80053 COMPREHEN METABOLIC PANEL: CPT

## 2018-05-08 PROCEDURE — 97161 PT EVAL LOW COMPLEX 20 MIN: CPT

## 2018-05-08 PROCEDURE — 70450 CT HEAD/BRAIN W/O DYE: CPT

## 2018-05-08 PROCEDURE — 83880 ASSAY OF NATRIURETIC PEPTIDE: CPT

## 2018-05-08 PROCEDURE — 80048 BASIC METABOLIC PNL TOTAL CA: CPT

## 2018-05-08 PROCEDURE — 93005 ELECTROCARDIOGRAM TRACING: CPT

## 2018-05-08 PROCEDURE — 87040 BLOOD CULTURE FOR BACTERIA: CPT

## 2018-05-08 PROCEDURE — 84100 ASSAY OF PHOSPHORUS: CPT

## 2018-05-08 RX ORDER — CLOPIDOGREL BISULFATE 75 MG/1
1 TABLET, FILM COATED ORAL
Qty: 30 | Refills: 0
Start: 2018-05-08 | End: 2018-06-06

## 2018-05-08 RX ORDER — TAMSULOSIN HYDROCHLORIDE 0.4 MG/1
1 CAPSULE ORAL
Qty: 30 | Refills: 0
Start: 2018-05-08 | End: 2018-06-06

## 2018-05-08 RX ORDER — METOPROLOL TARTRATE 50 MG
1 TABLET ORAL
Qty: 0 | Refills: 0 | COMMUNITY

## 2018-05-08 RX ORDER — LOSARTAN POTASSIUM 100 MG/1
1 TABLET, FILM COATED ORAL
Qty: 30 | Refills: 0
Start: 2018-05-08 | End: 2018-06-06

## 2018-05-08 RX ORDER — TAMSULOSIN HYDROCHLORIDE 0.4 MG/1
1 CAPSULE ORAL
Qty: 0 | Refills: 0 | COMMUNITY

## 2018-05-08 RX ORDER — METOPROLOL TARTRATE 50 MG
1 TABLET ORAL
Qty: 30 | Refills: 0
Start: 2018-05-08 | End: 2018-06-06

## 2018-05-08 RX ADMIN — Medication 1 DROP(S): at 05:42

## 2018-05-08 RX ADMIN — PANTOPRAZOLE SODIUM 40 MILLIGRAM(S): 20 TABLET, DELAYED RELEASE ORAL at 05:41

## 2018-05-08 RX ADMIN — AMLODIPINE BESYLATE 10 MILLIGRAM(S): 2.5 TABLET ORAL at 05:41

## 2018-05-08 RX ADMIN — ISOSORBIDE MONONITRATE 30 MILLIGRAM(S): 60 TABLET, EXTENDED RELEASE ORAL at 11:50

## 2018-05-08 RX ADMIN — Medication 1 DROP(S): at 11:51

## 2018-05-08 RX ADMIN — LOSARTAN POTASSIUM 100 MILLIGRAM(S): 100 TABLET, FILM COATED ORAL at 05:41

## 2018-05-08 RX ADMIN — Medication 100 MILLIGRAM(S): at 05:41

## 2018-05-08 RX ADMIN — Medication 81 MILLIGRAM(S): at 11:50

## 2018-05-08 RX ADMIN — HEPARIN SODIUM 5000 UNIT(S): 5000 INJECTION INTRAVENOUS; SUBCUTANEOUS at 05:41

## 2018-05-08 RX ADMIN — CLOPIDOGREL BISULFATE 75 MILLIGRAM(S): 75 TABLET, FILM COATED ORAL at 11:49

## 2018-05-08 RX ADMIN — PIPERACILLIN AND TAZOBACTAM 25 GRAM(S): 4; .5 INJECTION, POWDER, LYOPHILIZED, FOR SOLUTION INTRAVENOUS at 05:42

## 2018-05-08 RX ADMIN — FINASTERIDE 5 MILLIGRAM(S): 5 TABLET, FILM COATED ORAL at 11:50

## 2018-05-08 RX ADMIN — TAMSULOSIN HYDROCHLORIDE 0.4 MILLIGRAM(S): 0.4 CAPSULE ORAL at 11:50

## 2018-05-08 NOTE — PROGRESS NOTE ADULT - ASSESSMENT
Assessment:  Patient with h/o diverticulitis and abscess this past fall managed with drain and antibiotics and intraparenchymal hypertensive brain bleed came in for unsteady gait, tia per neuro. He had some mild abdomen tenderness which now resolved and may be from a mild flare of chronic diverticulitis.   no abdominal pain   no fever   Bcx NGTD   UCx normal kaylyn    wbc was normal     Plan:    day 5 of 14  Continue IV zosyn while in hospital   Can Switch to Augmentin 875 mg po bid to complete remaining days as planned   DC planning as per PMD  continue supportive care as per GI, medicine

## 2018-05-08 NOTE — PROGRESS NOTE ADULT - PROVIDER SPECIALTY LIST ADULT
Cardiology
Gastroenterology
Hospitalist
Infectious Disease
Internal Medicine
Neurology
Gastroenterology
Internal Medicine
Internal Medicine

## 2018-05-08 NOTE — PROGRESS NOTE ADULT - SUBJECTIVE AND OBJECTIVE BOX
- Patient seen and examined.  - In summary, patient is a 78 year old man who presented with weakness.   - Today, patient is without complaints.         *****MEDICATIONS:    MEDICATIONS  (STANDING):  amLODIPine   Tablet 10 milliGRAM(s) Oral daily  aspirin enteric coated 81 milliGRAM(s) Oral daily  clopidogrel Tablet 75 milliGRAM(s) Oral daily  docusate sodium 100 milliGRAM(s) Oral three times a day  finasteride 5 milliGRAM(s) Oral daily  heparin  Injectable 5000 Unit(s) SubCutaneous every 12 hours  insulin lispro (HumaLOG) corrective regimen sliding scale   SubCutaneous three times a day before meals  insulin lispro (HumaLOG) corrective regimen sliding scale   SubCutaneous at bedtime  isosorbide   mononitrate ER Tablet (IMDUR) 30 milliGRAM(s) Oral daily  losartan 100 milliGRAM(s) Oral daily  metoprolol succinate  milliGRAM(s) Oral daily  pantoprazole    Tablet 40 milliGRAM(s) Oral before breakfast  pilocarpine 1% Solution 1 Drop(s) Both EYES four times a day  piperacillin/tazobactam IVPB. 3.375 Gram(s) IV Intermittent every 8 hours  senna 2 Tablet(s) Oral at bedtime  simvastatin 40 milliGRAM(s) Oral at bedtime  tamsulosin 0.4 milliGRAM(s) Oral daily    MEDICATIONS  (PRN):  bisacodyl 5 milliGRAM(s) Oral every 12 hours PRN Constipation  oxyCODONE    IR 5 milliGRAM(s) Oral every 6 hours PRN Mild Pain (1 - 3)  polyethylene glycol 3350 17 Gram(s) Oral daily PRN Constipation             ***** REVIEW OF SYSTEM:  GEN: no fever, no chills, no pain  RESP: no SOB, no cough, no sputum  CVS: no chest pain, no palpitations, no edema  GI: no abdominal pain, no nausea, no vomiting, no constipation, no diarrhea  : no dysuria, no frequency  NEURO: no headache, no dizziness  PSYCH: no depression, not anxious  Derm : no itching, no rash         ***** VITAL SIGNS:    T(F): 97.9 (05-08-18 @ 07:51), Max: 98 (05-07-18 @ 11:44)  HR: 63 (05-08-18 @ 07:51) (63 - 67)  BP: 134/73 (05-08-18 @ 07:51) (112/61 - 159/70)  RR: 18 (05-08-18 @ 07:51) (18 - 18)  SpO2: 96% (05-08-18 @ 07:51) (95% - 98%)  Wt(kg): --  ,   I&O's Summary    07 May 2018 07:01  -  08 May 2018 07:00  --------------------------------------------------------  IN: 1400 mL / OUT: 2750 mL / NET: -1350 mL                 *****PHYSICAL EXAM:  GEN: A&O X 3 , NAD , comfortable  HEENT: NCAT, EOMI, MMM, no icterus  NECK: Supple, No JVD  CVS: S1S2 , regular , No M/R/G appreciated  PULM: CTA B/L,  no W/R/R appreciated  ABD.: soft. non tender, non distended,  bowel sounds present  Extrem: intact pulses , no edema noted  Derm: No rash or ecchymosis noted  PSYCH: normal mood, no depression, not anxious         *****LAB AND IMAGING:                                                 10.5   6.95  )-----------( 305      ( 08 May 2018 07:41 )             33.0               05-08    136  |  99  |  17  ----------------------------<  115<H>  4.3   |  24  |  1.07    Ca    9.2      08 May 2018 06:37    [All pertinent recent Imaging/Reports reviewed]         *****A S S E S S M E N T   A N D   P L A N :  78M with hx of cva and CAD adm with generalized weakness.  CT shows diverticulitis  GI following  abx per ID  BP improved  continue current meds  echo pending  f/u neuro  DCP    __________________________  RADHA Erwin D.O.

## 2018-05-08 NOTE — PROGRESS NOTE ADULT - SUBJECTIVE AND OBJECTIVE BOX
CC: f/u for diverticulitis improved     Patient reports no  abdomen pain    REVIEW OF SYSTEMS:  All other review of systems negative (Comprehensive ROS)    Antimicrobials Day #  :5  piperacillin/tazobactam IVPB. 3.375 Gram(s) IV Intermittent every 8 hours    Other Medications Reviewed    Vital Signs Last 24 Hrs  T(C): 36.6 (08 May 2018 07:51), Max: 36.7 (07 May 2018 11:44)  T(F): 97.9 (08 May 2018 07:51), Max: 98 (07 May 2018 11:44)  HR: 63 (08 May 2018 07:51) (63 - 67)  BP: 134/73 (08 May 2018 07:51) (112/61 - 159/70)  BP(mean): --  RR: 18 (08 May 2018 07:51) (18 - 18)  SpO2: 96% (08 May 2018 07:51) (95% - 98%)  PHYSICAL EXAM:  General: alert, no acute distress  Eyes:  anicteric, no conjunctival injection, no discharge  Oropharynx: no lesions or injection 	  Neck: supple, without adenopathy  Lungs: clear to auscultation  Heart: regular rate and rhythm; no murmur, rubs or gallops  Abdomen: soft, nondistended, nontender, without mass or organomegaly  Skin: no lesions  Extremities: no clubbing, cyanosis, or edema  Neurologic: alert,  moves all extremities    LAB RESULTS:                                   10.5   6.95  )-----------( 305      ( 08 May 2018 07:41 )             33.0                           10.0   7.71  )-----------( 284      ( 05 May 2018 11:05 )             31.6     05-05    133<L>  |  96  |  12  ----------------------------<  120<H>  4.2   |  26  |  0.86    Ca    9.1      05 May 2018 08:16          MICROBIOLOGY:  RECENT CULTURES:  05-03 @ 22:14 .Blood Blood-Peripheral     No growth to date.      05-02 @ 23:08 .Urine Clean Catch (Midstream)     <10,000 CFU/ml  Normal Urogenital kaylyn present          RADIOLOGY REVIEWED:    < from: CT Abdomen and Pelvis w/ Oral Cont and w/ IV Cont (05.03.18 @ 19:21) >    IMPRESSION:   Focal opacity at the left lung base, possibly infection.    Mural thickening of the distal descending/proximal sigmoid colon,   probably chronic diverticulitis. Mild acute diverticulitis is difficult   to exclude.        < end of copied text >

## 2018-05-08 NOTE — PROGRESS NOTE ADULT - ASSESSMENT
77 yo male PMH alzheimer's disease,   DM(II), HTN,   HLD, BPH,   GERD, chronic anemia,   CAD with stents,,    CVA 1 year ago with R side deficit,   h/o  sigmoid diverticulitis w/ pelvic abscess s/p IR drainage and BRYANT placement, last year   presents with 2 weeks generalized weakness, cold sweats,  hyponatremia at PCP.    Recently pt took 3 weeks of previously discontinued HCTZ.     : Hyponatremia of 129  from  HCTZ , now  is  134    Cozaar / Toprol XL / ASA 81 mg/day, ,Plavix 75 mg/day and Zocur 40 mg/day  for HLD and prior stents..  h/.o diastolic  dysfunction     DM:finger sticks AC and HS.  neuro  eval called  from er,  right  hemiparesis h/o , gait  instability,  high risk for  falls  and  pt  had  a  fall,  pelvic  xray,  no fx, ct head, normal  ct  with  a/c  diverticulitis,   on  zosyn, per  ID,  may change  to  augmentin, pe r ID  splint right foot. seen by  OT ,   D/C  TODAY

## 2018-05-08 NOTE — CHART NOTE - NSCHARTNOTEFT_GEN_A_CORE
Patient seen for evaluation and fitting for AFO. Shoe wear is not available. Contact information given to patient. Instructed to call office and make appointment. Due to small calf circumference and longer foot length impression is that he will require custom fabrication to ensure proper fit and function.  Bonilla LIN  [facility]  [phone]

## 2018-05-08 NOTE — PROGRESS NOTE ADULT - ATTENDING COMMENTS
Pt. Seen. Agree with above assessment and plan    Bryant Kaufman MD, FACP, FACG, [facility]  [phone]
Jalil Zhao MD, FACP, FACG, AGAF  [facility]  [phone]
Jalil Zhao MD, FACP, FACG, AGAF  [facility]  [phone]

## 2018-05-08 NOTE — PROGRESS NOTE ADULT - SUBJECTIVE AND OBJECTIVE BOX
Patient is a 78y old  Male who presented with a chief complaint of weakness (07 May 2018 09:45)      INTERVAL HPI/OVERNIGHT EVENTS:  tolerating PO intake with good appetite  no abdominal pain  +BMs - formed, without diarrhea    MEDICATIONS  (STANDING):  amLODIPine   Tablet 10 milliGRAM(s) Oral daily  aspirin enteric coated 81 milliGRAM(s) Oral daily  clopidogrel Tablet 75 milliGRAM(s) Oral daily  docusate sodium 100 milliGRAM(s) Oral three times a day  finasteride 5 milliGRAM(s) Oral daily  heparin  Injectable 5000 Unit(s) SubCutaneous every 12 hours  insulin lispro (HumaLOG) corrective regimen sliding scale   SubCutaneous three times a day before meals  insulin lispro (HumaLOG) corrective regimen sliding scale   SubCutaneous at bedtime  isosorbide   mononitrate ER Tablet (IMDUR) 30 milliGRAM(s) Oral daily  losartan 100 milliGRAM(s) Oral daily  metoprolol succinate  milliGRAM(s) Oral daily  pantoprazole    Tablet 40 milliGRAM(s) Oral before breakfast  pilocarpine 1% Solution 1 Drop(s) Both EYES four times a day  piperacillin/tazobactam IVPB. 3.375 Gram(s) IV Intermittent every 8 hours  senna 2 Tablet(s) Oral at bedtime  simvastatin 40 milliGRAM(s) Oral at bedtime  tamsulosin 0.4 milliGRAM(s) Oral daily    MEDICATIONS  (PRN):  bisacodyl 5 milliGRAM(s) Oral every 12 hours PRN Constipation  oxyCODONE    IR 5 milliGRAM(s) Oral every 6 hours PRN Mild Pain (1 - 3)  polyethylene glycol 3350 17 Gram(s) Oral daily PRN Constipation      Allergies  No Known Allergies      Review of Systems:  General:  No wt loss, fevers, chills, +generalized weakness   CV:  No pain, palpitations, hypo/hypertension +Hx CVA  Resp:  +cough +chest congestion - clear sputum  :  No pain, bleeding, incontinence +urinary hesitancy  Muscle:  ambulates with cane assistance  Neuro:  no focal weakness +HA no vision changes +hx CVA TIA +Hx IPH  Heme:  No petechiae, ecchymosis, easy bruisability  Skin:  No rash, tattoos, scars, edema    Vital Signs Last 24 Hrs  T(C): 36.6 (08 May 2018 07:51), Max: 36.7 (07 May 2018 11:44)  T(F): 97.9 (08 May 2018 07:51), Max: 98 (07 May 2018 11:44)  HR: 63 (08 May 2018 07:51) (63 - 67)  BP: 134/73 (08 May 2018 07:51) (112/61 - 159/70)  BP(mean): --  RR: 18 (08 May 2018 07:51) (18 - 18)  SpO2: 96% (08 May 2018 07:51) (95% - 98%)    PHYSICAL EXAM:  Constitutional: NAD, well-developed [place] male, non-toxic appearing sitting in bed  Neck: No LAD, supple  Respiratory: grossly clear bilaterally  Cardiovascular: S1 and S2, RRR,  Gastrointestinal: BS+, soft, ND / NT without rebound, guarding or rigidity, neg HSM, WH suprapubic scar (c/w prior drain site)  Extremities: No peripheral edema, neg clubbing, cyanosis  Vascular: 2+ peripheral pulses  Neurological: no focal asymmetry  Psychiatric: Normal mood, normal affect  Skin: No rashes    LABS:                        10.5   6.95  )-----------( 305      ( 08 May 2018 07:41 )             33.0     05-08    136  |  99  |  17  ----------------------------<  115<H>  4.3   |  24  |  1.07    Ca    9.2      08 May 2018 06:37      RADIOLOGY & ADDITIONAL TESTS: Patient is a 78y old  Male who presented with a chief complaint of weakness (07 May 2018 09:45)    INTERVAL HPI/OVERNIGHT EVENTS:  tolerating PO intake with good appetite  no abdominal pain  +BMs - formed, without diarrhea    MEDICATIONS  (STANDING):  amLODIPine   Tablet 10 milliGRAM(s) Oral daily  aspirin enteric coated 81 milliGRAM(s) Oral daily  clopidogrel Tablet 75 milliGRAM(s) Oral daily  docusate sodium 100 milliGRAM(s) Oral three times a day  finasteride 5 milliGRAM(s) Oral daily  heparin  Injectable 5000 Unit(s) SubCutaneous every 12 hours  insulin lispro (HumaLOG) corrective regimen sliding scale   SubCutaneous three times a day before meals  insulin lispro (HumaLOG) corrective regimen sliding scale   SubCutaneous at bedtime  isosorbide   mononitrate ER Tablet (IMDUR) 30 milliGRAM(s) Oral daily  losartan 100 milliGRAM(s) Oral daily  metoprolol succinate  milliGRAM(s) Oral daily  pantoprazole    Tablet 40 milliGRAM(s) Oral before breakfast  pilocarpine 1% Solution 1 Drop(s) Both EYES four times a day  piperacillin/tazobactam IVPB. 3.375 Gram(s) IV Intermittent every 8 hours  senna 2 Tablet(s) Oral at bedtime  simvastatin 40 milliGRAM(s) Oral at bedtime  tamsulosin 0.4 milliGRAM(s) Oral daily    MEDICATIONS  (PRN):  bisacodyl 5 milliGRAM(s) Oral every 12 hours PRN Constipation  oxyCODONE    IR 5 milliGRAM(s) Oral every 6 hours PRN Mild Pain (1 - 3)  polyethylene glycol 3350 17 Gram(s) Oral daily PRN Constipation    Allergies  No Known Allergies    Review of Systems:  General:  No wt loss, fevers, chills, +generalized weakness   CV:  No pain, palpitations, hypo/hypertension +Hx CVA  Resp:  +cough +chest congestion - clear sputum  :  No pain, bleeding, incontinence +urinary hesitancy  Muscle:  ambulates with cane assistance  Neuro:  no focal weakness +HA no vision changes +hx CVA TIA +Hx IPH  Heme:  No petechiae, ecchymosis, easy bruisability  Skin:  No rash, tattoos, scars, edema    Vital Signs Last 24 Hrs  T(C): 36.6 (08 May 2018 07:51), Max: 36.7 (07 May 2018 11:44)  T(F): 97.9 (08 May 2018 07:51), Max: 98 (07 May 2018 11:44)  HR: 63 (08 May 2018 07:51) (63 - 67)  BP: 134/73 (08 May 2018 07:51) (112/61 - 159/70)  BP(mean): --  RR: 18 (08 May 2018 07:51) (18 - 18)  SpO2: 96% (08 May 2018 07:51) (95% - 98%)    PHYSICAL EXAM:  Constitutional: NAD, well-developed [place]  male, non-toxic appearing sitting in bed  Neck: No LAD, supple  Respiratory: grossly clear bilaterally  Cardiovascular: S1 and S2, RRR,  Gastrointestinal: BS+, soft, ND / NT without rebound, guarding or rigidity, neg HSM, WH suprapubic scar (c/w prior drain site)  Extremities: No peripheral edema, neg clubbing, cyanosis  Vascular: 2+ peripheral pulses  Neurological: no focal asymmetry  Psychiatric: Normal mood, normal affect  Skin: No rashes    LABS:                      10.5   6.95  )-----------( 305      ( 08 May 2018 07:41 )             33.0     05-08    136  |  99  |  17  ----------------------------<  115<H>  4.3   |  24  |  1.07    Ca    9.2      08 May 2018 06:37      RADIOLOGY & ADDITIONAL TESTS:

## 2018-05-08 NOTE — PROGRESS NOTE ADULT - ASSESSMENT
77 yo male with PMH CVA, TIA, IPH, HTN, DM, CAD, BPH, diverticulosis/itis s/p IR perc drain of pelvic abscess 9/2017 now presents to ER with generalized weakness, sweats and unsteady gait and found to have mild leukocytosis. he does report intermittent lower abdominal discomfort. +cough but CXR with clear lungs    CT +acute diverticulitis, focal opacity at left lung base.  Improving.    PLAN  -Antibiotics per ID; planned 14 days total, can switch to PO Augmentin upon discharge  -PO diet (LRD)  -ID following    No GI objection to discharge planning    Jalil Fong PA-C    [facility]  [phone]

## 2018-05-08 NOTE — PROGRESS NOTE ADULT - SUBJECTIVE AND OBJECTIVE BOX
no complaints. awaiting splint  REVIEW OF SYSTEMS:  CONSTITUTIONAL: No weakness,  no   fevers      RESPIRATORY:  No    shortness of breath  , no  wheezing  CARDIOVASCULAR: No chest pain,   no  palpitations, no  cough  GASTROINTESTINAL: No abdominal  pain, no  vomiting, no  diarrhea  NEUROLOGICAL: No  focal  weakness    MEDICATIONS  (STANDING):  amLODIPine   Tablet 10 milliGRAM(s) Oral daily  aspirin enteric coated 81 milliGRAM(s) Oral daily  clopidogrel Tablet 75 milliGRAM(s) Oral daily  docusate sodium 100 milliGRAM(s) Oral three times a day  finasteride 5 milliGRAM(s) Oral daily  heparin  Injectable 5000 Unit(s) SubCutaneous every 12 hours  insulin lispro (HumaLOG) corrective regimen sliding scale   SubCutaneous three times a day before meals  insulin lispro (HumaLOG) corrective regimen sliding scale   SubCutaneous at bedtime  isosorbide   mononitrate ER Tablet (IMDUR) 30 milliGRAM(s) Oral daily  losartan 100 milliGRAM(s) Oral daily  metoprolol succinate  milliGRAM(s) Oral daily  pantoprazole    Tablet 40 milliGRAM(s) Oral before breakfast  pilocarpine 1% Solution 1 Drop(s) Both EYES four times a day  piperacillin/tazobactam IVPB. 3.375 Gram(s) IV Intermittent every 8 hours  senna 2 Tablet(s) Oral at bedtime  simvastatin 40 milliGRAM(s) Oral at bedtime  tamsulosin 0.4 milliGRAM(s) Oral daily    MEDICATIONS  (PRN):  bisacodyl 5 milliGRAM(s) Oral every 12 hours PRN Constipation  oxyCODONE    IR 5 milliGRAM(s) Oral every 6 hours PRN Mild Pain (1 - 3)  polyethylene glycol 3350 17 Gram(s) Oral daily PRN Constipation      Vital Signs Last 24 Hrs  T(C): 36.6 (08 May 2018 07:51), Max: 36.7 (07 May 2018 11:44)  T(F): 97.9 (08 May 2018 07:51), Max: 98 (07 May 2018 11:44)  HR: 63 (08 May 2018 07:51) (63 - 67)  BP: 134/73 (08 May 2018 07:51) (112/61 - 159/70)  BP(mean): --  RR: 18 (08 May 2018 07:51) (18 - 18)  SpO2: 96% (08 May 2018 07:51) (95% - 98%)  CAPILLARY BLOOD GLUCOSE      POCT Blood Glucose.: 122 mg/dL (08 May 2018 07:55)  POCT Blood Glucose.: 225 mg/dL (07 May 2018 21:05)  POCT Blood Glucose.: 154 mg/dL (07 May 2018 16:27)  POCT Blood Glucose.: 151 mg/dL (07 May 2018 11:40)    I&O's Summary    07 May 2018 07:01  -  08 May 2018 07:00  --------------------------------------------------------  IN: 1400 mL / OUT: 2750 mL / NET: -1350 mL        PHYSICAL EXAM:  HEAD:  Atraumatic, Normocephalic  NECK: Supple, No   JVD  CHEST/LUNG:   no     rales,     no,    rhonchi  HEART: Regular rate and rhythm;         murmur  ABDOMEN: Soft, Nontender, ;   EXTREMITIES:        edema  NEUROLOGY:  alert    LABS:                        10.5   6.95  )-----------( 305      ( 08 May 2018 07:41 )             33.0     05-08    136  |  99  |  17  ----------------------------<  115<H>  4.3   |  24  |  1.07    Ca    9.2      08 May 2018 06:37                      Thyroid Stimulating Hormone, Serum: 1.28 uIU/mL (05-03 @ 08:22)          Consultant(s) Notes Reviewed:      Care Discussed with Consultants/Other Providers:

## 2018-05-09 DIAGNOSIS — R69 ILLNESS, UNSPECIFIED: ICD-10-CM

## 2018-06-26 ENCOUNTER — OUTPATIENT (OUTPATIENT)
Dept: OUTPATIENT SERVICES | Facility: HOSPITAL | Age: 78
LOS: 1 days | End: 2018-06-26
Payer: MEDICARE

## 2018-06-26 DIAGNOSIS — I25.10 ATHEROSCLEROTIC HEART DISEASE OF NATIVE CORONARY ARTERY WITHOUT ANGINA PECTORIS: ICD-10-CM

## 2018-06-26 DIAGNOSIS — Z95.5 PRESENCE OF CORONARY ANGIOPLASTY IMPLANT AND GRAFT: Chronic | ICD-10-CM

## 2018-06-26 DIAGNOSIS — Z98.890 OTHER SPECIFIED POSTPROCEDURAL STATES: Chronic | ICD-10-CM

## 2018-06-26 DIAGNOSIS — Z90.49 ACQUIRED ABSENCE OF OTHER SPECIFIED PARTS OF DIGESTIVE TRACT: Chronic | ICD-10-CM

## 2018-06-26 PROCEDURE — A9502: CPT

## 2018-06-26 PROCEDURE — 93017 CV STRESS TEST TRACING ONLY: CPT

## 2018-06-26 PROCEDURE — 78452 HT MUSCLE IMAGE SPECT MULT: CPT

## 2018-07-16 PROBLEM — K21.9 GASTRO-ESOPHAGEAL REFLUX DISEASE WITHOUT ESOPHAGITIS: Chronic | Status: INACTIVE | Noted: 2017-10-07 | Resolved: 2018-05-02

## 2018-11-24 NOTE — PHYSICAL THERAPY INITIAL EVALUATION ADULT - PLANNED THERAPY INTERVENTIONS, PT EVAL
Patient Name: Michelle Livingston   Procedure Date: 6/2/2017 8:10 AM   MRN: [id number]   Account Number: [id number]   YOB: 1955   Admit Type: Outpatient   Age: 62   Gender: Female   Race: White   Attending MD: Tyrone Self MD   Grafts or Implants: Grafts or Implants Not Applicable   Procedure:            Colonoscopy   Indications:          Screening for colorectal malignant neoplasm of the                         colon but family history of other malignancies.   Providers:            MD Aramis   Referring MD:         Elliot Leal Md   Sedation:             Monitored Anesthesia Care   Procedure:        Pre-Anesthesia Assessment:        - Prior to the procedure, a History and Physical was performed, and        patient medications, allergies and sensitivities were reviewed. The        patient's tolerance of previous anesthesia was reviewed.        A History and Physical was performed prior to the procedure. Patient        medications and allergies were reviewed. The risks and benefits of the        procedure and sedation options were discussed. Questions were answered        and informed consent was obtained. Patient identification and proposed        procedure were verified. The patient was deemed in satisfactory        condition to undergo the procedure. The heart rate, respiratory rate,        oxygen saturations, blood pressure and response to sedation were        monitored throughout the procedure.        The endoscope was passed under direct vision. The Colonoscope was        introduced through the anus and advanced to the ascending colon. The        physical status of the patient was re-assessed after the procedure. The        colonoscopy was technically difficult and complex due to restricted        mobility of the colon. We will became aware of the fact that the        procedure may be difficult when we encountered problems negotiating the        rectosigmoid. We knew that the patient had had  a complete hysterectomy        and appendectomy and therefore had potential for adhesions and        explaining down the colon with the reduction in its mobility. We        initially employed the adult colonoscope and were able to negotiate the        rectosigmoid and advanced it to the ascending colon. In the ascending        colon, no matter what maneuvers we tried, the scope could not advance        forward. We exchanged the colonoscope and employed to the small bowel        which has the advantage of being thin and more flexible. In spite of        this again, when we reached the ascending colon, we could not go        further. I decision was therefore made to abandon the procedure at this        point. We suctioned as much as we could retrograde demonstrating that it        was normal. She had minor internal hemorrhoids. She tolerated the        procedure well and was sent to recovery room in good condition. There        were no biopsies obtained and therefore no specimens collected.   Scope Withdrawal Time 0 hours 7 minutes 7 seconds   Findings:        See the description of procedure above        The examined colon to the ascending colon was normal. She had minor        internal hemorrhoids. L.   Impression:        - The ascending colon is normal.        - No specimens collected.   Recommendation:        IF PATIENT HAS SYMPTOMS.        - Discharge patient to home (ambulatory).        - Repeat colonoscopy in 10 years for screening purposes.   Complications:        No immediate complications.   Estimated Blood Loss: Estimated blood loss: none.   Tyrone Self M.D.   Tyrone Self MD   6/2/2017 4:31:58 PM   Number of Addenda: 0        No Specimens removed during this procedure unless otherwise noted.      gait training/stair training/transfer training

## 2018-11-30 NOTE — PATIENT PROFILE ADULT. - NS PRO TALK SOMEONE YN
Continuity of Care Document (CCD)

 Created on:2018



Patient:Delta Rodriguez

Sex:Male

:1965

External Reference #:[id number]





Demographics







 Address  [address]



   [place] [address]

 

 Home Phone  [phone]

 

 Mobile Phone  [phone]

 

 Preferred Language  en

 

 Marital Status  Declined to Specify/Unknown

 

 Islam Affiliation  Unknown

 

 Race  Unknown

 

 Ethnic Group  Declined to Specify/Unknown









Author







 Name  Katelynn Trevino PA-C

 

 Address  [address]



   Unavailable



   [place] [address]









Support







 Name  Relationship  Address  Phone

 

 Unavailable  Sibling  Unavailable  [phone]









Care Team Providers







 Name  Role  Phone

 

 Bridger Webb MD  Care Team Information   Unavailable

 

 Bridger Webb MD  Primary Care Physician  Unavailable









Payers







 Type  Date  Identification Numbers  Payment Provider  Subscriber

 

   Effective:  Policy Number: [id number]  Medicare-[facility] Gov[facility]  Delta L Michael



   1999    [employer]  









 PayID: [id number]  [address]









 [place] [address]









     Policy Number: [id number]  Medicaid/C  Deltadelfina Mendozane









 Group Number: [id number]  Medicare Primary

 

 Group Name: 21  [address]

 

 PayID: [id number]  [place] [address]







Advance Directives







 Description

 

 No Information Available







Problems







 Date  Description  Provider  Status

 

 Onset: 10/07/2011  Acute pharyngitis  Jonathan E. Cryer, MD  Active

 

 Onset: 10/07/2011  Perforation of tympanic membrane  Jonathan E. Cryer, MD  
Active

 

 Onset: 10/07/2011  Dysfunction of eustachian tube  Jonathan E. Cryer, MD  
Active

 

 Onset: 10/07/2011  Middle ear conductive hearing loss  Jonathan E. Cryer, MD  
Active

 

 Onset: 10/07/2011  Sensorineural hearing loss  Jonathan E. Cryer, MD  Active

 

 Onset: 10/07/2011  Disorder of nasal cavity  Yanelis Wooten NP  Active

 

 Onset: 10/07/2011  Gastroesophageal reflux disease  Yanelis Wooten NP  Active

 

 Onset: 10/07/2011  Extrinsic asthma without status  Yanelis Wooten NP  Active



   asthmaticus    

 

 Onset: 10/07/2011  Peptic reflux disease  Yanelis Wooten NP  Active

 

 Onset: 10/07/2011  Allergic rhinitis  Yanelis Wooten NP  Active

 

 Onset: 2018  Chronic tympanitis  Katelynn Trevino PA-C  Active

 

 Onset: 2018  Impacted cerumen  Katelynn Trevino PA-C  Active







Family History







 Date  Family Member(s)  Problem(s)  Comments

 

   Mother  Non Insulin Dependent Diabetes  







Social History







 Type  Date  Description  Comments

 

 Birth Sex    Unknown  

 

 Occupation    tops  

 

 Tobacco Use  Start: Unknown End:  Former Cigarette Smoker  for 25 years



   Unknown  Packs Daily 1  

 

 Tobacco Use  Start: Unknown  has never smoked cigars  

 

 Tobacco Use  Start: Unknown  has never smoked a pipe  

 

 Smokeless Tobacco    has never used smokeless  



     tobacco  

 

 ETOH Use    Current Alcohol Use  



     Occasionally  

 

 Recreational Drug Use    denies drug use  

 

 Tobacco Use  Start: Unknown End:  Patient is a former smoker  



   Unknown    

 

 Smoking Status  Reviewed: 18  Patient is a former smoker  







Allergies, Adverse Reactions, Alerts







 Date  Description  Reaction  Status  Severity  Comments

 

 2008  Augmentin XR    Active    

 

 2013  Penicillins  neck, tongue swells  Active    

 

 2005  NKDA    Inactive    







Medications







 Medication  Date  Status  Form  Strength  Qnty  SIG  Indications  Ordering



                 Provider

 

 Omeprazole    Active  Capsules DR  40mg  60cap  40mg po  530.81  Saeid BOWLES



   /        s  bid for 1    Strominge



             month for    BUBBA donaldson



             esophagiti    



             s    

 

 Advair Hfa    Active  Aerosol  115/21  1unit  2 Puffs  478.19  Saeid BOWLES



   /        s  bid With    Strominge



             Spacer    BUBBA donaldson



                 



                 



                 



                 



                 



             Please    



             Provide    



             Spacer    

 

 Albuterol    Active  Aerosol  90mcg/Dos  2unit  2 Puffs    Wilber



 Inhalation  /      e  s  Q4H prn    E. Cryer, MD

 

 Bystolic    Active    5mg    qd    Unknown



                 

 

 Aspirin    Active  Tablets DR  81mg        Unknown



                 

 

 Triamterene/Hydro    Active            Unknown



 chlorothiazide                

 

 Nitroglycerin    Active    ?mg    prn Only    Tracey,



                 Bridger CARDENAS

 

 Ranexa    Active  Tablets ER  500mg    1 po bid    Unknown



       12HR          

 

 Atorvastatin    Active  Tablets  40mg    1 po qd    Unknown



 Calcium                

 

 Klor-Con M20    Active  Tablets ER  20Meq        Stefek,Pa



                 rosario MAC

 

 Metoprolol    Active  Tablets ER  50mg        Unknown



 Succinate ER      24HR          

 

 Triamterene/Hydro    Active  Capsules  37.5-25mg        Tracey,



 chlorothiazide                Bridger CARDENAS

 

 Advair Diskus    Active  Aerosol  250-50mcg        Tracey,



   /0000      /Dose        Bridger CARDENAS

 

 Proair HFA    Active  Aerosol  108(90Bas        Tracey,



   /0000      e)        Bridger CARDENAS



         mcg/Act        

 

 Atorvastatin    Active  Tablets  20mg        Stefek,Pa



 Calcium  /              ul M.DLottie

 

 Clopidogrel    Active  Tablets  75mg        Tracey,



 Bisulfate                Bridger CARDENAS

 

 Xarelto    Active  Tablets  20mg        Tracey,



                 Bridger CARDENAS

 

 Famotidine    Active  Tablets  20mg        Tracey,



                 Bridger CARDENAS

 

                 

 

 Ciprodex    Hx  Suspension  0.3-0.1%  7.500  4 drops            ml  left ear    E. Cryer,



   -          twice a    MD



             day for 10    



   /2018          days    

 

 Methylprednisolon    Hx  TBPK  4mg  1pack  take as              directed    E. Cryer,



   -              MD



                 

 

 Mupirocin    Hx  Ointment  2%  22gm  apply to              both    E. Cryer, -          nostril    MD



             twice a    



             day    

 

 Triamcinolone    Hx  Cream  0.1%  15gm  Apply to    Wilber



 Acetonide  /2016          left ear    E. Cryer,



   -          twice    MD



             daily for    



             1 week    

 

 Percocet    Hx  Tablets  5-325mg  30tab  1-2 tabs            s  by mouth    E. Cryer,



   -          every 4-6    MD



             hours as    



   /2018          needed for    



             pain    

 

 Levofloxacin    Hx  Tablets  500mg  10tab  1 by mouth            s  every day    E. Cryer, - MD



                 

 

 Ciprodex    Hx  Suspension  0.3-0.1%  1Bott  4 drops to            le  left ears    E. Cryer,



   -          twice a    MD



             day           rebate rx    



             bin:    



             [id number]    



             rxpcn:    



             loyalty    



             rxgrp:[id number]    



             [id number]    



             issuer:    



             ([id number])    



             id#[id number]    



                 

 

 Ofloxacin    Hx  Solution  0.3%  1unit  4 drops to    Saeid BOWLES



           s  affected    Strominge



   -          ear daily    r, MDIDIERLottie



   10/24              



   /2013              

 

 Ciprodex    Hx  Suspension  0.3-0.1%  2unit  4 drops    Saeid BOWLES



           s  infected    Strominge



   -          ear bid    r, M.D.



   10/24          apply    



   /2013          rebate    



             rxbin:    



             [id number]    



             rxpcn:    



             loyalty    



             rxgrp:    



             [id number]    



             issuer:    



             ([id number])    



             id:    



             [id number]    

 

 Oxycodone/Acetami    Hx  Tablets  5-325mg  30tab  1-2 tabs    Saeid randhawa          s  by mouth    Strominge



   -          every 4-6    r, M.DLottie



             hours as    



   /2018          needed for    



             pain    

 

 Ciprodex    Hx  Suspension  0.3-0.1%  2unit  4 drops  385.32  Saeid BOWLES



           s  infected    Strominge



   -          ear bid    r, MTANVIR



             apply    



             rebate    



             rxbin:    



             [id number]    



             rxpcn:    



             loyalty    



             rxgrp:    



             [id number]    



             issuer:    



             ([id number])    



             id:    



             [id number]    

 

 Levofloxacin    Hx  Tablets  500mg  10tab  1 po qd            s      E. Cryer,



   -              MD



                 

 

 Oxycodone/Acetami    Hx  Tablets  5-325mg  30tab  1-2 tabs    Wilber randhawa          s  by mouth    E. Cryer,



   -          every 4-6    MD



             hours as    



   /2013          needed for    



             pain    

 

 Ciprodex  10/16  Hx  Suspension  0.3-0.1%  2unit  4 drops  381.3          s  infected    E. Cryer,



   -          ear bid x    MD



             14 days    



             apply    



             rebate    



             rxbin:    



             [id number]    



             rxpcn:    



             loyalty    



             rxgrp:    



             [id number]    



             issuer:    



             ([id number])    



             id:    



             [id number]    

 

 Ipratropium    Hx  Solution  0.06%  6unit  2 to 4  J31.0  Saeid N.



 [place]  /        s  sprays in    Strominge



   -          each    r, M.DLottie



             nostril           times a    



             day as    



             needed for    



             rhinitis    

 

 Bacitracin    Hx      1Tube  apply to  472.0  Saeid N.



 Ointment  /          rim of    Strominge



   -          nose both    r, M.DLottie



             sides in    



             the am and    



             the pm.    

 

 Flovent HFA    Hx  Aerosol  110mcg/Ac  1unit  2 puff bid  493.01  Saeid BOWLES



         t  s      Turner donaldson M.D.



                 

 

 Fluticasone Nasal    Hx    50mcg  1unit  2 sprays    Wilber



 Spray          s  each side    E. Cryer, -          bid    MD



                 

 

 Omnaris    Hx  Suspension  50mcg/Act    2 sprays    Saeid BOWLES



           s  each    Turner



   -          nostril    BUBBA donaldson



             daily    



                 

 

 Percocet    Hx  Tablets  5-325mg  30tab  1-2 tabs            s  by mouth    E. Cryer, -          every 4-6    MD



             hours as    



   /2013          needed for    



             pain    

 

 Ciprofloxacin HCL    Hx  Tablets  500mg  14tab  1 tablet            s  twice a    E. Cryer, -          day for 7    MD



   05/24          days    



   /2011              

 

 Astepro Nasal    Hx    0.15% 205  1unit  2 sprays    Saeid BOWLES



 Spray        mcg  s  once a day    Turner donaldson M.D.



                 

 

 Aciphex    Hx  Tablets DR  20mg  30tab  1 po Daily    Lottie



           s      Turner donaldson M.D.



                 

 

 Mucinex DM  10/22  Hx  Tablets ER    5Days  1 op qd  478.1-4  Wilber



 Maximum Strength  /2009    12HR          E. Cryer, - MD



                 

 

 Fexofenadine HCL    Hx  Tabs  180mg  30tab  1qd - Take            s  One Tablet    E. Cryer, -          By Mouth    MD



   01/05          Every Day    



   /2011              

 

 Singulair    Hx  Tabs  10mg  30tab  1qd - Take  477.0          s  One Tablet    E. Cryer, -          By Mouth    MD



   11/27          Every Day    



   /2018              

 

 Medrol Dosepak    Hx  Tablets  4mg  1Pack  Take as  350.2            Directed    E. Cryer, - MD



                 

 

 Prednisone    Hx  Tablets  20mg  5Days  3 PO qd    Saeid BOWLES



             With Yaritza donaldson M.D.



                 

 

 Augmentin    Hx  Tablets  875mg  14Day  1 po bid  473.0  Saeid VELARDE.



           s  with food    Turner donaldson M.D.



                 

 

 Aciphex    Hx  Tablets DR  20mg  60tab  1 po bid  530.81          s      E. Cryer, - MD



                 

 

 Fexofenadine HCL    Hx  Tablets  180mg  30tab  1 po qd  477.8  Saeid VELARDE.



           maciel donaldson M.D.



                 

 

 Zyrtec    Hx  Tablets  10mg  30tab  1 PO qd    Saeid VELARDE.



           maciel donaldson M.D.



                 

 

 Fluticasone    Hx  Suspension  50mcg/Act  1mont  2 sprays  471.8  [place]



 Propionate  /2007        h  each    E. Cryer,



   -          nostril    MD



             daily    



   /              

 

 Prednisone    Hx  Tablets  10mg  40tab  4 Tabs PO  471.8  Wilber



           s  Every Day    E. Cryer,



   -          X4 D, Then    MD



   10/15          3 Tabs PO    



   /          Every Day    



             X 4D, Then    



             2 Tab PO    



             Daily X4D,    



             Then 1 Tab    



             PO Daily    



             X4D    

 

 Nasacort Aq    Hx  Suspension  55mcg/Act  1unit  2 Sprays  471.8  Wilber



 Intranasal Spray        uation  s  Each    E. Cryer,



   -          Nostril qd    MD



                 

 

 Levaquin    Hx  Tablets  500mg  10tab  1 Tablet  471.8  Wilber        s  By Mouth    E. Cryer,



   -          Daily    MD



   10/15          Until    



             Finished    

 

 Nexium    Hx            Unknown



   /              



   -              



                 

 

 Singulair    Hx  Tablets  10mg  30tab  1 PO qd  471.8  Wilber



           s      E. Cryer, - MD



                 

 

 Nasacort Aq    Hx  Suspension  55McG/Act  1unit  2 sprays  473.2  Wilber



 Intranasal [place]  /      uation  s  each    E. Cryer,



   -          nostril qd    MD



                 

 

 Clopidrogel    Hx  Tablets  75mg        Unknown



   /0000              



   -              



                 

 

 Isosorbide    Hx    60mg    1 po qd    Unknown



 Dinitrate SA  /              



   -              



                 

 

 Simvastatin    Hx    ?mg    oen po qd    Tracey,



                 Bridger CARDENAS



   -              



                 

 

 Lisinopril    Hx            Unknown



   /0000              



   -              



                 

 

 Pepcid ac Ez    Hx            Unknown



 Chews Maximum  /              



 Strength  -              



                 

 

 Sertraline HCL    Hx    ?mg    one po qd    Tracey,



                 Bridger CARDENAS



   -              



                 

 

 Clopidogrel    Hx  Tablets  75mg        Kory Rosenthal



 Bisulfate  /              rosario M.DLottie



   -              



                 

 

 Fluticasone    Hx  Suspension  50mcg/Act        Unknown



 Propionate  /              



   -              



                 

 

 Famotidine    Hx  Tablets  20mg        Unknown



   /              



   -              



                 







Immunizations







 CPT Code  Status  Date  Vaccine  Lot #

 

 [id number]  Ordered  10/01/2015  Influenza Virus Vaccine, 3 Years Of Age And Above,
  



       Intramuscular  

 

 40391  Given  Unknown  Influenza Virus Vaccine, 3 Years Of Age And Above,  



       Intramuscular  







Vital Signs







 Date  Vital  Result  Comment

 

 2018 10:50am  Weight  347.00 lb  









 Weight  157.399 kg  

 

 Height  69 inches  5'9"

 

 Height in cm's  175.3 cm  

 

 BMI (Body Mass Index)  51.2 kg/m2  









 2018 10:08am  Weight  338.00 lb  









 Weight  153.317 kg  

 

 Height  68.50 inches  5'8.50"

 

 Height in cm's  174.0 cm  

 

 BMI (Body Mass Index)  50.6 kg/m2  









 2018  9:06am  Weight  335.00 lb  









 Weight  151.956 kg  

 

 Height  68.50 inches  5'8.50"

 

 Height in cm's  174.0 cm  

 

 BMI (Body Mass Index)  50.2 kg/m2  









 2018 10:49am  BP Systolic  159 mmHg  









 BP Diastolic  94 mmHg  

 

 Heart Rate  74 /min  

 

 Respiratory Rate  17 /min  

 

 Weight  335.00 lb  

 

 Weight  151.956 kg  

 

 Height  68.50 inches  5'8.50"

 

 Height in cm's  174.0 cm  

 

 BMI (Body Mass Index)  50.2 kg/m2  









 2017  9:47am  BP Systolic  155 mmHg  









 BP Diastolic  88 mmHg  

 

 Heart Rate  77 /min  

 

 Weight  335.00 lb  

 

 Weight  151.956 kg  

 

 Height  68.50 inches  5'8.50"

 

 Height in cm's  174.0 cm  

 

 BMI (Body Mass Index)  50.2 kg/m2  









 2016  2:46pm  BP Systolic  165 mmHg  









 BP Diastolic  99 mmHg  

 

 Heart Rate  78 /min  

 

 Respiratory Rate  16 /min  

 

 Weight  335.00 lb  

 

 Weight  151.956 kg  

 

 Height  68.50 inches  5'8.50"

 

 Height in cm's  174.0 cm  

 

 BMI (Body Mass Index)  50.2 kg/m2  









 2016 11:17am  BP Systolic  114 mmHg  









 BP Diastolic  89 mmHg  

 

 Heart Rate  64 /min  

 

 Respiratory Rate  17 /min  

 

 Weight  335.00 lb  

 

 Weight  151.956 kg  

 

 Height  68.50 inches  5'8.50"

 

 Height in cm's  174.0 cm  

 

 BMI (Body Mass Index)  50.2 kg/m2  









 2016  9:57am  BP Systolic  107 mmHg  









 BP Diastolic  82 mmHg  

 

 Heart Rate  78 /min  

 

 Respiratory Rate  17 /min  

 

 Weight  335.00 lb  

 

 Weight  151.956 kg  

 

 Height  68.50 inches  5'8.50"

 

 Height in cm's  174.0 cm  

 

 BMI (Body Mass Index)  50.2 kg/m2  









 2015 10:02am  BP Systolic  142 mmHg  









 BP Diastolic  105 mmHg  

 

 Heart Rate  76 /min  

 

 Respiratory Rate  17 /min  

 

 Weight  335.00 lb  

 

 Weight  151.956 kg  

 

 Height  68.50 inches  5'8.50"

 

 Height in cm's  174.0 cm  

 

 BMI (Body Mass Index)  50.2 kg/m2  









 2015 10:35am  BP Systolic  134 mmHg  









 BP Diastolic  86 mmHg  

 

 Heart Rate  72 /min  

 

 Respiratory Rate  17 /min  

 

 Weight  335.00 lb  

 

 Weight  151.956 kg  

 

 Height  68.50 inches  5'8.50"

 

 Height in cm's  174.0 cm  

 

 BMI (Body Mass Index)  50.2 kg/m2  









 10/29/2015  9:50am  BP Systolic  132 mmHg  









 BP Diastolic  92 mmHg  

 

 Heart Rate  91 /min  

 

 Respiratory Rate  18 /min  

 

 Weight  335.00 lb  

 

 Weight  151.956 kg  

 

 Height  68.50 inches  5'8.50"

 

 Height in cm's  174.0 cm  

 

 BMI (Body Mass Index)  50.2 kg/m2  









 2015 10:38am  Respiratory Rate  17 /min  









 Weight  335.00 lb  

 

 Weight  151.956 kg  

 

 Height  68.50 inches  5'8.50"

 

 Height in cm's  174.0 cm  

 

 BMI (Body Mass Index)  50.2 kg/m2  









 2015  3:25pm  BP Systolic  97 mmHg  









 BP Diastolic  61 mmHg  

 

 Heart Rate  78 /min  

 

 Respiratory Rate  16 /min  

 

 Weight  335.00 lb  

 

 Weight  151.956 kg  

 

 Height  68.50 inches  5'8.50"

 

 Height in cm's  174.0 cm  

 

 BMI (Body Mass Index)  50.2 kg/m2  









 2014  9:12am  BP Systolic  131 mmHg  









 BP Diastolic  84 mmHg  

 

 Heart Rate  67 /min  

 

 Respiratory Rate  18 /min  

 

 Weight  335.00 lb  

 

 Weight  151.956 kg  

 

 Height  68.50 inches  5'8.50"

 

 Height in cm's  174.0 cm  

 

 BMI (Body Mass Index)  50.2 kg/m2  









 2014 10:15am  BP Systolic  109 mmHg  









 BP Diastolic  68 mmHg  

 

 Heart Rate  68 /min  

 

 Respiratory Rate  17 /min  

 

 Weight  328.00 lb  

 

 Weight  148.781 kg  

 

 Height  68.50 inches  5'8.50"

 

 Height in cm's  174.0 cm  

 

 BMI (Body Mass Index)  49.1 kg/m2  









 2014  9:02am  BP Systolic  121 mmHg  









 BP Diastolic  79 mmHg  

 

 Heart Rate  77 /min  

 

 Respiratory Rate  16 /min  

 

 Weight  325.00 lb  

 

 Weight  147.420 kg  

 

 Height  68.50 inches  5'8.50"

 

 Height in cm's  174.0 cm  

 

 BMI (Body Mass Index)  48.7 kg/m2  









 2014 10:13am  BP Systolic  138 mmHg  









 BP Diastolic  76 mmHg  

 

 Heart Rate  73 /min  

 

 Respiratory Rate  16 /min  

 

 Weight  325.00 lb  

 

 Weight  147.420 kg  

 

 Height  68.50 inches  5'8.50"

 

 Height in cm's  174.0 cm  

 

 BMI (Body Mass Index)  48.7 kg/m2  









 2014  9:38am  BP Systolic  128 mmHg  









 BP Diastolic  80 mmHg  

 

 Heart Rate  65 /min  

 

 Respiratory Rate  16 /min  

 

 Weight  325.00 lb  

 

 Weight  147.420 kg  

 

 Height  68.50 inches  5'8.50"

 

 Height in cm's  174.0 cm  

 

 BMI (Body Mass Index)  48.7 kg/m2  









 2014  9:28am  Respiratory Rate  17 /min  









 Weight  325.00 lb  

 

 Weight  147.420 kg  

 

 Height  68.50 inches  5'8.50"

 

 Height in cm's  174.0 cm  

 

 BMI (Body Mass Index)  48.7 kg/m2  









 2014 10:01am  BP Systolic  129 mmHg  









 BP Diastolic  82 mmHg  

 

 Heart Rate  87 /min  

 

 Respiratory Rate  17 /min  

 

 Weight  325.00 lb  

 

 Weight  147.420 kg  

 

 Height  68.50 inches  5'8.50"

 

 Height in cm's  174.0 cm  

 

 BMI (Body Mass Index)  48.7 kg/m2  









 2013 10:59am  BP Systolic  131 mmHg  









 BP Diastolic  83 mmHg  

 

 Heart Rate  75 /min  

 

 Respiratory Rate  16 /min  

 

 Weight  325.00 lb  

 

 Weight  147.420 kg  

 

 Height  68.50 inches  5'8.50"

 

 Height in cm's  174.0 cm  

 

 BMI (Body Mass Index)  48.7 kg/m2  









 10/24/2013  9:31am  BP Systolic  132 mmHg  









 BP Diastolic  84 mmHg  

 

 Heart Rate  61 /min  

 

 Respiratory Rate  17 /min  

 

 Weight  325.00 lb  

 

 Weight  147.420 kg  

 

 Height  68.50 inches  5'8.50"

 

 Height in cm's  174.0 cm  

 

 BMI (Body Mass Index)  48.7 kg/m2  









 2013  9:15am  BP Systolic  126 mmHg  









 BP Diastolic  81 mmHg  

 

 Heart Rate  73 /min  

 

 Respiratory Rate  17 /min  

 

 Weight  325.00 lb  

 

 Weight  147.420 kg  

 

 Height  68.50 inches  5'8.50"

 

 Height in cm's  174.0 cm  

 

 BMI (Body Mass Index)  48.7 kg/m2  









 2013  2:36pm  Weight  325.00 lb  









 Weight  147.420 kg  

 

 Height  68.50 inches  5'8.50"

 

 Height in cm's  174.0 cm  

 

 BMI (Body Mass Index)  48.7 kg/m2  









 2013  9:05am  Weight  325.00 lb  









 Weight  147.420 kg  

 

 Height  68.50 inches  5'8.50"

 

 Height in cm's  174.0 cm  

 

 BMI (Body Mass Index)  48.7 kg/m2  









 2013 10:00am  BP Systolic  108 mmHg  









 BP Diastolic  73 mmHg  

 

 Heart Rate  77 /min  

 

 Respiratory Rate  16 /min  

 

 Weight  325.00 lb  

 

 Weight  147.420 kg  

 

 Height  68.50 inches  5'8.50"

 

 Height in cm's  174.0 cm  

 

 BMI (Body Mass Index)  48.7 kg/m2  









 2013  2:49pm  BP Systolic  99 mmHg  









 BP Diastolic  72 mmHg  

 

 Heart Rate  62 /min  

 

 Respiratory Rate  16 /min  

 

 Weight  325.00 lb  

 

 Weight  147.420 kg  

 

 Height  68.50 inches  5'8.50"

 

 Height in cm's  174.0 cm  

 

 BMI (Body Mass Index)  48.7 kg/m2  









 2013  2:04pm  BP Systolic  123 mmHg  









 BP Diastolic  88 mmHg  

 

 Heart Rate  66 /min  

 

 Respiratory Rate  16 /min  

 

 Weight  325.94 lb  

 

 Weight  147.840 kg  

 

 Height  68.50 inches  5'8.50"

 

 Height in cm's  174.0 cm  

 

 BMI (Body Mass Index)  48.8 kg/m2  









 2013 11:43am  BP Systolic  122 mmHg  









 BP Diastolic  84 mmHg  

 

 Heart Rate  64 /min  

 

 Respiratory Rate  16 /min  

 

 Weight  305.00 lb  

 

 Weight  138.348 kg  

 

 Height  69.02 inches  5'9.02"

 

 Height in cm's  175.3 cm  

 

 BMI (Body Mass Index)  45.0 kg/m2  









 10/25/2012  3:08pm  Weight  288.00 lb  









 Weight  130.637 kg  

 

 Height  69.02 inches  5'9"

 

 Height in cm's  175.3 cm  

 

 BMI (Body Mass Index)  42.5 kg/m2  









 10/16/2012  9:18am  BP Systolic  150 mmHg  









 BP Diastolic  90 mmHg  

 

 Heart Rate  66 /min  

 

 Respiratory Rate  20 /min  

 

 Weight  208.00 lb  

 

 Weight  94.349 kg  

 

 Height  69 inches  5'9"

 

 Height in cm's  175.3 cm  

 

 BMI (Body Mass Index)  30.7 kg/m2  









 10/06/2011  2:00pm  BP Systolic  152 mmHg  









 BP Diastolic  100 mmHg  

 

 Heart Rate  62 /min  

 

 Respiratory Rate  16 /min  

 

 Weight  280.00 lb  

 

 Weight  127.008 kg  

 

 Height  69 inches  5'9"

 

 Height in cm's  175.3 cm  

 

 BMI (Body Mass Index)  41.3 kg/m2  









 2011 11:16am  BP Systolic  143 mmHg  









 BP Diastolic  95 mmHg  

 

 Heart Rate  75 /min  

 

 Respiratory Rate  17 /min  









 2009  3:28pm  Heart Rate  64 /min  









 Respiratory Rate  16 /min  

 

 Weight  321.00 lb  

 

 Weight  145.606 kg  









 2008  3:08pm  BP Systolic  170 mmHg  Taken with LG BP cuff









 BP Diastolic  106 mmHg  Taken with LG BP cuff

 

 Heart Rate  79 /min  

 

 Respiratory Rate  18 /min  









 2008  9:33am  BP Systolic  159 mmHg  









 BP Diastolic  114 mmHg  

 

 Heart Rate  79 /min  

 

 Respiratory Rate  16 /min  









 10/23/2008  3:55pm  BP Systolic  157 mmHg  









 BP Diastolic  107 mmHg  

 

 Heart Rate  77 /min  

 

 Respiratory Rate  16 /min  









 2008 10:06am  BP Systolic  133 mmHg  









 BP Diastolic  100 mmHg  

 

 Heart Rate  75 /min  

 

 Respiratory Rate  16 /min  









 2008 11:44am  BP Systolic  160 mmHg  









 BP Diastolic  109 mmHg  

 

 Heart Rate  74 /min  

 

 Respiratory Rate  16 /min  









 2008  3:25pm  BP Systolic  137 mmHg  









 BP Diastolic  85 mmHg  

 

 Heart Rate  75 /min  

 

 Respiratory Rate  16 /min  









 2008 10:15am  BP Systolic  139 mmHg  









 BP Diastolic  88 mmHg  

 

 Heart Rate  85 /min  

 

 Respiratory Rate  12 /min  









 2008  3:17pm  BP Systolic  138 mmHg  









 BP Diastolic  87 mmHg  

 

 Heart Rate  82 /min  

 

 Respiratory Rate  16 /min  









 2008  9:50am  BP Systolic  148 mmHg  









 BP Diastolic  86 mmHg  









 2008  3:43pm  BP Systolic  148 mmHg  









 BP Diastolic  92 mmHg  

 

 Heart Rate  80 /min  

 

 Respiratory Rate  14 /min  









 2007  1:52pm  BP Systolic  119 mmHg  









 BP Diastolic  76 mmHg  

 

 Heart Rate  75 /min  

 

 Respiratory Rate  16 /min  









 2006  9:11am  BP Systolic  176 mmHg  









 BP Diastolic  73 mmHg  

 

 Heart Rate  80 /min  

 

 Respiratory Rate  16 /min  









 2005  1:10pm  BP Systolic  146 mmHg  









 BP Diastolic  82 mmHg  

 

 Heart Rate  80 /min  

 

 Respiratory Rate  16 /min  







Results







 Test  Date  Facility  Test  Result  H/L  Range  Note

 

 CBC No Diff  2015  [facility]  White Blood  5.2 10^3
/uL  N  3.5-10.8  1



     c/o Department of Laboratories  Count        



     [place] [address] [phone]          









 Red Blood Count  5.43 10^6/uL  High  4.0-5.4  

 

 Hemoglobin  15.1 g/dL  N  14.0-18.0  

 

 Hematocrit  47 %  N  42-52  

 

 Mean Corpuscular Volume  87 fL  N  80-94  

 

 Mean Corpuscular Hemoglobin  28 pg  N  27-31  

 

 Mean Corpuscular HGB Conc  32 g/dL  N  31-36  

 

 Red Cell Distribution Width  15 %  N  10.5-15  

 

 Platelet Count  232 10^3/uL  N  150-450  

 

 Mean Platelet Volume  8 um3  N  7.4-10.4  









 Inr/Protime  2015  [facility]  Inr  1.05  N  0.89-
1.11  



     c/o Department of Laboratories          



     [place] [address] [phone]          

 

 Laboratory test  2015  [facility]  Partial  45.0  
High  26.0-36.3  2



 finding    c/o Department of Laboratories  Thrombo  seconds      



     [place] [address]  Time PTT        



     [phone]          

 

 Basic Metabolic  2015  [facility]  Sodium  141 mmol/
L  N  133-145  



 Panel    c/o Department of Laboratories          



     [place] [address] [phone]          









 Potassium  3.8 mmol/L  N  3.5-5.0  

 

 Chloride  106 mmol/L  N  101-111  

 

 Co2 Carbon Dioxide  28 mmol/L  N  22-32  

 

 Anion Gap  7 mmol/L  N  2-11  

 

 Glucose  91 mg/dL  N    

 

 Blood Urea Nitrogen  14 mg/dL  N  6-24  

 

 Creatinine  1.15 mg/dL  N  0.67-1.17  

 

 BUN/Creatinine Ratio  12.2  N  8-20  

 

 Calcium  9.9 mg/dL  N  8.6-10.3  

 

 Egfr Non-  67.0  N  >60  

 

 Egfr   86.2  N  >60  3









 Laboratory test finding  2013  [facility]  Inr  
0.96    0.87-0.97  4



     c/o Department of Laboratories          



     [place] [address] [phone]          









 Activated Partial Thrombo Time  44.1 seconds  High  22.18-37.18  5









 Basic Metabolic  2013  [facility]  Sodium  140 mmol/
L    133-145  



 Panel    c/o Department of Laboratories          



     [place] [address] [phone]          









 Potassium  4.3 mmol/L    3.5-5.0  

 

 Chloride  104 mmol/L    101-111  

 

 Co2 Carbon Dioxide  30.0 mmol/L    22-32  

 

 Anion Gap  6.0 mmol/L    2-11  

 

 Glucose  111 mg/dL  High    

 

 Blood Urea Nitrogen  14 mg/dL    6-24  

 

 Creatinine  1.30 mg/dL    0.50-1.40  

 

 BUN/Creatinine Ratio  10.8    8-20  

 

 Calcium  9.7 mg/dL    8.1-9.9  

 

 Egfr Non-  58.7    >60  

 

 Egfr   75.5    >60  6









 CBC Auto  2013  [facility]  White Blood  4.0 10^3/
uL  Low  4.8-10.8  



 Diff    c/o Department of Laboratories  Count        



     [place] [address] [phone]          









 Red Blood Count  4.93 10^6/uL    4.0-5.4  

 

 Hemoglobin  14.3 g/dL    14.0-18.0  

 

 Hematocrit  43 %    42-52  

 

 Mean Corpuscular Volume  87 fL    80-94  

 

 Mean Corpuscular Hemoglobin  29 pg    27-31  

 

 Mean Corpuscular HGB Conc  33 g/dL    31-36  

 

 Red Cell Distribution Width  14 %    10.5-15  

 

 Platelet Count  184 10^3/uL    150-450  

 

 Mean Platelet Volume  8 um3    7.4-10.4  

 

 Abs Neutrophils  2.2 10^3/uL    1.5-7.7  

 

 Abs Lymphocytes  1.3 10^3/uL    1.0-4.8  

 

 Abs Monocytes  0.4 10^3/uL    0-0.8  

 

 Abs Eosinophils  0.1 10^3/uL    0-0.6  

 

 Abs Basophils  0 10^3/uL    0-0.2  

 

 Abs Nucleated RBC  0.01 10^3/uL      

 

 Granulocyte %  54.0 %    38-83  

 

 Lymphocyte %  32.6 %    25-47  

 

 Monocyte %  9.7 %  High  1-9  

 

 Eosinophil %  3.1 %    0-6  

 

 Basophil %  0.6 %    0-2  

 

 Nucleated Red Blood Cells %  0.2      









 Basic Metabolic  2013  [facility]  Sodium  135 mmol/
L    133-145  



 Panel    c/o Department of Laboratories          



     [place] [address] [phone]          









 Potassium  3.6 mmol/L    3.5-5.0  

 

 Chloride  102 mmol/L    101-111  

 

 Co2 Carbon Dioxide  30.0 mmol/L    22-32  

 

 Anion Gap  3.0 mmol/L    2-11  

 

 Glucose  80 mg/dL      

 

 Blood Urea Nitrogen  11 mg/dL    6-24  

 

 Creatinine  1.10 mg/dL    0.50-1.40  

 

 BUN/Creatinine Ratio  10.0    8-20  

 

 Calcium  9.0 mg/dL    8.1-9.9  

 

 Egfr Non-  71.4    >60  

 

 Egfr   91.9    >60  7









 Laboratory test  12/10/2008  [facility]  Troponin-I (TnI)
  0.04 NG/ML    0-0.06  8



 finding    c/o Department of Laboratories          



     [place] [address] [phone]          

 

 Stat CMP  12/10/2008  [facility]  Sodium  136 mmol/L    
135-145  



     c/o Department of Laboratories          



     [place], [place] [id number] [phone]          









 Potassium  3.9 mmol/L    3.5-5.0  

 

 Chloride  104 mmol/L    101-111  

 

 Co2 (Carbon Dioxide)  25.0 mmol/L    22-32  

 

 Anion Gap  7.0 mmol/L    2-11  9

 

 Glucose  98 mg/dL      10

 

 BUN  15 mg/dL    6-24  

 

 Creatinine  1.20 mg/dL    0.50-1.40  

 

 One Over Creatinine  0.80      

 

 BUN/Creatinine Ratio  12.5    8-20  

 

 Calcium  9.3 mg/dL    8.1-9.9  11

 

 Total Protein  6.9 GM/DL    6.2-8.1  

 

 Albumin  3.9 GM/DL    3.6-5.4  

 

 Globulin  3.0 GM/DL    2-4  

 

 Albumin/Globulin Ratio  1.3    1-3  

 

 Bilirubin Total  0.5 mg/dL    0.4-1.5  

 

 Alkaline Phosphatase  112 U/L      

 

 Alt (SGPT)  53 U/L    17-63  

 

 Ast (Sgot)  31 U/L    12-42  









 CBC With  12/10/2008  [facility]  White Blood  6.7 CUMM  
  4.8-10.8  



 Electronic Diff    c/o Department of Laboratories  Count        



 Stat    [place], [place] [address] [phone]          









 Red Cell Count  5.28 CUMM    4.6-6.2  

 

 Hemoglobin  15.2 g/dL    14.0-18.0  

 

 Hematocrit  44 %    42-52  

 

 Mean Corpuscular Volume  83 um3    80-94  

 

 Mean Corpuscular Hemoglob  29 pg    27-31  

 

 Mean Corpuscular HGB Cone  35 g/dL    32-36  

 

 Redcell Distribution WDTH  14 %    10.5-15  

 

 Platelet Count  254 CUMM    150-450  

 

 Mean Platelet Volume  7.6 um3    7.4-10.4  

 

 Gran %  60.3 %    38-83  

 

 Lymph %  28.8 %    25-47  

 

 Mononuclear %  7.4 %    1-9  

 

 Eosinophil %  3.0 %    0-6  

 

 Basophil %  0.5 %    0-2  

 

 Abs Lymphs  1.9    1.0-4.8  

 

 Abs Mononuclear  0.5    0-0.8  

 

 Absolute Neutrophil Count  4.0    1.5-7.7  

 

 Abs Eosinophils  0.2    0-0.6  

 

 Abs Basophils  0    0-0.2  









 Laboratory test  2008  [facility]  Culture  FEW 
COLONIES      12



 finding    c/o Department of Laboratories  Sensitivity  OF  <SEE NOTE>      



     [place] [id number]          



     [phone]          









 1  SDS 

 

 2  SDS 

 

 3  *******Because ethnic data is not always readily available,



   this report includes an eGFR for both -Americans and



   non- Americans.****



   The National Kidney Disease Education Program (NKDEP) does



   not endorse the use of the MDRD equation for patients that



   are not between the ages of 18 and 70, are pregnant, have



   extremes of body size, muscle mass, or nutritional status,



   or are non- or non-.



   According to the National Kidney Foundation, irrespective of



   diagnosis, the stage of the disease is based on the level of



   kidney function:



   Stage Description                      GFR(mL/min/1.73 m(2))



   1     Kidney damage with normal or decreased GFR       90



   2     Kidney damage with mild decrease in GFR          60-89



   3     Moderate decrease in GFR                         30-59



   4     Severe decrease in GFR                           15-29



   5     Kidney failure                       <15 (or dialysis)

 

 4  SDS 13

 

 5  SDS 13

 

 6  *******Because ethnic data is not always readily available,



   this report includes an eGFR for both -Americans and



   non- Americans.****



   The National Kidney Disease Education Program (NKDEP) does



   not endorse the use of the MDRD equation for patients that



   are not between the ages of 18 and 70, are pregnant, have



   extremes of body size, muscle mass, or nutritional status,



   or are non- or non-.



   According to the National Kidney Foundation, irrespective of



   diagnosis, the stage of the disease is based on the level of



   kidney function:



   Stage Description                      GFR(mL/min/1.73 m(2))



   1     Kidney damage with normal or decreased GFR       90



   2     Kidney damage with mild decrease in GFR          60-89



   3     Moderate decrease in GFR                         30-59



   4     Severe decrease in GFR                           15-29



   5     Kidney failure                       <15 (or dialysis)

 

 7  *******Because ethnic data is not always readily available,



   this report includes an eGFR for both -Americans and



   non- Americans.****



   The National Kidney Disease Education Program (NKDEP) does



   not endorse the use of the MDRD equation for patients that



   are not between the ages of 18 and 70, are pregnant, have



   extremes of body size, muscle mass, or nutritional status,



   or are non- or non-.



   According to the National Kidney Foundation, irrespective of



   diagnosis, the stage of the disease is based on the level of



   kidney function:



   Stage Description                      GFR(mL/min/1.73 m(2))



   1     Kidney damage with normal or decreased GFR       90



   2     Kidney damage with mild decrease in GFR          60-89



   3     Moderate decrease in GFR                         30-59



   4     Severe decrease in GFR                           15-29



   5     Kidney failure                       <15 (or dialysis)

 

 8  New Reference Range and Interpretation effective 10/4/02



   



   TnI (ng/ml)             INTERPRETATION



   



   <0.06 ng/ml             NOT SUPPORTIVE OF DIAGNOSIS OF MI



   



   0.06 - 0.50 ng/ml       INDETERMINATE: SUGGEST SERIAL



   STUDIES IF CLINICALLY INDICATED.



   



   > 0.5 ng/ml             CONSISTENT WITH DIAGNOSIS OF MI



   .

 

 9  Anion gap measurement may be of limited value in the



   presence of any alkalosis, especially in a combined acid



   base disorder.



   .

 

 10  ** Note change in reference range as of 08.  The



   change was based on recommendations from the American



   Diabetes Association.

 

 11  Please note change in reference range effective 08



   



   



   .

 

 12  FEW COLONIES OF NORMAL RESPIRATORY EDMUNDO







Procedures







 Date  Code  Description  Status

 

 2018  49452  Debridement Of Mastoid Cavity, Simple  Completed

 

 2018  55805  Nasal Endoscopy, Diagnostic  Completed

 

 2016  18350  Nasal Endoscopy, Diagnostic  Completed

 

 2015  69512  Revision Mastoidectomy Resulting In Modified Radical  
Completed



     Mastoidctomy  

 

 2014  31232  Fiberoptic Laryngoscopy  Completed

 

 2014  14815  Tympanometry  Completed

 

 2014  48659  Comprehensive Audiogram  Completed

 

 10/24/2013  41537  Tympanometry  Completed

 

 10/24/2013  64349  Comprehensive Audiogram  Completed

 

 2013  28389  Binocular Microscopy  Completed

 

 2013  71263  Tympanomastoidectomy W/Ossicular Chain  Completed

 

 2013  76711  Medical Records  Completed

 

 2013  20971  Tympanometry  Completed

 

 2013  49966  Comprehensive Audiogram  Completed

 

 2013  59961  Comprehensive Audiogram  Completed

 

 2013  20064  Tympanometry  Completed

 

 10/16/2012  71171  Binocular Microscopy  Completed

 

 2012  17439  Nasal Endoscopy, Diagnostic  Completed

 

 2012  86751  Tympanometry  Completed

 

 2012  39033  Comprehensive Audiogram  Completed

 

 2012  84813  Tympanometry  Completed

 

 2012  22814  Binocular Microscopy  Completed

 

 2011  38309  Fiberoptic Laryngoscopy  Completed

 

 2011  88925  Tympanometry  Completed

 

 2011  93893  Tympanometry  Completed

 

 2011  34799  Comprehensive Audiogram  Completed

 

 2010  45318  Tympanometry  Completed

 

 2009  08825  Tympanometry  Completed

 

 2009  45772  Binocular Microscopy  Completed

 

 10/22/2009  67948  Comprehensive Audiogram  Completed

 

 10/22/2009  56600  Tympanometry  Completed

 

 2008  62694  Nasal Endoscopy, Diagnostic  Completed

 

 2008  02486  Fiberoptic Laryngoscopy  Completed

 

 2008  62878  No Show Fee  Completed

 

 2008  72244  Demonstration/Eval. Of Patient Utilization Of  Completed



     Spacer/Nebulizer  

 

 2008  96290  Respiratory Flow Volume Loop  Completed

 

 2008  84069  Bronchospasm Evaluation  Completed

 

 2008  38327  Prick Test  Completed

 

 2008  89353  Prick Test  Completed

 

 2008  67591  Nasal Endoscopy, Diagnostic  Completed

 

 2007  53670  Nasopharyngoscopy  Completed

 

 2007  54568  Nasal Endoscopy, Diagnostic  Completed

 

 2007  34204  Comprehensive Audiogram  Completed

 

 2007  07606  Comprehensive Audiogram  Completed

 

 2007  67000  Tympanometry  Completed

 

 2007  76689  Tympanometry  Completed

 

 2007  25166  Tympanostomy W/Tube Local Or Topical Anes.  Completed

 

 2007  91101  Tympanometry  Completed

 

 2007  45611  Tympanometry  Completed

 

 2007  33461  Comprehensive Audiogram  Completed

 

 2007  64755  Comprehensive Audiogram  Completed

 

 2006  46094  Nasal Endoscopy, Diagnostic  Completed

 

 2005  05251  Nasal Endoscopy, Diagnostic  Completed

 

 2005  97350  Nasal Endoscopy W/ Debridement  Completed

 

 2005  49709  Nasal Endoscopy W/ Debridement  Completed

 

 2004  66166  Nasal Endoscopy W/Maxllary Antrostomy W/Excision Of Poylp  
Completed

 

 2004  04016  Nasal Endoscopy With Ethmoidectomy, Partial  Completed

 

 12/10/2004  53760  Nasal Endoscopy, Diagnostic  Completed

 

 2004  31777  Nasal Endoscopy, Diagnostic  Completed

 

 10/13/2004  15674  Nasal Endoscopy, Diagnostic  Completed

 

 2004  45680  No Show Fee  Completed







Encounters







 Type  Date  Location  Provider  Dx  Diagnosis

 

 Office Visit  2018  [facility],After  Wilber PENA1.12  Cholesteatoma of



   10:30a  08  Cryer, MD tympanum, left ear









 J31.0  Chronic rhinitis

 

 G50.1  Atypical facial pain









 Office Visit  2018  [facility],After  Wilber ISLAS  H71.12  Cholesteatoma of



   10:00a  08  Cryer, MD    tympanum, left ear

 

 Office Visit  2018  [facility],After  Wilber ISLAS  H71.12  Cholesteatoma of



   11:15a  08  Cryer, MD tympanum, left ear

 

 Office Visit  2017  [facility],After  Wilber ISLAS  H71.12  Cholesteatoma of



   11:00a  08  Cryer, MD    tympanum, left ear









 H72.2x1  Other marginal perforations of tympanic membrane, right ear









 Office Visit  2017  [facility],After  Wilber ISLAS  H71.12  Cholesteatoma of



   10:30a  08  Cryer, MD    tympanum, left ear

 

 Office Visit  2016  [facility],After  Wilber ISLAS  J31.0  Chronic rhinitis



   2:45p  08  Cryer, MD    









 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









 Office Visit  2016  [facility],After  Wilber ISLAS  H71.12  Cholesteatoma of



   11:00a  08  Cryer, MD tympanum, left ear









 H60.11  Cellulitis of right external ear









 Office Visit  2016  [facility],After  Wilber ISLAS  H71.12  Cholesteatoma of



   11:00a  08  Cryer, MD tympanum, left ear

 

 Office Visit  2016  [facility],After  Wilber ISLAS  L23.3  Allergic contact



   3:45p  08  Cryer, MD    dermatitis due to



           drugs in contact w



           skin

 

 Office Visit  2015  [facility],After  Wilber ISLAS  H71.12  Cholesteatoma of



   11:30a  08  Cryer, MD    tympanum, left ear

 

 Office Visit  10/29/2015  [facility],After  Wilber ISLAS  H71.12  Cholesteatoma of



   10:30a  08  Cryer, MD    tympanum, left ear

 

 Office Visit  10/15/2015  [facility],After  Wilber ISLAS  H65.32  Chronic mucoid



   11:00a  08  Cryer, MD    otitis media, left



           ear









 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









 Office Visit  2015 10:15a  [facility],After 08  Wilber ISLAS  381.29  
Otitis Media



       Cryer, MD    Chronic Media



           Other









 385.32  Cholesteatoma Of Middle Ear









 Office Visit  2015  3:45p  St[facility],After 08  Wil[facility] ISLAS  381.29  
Otitis Media



       Cryer, MD    Chronic Media



           Other









 385.32  Cholesteatoma Of Middle Ear









 Office Visit  2014  9:45a  [facility],After 08  Wilber ISLAS  381.29  
Otitis Media



       Cryer, MD    Chronic Media



           Other

 

 Office Visit  2014 10:45a  [facility],After 08  Wilber ISLAS  381.29  
Otitis Media



       Cryer, MD    Chronic Media



           Other









 787.20  Dysphagia, Unspecified









 Office Visit  2014 10:15a  [facility],After 08  Jonathan E. Cryer, MD  
786.2  Cough









 787.20  Dysphagia, Unspecified

 

 493.00  Asthma, Extrinsic/Atopic









 Office  2014  [facility],After  Wilber ISLAS  389.03  Hearing Loss,



 Visit  10:00a  08  Cryer, MD    Conductive/Middle Ear









 389.10  Hearing Loss, Sensorineural/Unspecified

 

 462-2  Sore Throat









 Office Visit  2014 10:30a  [facility],After 08  Wilber ISLAS  380.10  
Otitis Externa



       Cryer, MD    Acute









 389.03  Hearing Loss, Conductive/Middle Ear









 Office Visit  2014 10:30a  Stacey,After 08  Wilber ISLAS  380.10  
Otitis Externa



       Cryer, MD    Acute









 381.29  Otitis Media Chronic Media Other









 Office Visit  2014  [facility],After  Wilber ISLAS  385.32  Cholesteatoma Of



   9:45a  08  Cryer, MD    Middle Ear

 

 Office Visit  2013  [facility],After  Wilber ISLAS  472.0  Rhinitis, Chronic



   11:00a  08  Cryer, MD    









 385.32  Cholesteatoma Of Middle Ear









 Office Visit  10/24/2013  [facility],After  Wilber ISLAS  385.32  Cholesteatoma Of



   10:30a  08  Cryer, MD    Middle Ear









 389.03  Hearing Loss, Conductive/Middle Ear

 

 389.10  Hearing Loss, Sensorineural/Unspecified

 

 477.8  Rhinitis, Perennial, Allergy









 Office Visit  2013  [facility],After  Jamie Wooten.32  Cholesteatoma Of



   4:00p  08  Yanelis NP    Middle Ear









 381.29  Otitis Media Chronic Media Other









 Office Visit  2013  [facility],After  Wilber ISLAS  385.32  Cholesteatoma Of



   1:45p  08  Cryer, MD    Middle Ear









 381.29  Otitis Media Chronic Media Other

 

 389.03  Hearing Loss, Conductive/Middle Ear









 Office Visit  2013  [facility],After  Nan Wooten32  Cholesteatoma Of



   2:15p  08  Yanelis NP    Middle Ear









 381.29  Otitis Media Chronic Media Other









 Office Visit  2013  [facility],After  Wilber ISLAS  385.32  Cholesteatoma Of



   1:30p  08  Cryer, MD    Middle Ear









 381.29  Otitis Media Chronic Media Other

 

 389.03  Hearing Loss, Conductive/Middle Ear

 

 389.10  Hearing Loss, Sensorineural/Unspecified









 Office Visit  2013  [facility],After  Wilber ISLAS  385.32  Cholesteatoma Of



   11:30a  08  Cryer, MD    Middle Ear

 

 Office Visit  2012  [facility],After  Wilber ISLAS  381.29  Otitis Media



   11:15a  08  Cryer, MD    Chronic Media



           Other

 

 Office Visit  10/25/2012  [facility],After  Wilber ISLAS  381.29  Otitis Media



   3:15p  08  Cryer, MD    Chronic Media



           Other

 

 Office Visit  10/16/2012  [facility],After  Sugar  381.3  Otitis Media,



   9:30a  08  Yanelis NP    Chronic



           Allergic/Unspecifi



           ed

 

 Office Visit  2012  [facility],After  Sugar,  350.2  Facial Pain,



   2:45p  08  Yanelis NP    Atypical









 472.0  Rhinitis, Chronic









 Office Visit  2012  3:30p  [facility],After 08  Sugar,  477.8  Rhinitis
,



       Yanelis NP    Perennial,



           Allergy









 493.01  Asthma Extrinsic W/ Status Asthmaticus









 Office  2012  [facility],After  Wilber ISLAS  389.03  Hearing Loss,



 Visit  11:15a  08  Cryer, MD    Conductive/Middle Ear









 389.10  Hearing Loss, Sensorineural/Unspecified

 

 384.82  Tympanic Membrane, Atrophic Nonflaccid









 Office Visit  2012  4:00p  [facility],After  Sugar  384.82  Tympanic



     08  Yanelis NP    Membrane,



           Atrophic



           Nonflaccid

 

 Office Visit  10/06/2011  1:45p  [facility],After  Wilber ISLAS  462  Pharyngitis,



     08  Cryer, MD    Acute









 384.20  Perforation Of Tympanic Membrane/Unspecified

 

 381.81  Dysfunction Of Eustachian Tube

 

 389.03  Hearing Loss, Conductive/Middle Ear

 

 389.10  Hearing Loss, Sensorineural/Unspecified









 Office Visit  2011  9:30a  [facility],After  Sugar,  478.19  Mucocele Of



     08  Yanelis NP    Sinus/Perf Nasal



           Septum









 530.81  Reflux, Gastroesophageal

 

 493.00  Asthma, Extrinsic/Atopic

 

 530.11  Reflux, Esophagitis









 Office  2011  [facility],After  Sugar,  384.20  Perforation Of Tympanic



 Visit  2:00p  08  Yanelis NP    Membrane/Unspecified









 477.8  Rhinitis, Perennial, Allergy

 

 493.00  Asthma, Extrinsic/Atopic









 Office  2011  [facility],After  Sugar,  384.20  Perforation Of Tympanic



 Visit  1:30p  08  Yanelis NP    Membrane/Unspecified









 381.81  Dysfunction Of Eustachian Tube









 Office  2011  [facility],After  Wilber ISLAS  389.03  Hearing Loss,



 Visit  2:00p  08  Cryer, MD    Conductive/Middle Ear









 389.10  Hearing Loss, Sensorineural/Unspecified

 

 381.81  Dysfunction Of Eustachian Tube

 

 384.20  Perforation Of Tympanic Membrane/Unspecified









 Office  2010  [facility],After  Sugar  384.20  Perforation Of Tympanic



 Visit  11:30a  08  Yanelis NP    Membrane/Unspecified









 381.81  Dysfunction Of Eustachian Tube

 

 350.2  Facial Pain, Atypical









 Office Visit  2010  1:30p  [facility],After 08  Wilber ISLAS  477.8  
Rhinitis, Cryer, MD    Perennial,



           Allergy









 384.20  Perforation Of Tympanic Membrane/Unspecified

 

 381.81  Dysfunction Of Eustachian Tube









 Office Visit  2010 10:00a  [facility],After 08  Wilber ISLAS  477.8  
Rhinitis, Cryer, MD    Perennial,



           Allergy









 493.00  Asthma, Extrinsic/Atopic

 

 384.20  Perforation Of Tympanic Membrane/Unspecified

 

 389.03  Hearing Loss, Conductive/Middle Ear









 Office  2009  [facility],After  Wilber ISLAS  384.20  Perforation Of Tympanic



 Visit  3:45p  08  Cryer, MD    Membrane/Unspecified









 388.71  Otalgia Otogenic Pain

 

 381.81  Dysfunction Of Eustachian Tube









 Office  2009  [facility],After  Wilber ISLAS  384.20  Perforation Of Tympanic



 Visit  2:30p  08  Cryer, MD    Membrane/Unspecified









 388.71  Otalgia Otogenic Pain









 Office Visit  10/22/2009  1:45p  [facility],After  Wilber ISLAS  388.31  Tinnitus,



     08  Cryer, MD    Subjective









 478.1-4  Obstruction Of Nasal Airway









 Office Visit  2009  4:00p  [facility],After  Wilber ISLAS  478.19  Mucocele Of



     08  Cryer, MD    Sinus/Perf Nasal



           Septum









 478.1-4  Obstruction Of Nasal Airway

 

 477.8  Rhinitis, Perennial, Allergy

 

 493.00  Asthma, Extrinsic/Atopic









 Office Visit  2009  3:15p  [facility],After 08  Wilber ISLAS  350.2  
Facial Pain,



       Cryer, MD    Atypical









 473.0  Sinusitis, Chronic Maxillary

 

 473.2  Sinusitis, Chronic Ethmoidal

 

 471.8  Polyps, Nasal/Sinus









 Office Visit  2008  [facility],After  Saeid BOWLES  493.01  Asthma Extrinsic



   2:45p  08  BUBBA Ann    W/ Status



           Asthmaticus









 530.81  Reflux, Gastroesophageal

 

 780.57  Apnea, Sleep Not Elsewhere Classified /Unspecified

 

 278.01  Obesity Morbid

 

 429.3  Cardiomegaly









 Office  12/10/2008  [facility],After  Saeid BOWLES  493.00  Asthma,



 Visit  1:48p  08  Gonzalez Ann/Atopic



       M.D.    

 

 Office  2008  [facility]  Sugar,  473.0  Sinusitis, Chronic



 Visit  9:30a  08  Yanelis NP    Maxillary









 530.81  Reflux, Gastroesophageal









 Office Visit  10/23/2008  [facility]  Wilber ISLAS  780.57  Apnea, Sleep Not



   4:00p  08  Cryer, MD    Elsewhere



           Classified



           /Unspecified









 381.81  Dysfunction Of Eustachian Tube

 

 389.03  Hearing Loss, Conductive/Middle Ear









 Office Visit  2008  [facility]  Wilber ISLAS  780.57  Apnea, Sleep Not



   10:15a  08  Cryer, MD    Elsewhere



           Classified



           /Unspecified









 530.11  Reflux, Esophagitis









 Office Visit  2008  [facility]After  Sugar  530.81  Reflux,



   3:30p  08  Yanelis NP    Gastroesophageal









 477.8  Rhinitis, Perennial, Allergy

 

 493.90  Asthma, Allergic/Unspecified









 Office Visit  2008 10:15a  [facility]After  Sugar,  478.19  Mucocele Of



     08  Yanelis NP    Sinus/Perf Nasal



           Septum









 478.1-4  Obstruction Of Nasal Airway

 

 477.8  Rhinitis, Perennial, Allergy









 Office Visit  2008  [facility]After  Sugar,  478.1-4  Obstruction Of



   3:00p  08  Yanelis NP    Nasal Airway









 381.3  Otitis Media, Chronic Allergic/Unspecified

 

 493.00  Asthma, Extrinsic/Atopic

 

 477.0  Rhinitis, Seasonal -Allergic Due To Pollen

 

 477.8  Rhinitis, Perennial, Allergy









 Office Visit  2008 10:00a  [facility]After 08  Sugar  477.8  Rhinitis
,



       Yanelis NP    Perennial,



           Allergy









 477.0  Rhinitis, Seasonal -Allergic Due To Pollen









 Office Visit  2008  3:30p  [facility]After 08  Wilber ISLAS  477.8  
Rhinitis, Cryer, MD    Perennial,



           Allergy









 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

 

 478.1-4  Obstruction Of Nasal Airway









 Office Visit  2007  9:00a  [facility]After 08  Wilber ISLAS  477.8  
Rhinitis, Cryer, MD    Perennial,



           Allergy

 

 Office Visit  2007  2:15p  [facility]After 08  Wilber ISLAS  477.8  
Rhinitis, Cryer, MD    Perennial,



           Allergy









 471.8  Polyps, Nasal/Sinus

 

 389.2  Hearing Loss, Mixed/Conductive & Sensorineural

 

 381.81  Dysfunction Of Eustachian Tube

 

 474.12  Hypertrophy, Adenoids









 Office Visit  10/15/2007  2:45p  [facility]After 08  Wilber ISLAS  473.0  
Sinusitis, Cryer, MD    Chronic



           Maxillary









 474.12  Hypertrophy, Adenoids

 

 381.81  Dysfunction Of Eustachian Tube









 Office Visit  2007  [facility]  Wilber ISLAS  381.81  Dysfunction Of



   3:15p  08  Cryer, MD    Eustachian Tube









 389.2  Hearing Loss, Mixed/Conductive & Sensorineural

 

 471.8  Polyps, Nasal/Sinus

 

 474.12  Hypertrophy, Adenoids









 Office Visit  2007  [facility]  Wilber ISLAS  381.81  Dysfunction Of



   2:30p  08  Cryer, MD    Eustachian Tube









 389.2  Hearing Loss, Mixed/Conductive & Sensorineural









 Office Visit  2006  9:45a  [facility]After  Wilber ISLAS  471.8  Polyps,



     08  Cryer, MD    Nasal/Sinus









 473.0  Sinusitis, Chronic Maxillary

 

 461.0  Sinusitis, Acute Maxillary









 Office Visit  2006  1:30p  [facility]  Wilber ISLAS  471.8  Polyps,



     08  Cryer, MD    Nasal/Sinus









 780.57  Apnea, Sleep Not Elsewhere Classified /Unspecified

 

 493.0  Code Needs 5TH Digit









 Office Visit  2005  1:30p  [facility]After 08  Wilber ISLAS  473.2  
Sinusitis, Cryer, MD    Chronic



           Ethmoidal









 471.8  Polyps, Nasal/Sinus

 

 389.03  Hearing Loss, Conductive/Middle Ear









 Office Visit  2004  [facility]After  Saeid BOWLES  473.2  Sinusitis,



   9:30a  08  BUBBA Ann    Chronic



           Ethmoidal









 471.8  Polyps, Nasal/Sinus









 Office Visit  2004  [facility],After  Julio FRANCO  780.57  Apnea, Sleep Not



   9:45a  08  BUBBA Desai    Elsewhere



           Classified



           /Unspecified

 

 Office Visit  2004  [facility]After  Julio FRANCO  478.1  Ulcer Of Nasal



   11:15a  08  BUBBA Desai    Septum







Plan of Treatment

Future Appointment(s):2018 11:30 am - Jonathan E. Cryer, MD at [facility],
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 
left earH61.23 Impacted cerumen, [id number] Chronic myringitis, left 
earNew Medication:   -
no

## 2018-12-05 NOTE — ED ADULT NURSE NOTE - FINAL NURSING ELECTRONIC SIGNATURE
Telephone Encounter by Khushboo Dawn CMA at 04/27/18 03:57 PM     Author:  Khushboo Dawn CMA Service:  (none) Author Type:  Certified Medical Assistant     Filed:  04/27/18 03:57 PM Encounter Date:  4/27/2018 Status:  Signed     :  Khushboo Dawn CMA (Certified Medical Assistant)            Spoke with patient and gave Dr Bertrand's response below.   Patient verbalized understanding.      Closing encounter.[JT1.1T]        Revision History        User Key Date/Time User Provider Type Action    > JT1.1 04/27/18 03:57 PM Khushboo Dawn CMA Certified Medical Assistant Sign    T - Template             29-Sep-2017 17:31

## 2019-01-16 NOTE — SWALLOW BEDSIDE ASSESSMENT ADULT - SWALLOW EVAL: SECRETION MANAGEMENT
Benefits, risks, and possible complications of procedure explained to patient/caregiver who verbalized understanding and gave verbal consent. WFL

## 2019-03-04 PROBLEM — R41.3 MEMORY LOSS: Status: ACTIVE | Noted: 2017-10-20

## 2019-11-06 PROBLEM — I10 ESSENTIAL (PRIMARY) HYPERTENSION: Chronic | Status: ACTIVE | Noted: 2017-10-07

## 2019-11-18 ENCOUNTER — APPOINTMENT (OUTPATIENT)
Dept: SURGERY | Facility: CLINIC | Age: 79
End: 2019-11-18

## 2019-12-09 ENCOUNTER — APPOINTMENT (OUTPATIENT)
Dept: SURGERY | Facility: CLINIC | Age: 79
End: 2019-12-09
Payer: MEDICARE

## 2019-12-09 VITALS
SYSTOLIC BLOOD PRESSURE: 152 MMHG | HEIGHT: 63 IN | TEMPERATURE: 98.2 F | WEIGHT: 142 LBS | BODY MASS INDEX: 25.16 KG/M2 | HEART RATE: 77 BPM | OXYGEN SATURATION: 96 % | DIASTOLIC BLOOD PRESSURE: 71 MMHG

## 2019-12-09 DIAGNOSIS — H91.90 UNSPECIFIED HEARING LOSS, UNSPECIFIED EAR: ICD-10-CM

## 2019-12-09 DIAGNOSIS — M19.90 UNSPECIFIED OSTEOARTHRITIS, UNSPECIFIED SITE: ICD-10-CM

## 2019-12-09 DIAGNOSIS — R51 HEADACHE: ICD-10-CM

## 2019-12-09 DIAGNOSIS — K21.9 GASTRO-ESOPHAGEAL REFLUX DISEASE W/OUT ESOPHAGITIS: ICD-10-CM

## 2019-12-09 DIAGNOSIS — E78.00 PURE HYPERCHOLESTEROLEMIA, UNSPECIFIED: ICD-10-CM

## 2019-12-09 DIAGNOSIS — G62.9 POLYNEUROPATHY, UNSPECIFIED: ICD-10-CM

## 2019-12-09 PROCEDURE — 99203 OFFICE O/P NEW LOW 30 MIN: CPT

## 2019-12-09 NOTE — PHYSICAL EXAM
[Normal Rate and Rhythm] : normal rate and rhythm [Normal Breath Sounds] : Normal breath sounds [Oriented to Person] : oriented to person [Alert] : alert [Oriented to Place] : oriented to place [Oriented to Time] : oriented to time [Calm] : calm [JVD] : no jugular venous distention  [de-identified] : A/Ox3; NAD. appears comfortable [de-identified] : +ROM, no joint swelling [de-identified] : EOMI [de-identified] : abd is soft, NT/ND\par hyperpigmented/hypertrophic skin lesion to the mid lower abdominal wall  [de-identified] : no skin rashes

## 2019-12-09 NOTE — HISTORY OF PRESENT ILLNESS
[de-identified] : 79 y.o M presents w the cc of having a cyst to the mid, lower abdominal wall. Patient states the cyst was small in the past, it has recently been increasing in size. He has a hyperpigmented/hypertrophic lesion to the area. Patient c/o itching/burning, discomfort to the area. \par His PMHX includes T2DM, CAD, hx of CVA, cardiac stent placement; on ASA and Plavix.

## 2019-12-09 NOTE — CONSULT LETTER
[Dear  ___] : Dear  [unfilled], [Consult Letter:] : I had the pleasure of evaluating your patient, [unfilled]. [Consult Closing:] : Thank you very much for allowing me to participate in the care of this patient.  If you have any questions, please do not hesitate to contact me. [Sincerely,] : Sincerely, [FreeTextEntry3] : Danyel Burrows MD\par

## 2019-12-09 NOTE — REASON FOR VISIT
[Consultation] : a consultation visit [Family Member] : family member [FreeTextEntry1] : hyperpigmented lesion, mid lower abdominal wall

## 2019-12-09 NOTE — REVIEW OF SYSTEMS
[Arthralgias] : arthralgias [Joint Pain] : joint pain [Fever] : no fever [Feeling Poorly] : not feeling poorly [Chills] : no chills [Chest Pain] : no chest pain [Shortness Of Breath] : no shortness of breath [Lower Ext Edema] : no extremity edema [Cough] : no cough [Hesitancy] : no urinary hesitancy [Abdominal Pain] : no abdominal pain [Dizziness] : no dizziness [Anxiety] : no anxiety [de-identified] : complains of having a cyst to the mid/lower abdominal wall ; states he has a lot of burning and itching to the area [FreeTextEntry5] : CAD, cardiac stents  [Swollen Glands] : no swollen glands

## 2019-12-09 NOTE — PLAN
[FreeTextEntry1] : Intralesional Injection with Triamcinolone - 40 mg/1 ML, Lidocaine 1% to affected area \par Patient tolerated procedure well\par \par patient will follow up if needed. Warning signs, follow up, and restrictions were discussed with the patient.\par \par

## 2020-06-17 NOTE — PHYSICAL THERAPY INITIAL EVALUATION ADULT - BALANCE DISTURBANCE, IDENTIFIED IMPAIRMENT CONTRIBUTE, REHAB EVAL
"Subjective:       Patient ID: Azucena Morrison is a 68 y.o. female.    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 Pt denies any fevers or chills but states she feels nauseous at times. Returned to [place] June 28, from [place], reports much trouble with abdominal wound while in [place].  7/6/17-- Patient scheduled for surgical drainage of wound Tuesday 7/11/17DB  7/19/17-- Patient post op clinic visit.  8/16/17: CT of abdomen and pelvis done 8/9/17 due to suspected fistula  8/23/17-- U/S of abdomen done today.  12/13/17 Wound vac with 20cc drainage in clinic today.  1/10/18: on PO abx  1/24/18: Fistulagram ordered per Dr. Horton. Patient still on PO abx  02/21/18 Patient is not on antibiotics at this time. BS fasting 135 per patient report this am.  03/07/18 Culture of Anterior Abdominal Wound is positive. No antibiotics at this time.  fasting per patient report.    03/21/18 Patient c/o nausea along with abdominal tenderness near abd midline wound. Patient states that pain level is 6 and that she passed two sero-sanguineous clots from wound. Patient is afebrile this am.  3/28/18: patient continue antibiotics and reports that pain is less than last weeks clinic visit. Drainage has decreased as well  04/04/18 Patient states that abdominal pain is "pressure" sensation and that when she pushes down on her abdomen on either side of abdominal wound she feels like she has to urinate. Patient reports pain level of 3 this am.  fasting this am per patient report.   5/2/18: Patient had Abdomina Toribio today before wound care. She had discontinued Bactrim PO per Dr. Horton's recommendation and culture results and is now taking Amoxicillin PO  6/20/18: Pt reports itching to wound and periwound   6/27/18: patient reports no new complaints with regards to her wound or " abdomen, wound continues to decrease in size and drainage  8/1/18: Pt reports increased pain to abdomen with nausea and emesis since thursday 7/26/18, denies any fevers, patient did no go to ER as instructed by home health nurse on Sat. 7/28/18.  8/29/18: Patient admitted to hospital 8/2 for abdominal wound complications. Surgery for abdominal abscess per Dr. Horton on 8/3. Patient placed on IV antibiotics and discharged home on 8/18 with [facility]  and PICC line to [facility]. Patient currently on IV ertapenem. IV medication sent to patients home per [facility].   9/5/18: IV Ertapenem to continue 1 week. Culture sent to lab. [facility]  sent orders. PICC line intact to [facility].  9/12/18 Antibiotics completed. Culture results pending. 1 deep stitch remaining.  9/12/18: Culture positive for E. Coli, patient to continue Ertapenem IV antibiotics x 2weeks. [facility] notified  9/26/18: F/U abdominal wound, wound continues to improve. Patient will complete IV antibiotics today  10/3/18: patient c/o diarrhea for 2 days and nausea with some vomiting. Labs and stool culture ordered. IV antibiotics discontinued today  10/10/2018 patient will start on IV antibiotic Invaz IV once a day, pending PICC line placement, order placed today  for PICC per Dr. Horton.  10/24/18: patient on IV antibiotics, tolerating well. Continue for 1 week  11/7/2018: IV Antibiotic discontinued.  11/8/2018: PICC line to left upper arm discontinued by Home Health.  11/21/2018 F/u for abdominal wall fistula , c/o nausea  No vomiting , no fever  12/5/2018: F/u for abdominal wall fisula, c/o gas, Dr. Horton will order Bentyl. Surgery schedule for January 2019.  12/12/2018: F/u for abdominal wall fistula, c/o abdominal pain. Dr. Horton ordered Norco 5/325mg tab 1 PO every 4 hours as needed for pain and referred patient for x-ray of abdomen after clinic today.  12/19/2018: F/u for abdominal wall fisula, c/o nausea, Dr. Horton re ordered  Ondansetron (Zofran) 8mg one tab by mouth every eight hours as needed for nausea. No changes in wound condition from last visit noted in clinic today.  12/26/2018: F/u abdominal wall fistula drainage, no c/o at this time. Denies any abdominal pain or nausea. Dr. Horton recommends surgery first week of January 2019 for abdominal fistula treatment and possible colostomy placement, patient agree.  01/02/2019: F/u abdominal wall fistula. Dressing with large amount of drainage, malodorous, Dr. Horton is scheduling surgery for third week of January, 2019.  1/16/19: F/U Dr. Horton today in clinic, surgery scheduled for next Friday 1/25/19 with Dr. Horton.     01/23/2019: Presents to wound care clinic for f/u abdominal wound care tx. Surgery scheduled with Dr. Horton for Friday 01/25/2019. Dr. Horton explained Mrs. Morrison surgery procedure including possible complications, Nurse  (Ukrainian) present, patient verbalizes understanding of procedure including possible complications, all questions from patient were answered by Dr. Horton including blood sugar and blood pressure medications per patient's concerns. Consent for surgery and blood transfusion signed by patient, Dr. Horton and nurse witnessed.  Abdominal wound cultures collected per Dr. Horton, cultures sent to lab/micro pending results. Dr. Horton prescribed the following orders: fleet enema for Thursday 1/24/2019, clear liquid diet and not to eat after midnight all for 1/24/2019. Start taking Neomycin and erythromycin by mouth three times a day (1/24/2019) and dulcolax one tab by mouth twice a day, Mrs. Morrison voices understanding.     01/30/2019: F/U wound care treatment with Dr. Horton. Per pt, primary physician Dr. Pj Monae ordered for her to take potassium pills for three days, last day today and scheduled for lab work at primary physician clinic on 1/31/2019. Per pt. Feeling better, no more abdominal pain (went to ER 1/24/2019 and  "discharged 1/25/2019 c/o abd pain.). Dr. Horton awaiting on primary clearance to re-schedule surgery, per pt. She will call wound care clinic and Dr. Horton to make aware of clearance day. Continue with same orders for dressing change for Dr. Horton. Mrs. Morrison requesting gentamicin refill.     02/06/2019: F/u with Dr. Horton for wound care treatment. Mrs. Morrison brought to clinic a clearance for surgery by Dr. Pj Sanches dated 02/06/2019 "Hyperkalemia-Resolved K 4.8 2/1/2019, labs , Cr. 1.05, H/H 10/30. No contraindication to surgery, tight control of BS, Hydration." A copy of EKG (1/24/2019) and lab work from [facility] collected 1/31/2019, reported results 2/1/2019. Dr. Horton scheduled surgery for 02/22/2019, consent were signed on 01/23/2019, all questions were answered by Dr. Horton, this nurse  (Syriac) present. Education provided to patient on the importance of having a clear liquid diet the day before surgery 02/21/2019 as per Dr. Horton, new medications and preop preparation before surgery, verbalizes understanding. Dr. Horton ordered Norco 5/325 mg PO for pain, Dulcolax two tabs by mouth to take on 02/21/2019, Soap suds enema (SSE) on Thursday 02/21/2019, Golytely by mouth 2 liters on 2/21/2019, Erythromycin 500 mg one tab by mouth three times a day and Neomycin 1 gm by mouth three times a day.     2/13/2019: Presents to wound care clinic for a f/u with Dr. Horton for wound care treatment. No new orders in wound dressing change, reviewed preop orders with Mrs. Morrison per Dr. Horton, all questions answered. Surgery scheduled for 02/22/2019.  2/20/19: Continues wound care a previously ordered.  Depth measured per Dr. Horton 8cm.  Preop orders reviewed with patient who verbalized understanding.  Surgery scheduled for Fri 2/22/19.  Rx given to patient for Norco and Zofran.    03/06/19: Patient admitted to [facility] on 02/22/19 for exploratory laparotomy of " "abdomen per Dr. Horton. Patient discharged on 02/28/19 with home health and PICC line with IV antibiotics. Staples removed today in clinic. Patient denies any fever, chills, n&v; however, she states that she has "low energy." Continued with wound care as ordered.     3/13/2019: F/U with Dr. Horton for wound care treatment. Continue with same wound dressing change orders as per Dr. Horton, orders followed. Home Health to continue wound dressing change and lab draw for CBC weekly; continue IV Ertapenem/Invaz 1 gm IV daily as ordered.     3/20/2019: Clinic visit with Dr. Horton for abdominal abscess treatment. Presents with moderate draining from abdominal wound; wound size decreasing per measurement. Per Dr. Horton, apply one-piece system ostomy bag to abdominal wound for drain collection, may drain bag when it fills and may change every other day, continue applying gentamicin to wound bed before applying ostomy bag. Continue IV Invaz until completion. If ostomy bag not effective for draining collection, may return to previous orders for wound dressing change. Orders followed. Education provided to Mrs. Morrison on handwashing and ostomy care techniques, voices and demonstrates understanding.   3/27/19: Follow up with Dr. Horton today in clinic for abdominal abscess, moderated tanish yellow drainge present on dressing.  Wound slowly improving, patient refused one-piece ostomy bag stated " no it's too messy." requested to return to previous dressing prior to ostomy bag placement- iodoform gauze, aquacel extra, sm abd, and mefix tape.  Silver nitrate x1 per Dr. Horton today in clinic. Rx given to patient for PO Zofran.     04/03/2019: F/u clinic visit with Dr. Horton. Abdominal wound improving in size, picture on file. Wound cultures from abdominal abscess collected and sent to lab/micro, pending results. Mrs. Morrison states going out of the country ([place]) at the end of April and plans to stay there for " approximately two months. Dr. Horton ordered IV antibiotic for two more weeks and new wound care dressing, orders followed.     4/9/2019: Clinic visit with Dr. Horton.  Per Dr. Horton, wound deep measurement increased, draining present. Wound cultures from abdominal wound taken and sent to lab/micro, pending results. C/o abdominal pain and itching around wound, Rx for betamethasone cream 01.% and Norco 5/325mg give in clinic by Dr. Horton. Wound care dressing orders followed. Continue IV antibiotic as previously ordered.  04/17/19: F/u for non-healing wound to abdomen. Culture report negative. Dr. Horton ordered 1 more week of IV antibiotics. Continue with topical wound care as ordered.     4/24/2019: Clinic visit with Dr. Horton for abdominal wound treatment. Wound improvement present, pictures on file. Last IV antibiotic today 4/24/2019, [facility] to discontinue PICC line from left upper arm on 4/25/2019. Per Lyubov Prince, going to [place] on Friday 4/26/2019 and out of the country ([place]) on Monday 4/29/2019. This nurse spoke to Shaunna at [employer] (IV antibiotic delivery company) and informed last dose of antibiotic is today. Dr. Horton ordered Hydrocodone-acetaminophen (Norco) 5-325 mg (32 tabs), Bentyl 10 mg (120 capsules) and Gentamycin ointment. Education provided on the importance of eating food that contains iron (to prevent anemia) as well as eating meat and taking her blood pressure medications (blood pressure medication) on time, voices understanding. All questions answered by Dr. Horton. [facility] outpatient confirmed Betamethasone cream ordered on 4/10/2019 is ready for . Instructions and education provided to Mrs. Morrison on abdominal wound dressing changes, hand washing techniques and medication use, effects and side effects, voices and demonstrates understanding. Discharged home on stable conditions, states will give wound care clinic a call when she is back from  [place] in few months from now.     7/17/19: Readmit today to wound care clinic.  Patient was recently out of the country visiting family in [place].  Patient complains of pain and nausea as on prior visits.  Dr. Horton seen patient today discussed with patient again placing colostomy patient refused.  Culture of wound taken, Dr. Horton restarted patient on zofran po for nausea, and Norco for pain. [facility] restarted today in clinic on Mondays and Fridays and prn. Orders given to gently pack wound with aquacel ag rope, cover with aquacel extra, 4x4 gauze, small abd pad and mefix tape change dressing ever other day by [facility] and Prn per patient.     7/24/2019: Clinic visit with Dr. Horton. Continue with dressing change as ordered. Wound cultures reviewed with patient, lab work ordered by Dr. Horton, no fever present or reported at this time. C/o decrease appetite, medication periactin prescribed. Requesting needles for insulin pen, order prescribed by Dr. Horton.      7/31/2019: F/U clinic visit with Dr. Horton. C/o of excruciated back pain 10/10, not feeling well, lot of gas and left upper quadrant discomfort. New orders prescribed: Augmentin 875-125 mg by mouth twice a day. Bentyl 10 mg one tab by mouth 4 times a day. Tramadol 50 mg one tab by mouth every 6 hours as needed for pain. Abdomen ultrasound. Wound care dressing completed as ordered.     8/7/2019: Clinic visit with Dr. Horton, denies any pain at this time. Wound care dressing done as ordered, no changes in wound size from last visit noted. Continue taking oral antiiotic as ordered, pending abdominal ultrasound, scheduled for 8/13/2019.     8/14/2019: F/U clinic visit with Dr. Horton. Admitted and discharged from emergency department this morning with abdominal pain, CT and ultrasound in filed. Dr. Horton performed a small drainage from the abdominal wound, moderate amount of creamy, serosanguineous fluid from abdominal wound  present. Iodoform gauze packed into her wound, dressing changed as ordered. Wound cultures collected and sent to lab/micro, pending results. Rx for Norco 5/325 mg one tab PO every 4 hours PRN as needed # 24 given to pt by Dr. Horton.      8/21/2019: Clinic visit with Dr. Horton. Wound dressing changed as ordered.      11/6/19: Follow up appt with Dr Horton. Wounds improving slowly. Continues to take antibiotics (Augmentin 875-125mg). No complaints voiced. Follow up in 1 week.  11/13/19:  F/U clinic visit with Dr. Horton.  Wound depth per Dr. Horton is 8.5 cm.  Continue PT Augmentin.  Patient is co left sided abdominal pain.  Abdominal US and labs ordered.  Wound care tolerated well.  Fu with Dr. Horton in 1 week.      11/20/19:Dr Horton assessed patient. Silver nitrate applied to wound. Continuing present plan of care. Patient is scheduled to have abdominal ultra sound Friday 11/22/19. Continues to take Augmentin.  F/U in 1 week.    11/27/19: Follow up in clinic with Dr. Horton. Wound depth 8.5cm, silver nitrate x 3 per Dr. Horton, patient continues taking po Augmentin, c/o left sided abdominal pain, ultrasound results discussed with patient, Dr. Horton will watch for now.  Follow up 1 week 12/4/19.      12/04/19: F/u Dr. Horton. Continue with current wound care treatment. Rx given for Bentyl 10 mg. Patient to follow up in  1 week at wound clinic  12/11/19: F/u with . Continue with current wound care treatment. Rx given for Lotrisone cream to be applied daily per patient to right lower leg rash. Patient to follow up in  1 week at wound clinic     12/18/19: F/u with Dr. Horton. Patient c/o pain 9 out of 10  to abdomen LLQ. Area of pain is warm to touch, pink, and a small nodule can be felt. Patient states that symptoms began about 4 days ago. Stat CT scan ordered. Patient will have CT done tomorrow due to not fasting this morning. Instructed patient to fast before CT scan. Continue with current  wound care treatment      01/08/2020: F/u with Dr. Horton. Patient had I&D on 12/22/19 for abscess to abdomen. New wound care orders given. Patient continues on po abx and home health for wound care. Follow up in 1 week at wound care clinic     01/15/20: F/u with Dr. Horton. Patient c/o mild discomfort at wound to left abdomen. Patient denies any fever, chills, n&v, diarrhea, and/or constipation. Will continue to monitor. Continue with current wound care treatment and follow up in  Wound clinic in 1 week.  1/29/2020: Fu today in wound care clinic with .  Wounds improving slowly.  Silver nitrate x 1 to each wound per Dr. Horton patient tolerated well.  RX give to patient today for Amoxicillin po, Bentyl Po and Norco 5/325mg Po today in clinic.  Fu 1 week 2/5/2020 with Dr. Horton.  2/5/20: F/U with Dr. Horton. LUQ site resolved. Midline site same. Cont. POC.  2/12/20: F/U with Dr. Horton. LUQ site and midline probed by Dr. Horton. Cont. Augmentin. Rx for Norco provided. Cont. POC.  2/19/20: F/U with Dr. Horton. Afebrile. No s/s of infection. Culture done of midline today. Rx for Augmentin and Zofran sent electronically to pharmacy. Return in 1 week.  2/26/2020: FU MD visit. Cultures reviewed per MD, po abx changed from augmentin to tetracycline sent to pharmacy, patient verbalized understanding. Patient states she will be traveling to [place] on March 29 for 3 months.      03/04/2020: F/u with Dr. Horton. Patient c/o pain at wound site located on left abdomen, lethargy, and no appetite for the past 3 days . Culture taken and patient instructed to stop po tetracycline while awaiting culture results. New wound care orders given for left abdominal wound. Orders routed to home health. Patient to follow up in 1 week at wound clinic.  3/11/20: F/U with Dr. Horton. No c/o for pain today. Stop tetracycline. Rx for Augmentin sent to pharmacy. Cont. POC. Return in 1 week. HH orders routed.  3/18/20: F/U  with Dr. Horton. No c/o of pain at this time. Rx for Bentyl and Norco provided. Continue Augmentin. Return in 1 week. HH orders routed.     04/01/2020: F/u with Dr. Horton. Wound showing improvement. Patient states that she has not had home health nurse come out in 2 weeks. Patient also states that she would like us to order supplies so she can begin doing her own wound care. Supplies ordered. Patient instructed on daily wound care treatment and verbalized understanding. Continue po Augmentin BID  04/08/2020: F/u with Dr. Horton. Wound improving. Continue with current wound care treatment. Patient given rx for norco 5/325 mg and zofran 8 mg. No new complaints per patient today   04/22/20  F/U with Dr. Horton.  Wound unchanged.  Pack wounds with plain nu gauze with gentamicin ointment.  Patient given RX for Norco 5/325 mg, Zofran 8 mg, Amoxiciilin .  Patient to follow  Up with Dr. Horton in 1 week.  Wound care supplies ordered.  Review of Systems   04/29/2020: F/u with Dr. Horton. Wound probed with cotton-tip applicator per Dr. Horton to assist with fluid drainage. Patient tolerated well. Continue with current wound care treatment and follow up in 1 week at wound clinic  05/06/2020: F/u with Dr. Horton. Wound remains stable. Patient experiencing increased pain. CT scan of abdomen ordered by PCP. Patient got CT scan on Monday, and Dr. Horton reviewed results with patient. Continue current wound care treatment and follow up in 1 week  05/13/2020- pt follow-up with Dr. Horton. Superficial skin discoloration noted to rle, addressed by Dr. Horton. Rx for bentyl and lotrisone sent with patient. Patient to continue daily dressing changes. Patient to follow-up with Dr. Horton 05/20/2020.  5/21/20:  Fu with Dr. Horton.  Purulent drainage with odor coming from wounds.  Cx of mid abdominal wound obtained.  Rx given to patient for Amoxicillin 875/125mg and Norco 5/325mg.  Care tolerated well.  Fu with Dr. Horton in  1 week.  5/28/20:Dr Horton assessed patient. Continue with same plan of care. Rx for Doxycycline sent lab. Patient instructed on Rx and voiced understanding.        06/03/2020: F/u with Dr. Horton. Continue po doxycycline and current topical wound care treatment daily. Follow up in 1 week at wound care clinic  06/10/2020: Wounds remain stable. Patient c/o mild pain to left side of abdomen. Continue with topical wound care treatment. Rx sent to patient's pharmacy for norco 5-325 mg and lidocaine jelly 2%  06/17/2020: F/u with Dr. Horton. Wounds remain stable. Continue with topical wound care treatment and follow up next week.    Review of Systems   Constitutional: Negative.    HENT: Negative.    Eyes: Negative.    Respiratory: Negative.    Cardiovascular: Negative.    Gastrointestinal: Negative.    Genitourinary: Negative.    Musculoskeletal: Negative.    Skin: Negative.    Neurological: Negative.    Psychiatric/Behavioral: Negative.        Objective:      Physical Exam  Constitutional:       Appearance: She is well-developed.   HENT:      Head: Normocephalic.   Eyes:      Conjunctiva/sclera: Conjunctivae normal.      Pupils: Pupils are equal, round, and reactive to light.   Neck:      Musculoskeletal: Normal range of motion and neck supple.   Cardiovascular:      Rate and Rhythm: Normal rate and regular rhythm.      Heart sounds: Normal heart sounds.   Pulmonary:      Effort: Pulmonary effort is normal.      Breath sounds: Normal breath sounds.   Abdominal:      General: Bowel sounds are normal.      Palpations: Abdomen is soft.   Musculoskeletal: Normal range of motion.   Skin:     General: Skin is warm and dry.   Neurological:      Mental Status: She is alert and oriented to person, place, and time.      Deep Tendon Reflexes: Reflexes are normal and symmetric.         Assessment:       1. Abdominal wall abscess    2. Type 2 diabetes mellitus without complication, without long-term current use of insulin            Wound 02/01/20 1353 Ulceration midline Abdomen #1 (Active)   02/01/20 1353    Pre-existing: Yes   Primary Wound Type: Ulceration   Side:    Orientation: midline   Location: Abdomen   Wound/PI Number (optional): #1   Ankle-Brachial Index:    Pulses:    Removal Indication and Assessment:    Wound Outcome:    (Retired) Wound Type:    (Retired) Wound Length (cm):    (Retired) Wound Width (cm):    (Retired) Depth (cm):    Wound Description (Comments):    Removal Indications:    Wound Image   06/17/20 0800   Dressing Appearance Intact 06/17/20 0800   Drainage Amount Moderate 06/17/20 0800   Drainage Characteristics/Odor Serosanguineous 06/17/20 0800   Appearance Red;Moist 06/17/20 0800   Tissue loss description Full thickness 06/17/20 0800   Red (%), Wound Tissue Color 100 % 06/17/20 0800   Periwound Area Intact 06/17/20 0800   Wound Edges Open 06/17/20 0800   Wound Length (cm) 0.3 cm 06/17/20 0800   Wound Width (cm) 0.3 cm 06/17/20 0800   Wound Depth (cm) 8.4 cm 06/17/20 0800   Wound Volume (cm^3) 0.76 cm^3 06/17/20 0800   Wound Surface Area (cm^2) 0.09 cm^2 06/17/20 0800   Care Cleansed with:;Sterile normal saline 06/17/20 0800   Dressing Applied;Calcium alginate;Gauze;Abd pad;Other (see comments) 06/17/20 0800   Periwound Care Skin barrier film applied 06/17/20 0800   Dressing Change Due 06/18/20 06/17/20 0800            Wound 03/04/20 1023 Abdomen #2 (Active)   03/04/20 1023    Pre-existing:    Primary Wound Type:    Side: Left   Orientation:    Location: Abdomen   Wound/PI Number (optional): #2   Ankle-Brachial Index:    Pulses:    Removal Indication and Assessment:    Wound Outcome:    (Retired) Wound Type:    (Retired) Wound Length (cm):    (Retired) Wound Width (cm):    (Retired) Depth (cm):    Wound Description (Comments):    Removal Indications:    Wound Image   06/17/20 0800   Dressing Appearance Intact 06/17/20 0800   Drainage Amount Moderate 06/17/20 0800   Drainage Characteristics/Odor Yellow 06/17/20 0800    Appearance Pink;Red;Moist 06/17/20 0800   Tissue loss description Full thickness 06/17/20 0800   Red (%), Wound Tissue Color 100 % 06/17/20 0800   Periwound Area Intact 06/17/20 0800   Wound Edges Irregular 06/17/20 0800   Wound Length (cm) 0.2 cm 06/17/20 0800   Wound Width (cm) 0.2 cm 06/17/20 0800   Wound Depth (cm) 6.5 cm 06/17/20 0800   Wound Volume (cm^3) 0.26 cm^3 06/17/20 0800   Wound Surface Area (cm^2) 0.04 cm^2 06/17/20 0800   Care Cleansed with:;Sterile normal saline 06/17/20 0800   Dressing Applied;Other (see comments);Calcium alginate;Gauze;Abd pad 06/17/20 0800   Periwound Care Skin barrier film applied 06/17/20 0800   Dressing Change Due 06/18/20 06/17/20 0800       Abdominal wound midline   Clean wound with normal saline   Lidocaine 2% gel or 4 % Topical solution: PRN in clinic   Danielle wound: Cavilon, betamethasone PRN itching   Primary dressing: Apply gentamycin to plain nu gauze and lightly pack into wound bed   Secondary dressing: Cover with aquacel extra, 4 x 4 gauze, small ABD or border dressing and secure with mefix tape   Other:  Culture obtained     Left Upper Quadrant abdomen   Cleanse with normal saline   Lidocaine 2% gel or 4 % Topical solution: PRN in clinic   Periwound: Cavilon, betamethasone PRN itching   Primary dressing: Apply gentamicin ointment to plain packing and gently pack to wound depth   Secondary dressing: Cover with aquacel extra, 4x4 gauze, small abd pad or border dressing, and secure with mefix tape       Frequency: Daily per patient     Follow up in 1 week 06/24/2020 with Dr. Horton   Other orders: Continue po doxycycline.      Plan:                Follow up in about 1 week (around 6/24/2020) for Dr. Horton.                 decreased strength

## 2020-06-25 NOTE — PATIENT PROFILE ADULT. - FUNCTIONAL SCREEN CURRENT LEVEL: DRESSING, MLM
CRNA.                   Juliet Ballard MD   6/25/2020
(0) independent

## 2020-12-16 PROBLEM — N39.0 ACUTE UTI: Status: RESOLVED | Noted: 2017-10-20 | Resolved: 2020-01-01

## 2020-12-26 NOTE — ED ADULT TRIAGE NOTE - AS TEMP SITE
Called patient to schedule a screening mammogram PATIENTPHONEMESSAGE: left message.-     Additional information     oral

## 2021-01-01 ENCOUNTER — INPATIENT (INPATIENT)
Facility: HOSPITAL | Age: 81
LOS: 5 days | Discharge: HOME HEALTH | End: 2021-03-10
Attending: INTERNAL MEDICINE | Admitting: INTERNAL MEDICINE
Payer: MEDICARE

## 2021-01-01 ENCOUNTER — TRANSCRIPTION ENCOUNTER (OUTPATIENT)
Age: 81
End: 2021-01-01

## 2021-01-01 ENCOUNTER — INPATIENT (INPATIENT)
Facility: HOSPITAL | Age: 81
LOS: 1 days | End: 2021-05-22
Attending: STUDENT IN AN ORGANIZED HEALTH CARE EDUCATION/TRAINING PROGRAM | Admitting: STUDENT IN AN ORGANIZED HEALTH CARE EDUCATION/TRAINING PROGRAM
Payer: MEDICARE

## 2021-01-01 ENCOUNTER — RESULT REVIEW (OUTPATIENT)
Age: 81
End: 2021-01-01

## 2021-01-01 VITALS
HEART RATE: 67 BPM | TEMPERATURE: 97 F | RESPIRATION RATE: 18 BRPM | OXYGEN SATURATION: 98 % | SYSTOLIC BLOOD PRESSURE: 129 MMHG | HEIGHT: 62 IN | DIASTOLIC BLOOD PRESSURE: 61 MMHG

## 2021-01-01 VITALS
RESPIRATION RATE: 18 BRPM | HEART RATE: 90 BPM | SYSTOLIC BLOOD PRESSURE: 140 MMHG | OXYGEN SATURATION: 95 % | DIASTOLIC BLOOD PRESSURE: 66 MMHG | TEMPERATURE: 98 F

## 2021-01-01 VITALS
TEMPERATURE: 97 F | HEIGHT: 60 IN | DIASTOLIC BLOOD PRESSURE: 58 MMHG | SYSTOLIC BLOOD PRESSURE: 145 MMHG | OXYGEN SATURATION: 98 % | RESPIRATION RATE: 18 BRPM | HEART RATE: 88 BPM

## 2021-01-01 VITALS
TEMPERATURE: 98 F | RESPIRATION RATE: 18 BRPM | HEART RATE: 63 BPM | OXYGEN SATURATION: 99 % | DIASTOLIC BLOOD PRESSURE: 69 MMHG | SYSTOLIC BLOOD PRESSURE: 173 MMHG

## 2021-01-01 DIAGNOSIS — Z90.49 ACQUIRED ABSENCE OF OTHER SPECIFIED PARTS OF DIGESTIVE TRACT: Chronic | ICD-10-CM

## 2021-01-01 DIAGNOSIS — Z02.9 ENCOUNTER FOR ADMINISTRATIVE EXAMINATIONS, UNSPECIFIED: ICD-10-CM

## 2021-01-01 DIAGNOSIS — I10 ESSENTIAL (PRIMARY) HYPERTENSION: ICD-10-CM

## 2021-01-01 DIAGNOSIS — N40.0 BENIGN PROSTATIC HYPERPLASIA WITHOUT LOWER URINARY TRACT SYMPTOMS: ICD-10-CM

## 2021-01-01 DIAGNOSIS — R55 SYNCOPE AND COLLAPSE: ICD-10-CM

## 2021-01-01 DIAGNOSIS — E78.5 HYPERLIPIDEMIA, UNSPECIFIED: ICD-10-CM

## 2021-01-01 DIAGNOSIS — I25.10 ATHEROSCLEROTIC HEART DISEASE OF NATIVE CORONARY ARTERY WITHOUT ANGINA PECTORIS: ICD-10-CM

## 2021-01-01 DIAGNOSIS — Z98.890 OTHER SPECIFIED POSTPROCEDURAL STATES: Chronic | ICD-10-CM

## 2021-01-01 DIAGNOSIS — D64.9 ANEMIA, UNSPECIFIED: ICD-10-CM

## 2021-01-01 DIAGNOSIS — R42 DIZZINESS AND GIDDINESS: ICD-10-CM

## 2021-01-01 DIAGNOSIS — Z95.5 PRESENCE OF CORONARY ANGIOPLASTY IMPLANT AND GRAFT: Chronic | ICD-10-CM

## 2021-01-01 DIAGNOSIS — S22.39XA FRACTURE OF ONE RIB, UNSPECIFIED SIDE, INITIAL ENCOUNTER FOR CLOSED FRACTURE: ICD-10-CM

## 2021-01-01 DIAGNOSIS — Z29.9 ENCOUNTER FOR PROPHYLACTIC MEASURES, UNSPECIFIED: ICD-10-CM

## 2021-01-01 DIAGNOSIS — E11.65 TYPE 2 DIABETES MELLITUS WITH HYPERGLYCEMIA: ICD-10-CM

## 2021-01-01 DIAGNOSIS — R07.9 CHEST PAIN, UNSPECIFIED: ICD-10-CM

## 2021-01-01 DIAGNOSIS — E11.9 TYPE 2 DIABETES MELLITUS WITHOUT COMPLICATIONS: ICD-10-CM

## 2021-01-01 DIAGNOSIS — R06.2 WHEEZING: ICD-10-CM

## 2021-01-01 LAB
-  AMIKACIN: SIGNIFICANT CHANGE UP
-  AMOXICILLIN/CLAVULANIC ACID: SIGNIFICANT CHANGE UP
-  AMPICILLIN/SULBACTAM: SIGNIFICANT CHANGE UP
-  AMPICILLIN: SIGNIFICANT CHANGE UP
-  AZTREONAM: SIGNIFICANT CHANGE UP
-  CEFAZOLIN: SIGNIFICANT CHANGE UP
-  CEFEPIME: SIGNIFICANT CHANGE UP
-  CEFOXITIN: SIGNIFICANT CHANGE UP
-  CEFTRIAXONE: SIGNIFICANT CHANGE UP
-  CIPROFLOXACIN: SIGNIFICANT CHANGE UP
-  ERTAPENEM: SIGNIFICANT CHANGE UP
-  GENTAMICIN: SIGNIFICANT CHANGE UP
-  IMIPENEM: SIGNIFICANT CHANGE UP
-  LEVOFLOXACIN: SIGNIFICANT CHANGE UP
-  MEROPENEM: SIGNIFICANT CHANGE UP
-  NITROFURANTOIN: SIGNIFICANT CHANGE UP
-  PIPERACILLIN/TAZOBACTAM: SIGNIFICANT CHANGE UP
-  TIGECYCLINE: SIGNIFICANT CHANGE UP
-  TOBRAMYCIN: SIGNIFICANT CHANGE UP
-  TRIMETHOPRIM/SULFAMETHOXAZOLE: SIGNIFICANT CHANGE UP
A1C WITH ESTIMATED AVERAGE GLUCOSE RESULT: 5.4 % — SIGNIFICANT CHANGE UP (ref 4–5.6)
A1C WITH ESTIMATED AVERAGE GLUCOSE RESULT: 5.9 % — HIGH (ref 4–5.6)
ALBUMIN SERPL ELPH-MCNC: 4.2 G/DL — SIGNIFICANT CHANGE UP (ref 3.3–5)
ALBUMIN SERPL ELPH-MCNC: 4.3 G/DL — SIGNIFICANT CHANGE UP (ref 3.3–5)
ALP SERPL-CCNC: 76 U/L — SIGNIFICANT CHANGE UP (ref 40–120)
ALP SERPL-CCNC: 84 U/L — SIGNIFICANT CHANGE UP (ref 40–120)
ALT FLD-CCNC: 14 U/L — SIGNIFICANT CHANGE UP (ref 4–41)
ALT FLD-CCNC: 15 U/L — SIGNIFICANT CHANGE UP (ref 4–41)
ANION GAP SERPL CALC-SCNC: 10 MMOL/L — SIGNIFICANT CHANGE UP (ref 7–14)
ANION GAP SERPL CALC-SCNC: 11 MMOL/L — SIGNIFICANT CHANGE UP (ref 7–14)
ANION GAP SERPL CALC-SCNC: 11 MMOL/L — SIGNIFICANT CHANGE UP (ref 7–14)
ANION GAP SERPL CALC-SCNC: 14 MMOL/L — SIGNIFICANT CHANGE UP (ref 7–14)
ANION GAP SERPL CALC-SCNC: 9 MMOL/L — SIGNIFICANT CHANGE UP (ref 7–14)
ANION GAP SERPL CALC-SCNC: 9 MMOL/L — SIGNIFICANT CHANGE UP (ref 7–14)
APPEARANCE UR: CLEAR — SIGNIFICANT CHANGE UP
APPEARANCE UR: CLEAR — SIGNIFICANT CHANGE UP
APTT BLD: 29.4 SEC — SIGNIFICANT CHANGE UP (ref 27–36.3)
AST SERPL-CCNC: 14 U/L — SIGNIFICANT CHANGE UP (ref 4–40)
AST SERPL-CCNC: 15 U/L — SIGNIFICANT CHANGE UP (ref 4–40)
BASOPHILS # BLD AUTO: 0 K/UL — SIGNIFICANT CHANGE UP (ref 0–0.2)
BASOPHILS # BLD AUTO: 0.04 K/UL — SIGNIFICANT CHANGE UP (ref 0–0.2)
BASOPHILS # BLD AUTO: 0.07 K/UL — SIGNIFICANT CHANGE UP (ref 0–0.2)
BASOPHILS NFR BLD AUTO: 0 % — SIGNIFICANT CHANGE UP (ref 0–2)
BASOPHILS NFR BLD AUTO: 0.3 % — SIGNIFICANT CHANGE UP (ref 0–2)
BASOPHILS NFR BLD AUTO: 0.9 % — SIGNIFICANT CHANGE UP (ref 0–2)
BILIRUB SERPL-MCNC: 0.4 MG/DL — SIGNIFICANT CHANGE UP (ref 0.2–1.2)
BILIRUB SERPL-MCNC: 0.6 MG/DL — SIGNIFICANT CHANGE UP (ref 0.2–1.2)
BILIRUB UR-MCNC: NEGATIVE — SIGNIFICANT CHANGE UP
BILIRUB UR-MCNC: NEGATIVE — SIGNIFICANT CHANGE UP
BLD GP AB SCN SERPL QL: NEGATIVE — SIGNIFICANT CHANGE UP
BLOOD GAS VENOUS COMPREHENSIVE RESULT: SIGNIFICANT CHANGE UP
BUN SERPL-MCNC: 10 MG/DL — SIGNIFICANT CHANGE UP (ref 7–23)
BUN SERPL-MCNC: 10 MG/DL — SIGNIFICANT CHANGE UP (ref 7–23)
BUN SERPL-MCNC: 12 MG/DL — SIGNIFICANT CHANGE UP (ref 7–23)
BUN SERPL-MCNC: 15 MG/DL — SIGNIFICANT CHANGE UP (ref 7–23)
BUN SERPL-MCNC: 7 MG/DL — SIGNIFICANT CHANGE UP (ref 7–23)
BUN SERPL-MCNC: 7 MG/DL — SIGNIFICANT CHANGE UP (ref 7–23)
BUN SERPL-MCNC: 8 MG/DL — SIGNIFICANT CHANGE UP (ref 7–23)
CALCIUM SERPL-MCNC: 8.5 MG/DL — SIGNIFICANT CHANGE UP (ref 8.4–10.5)
CALCIUM SERPL-MCNC: 8.6 MG/DL — SIGNIFICANT CHANGE UP (ref 8.4–10.5)
CALCIUM SERPL-MCNC: 8.6 MG/DL — SIGNIFICANT CHANGE UP (ref 8.4–10.5)
CALCIUM SERPL-MCNC: 8.8 MG/DL — SIGNIFICANT CHANGE UP (ref 8.4–10.5)
CALCIUM SERPL-MCNC: 8.8 MG/DL — SIGNIFICANT CHANGE UP (ref 8.4–10.5)
CALCIUM SERPL-MCNC: 8.9 MG/DL — SIGNIFICANT CHANGE UP (ref 8.4–10.5)
CALCIUM SERPL-MCNC: 9.1 MG/DL — SIGNIFICANT CHANGE UP (ref 8.4–10.5)
CALCIUM SERPL-MCNC: 9.4 MG/DL — SIGNIFICANT CHANGE UP (ref 8.4–10.5)
CALCIUM SERPL-MCNC: 9.4 MG/DL — SIGNIFICANT CHANGE UP (ref 8.4–10.5)
CHLORIDE SERPL-SCNC: 101 MMOL/L — SIGNIFICANT CHANGE UP (ref 98–107)
CHLORIDE SERPL-SCNC: 101 MMOL/L — SIGNIFICANT CHANGE UP (ref 98–107)
CHLORIDE SERPL-SCNC: 102 MMOL/L — SIGNIFICANT CHANGE UP (ref 98–107)
CHLORIDE SERPL-SCNC: 96 MMOL/L — LOW (ref 98–107)
CHLORIDE SERPL-SCNC: 96 MMOL/L — LOW (ref 98–107)
CHLORIDE SERPL-SCNC: 97 MMOL/L — LOW (ref 98–107)
CHLORIDE SERPL-SCNC: 98 MMOL/L — SIGNIFICANT CHANGE UP (ref 98–107)
CHLORIDE SERPL-SCNC: 99 MMOL/L — SIGNIFICANT CHANGE UP (ref 98–107)
CHLORIDE SERPL-SCNC: 99 MMOL/L — SIGNIFICANT CHANGE UP (ref 98–107)
CO2 SERPL-SCNC: 21 MMOL/L — LOW (ref 22–31)
CO2 SERPL-SCNC: 22 MMOL/L — SIGNIFICANT CHANGE UP (ref 22–31)
CO2 SERPL-SCNC: 23 MMOL/L — SIGNIFICANT CHANGE UP (ref 22–31)
CO2 SERPL-SCNC: 23 MMOL/L — SIGNIFICANT CHANGE UP (ref 22–31)
CO2 SERPL-SCNC: 24 MMOL/L — SIGNIFICANT CHANGE UP (ref 22–31)
CO2 SERPL-SCNC: 25 MMOL/L — SIGNIFICANT CHANGE UP (ref 22–31)
CO2 SERPL-SCNC: 27 MMOL/L — SIGNIFICANT CHANGE UP (ref 22–31)
COLOR SPEC: SIGNIFICANT CHANGE UP
COLOR SPEC: YELLOW — SIGNIFICANT CHANGE UP
COVID-19 SPIKE DOMAIN AB INTERP: POSITIVE
COVID-19 SPIKE DOMAIN ANTIBODY RESULT: 80.8 U/ML — HIGH
CREAT SERPL-MCNC: 0.74 MG/DL — SIGNIFICANT CHANGE UP (ref 0.5–1.3)
CREAT SERPL-MCNC: 0.82 MG/DL — SIGNIFICANT CHANGE UP (ref 0.5–1.3)
CREAT SERPL-MCNC: 0.83 MG/DL — SIGNIFICANT CHANGE UP (ref 0.5–1.3)
CREAT SERPL-MCNC: 0.92 MG/DL — SIGNIFICANT CHANGE UP (ref 0.5–1.3)
CREAT SERPL-MCNC: 0.96 MG/DL — SIGNIFICANT CHANGE UP (ref 0.5–1.3)
CREAT SERPL-MCNC: 1.01 MG/DL — SIGNIFICANT CHANGE UP (ref 0.5–1.3)
CREAT SERPL-MCNC: 1.02 MG/DL — SIGNIFICANT CHANGE UP (ref 0.5–1.3)
CREAT SERPL-MCNC: 1.02 MG/DL — SIGNIFICANT CHANGE UP (ref 0.5–1.3)
CREAT SERPL-MCNC: 1.17 MG/DL — SIGNIFICANT CHANGE UP (ref 0.5–1.3)
CULTURE RESULTS: SIGNIFICANT CHANGE UP
CULTURE RESULTS: SIGNIFICANT CHANGE UP
DIFF PNL FLD: NEGATIVE — SIGNIFICANT CHANGE UP
DIFF PNL FLD: NEGATIVE — SIGNIFICANT CHANGE UP
EOSINOPHIL # BLD AUTO: 0 K/UL — SIGNIFICANT CHANGE UP (ref 0–0.5)
EOSINOPHIL # BLD AUTO: 0.05 K/UL — SIGNIFICANT CHANGE UP (ref 0–0.5)
EOSINOPHIL # BLD AUTO: 0.12 K/UL — SIGNIFICANT CHANGE UP (ref 0–0.5)
EOSINOPHIL NFR BLD AUTO: 0 % — SIGNIFICANT CHANGE UP (ref 0–6)
EOSINOPHIL NFR BLD AUTO: 0.3 % — SIGNIFICANT CHANGE UP (ref 0–6)
EOSINOPHIL NFR BLD AUTO: 1.7 % — SIGNIFICANT CHANGE UP (ref 0–6)
ESTIMATED AVERAGE GLUCOSE: 108 MG/DL — SIGNIFICANT CHANGE UP (ref 68–114)
ESTIMATED AVERAGE GLUCOSE: 123 MG/DL — HIGH (ref 68–114)
FERRITIN SERPL-MCNC: 5 NG/ML — LOW (ref 30–400)
FOLATE SERPL-MCNC: 12.6 NG/ML — SIGNIFICANT CHANGE UP (ref 3.1–17.5)
GLUCOSE BLDC GLUCOMTR-MCNC: 104 MG/DL — HIGH (ref 70–99)
GLUCOSE BLDC GLUCOMTR-MCNC: 109 MG/DL — HIGH (ref 70–99)
GLUCOSE BLDC GLUCOMTR-MCNC: 114 MG/DL — HIGH (ref 70–99)
GLUCOSE BLDC GLUCOMTR-MCNC: 121 MG/DL — HIGH (ref 70–99)
GLUCOSE BLDC GLUCOMTR-MCNC: 121 MG/DL — HIGH (ref 70–99)
GLUCOSE BLDC GLUCOMTR-MCNC: 122 MG/DL — HIGH (ref 70–99)
GLUCOSE BLDC GLUCOMTR-MCNC: 122 MG/DL — HIGH (ref 70–99)
GLUCOSE BLDC GLUCOMTR-MCNC: 124 MG/DL — HIGH (ref 70–99)
GLUCOSE BLDC GLUCOMTR-MCNC: 124 MG/DL — HIGH (ref 70–99)
GLUCOSE BLDC GLUCOMTR-MCNC: 137 MG/DL — HIGH (ref 70–99)
GLUCOSE BLDC GLUCOMTR-MCNC: 160 MG/DL — HIGH (ref 70–99)
GLUCOSE BLDC GLUCOMTR-MCNC: 165 MG/DL — HIGH (ref 70–99)
GLUCOSE BLDC GLUCOMTR-MCNC: 175 MG/DL — HIGH (ref 70–99)
GLUCOSE BLDC GLUCOMTR-MCNC: 189 MG/DL — HIGH (ref 70–99)
GLUCOSE BLDC GLUCOMTR-MCNC: 86 MG/DL — SIGNIFICANT CHANGE UP (ref 70–99)
GLUCOSE SERPL-MCNC: 103 MG/DL — HIGH (ref 70–99)
GLUCOSE SERPL-MCNC: 111 MG/DL — HIGH (ref 70–99)
GLUCOSE SERPL-MCNC: 111 MG/DL — HIGH (ref 70–99)
GLUCOSE SERPL-MCNC: 115 MG/DL — HIGH (ref 70–99)
GLUCOSE SERPL-MCNC: 116 MG/DL — HIGH (ref 70–99)
GLUCOSE SERPL-MCNC: 118 MG/DL — HIGH (ref 70–99)
GLUCOSE SERPL-MCNC: 135 MG/DL — HIGH (ref 70–99)
GLUCOSE SERPL-MCNC: 150 MG/DL — HIGH (ref 70–99)
GLUCOSE SERPL-MCNC: 191 MG/DL — HIGH (ref 70–99)
GLUCOSE UR QL: ABNORMAL
GLUCOSE UR QL: NEGATIVE — SIGNIFICANT CHANGE UP
HCT VFR BLD CALC: 24.9 % — LOW (ref 39–50)
HCT VFR BLD CALC: 25.3 % — LOW (ref 39–50)
HCT VFR BLD CALC: 25.4 % — LOW (ref 39–50)
HCT VFR BLD CALC: 26 % — LOW (ref 39–50)
HCT VFR BLD CALC: 26.2 % — LOW (ref 39–50)
HCT VFR BLD CALC: 27.3 % — LOW (ref 39–50)
HCT VFR BLD CALC: 28.1 % — LOW (ref 39–50)
HCT VFR BLD CALC: 28.3 % — LOW (ref 39–50)
HCT VFR BLD CALC: 32.6 % — LOW (ref 39–50)
HCT VFR BLD CALC: 37.2 % — LOW (ref 39–50)
HGB BLD-MCNC: 10.9 G/DL — LOW (ref 13–17)
HGB BLD-MCNC: 7.2 G/DL — LOW (ref 13–17)
HGB BLD-MCNC: 7.3 G/DL — LOW (ref 13–17)
HGB BLD-MCNC: 7.4 G/DL — LOW (ref 13–17)
HGB BLD-MCNC: 7.4 G/DL — LOW (ref 13–17)
HGB BLD-MCNC: 7.5 G/DL — LOW (ref 13–17)
HGB BLD-MCNC: 7.6 G/DL — LOW (ref 13–17)
HGB BLD-MCNC: 7.9 G/DL — LOW (ref 13–17)
HGB BLD-MCNC: 8 G/DL — LOW (ref 13–17)
HGB BLD-MCNC: 9.5 G/DL — LOW (ref 13–17)
IANC: 11.68 K/UL — HIGH (ref 1.5–8.5)
IANC: 11.9 K/UL — HIGH (ref 1.5–8.5)
IANC: 4.81 K/UL — SIGNIFICANT CHANGE UP (ref 1.5–8.5)
IMM GRANULOCYTES NFR BLD AUTO: 0.8 % — SIGNIFICANT CHANGE UP (ref 0–1.5)
INR BLD: 0.99 RATIO — SIGNIFICANT CHANGE UP (ref 0.88–1.16)
IRON SATN MFR SERPL: 16 UG/DL — LOW (ref 45–165)
IRON SATN MFR SERPL: 4 % — LOW (ref 14–50)
KETONES UR-MCNC: ABNORMAL
KETONES UR-MCNC: NEGATIVE — SIGNIFICANT CHANGE UP
LEUKOCYTE ESTERASE UR-ACNC: NEGATIVE — SIGNIFICANT CHANGE UP
LEUKOCYTE ESTERASE UR-ACNC: NEGATIVE — SIGNIFICANT CHANGE UP
LYMPHOCYTES # BLD AUTO: 0.27 K/UL — LOW (ref 1–3.3)
LYMPHOCYTES # BLD AUTO: 0.9 K/UL — LOW (ref 1–3.3)
LYMPHOCYTES # BLD AUTO: 1.39 K/UL — SIGNIFICANT CHANGE UP (ref 1–3.3)
LYMPHOCYTES # BLD AUTO: 19.1 % — SIGNIFICANT CHANGE UP (ref 13–44)
LYMPHOCYTES # BLD AUTO: 2 % — LOW (ref 13–44)
LYMPHOCYTES # BLD AUTO: 6.3 % — LOW (ref 13–44)
MAGNESIUM SERPL-MCNC: 1.4 MG/DL — LOW (ref 1.6–2.6)
MCHC RBC-ENTMCNC: 18.7 PG — LOW (ref 27–34)
MCHC RBC-ENTMCNC: 18.8 PG — LOW (ref 27–34)
MCHC RBC-ENTMCNC: 18.8 PG — LOW (ref 27–34)
MCHC RBC-ENTMCNC: 19 PG — LOW (ref 27–34)
MCHC RBC-ENTMCNC: 19.1 PG — LOW (ref 27–34)
MCHC RBC-ENTMCNC: 19.2 PG — LOW (ref 27–34)
MCHC RBC-ENTMCNC: 20.6 PG — LOW (ref 27–34)
MCHC RBC-ENTMCNC: 20.9 PG — LOW (ref 27–34)
MCHC RBC-ENTMCNC: 27.5 GM/DL — LOW (ref 32–36)
MCHC RBC-ENTMCNC: 28.1 GM/DL — LOW (ref 32–36)
MCHC RBC-ENTMCNC: 28.2 GM/DL — LOW (ref 32–36)
MCHC RBC-ENTMCNC: 28.3 GM/DL — LOW (ref 32–36)
MCHC RBC-ENTMCNC: 28.7 GM/DL — LOW (ref 32–36)
MCHC RBC-ENTMCNC: 28.9 GM/DL — LOW (ref 32–36)
MCHC RBC-ENTMCNC: 29.1 GM/DL — LOW (ref 32–36)
MCHC RBC-ENTMCNC: 29.2 GM/DL — LOW (ref 32–36)
MCHC RBC-ENTMCNC: 29.2 GM/DL — LOW (ref 32–36)
MCHC RBC-ENTMCNC: 29.3 GM/DL — LOW (ref 32–36)
MCV RBC AUTO: 64.1 FL — LOW (ref 80–100)
MCV RBC AUTO: 65.2 FL — LOW (ref 80–100)
MCV RBC AUTO: 65.5 FL — LOW (ref 80–100)
MCV RBC AUTO: 66.2 FL — LOW (ref 80–100)
MCV RBC AUTO: 66.7 FL — LOW (ref 80–100)
MCV RBC AUTO: 67.2 FL — LOW (ref 80–100)
MCV RBC AUTO: 68 FL — LOW (ref 80–100)
MCV RBC AUTO: 69.1 FL — LOW (ref 80–100)
MCV RBC AUTO: 70.6 FL — LOW (ref 80–100)
MCV RBC AUTO: 71.3 FL — LOW (ref 80–100)
METHOD TYPE: SIGNIFICANT CHANGE UP
MONOCYTES # BLD AUTO: 0.19 K/UL — SIGNIFICANT CHANGE UP (ref 0–0.9)
MONOCYTES # BLD AUTO: 0.53 K/UL — SIGNIFICANT CHANGE UP (ref 0–0.9)
MONOCYTES # BLD AUTO: 1.31 K/UL — HIGH (ref 0–0.9)
MONOCYTES NFR BLD AUTO: 2.6 % — SIGNIFICANT CHANGE UP (ref 2–14)
MONOCYTES NFR BLD AUTO: 4 % — SIGNIFICANT CHANGE UP (ref 2–14)
MONOCYTES NFR BLD AUTO: 9.2 % — SIGNIFICANT CHANGE UP (ref 2–14)
NEUTROPHILS # BLD AUTO: 11.9 K/UL — HIGH (ref 1.8–7.4)
NEUTROPHILS # BLD AUTO: 12.36 K/UL — HIGH (ref 1.8–7.4)
NEUTROPHILS # BLD AUTO: 5.27 K/UL — SIGNIFICANT CHANGE UP (ref 1.8–7.4)
NEUTROPHILS NFR BLD AUTO: 71.3 % — SIGNIFICANT CHANGE UP (ref 43–77)
NEUTROPHILS NFR BLD AUTO: 83.1 % — HIGH (ref 43–77)
NEUTROPHILS NFR BLD AUTO: 93 % — HIGH (ref 43–77)
NITRITE UR-MCNC: NEGATIVE — SIGNIFICANT CHANGE UP
NITRITE UR-MCNC: NEGATIVE — SIGNIFICANT CHANGE UP
NRBC # BLD: 0 /100 WBCS — SIGNIFICANT CHANGE UP
NRBC # FLD: 0 K/UL — SIGNIFICANT CHANGE UP
NRBC # FLD: 0.02 K/UL — HIGH
NRBC # FLD: 0.03 K/UL — HIGH
OB PNL STL: NEGATIVE — SIGNIFICANT CHANGE UP
ORGANISM # SPEC MICROSCOPIC CNT: SIGNIFICANT CHANGE UP
ORGANISM # SPEC MICROSCOPIC CNT: SIGNIFICANT CHANGE UP
PH UR: 6 — SIGNIFICANT CHANGE UP (ref 5–8)
PH UR: 6.5 — SIGNIFICANT CHANGE UP (ref 5–8)
PHOSPHATE SERPL-MCNC: 2.7 MG/DL — SIGNIFICANT CHANGE UP (ref 2.5–4.5)
PLATELET # BLD AUTO: 241 K/UL — SIGNIFICANT CHANGE UP (ref 150–400)
PLATELET # BLD AUTO: 259 K/UL — SIGNIFICANT CHANGE UP (ref 150–400)
PLATELET # BLD AUTO: 260 K/UL — SIGNIFICANT CHANGE UP (ref 150–400)
PLATELET # BLD AUTO: 262 K/UL — SIGNIFICANT CHANGE UP (ref 150–400)
PLATELET # BLD AUTO: 270 K/UL — SIGNIFICANT CHANGE UP (ref 150–400)
PLATELET # BLD AUTO: 278 K/UL — SIGNIFICANT CHANGE UP (ref 150–400)
PLATELET # BLD AUTO: 283 K/UL — SIGNIFICANT CHANGE UP (ref 150–400)
PLATELET # BLD AUTO: 283 K/UL — SIGNIFICANT CHANGE UP (ref 150–400)
PLATELET # BLD AUTO: 287 K/UL — SIGNIFICANT CHANGE UP (ref 150–400)
PLATELET # BLD AUTO: 317 K/UL — SIGNIFICANT CHANGE UP (ref 150–400)
POTASSIUM SERPL-MCNC: 3.9 MMOL/L — SIGNIFICANT CHANGE UP (ref 3.5–5.3)
POTASSIUM SERPL-MCNC: 3.9 MMOL/L — SIGNIFICANT CHANGE UP (ref 3.5–5.3)
POTASSIUM SERPL-MCNC: 4 MMOL/L — SIGNIFICANT CHANGE UP (ref 3.5–5.3)
POTASSIUM SERPL-MCNC: 4 MMOL/L — SIGNIFICANT CHANGE UP (ref 3.5–5.3)
POTASSIUM SERPL-MCNC: 4.1 MMOL/L — SIGNIFICANT CHANGE UP (ref 3.5–5.3)
POTASSIUM SERPL-MCNC: 4.2 MMOL/L — SIGNIFICANT CHANGE UP (ref 3.5–5.3)
POTASSIUM SERPL-MCNC: 4.2 MMOL/L — SIGNIFICANT CHANGE UP (ref 3.5–5.3)
POTASSIUM SERPL-MCNC: 4.6 MMOL/L — SIGNIFICANT CHANGE UP (ref 3.5–5.3)
POTASSIUM SERPL-MCNC: 5.3 MMOL/L — SIGNIFICANT CHANGE UP (ref 3.5–5.3)
POTASSIUM SERPL-SCNC: 3.9 MMOL/L — SIGNIFICANT CHANGE UP (ref 3.5–5.3)
POTASSIUM SERPL-SCNC: 3.9 MMOL/L — SIGNIFICANT CHANGE UP (ref 3.5–5.3)
POTASSIUM SERPL-SCNC: 4 MMOL/L — SIGNIFICANT CHANGE UP (ref 3.5–5.3)
POTASSIUM SERPL-SCNC: 4 MMOL/L — SIGNIFICANT CHANGE UP (ref 3.5–5.3)
POTASSIUM SERPL-SCNC: 4.1 MMOL/L — SIGNIFICANT CHANGE UP (ref 3.5–5.3)
POTASSIUM SERPL-SCNC: 4.2 MMOL/L — SIGNIFICANT CHANGE UP (ref 3.5–5.3)
POTASSIUM SERPL-SCNC: 4.2 MMOL/L — SIGNIFICANT CHANGE UP (ref 3.5–5.3)
POTASSIUM SERPL-SCNC: 4.6 MMOL/L — SIGNIFICANT CHANGE UP (ref 3.5–5.3)
POTASSIUM SERPL-SCNC: 5.3 MMOL/L — SIGNIFICANT CHANGE UP (ref 3.5–5.3)
PROT SERPL-MCNC: 6.8 G/DL — SIGNIFICANT CHANGE UP (ref 6–8.3)
PROT SERPL-MCNC: 6.8 G/DL — SIGNIFICANT CHANGE UP (ref 6–8.3)
PROT UR-MCNC: ABNORMAL
PROT UR-MCNC: NEGATIVE — SIGNIFICANT CHANGE UP
PROTHROM AB SERPL-ACNC: 11.4 SEC — SIGNIFICANT CHANGE UP (ref 10.6–13.6)
RBC # BLD: 3.76 M/UL — LOW (ref 4.2–5.8)
RBC # BLD: 3.88 M/UL — LOW (ref 4.2–5.8)
RBC # BLD: 3.9 M/UL — LOW (ref 4.2–5.8)
RBC # BLD: 3.95 M/UL — LOW (ref 4.2–5.8)
RBC # BLD: 3.95 M/UL — LOW (ref 4.2–5.8)
RBC # BLD: 3.99 M/UL — LOW (ref 4.2–5.8)
RBC # BLD: 4.16 M/UL — LOW (ref 4.2–5.8)
RBC # BLD: 4.21 M/UL — SIGNIFICANT CHANGE UP (ref 4.2–5.8)
RBC # BLD: 4.21 M/UL — SIGNIFICANT CHANGE UP (ref 4.2–5.8)
RBC # BLD: 4.62 M/UL — SIGNIFICANT CHANGE UP (ref 4.2–5.8)
RBC # BLD: 5.22 M/UL — SIGNIFICANT CHANGE UP (ref 4.2–5.8)
RBC # FLD: 18.4 % — HIGH (ref 10.3–14.5)
RBC # FLD: 18.6 % — HIGH (ref 10.3–14.5)
RBC # FLD: 18.7 % — HIGH (ref 10.3–14.5)
RBC # FLD: 19.1 % — HIGH (ref 10.3–14.5)
RBC # FLD: 20.1 % — HIGH (ref 10.3–14.5)
RBC # FLD: 21 % — HIGH (ref 10.3–14.5)
RBC # FLD: 23.7 % — HIGH (ref 10.3–14.5)
RBC # FLD: 23.8 % — HIGH (ref 10.3–14.5)
RETICS #: 94.9 K/UL — SIGNIFICANT CHANGE UP (ref 25–125)
RETICS/RBC NFR: 2.3 % — SIGNIFICANT CHANGE UP (ref 0.5–2.5)
RH IG SCN BLD-IMP: POSITIVE — SIGNIFICANT CHANGE UP
SARS-COV-2 IGG SERPL QL IA: NEGATIVE — SIGNIFICANT CHANGE UP
SARS-COV-2 IGG+IGM SERPL QL IA: 80.8 U/ML — HIGH
SARS-COV-2 IGG+IGM SERPL QL IA: POSITIVE
SARS-COV-2 IGM SERPL IA-ACNC: 0.08 INDEX — SIGNIFICANT CHANGE UP
SARS-COV-2 RNA SPEC QL NAA+PROBE: SIGNIFICANT CHANGE UP
SARS-COV-2 RNA SPEC QL NAA+PROBE: SIGNIFICANT CHANGE UP
SODIUM SERPL-SCNC: 131 MMOL/L — LOW (ref 135–145)
SODIUM SERPL-SCNC: 134 MMOL/L — LOW (ref 135–145)
SODIUM SERPL-SCNC: 135 MMOL/L — SIGNIFICANT CHANGE UP (ref 135–145)
SP GR SPEC: 1.01 — SIGNIFICANT CHANGE UP (ref 1.01–1.02)
SP GR SPEC: 1.02 — SIGNIFICANT CHANGE UP (ref 1.01–1.02)
SPECIMEN SOURCE: SIGNIFICANT CHANGE UP
SPECIMEN SOURCE: SIGNIFICANT CHANGE UP
TIBC SERPL-MCNC: 445 UG/DL — HIGH (ref 220–430)
TRANSFERRIN SERPL-MCNC: 405 MG/DL — HIGH (ref 200–360)
TROPONIN T, HIGH SENSITIVITY RESULT: 14 NG/L — SIGNIFICANT CHANGE UP
TROPONIN T, HIGH SENSITIVITY RESULT: 15 NG/L — SIGNIFICANT CHANGE UP
TROPONIN T, HIGH SENSITIVITY RESULT: 16 NG/L — SIGNIFICANT CHANGE UP
TROPONIN T, HIGH SENSITIVITY RESULT: 17 NG/L — SIGNIFICANT CHANGE UP
TROPONIN T, HIGH SENSITIVITY RESULT: 18 NG/L — SIGNIFICANT CHANGE UP
TROPONIN T, HIGH SENSITIVITY RESULT: 25 NG/L — SIGNIFICANT CHANGE UP
UIBC SERPL-MCNC: 429 UG/DL — HIGH (ref 110–370)
UROBILINOGEN FLD QL: SIGNIFICANT CHANGE UP
UROBILINOGEN FLD QL: SIGNIFICANT CHANGE UP
VIT B12 SERPL-MCNC: 1106 PG/ML — HIGH (ref 200–900)
WBC # BLD: 11.08 K/UL — HIGH (ref 3.8–10.5)
WBC # BLD: 13.29 K/UL — HIGH (ref 3.8–10.5)
WBC # BLD: 14.31 K/UL — HIGH (ref 3.8–10.5)
WBC # BLD: 7.3 K/UL — SIGNIFICANT CHANGE UP (ref 3.8–10.5)
WBC # BLD: 7.51 K/UL — SIGNIFICANT CHANGE UP (ref 3.8–10.5)
WBC # BLD: 7.95 K/UL — SIGNIFICANT CHANGE UP (ref 3.8–10.5)
WBC # BLD: 8.28 K/UL — SIGNIFICANT CHANGE UP (ref 3.8–10.5)
WBC # BLD: 8.29 K/UL — SIGNIFICANT CHANGE UP (ref 3.8–10.5)
WBC # BLD: 8.52 K/UL — SIGNIFICANT CHANGE UP (ref 3.8–10.5)
WBC # BLD: 9.39 K/UL — SIGNIFICANT CHANGE UP (ref 3.8–10.5)
WBC # FLD AUTO: 11.08 K/UL — HIGH (ref 3.8–10.5)
WBC # FLD AUTO: 13.29 K/UL — HIGH (ref 3.8–10.5)
WBC # FLD AUTO: 14.31 K/UL — HIGH (ref 3.8–10.5)
WBC # FLD AUTO: 7.3 K/UL — SIGNIFICANT CHANGE UP (ref 3.8–10.5)
WBC # FLD AUTO: 7.51 K/UL — SIGNIFICANT CHANGE UP (ref 3.8–10.5)
WBC # FLD AUTO: 7.95 K/UL — SIGNIFICANT CHANGE UP (ref 3.8–10.5)
WBC # FLD AUTO: 8.28 K/UL — SIGNIFICANT CHANGE UP (ref 3.8–10.5)
WBC # FLD AUTO: 8.29 K/UL — SIGNIFICANT CHANGE UP (ref 3.8–10.5)
WBC # FLD AUTO: 8.52 K/UL — SIGNIFICANT CHANGE UP (ref 3.8–10.5)
WBC # FLD AUTO: 9.39 K/UL — SIGNIFICANT CHANGE UP (ref 3.8–10.5)

## 2021-01-01 PROCEDURE — 99285 EMERGENCY DEPT VISIT HI MDM: CPT | Mod: 25

## 2021-01-01 PROCEDURE — 99233 SBSQ HOSP IP/OBS HIGH 50: CPT

## 2021-01-01 PROCEDURE — 70450 CT HEAD/BRAIN W/O DYE: CPT | Mod: 26

## 2021-01-01 PROCEDURE — 99223 1ST HOSP IP/OBS HIGH 75: CPT

## 2021-01-01 PROCEDURE — 43239 EGD BIOPSY SINGLE/MULTIPLE: CPT | Mod: GC,59

## 2021-01-01 PROCEDURE — 99233 SBSQ HOSP IP/OBS HIGH 50: CPT | Mod: GC

## 2021-01-01 PROCEDURE — 88305 TISSUE EXAM BY PATHOLOGIST: CPT | Mod: 26

## 2021-01-01 PROCEDURE — 93010 ELECTROCARDIOGRAM REPORT: CPT

## 2021-01-01 PROCEDURE — 99239 HOSP IP/OBS DSCHRG MGMT >30: CPT

## 2021-01-01 PROCEDURE — 71260 CT THORAX DX C+: CPT | Mod: 26

## 2021-01-01 PROCEDURE — 74177 CT ABD & PELVIS W/CONTRAST: CPT | Mod: 26

## 2021-01-01 PROCEDURE — 71045 X-RAY EXAM CHEST 1 VIEW: CPT | Mod: 26

## 2021-01-01 PROCEDURE — 99232 SBSQ HOSP IP/OBS MODERATE 35: CPT | Mod: GC

## 2021-01-01 PROCEDURE — 45378 DIAGNOSTIC COLONOSCOPY: CPT | Mod: GC

## 2021-01-01 PROCEDURE — 72125 CT NECK SPINE W/O DYE: CPT | Mod: 26

## 2021-01-01 PROCEDURE — 93306 TTE W/DOPPLER COMPLETE: CPT | Mod: 26

## 2021-01-01 PROCEDURE — 99223 1ST HOSP IP/OBS HIGH 75: CPT | Mod: GC

## 2021-01-01 RX ORDER — OMEPRAZOLE 10 MG/1
0 CAPSULE, DELAYED RELEASE ORAL
Qty: 0 | Refills: 0 | DISCHARGE

## 2021-01-01 RX ORDER — ACETAMINOPHEN 500 MG
650 TABLET ORAL EVERY 6 HOURS
Refills: 0 | Status: DISCONTINUED | OUTPATIENT
Start: 2021-01-01 | End: 2021-01-01

## 2021-01-01 RX ORDER — FERROUS SULFATE 325(65) MG
325 TABLET ORAL DAILY
Refills: 0 | Status: DISCONTINUED | OUTPATIENT
Start: 2021-01-01 | End: 2021-01-01

## 2021-01-01 RX ORDER — DUTASTERIDE 0.5 MG/1
1 CAPSULE, LIQUID FILLED ORAL
Qty: 0 | Refills: 0 | DISCHARGE

## 2021-01-01 RX ORDER — HEPARIN SODIUM 5000 [USP'U]/ML
5000 INJECTION INTRAVENOUS; SUBCUTANEOUS EVERY 8 HOURS
Refills: 0 | Status: DISCONTINUED | OUTPATIENT
Start: 2021-01-01 | End: 2021-01-01

## 2021-01-01 RX ORDER — ISOSORBIDE MONONITRATE 60 MG/1
30 TABLET, EXTENDED RELEASE ORAL DAILY
Refills: 0 | Status: DISCONTINUED | OUTPATIENT
Start: 2021-01-01 | End: 2021-01-01

## 2021-01-01 RX ORDER — METOPROLOL TARTRATE 50 MG
50 TABLET ORAL DAILY
Refills: 0 | Status: DISCONTINUED | OUTPATIENT
Start: 2021-01-01 | End: 2021-01-01

## 2021-01-01 RX ORDER — DONEPEZIL HYDROCHLORIDE 10 MG/1
0 TABLET, FILM COATED ORAL
Qty: 0 | Refills: 0 | DISCHARGE

## 2021-01-01 RX ORDER — ASPIRIN/CALCIUM CARB/MAGNESIUM 324 MG
81 TABLET ORAL DAILY
Refills: 0 | Status: DISCONTINUED | OUTPATIENT
Start: 2021-01-01 | End: 2021-01-01

## 2021-01-01 RX ORDER — ASPIRIN/CALCIUM CARB/MAGNESIUM 324 MG
0 TABLET ORAL
Qty: 0 | Refills: 0 | DISCHARGE

## 2021-01-01 RX ORDER — DEXTROSE 50 % IN WATER 50 %
12.5 SYRINGE (ML) INTRAVENOUS ONCE
Refills: 0 | Status: DISCONTINUED | OUTPATIENT
Start: 2021-01-01 | End: 2021-01-01

## 2021-01-01 RX ORDER — FINASTERIDE 5 MG/1
1 TABLET, FILM COATED ORAL
Qty: 30 | Refills: 0
Start: 2021-01-01 | End: 2021-01-01

## 2021-01-01 RX ORDER — ASCORBIC ACID 60 MG
500 TABLET,CHEWABLE ORAL DAILY
Refills: 0 | Status: DISCONTINUED | OUTPATIENT
Start: 2021-01-01 | End: 2021-01-01

## 2021-01-01 RX ORDER — METOPROLOL TARTRATE 50 MG
1 TABLET ORAL
Qty: 0 | Refills: 0 | DISCHARGE

## 2021-01-01 RX ORDER — LIDOCAINE 4 G/100G
1 CREAM TOPICAL DAILY
Refills: 0 | Status: DISCONTINUED | OUTPATIENT
Start: 2021-01-01 | End: 2021-01-01

## 2021-01-01 RX ORDER — POLYETHYLENE GLYCOL 3350 17 G/17G
17 POWDER, FOR SOLUTION ORAL DAILY
Refills: 0 | Status: DISCONTINUED | OUTPATIENT
Start: 2021-01-01 | End: 2021-01-01

## 2021-01-01 RX ORDER — TAMSULOSIN HYDROCHLORIDE 0.4 MG/1
0 CAPSULE ORAL
Qty: 0 | Refills: 0 | DISCHARGE

## 2021-01-01 RX ORDER — METFORMIN HYDROCHLORIDE 850 MG/1
0 TABLET ORAL
Qty: 0 | Refills: 0 | DISCHARGE

## 2021-01-01 RX ORDER — LIDOCAINE 4 G/100G
1 CREAM TOPICAL ONCE
Refills: 0 | Status: COMPLETED | OUTPATIENT
Start: 2021-01-01 | End: 2021-01-01

## 2021-01-01 RX ORDER — DEXTROSE 50 % IN WATER 50 %
25 SYRINGE (ML) INTRAVENOUS ONCE
Refills: 0 | Status: DISCONTINUED | OUTPATIENT
Start: 2021-01-01 | End: 2021-01-01

## 2021-01-01 RX ORDER — HYDROMORPHONE HYDROCHLORIDE 2 MG/ML
0.5 INJECTION INTRAMUSCULAR; INTRAVENOUS; SUBCUTANEOUS ONCE
Refills: 0 | Status: DISCONTINUED | OUTPATIENT
Start: 2021-01-01 | End: 2021-01-01

## 2021-01-01 RX ORDER — SODIUM CHLORIDE 9 MG/ML
1000 INJECTION, SOLUTION INTRAVENOUS
Refills: 0 | Status: DISCONTINUED | OUTPATIENT
Start: 2021-01-01 | End: 2021-01-01

## 2021-01-01 RX ORDER — GABAPENTIN 400 MG/1
100 CAPSULE ORAL DAILY
Refills: 0 | Status: DISCONTINUED | OUTPATIENT
Start: 2021-01-01 | End: 2021-01-01

## 2021-01-01 RX ORDER — INSULIN LISPRO 100/ML
VIAL (ML) SUBCUTANEOUS
Refills: 0 | Status: DISCONTINUED | OUTPATIENT
Start: 2021-01-01 | End: 2021-01-01

## 2021-01-01 RX ORDER — ISOSORBIDE MONONITRATE 60 MG/1
0 TABLET, EXTENDED RELEASE ORAL
Qty: 0 | Refills: 0 | DISCHARGE

## 2021-01-01 RX ORDER — SODIUM CHLORIDE 9 MG/ML
3 INJECTION INTRAMUSCULAR; INTRAVENOUS; SUBCUTANEOUS EVERY 8 HOURS
Refills: 0 | Status: DISCONTINUED | OUTPATIENT
Start: 2021-01-01 | End: 2021-01-01

## 2021-01-01 RX ORDER — IRON SUCROSE 20 MG/ML
100 INJECTION, SOLUTION INTRAVENOUS
Refills: 0 | Status: COMPLETED | OUTPATIENT
Start: 2021-01-01 | End: 2021-01-01

## 2021-01-01 RX ORDER — ALBUTEROL 90 UG/1
2 AEROSOL, METERED ORAL EVERY 6 HOURS
Refills: 0 | Status: DISCONTINUED | OUTPATIENT
Start: 2021-01-01 | End: 2021-01-01

## 2021-01-01 RX ORDER — SOD SULF/SODIUM/NAHCO3/KCL/PEG
4000 SOLUTION, RECONSTITUTED, ORAL ORAL ONCE
Refills: 0 | Status: COMPLETED | OUTPATIENT
Start: 2021-01-01 | End: 2021-01-01

## 2021-01-01 RX ORDER — FERROUS SULFATE 325(65) MG
1 TABLET ORAL
Qty: 30 | Refills: 0
Start: 2021-01-01 | End: 2021-01-01

## 2021-01-01 RX ORDER — TAMSULOSIN HYDROCHLORIDE 0.4 MG/1
0.4 CAPSULE ORAL AT BEDTIME
Refills: 0 | Status: DISCONTINUED | OUTPATIENT
Start: 2021-01-01 | End: 2021-01-01

## 2021-01-01 RX ORDER — SODIUM CHLORIDE 9 MG/ML
1000 INJECTION INTRAMUSCULAR; INTRAVENOUS; SUBCUTANEOUS ONCE
Refills: 0 | Status: COMPLETED | OUTPATIENT
Start: 2021-01-01 | End: 2021-01-01

## 2021-01-01 RX ORDER — OMEPRAZOLE 10 MG/1
1 CAPSULE, DELAYED RELEASE ORAL
Qty: 0 | Refills: 0 | DISCHARGE

## 2021-01-01 RX ORDER — SODIUM CHLORIDE 9 MG/ML
1000 INJECTION, SOLUTION INTRAVENOUS ONCE
Refills: 0 | Status: COMPLETED | OUTPATIENT
Start: 2021-01-01 | End: 2021-01-01

## 2021-01-01 RX ORDER — OXYCODONE HYDROCHLORIDE 5 MG/1
1 TABLET ORAL
Qty: 0 | Refills: 0 | DISCHARGE

## 2021-01-01 RX ORDER — SENNA PLUS 8.6 MG/1
2 TABLET ORAL AT BEDTIME
Refills: 0 | Status: DISCONTINUED | OUTPATIENT
Start: 2021-01-01 | End: 2021-01-01

## 2021-01-01 RX ORDER — FINASTERIDE 5 MG/1
5 TABLET, FILM COATED ORAL DAILY
Refills: 0 | Status: DISCONTINUED | OUTPATIENT
Start: 2021-01-01 | End: 2021-01-01

## 2021-01-01 RX ORDER — SOD SULF/SODIUM/NAHCO3/KCL/PEG
4000 SOLUTION, RECONSTITUTED, ORAL ORAL ONCE
Refills: 0 | Status: DISCONTINUED | OUTPATIENT
Start: 2021-01-01 | End: 2021-01-01

## 2021-01-01 RX ORDER — IRON SUCROSE 20 MG/ML
200 INJECTION, SOLUTION INTRAVENOUS ONCE
Refills: 0 | Status: COMPLETED | OUTPATIENT
Start: 2021-01-01 | End: 2021-01-01

## 2021-01-01 RX ORDER — DEXTROSE 50 % IN WATER 50 %
15 SYRINGE (ML) INTRAVENOUS ONCE
Refills: 0 | Status: DISCONTINUED | OUTPATIENT
Start: 2021-01-01 | End: 2021-01-01

## 2021-01-01 RX ORDER — PANTOPRAZOLE SODIUM 20 MG/1
40 TABLET, DELAYED RELEASE ORAL
Refills: 0 | Status: DISCONTINUED | OUTPATIENT
Start: 2021-01-01 | End: 2021-01-01

## 2021-01-01 RX ORDER — CYPROHEPTADINE HYDROCHLORIDE 4 MG/1
4 TABLET ORAL
Refills: 0 | Status: DISCONTINUED | OUTPATIENT
Start: 2021-01-01 | End: 2021-01-01

## 2021-01-01 RX ORDER — LOSARTAN POTASSIUM 100 MG/1
50 TABLET, FILM COATED ORAL ONCE
Refills: 0 | Status: COMPLETED | OUTPATIENT
Start: 2021-01-01 | End: 2021-01-01

## 2021-01-01 RX ORDER — GLUCAGON INJECTION, SOLUTION 0.5 MG/.1ML
1 INJECTION, SOLUTION SUBCUTANEOUS ONCE
Refills: 0 | Status: DISCONTINUED | OUTPATIENT
Start: 2021-01-01 | End: 2021-01-01

## 2021-01-01 RX ORDER — LOSARTAN POTASSIUM 100 MG/1
0 TABLET, FILM COATED ORAL
Qty: 0 | Refills: 0 | DISCHARGE

## 2021-01-01 RX ORDER — CETIRIZINE HYDROCHLORIDE 10 MG/1
10 TABLET ORAL
Qty: 0 | Refills: 0 | DISCHARGE

## 2021-01-01 RX ORDER — MORPHINE SULFATE 50 MG/1
4 CAPSULE, EXTENDED RELEASE ORAL ONCE
Refills: 0 | Status: DISCONTINUED | OUTPATIENT
Start: 2021-01-01 | End: 2021-01-01

## 2021-01-01 RX ORDER — CEFTRIAXONE 500 MG/1
1000 INJECTION, POWDER, FOR SOLUTION INTRAMUSCULAR; INTRAVENOUS ONCE
Refills: 0 | Status: COMPLETED | OUTPATIENT
Start: 2021-01-01 | End: 2021-01-01

## 2021-01-01 RX ORDER — GABAPENTIN 400 MG/1
0 CAPSULE ORAL
Qty: 0 | Refills: 0 | DISCHARGE

## 2021-01-01 RX ORDER — SIMVASTATIN 20 MG/1
40 TABLET, FILM COATED ORAL AT BEDTIME
Refills: 0 | Status: DISCONTINUED | OUTPATIENT
Start: 2021-01-01 | End: 2021-01-01

## 2021-01-01 RX ORDER — MAGNESIUM SULFATE 500 MG/ML
2 VIAL (ML) INJECTION ONCE
Refills: 0 | Status: COMPLETED | OUTPATIENT
Start: 2021-01-01 | End: 2021-01-01

## 2021-01-01 RX ORDER — DONEPEZIL HYDROCHLORIDE 10 MG/1
10 TABLET, FILM COATED ORAL AT BEDTIME
Refills: 0 | Status: DISCONTINUED | OUTPATIENT
Start: 2021-01-01 | End: 2021-01-01

## 2021-01-01 RX ORDER — INSULIN LISPRO 100/ML
VIAL (ML) SUBCUTANEOUS AT BEDTIME
Refills: 0 | Status: DISCONTINUED | OUTPATIENT
Start: 2021-01-01 | End: 2021-01-01

## 2021-01-01 RX ORDER — DONEPEZIL HYDROCHLORIDE 10 MG/1
1 TABLET, FILM COATED ORAL
Qty: 0 | Refills: 0 | DISCHARGE

## 2021-01-01 RX ORDER — ONDANSETRON 8 MG/1
4 TABLET, FILM COATED ORAL ONCE
Refills: 0 | Status: COMPLETED | OUTPATIENT
Start: 2021-01-01 | End: 2021-01-01

## 2021-01-01 RX ORDER — SODIUM CHLORIDE 9 MG/ML
500 INJECTION, SOLUTION INTRAVENOUS
Refills: 0 | Status: DISCONTINUED | OUTPATIENT
Start: 2021-01-01 | End: 2021-01-01

## 2021-01-01 RX ORDER — SODIUM CHLORIDE 9 MG/ML
1 INJECTION INTRAMUSCULAR; INTRAVENOUS; SUBCUTANEOUS THREE TIMES A DAY
Refills: 0 | Status: COMPLETED | OUTPATIENT
Start: 2021-01-01 | End: 2021-01-01

## 2021-01-01 RX ORDER — SIMVASTATIN 20 MG/1
0 TABLET, FILM COATED ORAL
Qty: 0 | Refills: 0 | DISCHARGE

## 2021-01-01 RX ORDER — ASCORBIC ACID 60 MG
1 TABLET,CHEWABLE ORAL
Qty: 30 | Refills: 0
Start: 2021-01-01 | End: 2021-01-01

## 2021-01-01 RX ORDER — ISOPROPYL ALCOHOL, BENZOCAINE .7; .06 ML/ML; ML/ML
1 SWAB TOPICAL
Qty: 100 | Refills: 1
Start: 2021-01-01 | End: 2021-01-01

## 2021-01-01 RX ORDER — CYPROHEPTADINE HYDROCHLORIDE 4 MG/1
0 TABLET ORAL
Qty: 0 | Refills: 1 | DISCHARGE

## 2021-01-01 RX ORDER — GABAPENTIN 400 MG/1
1 CAPSULE ORAL
Qty: 0 | Refills: 0 | DISCHARGE

## 2021-01-01 RX ORDER — METOPROLOL TARTRATE 50 MG
0 TABLET ORAL
Qty: 0 | Refills: 0 | DISCHARGE

## 2021-01-01 RX ORDER — METFORMIN HYDROCHLORIDE 850 MG/1
1 TABLET ORAL
Qty: 0 | Refills: 0 | DISCHARGE

## 2021-01-01 RX ORDER — ACETAMINOPHEN 500 MG
650 TABLET ORAL ONCE
Refills: 0 | Status: COMPLETED | OUTPATIENT
Start: 2021-01-01 | End: 2021-01-01

## 2021-01-01 RX ORDER — MORPHINE SULFATE 50 MG/1
4 CAPSULE, EXTENDED RELEASE ORAL EVERY 4 HOURS
Refills: 0 | Status: DISCONTINUED | OUTPATIENT
Start: 2021-01-01 | End: 2021-01-01

## 2021-01-01 RX ADMIN — ISOSORBIDE MONONITRATE 30 MILLIGRAM(S): 60 TABLET, EXTENDED RELEASE ORAL at 06:33

## 2021-01-01 RX ADMIN — SODIUM CHLORIDE 3 MILLILITER(S): 9 INJECTION INTRAMUSCULAR; INTRAVENOUS; SUBCUTANEOUS at 13:25

## 2021-01-01 RX ADMIN — MORPHINE SULFATE 4 MILLIGRAM(S): 50 CAPSULE, EXTENDED RELEASE ORAL at 11:45

## 2021-01-01 RX ADMIN — SODIUM CHLORIDE 1000 MILLILITER(S): 9 INJECTION INTRAMUSCULAR; INTRAVENOUS; SUBCUTANEOUS at 12:00

## 2021-01-01 RX ADMIN — Medication 81 MILLIGRAM(S): at 12:25

## 2021-01-01 RX ADMIN — LIDOCAINE 1 PATCH: 4 CREAM TOPICAL at 18:25

## 2021-01-01 RX ADMIN — ISOSORBIDE MONONITRATE 30 MILLIGRAM(S): 60 TABLET, EXTENDED RELEASE ORAL at 12:40

## 2021-01-01 RX ADMIN — SODIUM CHLORIDE 3 MILLILITER(S): 9 INJECTION INTRAMUSCULAR; INTRAVENOUS; SUBCUTANEOUS at 21:33

## 2021-01-01 RX ADMIN — IRON SUCROSE 210 MILLIGRAM(S): 20 INJECTION, SOLUTION INTRAVENOUS at 18:38

## 2021-01-01 RX ADMIN — GABAPENTIN 100 MILLIGRAM(S): 400 CAPSULE ORAL at 12:49

## 2021-01-01 RX ADMIN — IRON SUCROSE 210 MILLIGRAM(S): 20 INJECTION, SOLUTION INTRAVENOUS at 18:19

## 2021-01-01 RX ADMIN — TAMSULOSIN HYDROCHLORIDE 0.4 MILLIGRAM(S): 0.4 CAPSULE ORAL at 22:12

## 2021-01-01 RX ADMIN — ISOSORBIDE MONONITRATE 30 MILLIGRAM(S): 60 TABLET, EXTENDED RELEASE ORAL at 14:06

## 2021-01-01 RX ADMIN — ISOSORBIDE MONONITRATE 30 MILLIGRAM(S): 60 TABLET, EXTENDED RELEASE ORAL at 12:26

## 2021-01-01 RX ADMIN — PANTOPRAZOLE SODIUM 40 MILLIGRAM(S): 20 TABLET, DELAYED RELEASE ORAL at 05:51

## 2021-01-01 RX ADMIN — Medication 325 MILLIGRAM(S): at 12:27

## 2021-01-01 RX ADMIN — HEPARIN SODIUM 5000 UNIT(S): 5000 INJECTION INTRAVENOUS; SUBCUTANEOUS at 14:56

## 2021-01-01 RX ADMIN — Medication 2: at 09:43

## 2021-01-01 RX ADMIN — SIMVASTATIN 40 MILLIGRAM(S): 20 TABLET, FILM COATED ORAL at 21:22

## 2021-01-01 RX ADMIN — HEPARIN SODIUM 5000 UNIT(S): 5000 INJECTION INTRAVENOUS; SUBCUTANEOUS at 05:25

## 2021-01-01 RX ADMIN — SODIUM CHLORIDE 1 GRAM(S): 9 INJECTION INTRAMUSCULAR; INTRAVENOUS; SUBCUTANEOUS at 21:34

## 2021-01-01 RX ADMIN — MORPHINE SULFATE 4 MILLIGRAM(S): 50 CAPSULE, EXTENDED RELEASE ORAL at 05:40

## 2021-01-01 RX ADMIN — PANTOPRAZOLE SODIUM 40 MILLIGRAM(S): 20 TABLET, DELAYED RELEASE ORAL at 05:20

## 2021-01-01 RX ADMIN — SODIUM CHLORIDE 1 GRAM(S): 9 INJECTION INTRAMUSCULAR; INTRAVENOUS; SUBCUTANEOUS at 05:24

## 2021-01-01 RX ADMIN — SIMVASTATIN 40 MILLIGRAM(S): 20 TABLET, FILM COATED ORAL at 21:34

## 2021-01-01 RX ADMIN — GABAPENTIN 100 MILLIGRAM(S): 400 CAPSULE ORAL at 10:21

## 2021-01-01 RX ADMIN — FINASTERIDE 5 MILLIGRAM(S): 5 TABLET, FILM COATED ORAL at 12:42

## 2021-01-01 RX ADMIN — DONEPEZIL HYDROCHLORIDE 10 MILLIGRAM(S): 10 TABLET, FILM COATED ORAL at 22:12

## 2021-01-01 RX ADMIN — GABAPENTIN 100 MILLIGRAM(S): 400 CAPSULE ORAL at 13:15

## 2021-01-01 RX ADMIN — SODIUM CHLORIDE 3 MILLILITER(S): 9 INJECTION INTRAMUSCULAR; INTRAVENOUS; SUBCUTANEOUS at 05:49

## 2021-01-01 RX ADMIN — HEPARIN SODIUM 5000 UNIT(S): 5000 INJECTION INTRAVENOUS; SUBCUTANEOUS at 21:21

## 2021-01-01 RX ADMIN — Medication 650 MILLIGRAM(S): at 21:22

## 2021-01-01 RX ADMIN — GABAPENTIN 100 MILLIGRAM(S): 400 CAPSULE ORAL at 14:06

## 2021-01-01 RX ADMIN — SODIUM CHLORIDE 3 MILLILITER(S): 9 INJECTION INTRAMUSCULAR; INTRAVENOUS; SUBCUTANEOUS at 14:01

## 2021-01-01 RX ADMIN — Medication 500 MILLIGRAM(S): at 12:26

## 2021-01-01 RX ADMIN — SODIUM CHLORIDE 1 GRAM(S): 9 INJECTION INTRAMUSCULAR; INTRAVENOUS; SUBCUTANEOUS at 06:27

## 2021-01-01 RX ADMIN — SENNA PLUS 2 TABLET(S): 8.6 TABLET ORAL at 21:26

## 2021-01-01 RX ADMIN — DONEPEZIL HYDROCHLORIDE 10 MILLIGRAM(S): 10 TABLET, FILM COATED ORAL at 21:26

## 2021-01-01 RX ADMIN — SODIUM CHLORIDE 3 MILLILITER(S): 9 INJECTION INTRAMUSCULAR; INTRAVENOUS; SUBCUTANEOUS at 13:57

## 2021-01-01 RX ADMIN — Medication 1: at 12:43

## 2021-01-01 RX ADMIN — Medication 81 MILLIGRAM(S): at 12:42

## 2021-01-01 RX ADMIN — MORPHINE SULFATE 4 MILLIGRAM(S): 50 CAPSULE, EXTENDED RELEASE ORAL at 18:50

## 2021-01-01 RX ADMIN — HEPARIN SODIUM 5000 UNIT(S): 5000 INJECTION INTRAVENOUS; SUBCUTANEOUS at 21:27

## 2021-01-01 RX ADMIN — ONDANSETRON 4 MILLIGRAM(S): 8 TABLET, FILM COATED ORAL at 16:02

## 2021-01-01 RX ADMIN — MORPHINE SULFATE 4 MILLIGRAM(S): 50 CAPSULE, EXTENDED RELEASE ORAL at 09:51

## 2021-01-01 RX ADMIN — SODIUM CHLORIDE 1 GRAM(S): 9 INJECTION INTRAMUSCULAR; INTRAVENOUS; SUBCUTANEOUS at 21:06

## 2021-01-01 RX ADMIN — Medication 1: at 17:37

## 2021-01-01 RX ADMIN — MORPHINE SULFATE 4 MILLIGRAM(S): 50 CAPSULE, EXTENDED RELEASE ORAL at 05:25

## 2021-01-01 RX ADMIN — PANTOPRAZOLE SODIUM 40 MILLIGRAM(S): 20 TABLET, DELAYED RELEASE ORAL at 05:50

## 2021-01-01 RX ADMIN — SODIUM CHLORIDE 3 MILLILITER(S): 9 INJECTION INTRAMUSCULAR; INTRAVENOUS; SUBCUTANEOUS at 06:07

## 2021-01-01 RX ADMIN — Medication 325 MILLIGRAM(S): at 22:12

## 2021-01-01 RX ADMIN — SODIUM CHLORIDE 1 GRAM(S): 9 INJECTION INTRAMUSCULAR; INTRAVENOUS; SUBCUTANEOUS at 14:06

## 2021-01-01 RX ADMIN — Medication 50 MILLIGRAM(S): at 05:51

## 2021-01-01 RX ADMIN — Medication 81 MILLIGRAM(S): at 12:49

## 2021-01-01 RX ADMIN — ISOSORBIDE MONONITRATE 30 MILLIGRAM(S): 60 TABLET, EXTENDED RELEASE ORAL at 13:15

## 2021-01-01 RX ADMIN — DONEPEZIL HYDROCHLORIDE 10 MILLIGRAM(S): 10 TABLET, FILM COATED ORAL at 21:21

## 2021-01-01 RX ADMIN — MORPHINE SULFATE 4 MILLIGRAM(S): 50 CAPSULE, EXTENDED RELEASE ORAL at 23:37

## 2021-01-01 RX ADMIN — SODIUM CHLORIDE 1000 MILLILITER(S): 9 INJECTION, SOLUTION INTRAVENOUS at 21:22

## 2021-01-01 RX ADMIN — DONEPEZIL HYDROCHLORIDE 10 MILLIGRAM(S): 10 TABLET, FILM COATED ORAL at 22:31

## 2021-01-01 RX ADMIN — TAMSULOSIN HYDROCHLORIDE 0.4 MILLIGRAM(S): 0.4 CAPSULE ORAL at 21:26

## 2021-01-01 RX ADMIN — HYDROMORPHONE HYDROCHLORIDE 0.5 MILLIGRAM(S): 2 INJECTION INTRAMUSCULAR; INTRAVENOUS; SUBCUTANEOUS at 14:30

## 2021-01-01 RX ADMIN — SODIUM CHLORIDE 3 MILLILITER(S): 9 INJECTION INTRAMUSCULAR; INTRAVENOUS; SUBCUTANEOUS at 21:21

## 2021-01-01 RX ADMIN — CYPROHEPTADINE HYDROCHLORIDE 4 MILLIGRAM(S): 4 TABLET ORAL at 22:26

## 2021-01-01 RX ADMIN — DONEPEZIL HYDROCHLORIDE 10 MILLIGRAM(S): 10 TABLET, FILM COATED ORAL at 21:06

## 2021-01-01 RX ADMIN — SODIUM CHLORIDE 3 MILLILITER(S): 9 INJECTION INTRAMUSCULAR; INTRAVENOUS; SUBCUTANEOUS at 05:19

## 2021-01-01 RX ADMIN — SIMVASTATIN 40 MILLIGRAM(S): 20 TABLET, FILM COATED ORAL at 21:06

## 2021-01-01 RX ADMIN — MORPHINE SULFATE 4 MILLIGRAM(S): 50 CAPSULE, EXTENDED RELEASE ORAL at 18:23

## 2021-01-01 RX ADMIN — PANTOPRAZOLE SODIUM 40 MILLIGRAM(S): 20 TABLET, DELAYED RELEASE ORAL at 06:33

## 2021-01-01 RX ADMIN — LIDOCAINE 1 PATCH: 4 CREAM TOPICAL at 10:00

## 2021-01-01 RX ADMIN — FINASTERIDE 5 MILLIGRAM(S): 5 TABLET, FILM COATED ORAL at 10:21

## 2021-01-01 RX ADMIN — DONEPEZIL HYDROCHLORIDE 10 MILLIGRAM(S): 10 TABLET, FILM COATED ORAL at 21:34

## 2021-01-01 RX ADMIN — FINASTERIDE 5 MILLIGRAM(S): 5 TABLET, FILM COATED ORAL at 14:06

## 2021-01-01 RX ADMIN — CYPROHEPTADINE HYDROCHLORIDE 4 MILLIGRAM(S): 4 TABLET ORAL at 22:31

## 2021-01-01 RX ADMIN — SODIUM CHLORIDE 3 MILLILITER(S): 9 INJECTION INTRAMUSCULAR; INTRAVENOUS; SUBCUTANEOUS at 13:13

## 2021-01-01 RX ADMIN — MORPHINE SULFATE 4 MILLIGRAM(S): 50 CAPSULE, EXTENDED RELEASE ORAL at 13:12

## 2021-01-01 RX ADMIN — SODIUM CHLORIDE 1 GRAM(S): 9 INJECTION INTRAMUSCULAR; INTRAVENOUS; SUBCUTANEOUS at 12:42

## 2021-01-01 RX ADMIN — Medication 81 MILLIGRAM(S): at 12:40

## 2021-01-01 RX ADMIN — SODIUM CHLORIDE 3 MILLILITER(S): 9 INJECTION INTRAMUSCULAR; INTRAVENOUS; SUBCUTANEOUS at 05:05

## 2021-01-01 RX ADMIN — MORPHINE SULFATE 4 MILLIGRAM(S): 50 CAPSULE, EXTENDED RELEASE ORAL at 17:25

## 2021-01-01 RX ADMIN — FINASTERIDE 5 MILLIGRAM(S): 5 TABLET, FILM COATED ORAL at 12:40

## 2021-01-01 RX ADMIN — IRON SUCROSE 110 MILLIGRAM(S): 20 INJECTION, SOLUTION INTRAVENOUS at 10:19

## 2021-01-01 RX ADMIN — MORPHINE SULFATE 4 MILLIGRAM(S): 50 CAPSULE, EXTENDED RELEASE ORAL at 23:22

## 2021-01-01 RX ADMIN — GABAPENTIN 100 MILLIGRAM(S): 400 CAPSULE ORAL at 12:40

## 2021-01-01 RX ADMIN — PANTOPRAZOLE SODIUM 40 MILLIGRAM(S): 20 TABLET, DELAYED RELEASE ORAL at 05:25

## 2021-01-01 RX ADMIN — Medication 325 MILLIGRAM(S): at 12:40

## 2021-01-01 RX ADMIN — Medication 1: at 12:34

## 2021-01-01 RX ADMIN — MORPHINE SULFATE 4 MILLIGRAM(S): 50 CAPSULE, EXTENDED RELEASE ORAL at 15:15

## 2021-01-01 RX ADMIN — IRON SUCROSE 210 MILLIGRAM(S): 20 INJECTION, SOLUTION INTRAVENOUS at 18:07

## 2021-01-01 RX ADMIN — SODIUM CHLORIDE 1000 MILLILITER(S): 9 INJECTION INTRAMUSCULAR; INTRAVENOUS; SUBCUTANEOUS at 15:47

## 2021-01-01 RX ADMIN — ISOSORBIDE MONONITRATE 30 MILLIGRAM(S): 60 TABLET, EXTENDED RELEASE ORAL at 12:49

## 2021-01-01 RX ADMIN — Medication 50 MILLIGRAM(S): at 05:50

## 2021-01-01 RX ADMIN — CYPROHEPTADINE HYDROCHLORIDE 4 MILLIGRAM(S): 4 TABLET ORAL at 21:06

## 2021-01-01 RX ADMIN — HYDROMORPHONE HYDROCHLORIDE 0.5 MILLIGRAM(S): 2 INJECTION INTRAMUSCULAR; INTRAVENOUS; SUBCUTANEOUS at 14:09

## 2021-01-01 RX ADMIN — SODIUM CHLORIDE 3 MILLILITER(S): 9 INJECTION INTRAMUSCULAR; INTRAVENOUS; SUBCUTANEOUS at 12:44

## 2021-01-01 RX ADMIN — CYPROHEPTADINE HYDROCHLORIDE 4 MILLIGRAM(S): 4 TABLET ORAL at 22:13

## 2021-01-01 RX ADMIN — SODIUM CHLORIDE 3 MILLILITER(S): 9 INJECTION INTRAMUSCULAR; INTRAVENOUS; SUBCUTANEOUS at 05:51

## 2021-01-01 RX ADMIN — CEFTRIAXONE 100 MILLIGRAM(S): 500 INJECTION, POWDER, FOR SOLUTION INTRAMUSCULAR; INTRAVENOUS at 17:16

## 2021-01-01 RX ADMIN — Medication 50 MILLIGRAM(S): at 05:25

## 2021-01-01 RX ADMIN — SODIUM CHLORIDE 1 GRAM(S): 9 INJECTION INTRAMUSCULAR; INTRAVENOUS; SUBCUTANEOUS at 22:35

## 2021-01-01 RX ADMIN — SIMVASTATIN 40 MILLIGRAM(S): 20 TABLET, FILM COATED ORAL at 21:26

## 2021-01-01 RX ADMIN — SODIUM CHLORIDE 3 MILLILITER(S): 9 INJECTION INTRAMUSCULAR; INTRAVENOUS; SUBCUTANEOUS at 05:08

## 2021-01-01 RX ADMIN — TAMSULOSIN HYDROCHLORIDE 0.4 MILLIGRAM(S): 0.4 CAPSULE ORAL at 21:43

## 2021-01-01 RX ADMIN — Medication 50 MILLIGRAM(S): at 06:27

## 2021-01-01 RX ADMIN — SODIUM CHLORIDE 3 MILLILITER(S): 9 INJECTION INTRAMUSCULAR; INTRAVENOUS; SUBCUTANEOUS at 21:11

## 2021-01-01 RX ADMIN — Medication 650 MILLIGRAM(S): at 22:22

## 2021-01-01 RX ADMIN — MORPHINE SULFATE 4 MILLIGRAM(S): 50 CAPSULE, EXTENDED RELEASE ORAL at 09:47

## 2021-01-01 RX ADMIN — Medication 1: at 12:50

## 2021-01-01 RX ADMIN — Medication 50 GRAM(S): at 09:44

## 2021-01-01 RX ADMIN — DONEPEZIL HYDROCHLORIDE 10 MILLIGRAM(S): 10 TABLET, FILM COATED ORAL at 21:43

## 2021-01-01 RX ADMIN — Medication 81 MILLIGRAM(S): at 13:15

## 2021-01-01 RX ADMIN — SODIUM CHLORIDE 1000 MILLILITER(S): 9 INJECTION INTRAMUSCULAR; INTRAVENOUS; SUBCUTANEOUS at 13:02

## 2021-01-01 RX ADMIN — SIMVASTATIN 40 MILLIGRAM(S): 20 TABLET, FILM COATED ORAL at 22:35

## 2021-01-01 RX ADMIN — TAMSULOSIN HYDROCHLORIDE 0.4 MILLIGRAM(S): 0.4 CAPSULE ORAL at 21:09

## 2021-01-01 RX ADMIN — Medication 81 MILLIGRAM(S): at 14:06

## 2021-01-01 RX ADMIN — PANTOPRAZOLE SODIUM 40 MILLIGRAM(S): 20 TABLET, DELAYED RELEASE ORAL at 06:27

## 2021-01-01 RX ADMIN — TAMSULOSIN HYDROCHLORIDE 0.4 MILLIGRAM(S): 0.4 CAPSULE ORAL at 21:22

## 2021-01-01 RX ADMIN — FINASTERIDE 5 MILLIGRAM(S): 5 TABLET, FILM COATED ORAL at 12:25

## 2021-01-01 RX ADMIN — Medication 1: at 11:36

## 2021-01-01 RX ADMIN — TAMSULOSIN HYDROCHLORIDE 0.4 MILLIGRAM(S): 0.4 CAPSULE ORAL at 21:35

## 2021-01-01 RX ADMIN — MORPHINE SULFATE 4 MILLIGRAM(S): 50 CAPSULE, EXTENDED RELEASE ORAL at 14:56

## 2021-01-01 RX ADMIN — Medication 50 MILLIGRAM(S): at 08:39

## 2021-01-01 RX ADMIN — Medication 20 MILLIGRAM(S): at 21:06

## 2021-01-01 RX ADMIN — ISOSORBIDE MONONITRATE 30 MILLIGRAM(S): 60 TABLET, EXTENDED RELEASE ORAL at 12:43

## 2021-01-01 RX ADMIN — SODIUM CHLORIDE 1 GRAM(S): 9 INJECTION INTRAMUSCULAR; INTRAVENOUS; SUBCUTANEOUS at 05:20

## 2021-01-01 RX ADMIN — Medication 1: at 09:28

## 2021-01-01 RX ADMIN — GABAPENTIN 100 MILLIGRAM(S): 400 CAPSULE ORAL at 12:26

## 2021-01-01 RX ADMIN — FINASTERIDE 5 MILLIGRAM(S): 5 TABLET, FILM COATED ORAL at 13:15

## 2021-01-01 RX ADMIN — Medication 50 MILLIGRAM(S): at 05:24

## 2021-01-01 RX ADMIN — Medication 4000 MILLILITER(S): at 18:07

## 2021-01-01 RX ADMIN — MORPHINE SULFATE 4 MILLIGRAM(S): 50 CAPSULE, EXTENDED RELEASE ORAL at 12:00

## 2021-01-01 RX ADMIN — SODIUM CHLORIDE 3 MILLILITER(S): 9 INJECTION INTRAMUSCULAR; INTRAVENOUS; SUBCUTANEOUS at 12:41

## 2021-01-01 RX ADMIN — FINASTERIDE 5 MILLIGRAM(S): 5 TABLET, FILM COATED ORAL at 12:50

## 2021-01-01 RX ADMIN — Medication 50 MILLIGRAM(S): at 05:20

## 2021-01-01 RX ADMIN — MORPHINE SULFATE 4 MILLIGRAM(S): 50 CAPSULE, EXTENDED RELEASE ORAL at 22:26

## 2021-01-01 RX ADMIN — Medication 1: at 18:08

## 2021-01-01 RX ADMIN — TAMSULOSIN HYDROCHLORIDE 0.4 MILLIGRAM(S): 0.4 CAPSULE ORAL at 22:31

## 2021-01-01 RX ADMIN — SODIUM CHLORIDE 75 MILLILITER(S): 9 INJECTION, SOLUTION INTRAVENOUS at 15:01

## 2021-01-01 RX ADMIN — SODIUM CHLORIDE 3 MILLILITER(S): 9 INJECTION INTRAMUSCULAR; INTRAVENOUS; SUBCUTANEOUS at 21:31

## 2021-01-01 RX ADMIN — MORPHINE SULFATE 4 MILLIGRAM(S): 50 CAPSULE, EXTENDED RELEASE ORAL at 19:20

## 2021-01-01 RX ADMIN — GABAPENTIN 100 MILLIGRAM(S): 400 CAPSULE ORAL at 12:43

## 2021-01-01 RX ADMIN — CYPROHEPTADINE HYDROCHLORIDE 4 MILLIGRAM(S): 4 TABLET ORAL at 20:23

## 2021-01-01 RX ADMIN — Medication 81 MILLIGRAM(S): at 20:44

## 2021-01-01 RX ADMIN — SODIUM CHLORIDE 3 MILLILITER(S): 9 INJECTION INTRAMUSCULAR; INTRAVENOUS; SUBCUTANEOUS at 22:13

## 2021-01-01 RX ADMIN — SIMVASTATIN 40 MILLIGRAM(S): 20 TABLET, FILM COATED ORAL at 21:43

## 2021-01-01 RX ADMIN — SIMVASTATIN 40 MILLIGRAM(S): 20 TABLET, FILM COATED ORAL at 22:12

## 2021-01-01 RX ADMIN — SODIUM CHLORIDE 1 GRAM(S): 9 INJECTION INTRAMUSCULAR; INTRAVENOUS; SUBCUTANEOUS at 14:48

## 2021-01-01 RX ADMIN — LIDOCAINE 1 PATCH: 4 CREAM TOPICAL at 23:39

## 2021-01-01 RX ADMIN — LIDOCAINE 1 PATCH: 4 CREAM TOPICAL at 19:53

## 2021-03-04 NOTE — ED PROVIDER NOTE - BIRTH SEX
Mom called and said to send [address] in [place]. She will call to schedule when pt starts her cycle. Male

## 2021-03-04 NOTE — ED ADULT TRIAGE NOTE - CHIEF COMPLAINT QUOTE
Pt with co rectal bleeding x 2 weeks with hx of diverticulitis  Pt had blood work yesterday showing that he is anemic 7/6/26. pt reports dizziness and intermittent chest pain.

## 2021-03-04 NOTE — ED PROVIDER NOTE - ATTENDING CONTRIBUTION TO CARE
Dr. Jerez: 79 yo male with HTN, CAD, DM, GERD, HLD, in ED with anemia.  Pt states that his PMD sent him to ED because he is "losing blood" but he doesn't know how.  Pt denies any bloody/black stool or bloody vomit.  Of note, on presentation to ED, EKG showing possible inferior STEMI.  Old EKG in system looks similar, and pt denies any CP/SOB.  He states that he has had CP in the past, but not recently.  Very comfortable appearing on presentation.  On exam pt well appearing, in NAD, heart RRR, lungs CTAB, abd NTND, extremities without swelling, strength 5/5 in all extremities and skin without rash.  Rectal exam performed by resident showing brown stool.

## 2021-03-04 NOTE — ED ADULT NURSE NOTE - OBJECTIVE STATEMENT
Pt w/ hx of HTN CAD w/stents, HLD DM GERD received to rm 10 aaox3 ambulatory Pt states "I'm losing blood."  Pt denies observing blood In stool urine or vomit/sputum and has no ecchymosis or lacerations.  Pt's EKG in triage is read as STEMI however per MD EKG similar to old EKG in chart.  Pt denies chest pain SOB dizziness headache fatigue ROMERO palpitations.  Pt p/w NAD breaths even unlabored skin warm dry intact.  18 g IV access obtained at R.AC labs as ordered will endorse report to primary RN.

## 2021-03-04 NOTE — ED PROVIDER NOTE - OBJECTIVE STATEMENT
81 y/o M, PMH of HTN, CAD (w/ stents), DM, GERD, HLD, Diverticulitis, and chronic anemia, presents to the ED for rectal bleeding. Pt states that his PMD sent him to ED because he is "losing blood" but he doesn't know how. He does recall having some rectal bleeding a couple weeks ago, but denies any recent blood per rectum or in stool. Also denies any recent CP, SOB, hematemesis, melena, HA, 81 y/o M, PMH of HTN, CAD (w/ stents), DM, GERD, HLD, Diverticulitis, and chronic anemia, presents to the ED for rectal bleeding. Pt states that his PMD sent him to ED because he is "losing blood" but he doesn't know how. He does recall having some rectal bleeding a couple weeks ago, but denies any recent blood per rectum or in stool. Also denies any recent CP, SOB, hematemesis, melena, fever, chills, abd pain, n/v/d, rashes, or urinary sx.    PMD: Dr. Devicka Persaud ([phone])

## 2021-03-04 NOTE — ED PROVIDER NOTE - CLINICAL SUMMARY MEDICAL DECISION MAKING FREE TEXT BOX
79 y/o M, PMH of HTN, CAD (w/ stents), DM, GERD, HLD, Diverticulitis, and chronic anemia, presents to the ED for rectal bleeding. Pt denies any current rectal bleeding, hematemesis, melena, or bloody stools.  On presentation to ED, EKG showed possible inferior STEMI. Past EKG in system showed similar. Pt not c/o CP/SOB, thus lower suspicion for ACS at this time.  Main concern for occult bleeding.  Will check fecal occult, basic labs, coags, trops, and CXR. Reassess.

## 2021-03-04 NOTE — ED PROVIDER NOTE - RECTUM PROSTATE EXAM
Normal rectal exam; no external/internal hemorrhoids; no gross blood; brown stool; Chaperoned by Merlin Jerez MD.

## 2021-03-04 NOTE — ED PROVIDER NOTE - CHIEF COMPLAINT
The patient is a 80y Male complaining of 
I will SWITCH the dose or number of times a day I take the medications listed below when I get home from the hospital:  None

## 2021-03-04 NOTE — ED ADULT NURSE NOTE - NSIMPLEMENTINTERV_GEN_ALL_ED
Implemented All Fall with Harm Risk Interventions:  [place] to call system. Call bell, personal items and telephone within reach. Instruct patient to call for assistance. Room bathroom lighting operational. Non-slip footwear when patient is off stretcher. Physically safe environment: no spills, clutter or unnecessary equipment. Stretcher in lowest position, wheels locked, appropriate side rails in place. Provide visual cue, wrist band, yellow gown, etc. Monitor gait and stability. Monitor for mental status changes and reorient to person, place, and time. Review medications for side effects contributing to fall risk. Reinforce activity limits and safety measures with patient and family. Provide visual clues: red socks.

## 2021-03-05 NOTE — H&P ADULT - PROBLEM SELECTOR PLAN 8
1.  Name of PCP: Dr. Chamberlain  2.  PCP Contacted on Admission: [ ] Y    [ ] N    3.  PCP contacted at Discharge: [ ] Y    [ ] N    [ ] N/A  4.  Post-Discharge Appointment Date and Location:  5.  Summary of Handoff given to PCP: cont metoprolol, isosorbide - cont metoprolol, isosorbide

## 2021-03-05 NOTE — H&P ADULT - NEGATIVE GENERAL GENITOURINARY SYMPTOMS
no hematuria/no renal colic/no flank pain L/no flank pain R no hematuria/no renal colic/no flank pain L/no flank pain R/normal urinary frequency

## 2021-03-05 NOTE — H&P ADULT - NSHPPHYSICALEXAM_GEN_ALL_CORE
Vital Signs Last 24 Hrs  T(C): 36.8 (05 Mar 2021 06:09), Max: 36.8 (05 Mar 2021 06:09)  T(F): 98.2 (05 Mar 2021 06:09), Max: 98.2 (05 Mar 2021 06:09)  HR: 66 (05 Mar 2021 06:09) (65 - 73)  BP: 185/75 (05 Mar 2021 06:09) (129/61 - 185/75)  BP(mean): --  RR: 18 (05 Mar 2021 06:09) (16 - 18)  SpO2: 100% (05 Mar 2021 06:09) (98% - 100%)

## 2021-03-05 NOTE — H&P ADULT - PROBLEM SELECTOR PLAN 4
cont flomax, dutasteride will cont asa for now given patient denies any active bleeding - No current bleeding at present but will hold aspirin for now, recheck FOBT, check orthostatics, monitor H/H  - f/u GI to see if aspirin can be restarted on patient

## 2021-03-05 NOTE — H&P ADULT - NSHPLABSRESULTS_GEN_ALL_CORE
EKG: NSR 65<0.5mm JOHN in II, III, AVF TWI in AVR< AVL, V1 QTC: 411                          7.2    7.30  )-----------( 270      ( 04 Mar 2021 15:41 )             24.9     03-04    134<L>  |  99  |  10  ----------------------------<  118<H>  5.3   |  25  |  1.17    Ca    9.1      04 Mar 2021 15:41    TPro  6.8  /  Alb  4.2  /  TBili  0.4  /  DBili  x   /  AST  14  /  ALT  14  /  AlkPhos  76  03-04 EKG: NSR 65<0.5mm JOHN in II, III, AVF TWI in AVR< AVL, V1 QTC: 411                          7.2    7.30  )-----------( 270      ( 04 Mar 2021 15:41 )             24.9     03-04    134<L>  |  99  |  10  ----------------------------<  118<H>  5.3   |  25  |  1.17    Ca    9.1      04 Mar 2021 15:41    TPro  6.8  /  Alb  4.2  /  TBili  0.4  /  DBili  x   /  AST  14  /  ALT  14  /  AlkPhos  76  03-04    Troponin T, High Sensitivity (03.04.21 @ 15:41)   Troponin T, High Sensitivity Result: 16  Troponin T, High Sensitivity (03.04.21 @ 16:42)   Troponin T, High Sensitivity Result: 18 EKG: NSR 65, 0.5-1mm JOHN in II, III, AVF TWI in AVR, AVL, V1 QTC: 411 similar to prior EKG from 2018                          7.2    7.30  )-----------( 270      ( 04 Mar 2021 15:41 )             24.9     03-04    134<L>  |  99  |  10  ----------------------------<  118<H>  5.3   |  25  |  1.17    Ca    9.1      04 Mar 2021 15:41    TPro  6.8  /  Alb  4.2  /  TBili  0.4  /  DBili  x   /  AST  14  /  ALT  14  /  AlkPhos  76  03-04    Troponin T, High Sensitivity (03.04.21 @ 15:41)   Troponin T, High Sensitivity Result: 16  Troponin T, High Sensitivity (03.04.21 @ 16:42)   Troponin T, High Sensitivity Result: 18

## 2021-03-05 NOTE — H&P ADULT - NSICDXPASTSURGICALHX_GEN_ALL_CORE_FT
PAST SURGICAL HISTORY:  H/O hernia repair     History of appendectomy     History of appendectomy     History of coronary artery stent placement     Stented coronary artery

## 2021-03-05 NOTE — H&P ADULT - PROBLEM SELECTOR PLAN 10
1.  Name of PCP: Dr. Chamberlain  2.  PCP Contacted on Admission: [ ] Y    [ ] N    3.  PCP contacted at Discharge: [ ] Y    [ ] N    [ ] N/A  4.  Post-Discharge Appointment Date and Location:  5.  Summary of Handoff given to PCP: 1.  Name of PCP: Dr. Devicka Persaud  2.  PCP Contacted on Admission: [ ] Y    [ ] N    3.  PCP contacted at Discharge: [ ] Y    [ ] N    [ ] N/A  4.  Post-Discharge Appointment Date and Location:  5.  Summary of Handoff given to PCP:

## 2021-03-05 NOTE — H&P ADULT - PROBLEM SELECTOR PLAN 1
Admit to medicine  check cbc, bmp, a1c, anemia panel  Monitor H/H  no signs of activebleeding --> occult neg  GI consulted  daughter to reach out to previous PMD to get baseline Hgb Admit to medicine  check cbc, bmp, a1c, anemia panel  Monitor H/H  no signs of active bleeding --> occult neg, repeat Occult ordered   GI consulted  Anemia panel  daughter to reach out to previous PMD to get baseline Hgb - Admit to medicine  - check cbc, bmp, a1c, anemia panel  - Monitor H/H  - no signs of active bleeding --> occult neg, repeat Occult ordered   - GI consulted  - Anemia panel  - daughter to reach out to previous PMD to get baseline Hgb (on our labs prior levels were ~Hgb 10)  - Daughter reports patient with history of hemorrhoids and diverticulosis

## 2021-03-05 NOTE — CHART NOTE - NSCHARTNOTEFT_GEN_A_CORE
Brief GI update  ==========  Discussed with patient's daughter (Shreya Cortez). She reports patient was complaining of intermittent blood seen on toilet paper in the last few months.  No recent colonoscopy or endoscopy. Given significant JASPER and concern for GI blood loss would plan for colonoscopy and endoscopy on Monday.    Recommendations:  - Clear liquid diet on Sunday  - NPO midnight Sunday night  - Prep to be ordered by GI fellow  - Cardiology recs appreciated, patient is cleared for procedure    Thank you for involving us in the care of this patient. Please reach out if any further questions.    Stan Durham, PGY-4  GI Fellow    Available on [employer] Teams  Pager [phone] ([facility]) or [phone] ([facility]  After 5PM/Weekends, please contact the on-call GI fellow: [phone]  Available through [employer] Teams

## 2021-03-05 NOTE — CONSULT NOTE ADULT - SUBJECTIVE AND OBJECTIVE BOX
Chief Complaint:  Patient is a 80y old  Male who presents with a chief complaint of Anemia (05 Mar 2021 03:17)      HPI:  Mr. Chamberlain is an 81 yo M with PMH of HTN, CAD (s/p stents-last 5 years ago), DM type 2 (not on insulin), ?Dementia (on Donepezil), GERD, HLD, BPH, known diverticular disease and hemorrhoids who presents with anemia.       Patient was sent from PCP's office for Hb 7.5. He endorses BRBPR        Patient states he went to PMD for a routine blood work and found his Hgb was 7.5 and was sent to ED for further eval. He states he had episodes of blood in stool about 2-3 weeks ago that he believes were due to supplements he was taking, he stopped all the supplements and then the bleeding self resolved. Has restarted the supplements without any additional bleeding episodes, denies any melena. Does state that he feels weak and dizzy when ambulating and has had some falls as a result (no LOC) though unable to state when the last fall was. Also complains of episodes of midsternal chest pain which radiates to R side for years, last episode was more than two weeks ago. Denies any pain when ambulating though states that he is overall not too ambulatory, does get tired when ambulating, cannot state how long that has been going on for. Denies fevers, chills, cough, LOC, SOB, abdominal pain, nausea, vomiting, melena, LE edema or dysuria.     Per daughter: Patient had blood work in August with another PMD and unsure of patient's baseline Hgb. Patient just started to follow up with Dr. Chamberlain recently after patient had episode of blood in the stool. Per daughter, patient orders herbal, organic vitamins off "the magazines." She is not sure of the name of these vitamins.     On arrival to the ED, his vitals were T 97.2, P 67, /61, RR 18, o2 sat 98% RA. His lab work showed anemia to 7.2 with negative FOBT and low and stable trops. EKG showed concerning for STEMI but was unchanged when compared to prior. His CXR showed clear lungs. He was admitted to medicine on telemetry.   Allergies:  No Known Allergies      Home Medications:    Hospital Medications:  aspirin enteric coated 81 milliGRAM(s) Oral daily  cyproheptadine 4 milliGRAM(s) Oral <User Schedule>  dextrose 40% Gel 15 Gram(s) Oral once  dextrose 5%. 1000 milliLiter(s) IV Continuous <Continuous>  dextrose 5%. 1000 milliLiter(s) IV Continuous <Continuous>  dextrose 50% Injectable 25 Gram(s) IV Push once  dextrose 50% Injectable 12.5 Gram(s) IV Push once  dextrose 50% Injectable 25 Gram(s) IV Push once  donepezil 10 milliGRAM(s) Oral at bedtime  finasteride 5 milliGRAM(s) Oral daily  gabapentin 100 milliGRAM(s) Oral daily  glucagon  Injectable 1 milliGRAM(s) IntraMuscular once  isosorbide   mononitrate ER Tablet (IMDUR) 30 milliGRAM(s) Oral daily  metoprolol succinate ER 50 milliGRAM(s) Oral daily  pantoprazole    Tablet 40 milliGRAM(s) Oral before breakfast  simvastatin 40 milliGRAM(s) Oral at bedtime  sodium chloride 0.9% lock flush 3 milliLiter(s) IV Push every 8 hours  tamsulosin 0.4 milliGRAM(s) Oral at bedtime      PMHX/PSHX:  GERD (gastroesophageal reflux disease)    Essential hypertension    HLD (hyperlipidemia)    Intraparenchymal hemorrhage of brain    DM (diabetes mellitus)    CAD (coronary artery disease)    TIA (transient ischemic attack)    HLD (hyperlipidemia)    GERD (gastroesophageal reflux disease)    Anemia    Diabetes    BPH (benign prostatic hyperplasia)    HTN (hypertension)    History of appendectomy    History of coronary artery stent placement    No significant past surgical history    Stented coronary artery    H/O hernia repair    History of appendectomy        Family history:  No pertinent family history in first degree relatives    Family history of heart disease        Denies family history of colon cancer/polyps, stomach cancer/polyps, pancreatic cancer/masses, liver cancer/disease, ovarian cancer and endometrial cancer.    Social History:     Tob: Denies  EtOH: Denies  Illicit Drugs: Denies    ROS:     General:  No wt loss, fevers, chills, night sweats, fatigue  Eyes:  Good vision, no reported pain  ENT:  No sore throat, pain, runny nose, dysphagia  CV:  No pain, palpitations, hypo/hypertension  Pulm:  No dyspnea, cough, tachypnea, wheezing  GI:  No pain, No nausea, No vomiting, No diarrhea, No constipation, No weight loss, No fever, No pruritis, No rectal bleeding, No tarry stools, No dysphagia,  :  No pain, bleeding, incontinence, nocturia  Muscle:  No pain, weakness  Neuro:  No weakness, tingling, memory problems  Psych:  No fatigue, insomnia, mood problems, depression  Endocrine:  No polyuria, polydipsia, cold/heat intolerance  Heme:  No petechiae, ecchymosis, easy bruisability  Skin:  No rash, tattoos, scars, edema    PHYSICAL EXAM:     GENERAL:  No acute distress  HEENT:  Normocephalic/atraumatic, no scleral icterus  CHEST:  Clear to auscultation bilaterally, no wheezes/rales/ronchi, no accessory muscle use  HEART:  Regular rate and rhythm, no murmurs/rubs/gallops  ABDOMEN:  Soft, non-tender, non-distended, normoactive bowel sounds,  no masses, no hepato-splenomegaly, no signs of chronic liver disease  EXTREMITIES: No cyanosis, clubbing, or edema  SKIN:  No rash/erythema/ecchymoses/petechiae/wounds/abscess/warm/dry  NEURO:  Alert and oriented x 3, no asterixis    Vital Signs:  Vital Signs Last 24 Hrs  T(C): 36.8 (05 Mar 2021 06:09), Max: 36.8 (05 Mar 2021 06:09)  T(F): 98.2 (05 Mar 2021 06:09), Max: 98.2 (05 Mar 2021 06:09)  HR: 66 (05 Mar 2021 06:09) (65 - 73)  BP: 185/75 (05 Mar 2021 06:09) (129/61 - 185/75)  BP(mean): --  RR: 18 (05 Mar 2021 06:09) (16 - 18)  SpO2: 100% (05 Mar 2021 06:09) (98% - 100%)  Daily Height in cm: 157.48 (04 Mar 2021 14:28)    Daily     LABS:                        7.9    8.29  )-----------( 287      ( 05 Mar 2021 06:04 )             28.1     Mean Cell Volume: 66.7 fL (03-05-21 @ 06:04)    03-05    134<L>  |  98  |  12  ----------------------------<  103<H>  4.6   |  27  |  1.02    Ca    9.4      05 Mar 2021 06:04    TPro  6.8  /  Alb  4.2  /  TBili  0.4  /  DBili  x   /  AST  14  /  ALT  14  /  AlkPhos  76  03-04    LIVER FUNCTIONS - ( 04 Mar 2021 15:41 )  Alb: 4.2 g/dL / Pro: 6.8 g/dL / ALK PHOS: 76 U/L / ALT: 14 U/L / AST: 14 U/L / GGT: x           PT/INR - ( 04 Mar 2021 15:41 )   PT: 11.4 sec;   INR: 0.99 ratio         PTT - ( 04 Mar 2021 15:41 )  PTT:29.4 sec                            7.9    8.29  )-----------( 287      ( 05 Mar 2021 06:04 )             28.1                         7.2    7.30  )-----------( 270      ( 04 Mar 2021 15:41 )             24.9       Imaging:           Chief Complaint:  Patient is a 80y old  Male who presents with a chief complaint of Anemia (05 Mar 2021 03:17)      HPI:  Mr. Chamberlain is an 79 yo M with PMH of HTN, CAD (s/p stents-last 5 years ago), DM type 2 (not on insulin), ?Dementia (on Donepezil), GERD, HLD, BPH, known diverticular disease and hemorrhoids who presents with anemia.     Patient was sent from PCP's office for Hb 7.5.         Patient states he went to PMD for a routine blood work and found his Hgb was 7.5 and was sent to ED for further eval. He states he had episodes of blood in stool about 2-3 weeks ago that he believes were due to supplements he was taking, he stopped all the supplements and then the bleeding self resolved. Has restarted the supplements without any additional bleeding episodes, denies any melena. Does state that he feels weak and dizzy when ambulating and has had some falls as a result (no LOC) though unable to state when the last fall was. Also complains of episodes of midsternal chest pain which radiates to R side for years, last episode was more than two weeks ago. Denies any pain when ambulating though states that he is overall not too ambulatory, does get tired when ambulating, cannot state how long that has been going on for. Denies fevers, chills, cough, LOC, SOB, abdominal pain, nausea, vomiting, melena, LE edema or dysuria.     Per daughter: Patient had blood work in August with another PMD and unsure of patient's baseline Hgb. Patient just started to follow up with Dr. Chamberlain recently after patient had episode of blood in the stool. Per daughter, patient orders herbal, organic vitamins off "the magazines." She is not sure of the name of these vitamins.     On arrival to the ED, his vitals were T 97.2, P 67, /61, RR 18, o2 sat 98% RA. His lab work showed anemia to 7.2 with negative FOBT and low and stable trops. EKG showed concerning for STEMI but was unchanged when compared to prior. His CXR showed clear lungs. He was admitted to medicine on telemetry.   Allergies:  No Known Allergies      Home Medications:    Hospital Medications:  aspirin enteric coated 81 milliGRAM(s) Oral daily  cyproheptadine 4 milliGRAM(s) Oral <User Schedule>  dextrose 40% Gel 15 Gram(s) Oral once  dextrose 5%. 1000 milliLiter(s) IV Continuous <Continuous>  dextrose 5%. 1000 milliLiter(s) IV Continuous <Continuous>  dextrose 50% Injectable 25 Gram(s) IV Push once  dextrose 50% Injectable 12.5 Gram(s) IV Push once  dextrose 50% Injectable 25 Gram(s) IV Push once  donepezil 10 milliGRAM(s) Oral at bedtime  finasteride 5 milliGRAM(s) Oral daily  gabapentin 100 milliGRAM(s) Oral daily  glucagon  Injectable 1 milliGRAM(s) IntraMuscular once  isosorbide   mononitrate ER Tablet (IMDUR) 30 milliGRAM(s) Oral daily  metoprolol succinate ER 50 milliGRAM(s) Oral daily  pantoprazole    Tablet 40 milliGRAM(s) Oral before breakfast  simvastatin 40 milliGRAM(s) Oral at bedtime  sodium chloride 0.9% lock flush 3 milliLiter(s) IV Push every 8 hours  tamsulosin 0.4 milliGRAM(s) Oral at bedtime      PMHX/PSHX:  GERD (gastroesophageal reflux disease)    Essential hypertension    HLD (hyperlipidemia)    Intraparenchymal hemorrhage of brain    DM (diabetes mellitus)    CAD (coronary artery disease)    TIA (transient ischemic attack)    HLD (hyperlipidemia)    GERD (gastroesophageal reflux disease)    Anemia    Diabetes    BPH (benign prostatic hyperplasia)    HTN (hypertension)    History of appendectomy    History of coronary artery stent placement    No significant past surgical history    Stented coronary artery    H/O hernia repair    History of appendectomy        Family history:  No pertinent family history in first degree relatives    Family history of heart disease        Denies family history of colon cancer/polyps, stomach cancer/polyps, pancreatic cancer/masses, liver cancer/disease, ovarian cancer and endometrial cancer.    Social History:     Tob: Denies  EtOH: Denies  Illicit Drugs: Denies    ROS:     General:  No wt loss, fevers, chills, night sweats, fatigue  Eyes:  Good vision, no reported pain  ENT:  No sore throat, pain, runny nose, dysphagia  CV:  No pain, palpitations, hypo/hypertension  Pulm:  No dyspnea, cough, tachypnea, wheezing  GI:  No pain, No nausea, No vomiting, No diarrhea, No constipation, No weight loss, No fever, No pruritis, No rectal bleeding, No tarry stools, No dysphagia,  :  No pain, bleeding, incontinence, nocturia  Muscle:  No pain, weakness  Neuro:  No weakness, tingling, memory problems  Psych:  No fatigue, insomnia, mood problems, depression  Endocrine:  No polyuria, polydipsia, cold/heat intolerance  Heme:  No petechiae, ecchymosis, easy bruisability  Skin:  No rash, tattoos, scars, edema    PHYSICAL EXAM:     GENERAL:  No acute distress  HEENT:  Normocephalic/atraumatic, no scleral icterus  CHEST:  Clear to auscultation bilaterally, no wheezes/rales/ronchi, no accessory muscle use  HEART:  Regular rate and rhythm, no murmurs/rubs/gallops  ABDOMEN:  Soft, non-tender, non-distended, normoactive bowel sounds,  no masses, no hepato-splenomegaly, no signs of chronic liver disease  EXTREMITIES: No cyanosis, clubbing, or edema  SKIN:  No rash/erythema/ecchymoses/petechiae/wounds/abscess/warm/dry  NEURO:  Alert and oriented x 3, no asterixis    Vital Signs:  Vital Signs Last 24 Hrs  T(C): 36.8 (05 Mar 2021 06:09), Max: 36.8 (05 Mar 2021 06:09)  T(F): 98.2 (05 Mar 2021 06:09), Max: 98.2 (05 Mar 2021 06:09)  HR: 66 (05 Mar 2021 06:09) (65 - 73)  BP: 185/75 (05 Mar 2021 06:09) (129/61 - 185/75)  BP(mean): --  RR: 18 (05 Mar 2021 06:09) (16 - 18)  SpO2: 100% (05 Mar 2021 06:09) (98% - 100%)  Daily Height in cm: 157.48 (04 Mar 2021 14:28)    Daily     LABS:                        7.9    8.29  )-----------( 287      ( 05 Mar 2021 06:04 )             28.1     Mean Cell Volume: 66.7 fL (03-05-21 @ 06:04)    03-05    134<L>  |  98  |  12  ----------------------------<  103<H>  4.6   |  27  |  1.02    Ca    9.4      05 Mar 2021 06:04    TPro  6.8  /  Alb  4.2  /  TBili  0.4  /  DBili  x   /  AST  14  /  ALT  14  /  AlkPhos  76  03-04    LIVER FUNCTIONS - ( 04 Mar 2021 15:41 )  Alb: 4.2 g/dL / Pro: 6.8 g/dL / ALK PHOS: 76 U/L / ALT: 14 U/L / AST: 14 U/L / GGT: x           PT/INR - ( 04 Mar 2021 15:41 )   PT: 11.4 sec;   INR: 0.99 ratio         PTT - ( 04 Mar 2021 15:41 )  PTT:29.4 sec                            7.9    8.29  )-----------( 287      ( 05 Mar 2021 06:04 )             28.1                         7.2    7.30  )-----------( 270      ( 04 Mar 2021 15:41 )             24.9       Imaging:           Chief Complaint:  Patient is a 80y old  Male who presents with a chief complaint of Anemia (05 Mar 2021 03:17)      HPI:  Mr. Chamberlain is an 81 yo M with PMH of HTN, CAD (s/p stents-last 5 years ago), DM type 2 (not on insulin), ?Dementia (on Donepezil), GERD, HLD, BPH, known diverticular disease and hemorrhoids who presents with anemia.     Patient was sent from PCP's office for Hb 7.5. Patient reports intermittent black stools at home, most recently a few weeks prior to admission. No abdominal pain, nausea, vomiting, dysphagia, early satiety. No NSAIDS use other than ASA. Per chart, patient endorsed to "episodes of blood in stool about 2-3 weeks ago that he believes were due to supplements he was taking, he stopped all the supplements and then the bleeding self resolved", however patient currently denies any similar complaints. Endorses some lightheadedness with some falls at home with no LOC. Of note, patient also complains of intermittent midsternal chest pain - currently does not endorse chest pain. No fever, chills.     Per daughter: Patient had blood work in August with another PMD and unsure of patient's baseline Hgb. Patient just started to follow up with Dr. Chamberlain recently after patient had episode of blood in the stool. Per daughter, patient orders herbal, organic vitamins off "the magazines." She is not sure of the name of these vitamins. Patient had a recent colonoscopy a year prior to admission.     On arrival to the ED, his vitals were T 97.2, P 67, /61, RR 18, o2 sat 98% RA. His lab work showed anemia to 7.2 with negative FOBT and low and stable trops. EKG showed concerning for STEMI but was unchanged when compared to prior. His CXR showed clear lungs. He was admitted to medicine on telemetry.     Allergies:  No Known Allergies      Home Medications:  aspirin 81 mg oral delayed release tablet: 1 tab(s) orally once a day (05 Mar 2021 03:14)  CYPROHEPTADINE 4 MG TABLET: TAKE 1 TABLET (4 MG TOTAL) BY MOUTH NIGHTLY MAX DAILY AMOUNT  4 MG. N/C** (05 Mar 2021 03:14)  DONEPEZIL HCL 10 MG TABLET: 1 tab(s) orally once a day (05 Mar 2021 03:14)  dutasteride 0.5 mg oral capsule: 1 cap(s) orally once a day (05 Mar 2021 03:14)  GABAPENTIN 100 MG CAPSULE: 1 cap(s) orally once a day (05 Mar 2021 03:14)  LOSARTAN POTASSIUM 50 MG TAB:  (05 Mar 2021 01:25)  metFORMIN 850 mg oral tablet: 1 tab(s) orally 2 times a day (05 Mar 2021 03:14)  METOPROLOL SUCC ER 50 MG TAB: 1 tab(s) orally once a day (05 Mar 2021 03:14)  OMEPRAZOLE DR 40 MG CAPSULE: 1 cap(s) orally once a day (05 Mar 2021 03:14)  simvastatin 40 mg oral tablet: 1 tab(s) orally once a day (at bedtime) (05 Mar 2021 03:14)    Hospital Medications:  aspirin enteric coated 81 milliGRAM(s) Oral daily  cyproheptadine 4 milliGRAM(s) Oral <User Schedule>  dextrose 40% Gel 15 Gram(s) Oral once  dextrose 5%. 1000 milliLiter(s) IV Continuous <Continuous>  dextrose 5%. 1000 milliLiter(s) IV Continuous <Continuous>  dextrose 50% Injectable 25 Gram(s) IV Push once  dextrose 50% Injectable 12.5 Gram(s) IV Push once  dextrose 50% Injectable 25 Gram(s) IV Push once  donepezil 10 milliGRAM(s) Oral at bedtime  finasteride 5 milliGRAM(s) Oral daily  gabapentin 100 milliGRAM(s) Oral daily  glucagon  Injectable 1 milliGRAM(s) IntraMuscular once  isosorbide   mononitrate ER Tablet (IMDUR) 30 milliGRAM(s) Oral daily  metoprolol succinate ER 50 milliGRAM(s) Oral daily  pantoprazole    Tablet 40 milliGRAM(s) Oral before breakfast  simvastatin 40 milliGRAM(s) Oral at bedtime  sodium chloride 0.9% lock flush 3 milliLiter(s) IV Push every 8 hours  tamsulosin 0.4 milliGRAM(s) Oral at bedtime      PMHX/PSHX:  GERD (gastroesophageal reflux disease)    Essential hypertension    HLD (hyperlipidemia)    Intraparenchymal hemorrhage of brain    DM (diabetes mellitus)    CAD (coronary artery disease)    TIA (transient ischemic attack)    HLD (hyperlipidemia)    GERD (gastroesophageal reflux disease)    Anemia    Diabetes    BPH (benign prostatic hyperplasia)    HTN (hypertension)    History of appendectomy    History of coronary artery stent placement    No significant past surgical history    Stented coronary artery    H/O hernia repair    History of appendectomy        Family history:  No pertinent family history in first degree relatives    Family history of heart disease        Denies family history of colon cancer/polyps, stomach cancer/polyps, pancreatic cancer/masses, liver cancer/disease, ovarian cancer and endometrial cancer.    Social History:     Tob: Denies  EtOH: Denies  Illicit Drugs: Denies    ROS:     General:  No wt loss, fevers, chills, night sweats, fatigue  Eyes:  Good vision, no reported pain  ENT:  No sore throat, pain, runny nose, dysphagia  CV:  No pain, palpitations, hypo/hypertension  Pulm:  No dyspnea, cough, tachypnea, wheezing  GI:  No pain, No nausea, No vomiting, No diarrhea, No constipation, No weight loss, No fever, No pruritis, No rectal bleeding, No tarry stools, No dysphagia,  :  No pain, bleeding, incontinence, nocturia  Muscle:  No pain, weakness  Neuro:  No weakness, tingling, memory problems  Psych:  No fatigue, insomnia, mood problems, depression  Endocrine:  No polyuria, polydipsia, cold/heat intolerance  Heme:  No petechiae, ecchymosis, easy bruisability  Skin:  No rash, tattoos, scars, edema    PHYSICAL EXAM:     GENERAL:  No acute distress  HEENT:  Normocephalic/atraumatic, no scleral icterus  CHEST:  Clear to auscultation bilaterally, no wheezes/rales/ronchi, no accessory muscle use  HEART:  Regular rate and rhythm, no murmurs/rubs/gallops  ABDOMEN:  Soft, non-tender, non-distended, normoactive bowel sounds,  no masses, no hepato-splenomegaly, no signs of chronic liver disease  EXTREMITIES: No cyanosis, clubbing, or edema  SKIN:  No rash/erythema/ecchymoses/petechiae/wounds/abscess/warm/dry  NEURO:  Alert and oriented x 3, no asterixis    Vital Signs:  Vital Signs Last 24 Hrs  T(C): 36.8 (05 Mar 2021 06:09), Max: 36.8 (05 Mar 2021 06:09)  T(F): 98.2 (05 Mar 2021 06:09), Max: 98.2 (05 Mar 2021 06:09)  HR: 66 (05 Mar 2021 06:09) (65 - 73)  BP: 185/75 (05 Mar 2021 06:09) (129/61 - 185/75)  BP(mean): --  RR: 18 (05 Mar 2021 06:09) (16 - 18)  SpO2: 100% (05 Mar 2021 06:09) (98% - 100%)  Daily Height in cm: 157.48 (04 Mar 2021 14:28)    Daily     LABS:                        7.9    8.29  )-----------( 287      ( 05 Mar 2021 06:04 )             28.1     Mean Cell Volume: 66.7 fL (03-05-21 @ 06:04)    03-05    134<L>  |  98  |  12  ----------------------------<  103<H>  4.6   |  27  |  1.02    Ca    9.4      05 Mar 2021 06:04    TPro  6.8  /  Alb  4.2  /  TBili  0.4  /  DBili  x   /  AST  14  /  ALT  14  /  AlkPhos  76  03-04    LIVER FUNCTIONS - ( 04 Mar 2021 15:41 )  Alb: 4.2 g/dL / Pro: 6.8 g/dL / ALK PHOS: 76 U/L / ALT: 14 U/L / AST: 14 U/L / GGT: x           PT/INR - ( 04 Mar 2021 15:41 )   PT: 11.4 sec;   INR: 0.99 ratio         PTT - ( 04 Mar 2021 15:41 )  PTT:29.4 sec                            7.9    8.29  )-----------( 287      ( 05 Mar 2021 06:04 )             28.1                         7.2    7.30  )-----------( 270      ( 04 Mar 2021 15:41 )             24.9       Imaging:           Chief Complaint:  Patient is a 80y old  Male who presents with a chief complaint of Anemia (05 Mar 2021 03:17)      HPI:  Mr. Chamberlain is an 79 yo M with PMH of HTN, CAD (s/p stents-last 5 years ago), DM type 2 (not on insulin), ?Dementia (on Donepezil), GERD, HLD, BPH, known diverticular disease and hemorrhoids who presents with anemia.     Patient was sent from PCP's office for Hb 7.5. Patient reports intermittent black stools at home, most recently a few weeks prior to admission. No abdominal pain, nausea, vomiting, dysphagia, early satiety. No NSAIDS use other than ASA. Per chart, patient endorsed to "episodes of blood in stool about 2-3 weeks ago that he believes were due to supplements he was taking, he stopped all the supplements and then the bleeding self resolved", however patient currently denies any similar complaints. Endorses some lightheadedness with some falls at home with no LOC. Of note, patient also complains of intermittent midsternal chest pain - not associated with meals/exercise - currently does not endorse chest pain. No fever, chills.     Per daughter: Patient had blood work in August with another PMD and unsure of patient's baseline Hgb. Patient just started to follow up with Dr. Chamberlain recently after patient had episode of blood in the stool. Per daughter, patient orders herbal, organic vitamins off "the magazines." She is not sure of the name of these vitamins. Patient had a recent colonoscopy a year prior to admission.     On arrival to the ED, his vitals were T 97.2, P 67, /61, RR 18, o2 sat 98% RA. His lab work showed anemia to 7.2 with negative FOBT and low and stable trops. EKG showed concerning for STEMI but was unchanged when compared to prior. His CXR showed clear lungs. He was admitted to medicine on telemetry.     Allergies:  No Known Allergies      Home Medications:  aspirin 81 mg oral delayed release tablet: 1 tab(s) orally once a day (05 Mar 2021 03:14)  CYPROHEPTADINE 4 MG TABLET: TAKE 1 TABLET (4 MG TOTAL) BY MOUTH NIGHTLY MAX DAILY AMOUNT  4 MG. N/C** (05 Mar 2021 03:14)  DONEPEZIL HCL 10 MG TABLET: 1 tab(s) orally once a day (05 Mar 2021 03:14)  dutasteride 0.5 mg oral capsule: 1 cap(s) orally once a day (05 Mar 2021 03:14)  GABAPENTIN 100 MG CAPSULE: 1 cap(s) orally once a day (05 Mar 2021 03:14)  LOSARTAN POTASSIUM 50 MG TAB:  (05 Mar 2021 01:25)  metFORMIN 850 mg oral tablet: 1 tab(s) orally 2 times a day (05 Mar 2021 03:14)  METOPROLOL SUCC ER 50 MG TAB: 1 tab(s) orally once a day (05 Mar 2021 03:14)  OMEPRAZOLE DR 40 MG CAPSULE: 1 cap(s) orally once a day (05 Mar 2021 03:14)  simvastatin 40 mg oral tablet: 1 tab(s) orally once a day (at bedtime) (05 Mar 2021 03:14)    Hospital Medications:  aspirin enteric coated 81 milliGRAM(s) Oral daily  cyproheptadine 4 milliGRAM(s) Oral <User Schedule>  dextrose 40% Gel 15 Gram(s) Oral once  dextrose 5%. 1000 milliLiter(s) IV Continuous <Continuous>  dextrose 5%. 1000 milliLiter(s) IV Continuous <Continuous>  dextrose 50% Injectable 25 Gram(s) IV Push once  dextrose 50% Injectable 12.5 Gram(s) IV Push once  dextrose 50% Injectable 25 Gram(s) IV Push once  donepezil 10 milliGRAM(s) Oral at bedtime  finasteride 5 milliGRAM(s) Oral daily  gabapentin 100 milliGRAM(s) Oral daily  glucagon  Injectable 1 milliGRAM(s) IntraMuscular once  isosorbide   mononitrate ER Tablet (IMDUR) 30 milliGRAM(s) Oral daily  metoprolol succinate ER 50 milliGRAM(s) Oral daily  pantoprazole    Tablet 40 milliGRAM(s) Oral before breakfast  simvastatin 40 milliGRAM(s) Oral at bedtime  sodium chloride 0.9% lock flush 3 milliLiter(s) IV Push every 8 hours  tamsulosin 0.4 milliGRAM(s) Oral at bedtime      PMHX/PSHX:  GERD (gastroesophageal reflux disease)    Essential hypertension    HLD (hyperlipidemia)    Intraparenchymal hemorrhage of brain    DM (diabetes mellitus)    CAD (coronary artery disease)    TIA (transient ischemic attack)    HLD (hyperlipidemia)    GERD (gastroesophageal reflux disease)    Anemia    Diabetes    BPH (benign prostatic hyperplasia)    HTN (hypertension)    History of appendectomy    History of coronary artery stent placement    No significant past surgical history    Stented coronary artery    H/O hernia repair    History of appendectomy        Family history:  No pertinent family history in first degree relatives    Family history of heart disease        Denies family history of colon cancer/polyps, stomach cancer/polyps, pancreatic cancer/masses, liver cancer/disease, ovarian cancer and endometrial cancer.    Social History:     Tob: Denies  EtOH: Denies  Illicit Drugs: Denies    ROS:   General:  No wt loss, fevers, chills, night sweats, fatigue  Eyes:  Good vision, no reported pain  ENT:  No sore throat, pain, runny nose, dysphagia  CV:  As above  Pulm:  No dyspnea, cough, tachypnea, wheezing  GI:  As above  :  No pain, bleeding, incontinence, nocturia  Muscle:  No pain, weakness  Neuro:  No weakness, tingling, memory problems  Psych:  No fatigue, insomnia, mood problems, depression  Endocrine:  No polyuria, polydipsia, cold/heat intolerance  Heme:  No petechiae, ecchymosis, easy bruisability  Skin:  No rash, tattoos, scars, edema    PHYSICAL EXAM:   GENERAL:  No acute distress  HEENT:  Normocephalic/atraumatic, no scleral icterus  CHEST:  Clear to auscultation bilaterally, no wheezes/rales/ronchi, no accessory muscle use  HEART:  Regular rate and rhythm, no murmurs/rubs/gallops  ABDOMEN:  Soft, non-tender, non-distended, normoactive bowel sounds,  no masses, no hepato-splenomegaly, no signs of chronic liver disease  RECTAL: Not performed as patient in the hallway  EXTREMITIES: No cyanosis, clubbing, or edema  SKIN:  No rash/erythema/ecchymoses/petechiae/wounds/abscess/warm/dry  NEURO:  Alert and oriented x 3, no asterixis    Vital Signs:  Vital Signs Last 24 Hrs  T(C): 36.8 (05 Mar 2021 06:09), Max: 36.8 (05 Mar 2021 06:09)  T(F): 98.2 (05 Mar 2021 06:09), Max: 98.2 (05 Mar 2021 06:09)  HR: 66 (05 Mar 2021 06:09) (65 - 73)  BP: 185/75 (05 Mar 2021 06:09) (129/61 - 185/75)  BP(mean): --  RR: 18 (05 Mar 2021 06:09) (16 - 18)  SpO2: 100% (05 Mar 2021 06:09) (98% - 100%)  Daily Height in cm: 157.48 (04 Mar 2021 14:28)    Daily     LABS:                        7.9    8.29  )-----------( 287      ( 05 Mar 2021 06:04 )             28.1     Mean Cell Volume: 66.7 fL (03-05-21 @ 06:04)    03-05    134<L>  |  98  |  12  ----------------------------<  103<H>  4.6   |  27  |  1.02    Ca    9.4      05 Mar 2021 06:04    TPro  6.8  /  Alb  4.2  /  TBili  0.4  /  DBili  x   /  AST  14  /  ALT  14  /  AlkPhos  76  03-04    LIVER FUNCTIONS - ( 04 Mar 2021 15:41 )  Alb: 4.2 g/dL / Pro: 6.8 g/dL / ALK PHOS: 76 U/L / ALT: 14 U/L / AST: 14 U/L / GGT: x           PT/INR - ( 04 Mar 2021 15:41 )   PT: 11.4 sec;   INR: 0.99 ratio         PTT - ( 04 Mar 2021 15:41 )  PTT:29.4 sec                            7.9    8.29  )-----------( 287      ( 05 Mar 2021 06:04 )             28.1                         7.2    7.30  )-----------( 270      ( 04 Mar 2021 15:41 )             24.9       Imaging:

## 2021-03-05 NOTE — H&P ADULT - NSICDXPASTMEDICALHX_GEN_ALL_CORE_FT
PAST MEDICAL HISTORY:  Anemia     BPH (benign prostatic hyperplasia)     CAD (coronary artery disease)     Diabetes     DM (diabetes mellitus)     Essential hypertension     GERD (gastroesophageal reflux disease)     HLD (hyperlipidemia)     Intraparenchymal hemorrhage of brain     TIA (transient ischemic attack)

## 2021-03-05 NOTE — H&P ADULT - NSHPSOCIALHISTORY_GEN_ALL_CORE
Lives with daughter    Denies smoking, alcohol or drug use Lives with daughter    Denies smoking, alcohol or drug use    Ambulate with cane

## 2021-03-05 NOTE — H&P ADULT - PROBLEM SELECTOR PLAN 7
pneumatic compressions for VTE px ISS  monitor fingersticks  A1C  hold oral hypoglycemics - ISS  - monitor fingersticks  - A1C  - hold oral hypoglycemics

## 2021-03-05 NOTE — PROGRESS NOTE ADULT - PROBLEM SELECTOR PLAN 4
- No current bleeding at present but will hold aspirin for now, recheck FOBT, check orthostatics, monitor H/H  - per GI, can resume aspirin.

## 2021-03-05 NOTE — H&P ADULT - HISTORY OF PRESENT ILLNESS
81 y/o FM with hx of HTN, CAD (s/p stents-last 5 years ago), easily forgetful DM type 2 (not on insulin), GERD, HLD, BPH, diverticulitis and hemorrhoids presents with anemia. Patient states he went to PMD for a routine blood work and found his Hgb was 7.5 and was sent to ED for further eval. He states he may had episodes of blood is stool about 2-3 weeks then it self resolved. he states he was taking these herbal supplements (unsure of the name) at that time and bleeding improved after he stopped those vitamins. Also complains of episodes of midsternal chest pain which radiates to R side for years. Last episodes more than two weeks ago. Denies fever. chills, cough, falls, LOC, SOB, abdominal pain, nausea, vomiting, melena, LE edema or dysuria. Per daughter: Patient had blood work in August with another pMD and unsure of patient's baseline Hgb. Patient just started to follow up with Dr. Chamberlain recently after patient had episode of blood in the stool. Per daughter, patient orders herbal, organic vitamins off "the magazines." She is not sure of the name of these vitamins.  79 y/o FM with hx of HTN, CAD (s/p stents-last 5 years ago), easily forgetful DM type 2 (not on insulin), GERD, HLD, BPH, diverticulitis and hemorrhoids presents with anemia. Patient states he went to PMD for a routine blood work and found his Hgb was 7.5 and was sent to ED for further eval. He states he may had episodes of blood in stool about 2-3 weeks ago then it self resolved. he states he was taking these herbal supplements (unsure of the name) at that time and bleeding improved after he stopped those vitamins. Also complains of episodes of midsternal chest pain which radiates to R side for years. Last episodes more than two weeks ago. Denies fever. chills, cough, falls, LOC, SOB, abdominal pain, nausea, vomiting, melena, LE edema or dysuria. Per daughter: Patient had blood work in August with another pMD and unsure of patient's baseline Hgb. Patient just started to follow up with Dr. Chamberlain recently after patient had episode of blood in the stool. Per daughter, patient orders herbal, organic vitamins off "the magazines." She is not sure of the name of these vitamins.  79 y/o FM with hx of HTN, CAD (s/p stents-last 5 years ago), DM type 2 (not on insulin), ?Dementia (on Donepezil), GERD, HLD, BPH, diverticulitis and hemorrhoids presents with anemia. Patient states he went to PMD for a routine blood work and found his Hgb was 7.5 and was sent to ED for further eval. He states he had episodes of blood in stool about 2-3 weeks ago that he believes were due to supplements he was taking, he stopped all the supplements and then the bleeding self resolved. Has restarted the supplements without any additional bleeding episodes, denies any melena. Does state that he feels weak and dizzy when ambulating and has had some falls as a result (no LOC) though unable to state when the last fall was. Also complains of episodes of midsternal chest pain which radiates to R side for years, last episode was more than two weeks ago. Denies any pain when ambulating though states that he is overall not too ambulatory, does get tired when ambulating, cannot state how long that has been going on for. Denies fevers, chills, cough, LOC, SOB, abdominal pain, nausea, vomiting, melena, LE edema or dysuria.     Per daughter: Patient had blood work in August with another PMD and unsure of patient's baseline Hgb. Patient just started to follow up with Dr. Chamberlain recently after patient had episode of blood in the stool. Per daughter, patient orders herbal, organic vitamins off "the magazines." She is not sure of the name of these vitamins.     On arrival to the ED, his vitals were T 97.2, P 67, /61, RR 18, o2 sat 98% RA. His lab work showed anemia to 7.2 with negative FOBT and low and stable trops. EKG showed concerning for STEMI but was unchanged when compared to prior. His CXR showed clear lungs. He was admitted to medicine on telemetry.  81 y/o FM with hx of HTN, CAD (s/p stents-last 5 years ago), DM type 2 (not on insulin), ?Dementia (on Donepezil), GERD, HLD, BPH, diverticulitis and hemorrhoids presents with anemia. Patient states he went to PMD for a routine blood work and found his Hgb was 7.5 and was sent to ED for further eval. He states he had episodes of blood in stool about 2-3 weeks ago that he believes were due to supplements he was taking, he stopped all the supplements and then the bleeding self resolved. Has restarted the supplements without any additional bleeding episodes, denies any melena. Does state that he feels weak and dizzy when ambulating and has had some falls as a result (no LOC) though unable to state when the last fall was. Also complains of episodes of midsternal chest pain which radiates to R side for years, last episode was more than two weeks ago. Denies any pain when ambulating though states that he is overall not too ambulatory, does get tired when ambulating, cannot state how long that has been going on for. Said that he has had several tests in the past for his chest pain and could not find the reason for his chest pain. No chest pain currently. Denies fevers, chills, cough, LOC, SOB, abdominal pain, nausea, vomiting, melena, LE edema or dysuria.     Per daughter: Patient had blood work in August with another PMD and unsure of patient's baseline Hgb. Patient just started to follow up with Dr. Chamberlain recently after patient had episode of blood in the stool. Per daughter, patient orders herbal, organic vitamins off "the magazines." She is not sure of the name of these vitamins.     On arrival to the ED, his vitals were T 97.2, P 67, /61, RR 18, o2 sat 98% RA. His lab work showed anemia to 7.2 with negative FOBT and low and stable trops. EKG showed concerning for STEMI but was unchanged when compared to prior. His CXR showed clear lungs. He was admitted to medicine on telemetry.

## 2021-03-05 NOTE — H&P ADULT - NSICDXFAMILYHX_GEN_ALL_CORE_FT
FAMILY HISTORY:  Family history of heart disease  No pertinent family history in first degree relatives     FAMILY HISTORY:  Family history of heart disease

## 2021-03-05 NOTE — PROGRESS NOTE ADULT - ASSESSMENT
81 y/o FM with hx of HTN, CAD (s/p stents-last 5 years ago), easily forgetful DM type 2 (not on insulin), GERD, HLD, BPH, diverticulitis and hemorrhoids send by PCP for anemia. Per PCP, patient recently changed PCP, routine lab 2 days prior showed anemia, thus send patient to hospital. Patient noted to have chronic microcytic anemia and diverticulitis in the past. potentially chronic slow GIB. He reports CP/epigastric pain. rule-out angina, being evaluated by cardiology. GI plan for EGD/colon next week after cards eval.

## 2021-03-05 NOTE — PROGRESS NOTE ADULT - SUBJECTIVE AND OBJECTIVE BOX
[facility] Division of Hospital Medicine  Nigel Rushing MD  In House Pager [phone]    Patient is a 80y old  Male who presents with a chief complaint of Anemia (05 Mar 2021 08:29)      SUBJECTIVE / OVERNIGHT EVENTS:  No overnight events. Labs and vitals reviewed.  Patient seen and examined at bedside, no acute complaints.  No fever, no chills, no SOB, no CP, no n/v/d, no abd pain, no dysuria      MEDICATIONS  (STANDING):  aspirin enteric coated 81 milliGRAM(s) Oral daily  cyproheptadine 4 milliGRAM(s) Oral <User Schedule>  dextrose 40% Gel 15 Gram(s) Oral once  dextrose 5%. 1000 milliLiter(s) (50 mL/Hr) IV Continuous <Continuous>  dextrose 5%. 1000 milliLiter(s) (100 mL/Hr) IV Continuous <Continuous>  dextrose 50% Injectable 25 Gram(s) IV Push once  dextrose 50% Injectable 12.5 Gram(s) IV Push once  dextrose 50% Injectable 25 Gram(s) IV Push once  donepezil 10 milliGRAM(s) Oral at bedtime  finasteride 5 milliGRAM(s) Oral daily  gabapentin 100 milliGRAM(s) Oral daily  glucagon  Injectable 1 milliGRAM(s) IntraMuscular once  insulin lispro (ADMELOG) corrective regimen sliding scale   SubCutaneous three times a day before meals  insulin lispro (ADMELOG) corrective regimen sliding scale   SubCutaneous at bedtime  isosorbide   mononitrate ER Tablet (IMDUR) 30 milliGRAM(s) Oral daily  metoprolol succinate ER 50 milliGRAM(s) Oral daily  pantoprazole    Tablet 40 milliGRAM(s) Oral before breakfast  simvastatin 40 milliGRAM(s) Oral at bedtime  sodium chloride 0.9% lock flush 3 milliLiter(s) IV Push every 8 hours  tamsulosin 0.4 milliGRAM(s) Oral at bedtime    MEDICATIONS  (PRN):    CAPILLARY BLOOD GLUCOSE      POCT Blood Glucose.: 86 mg/dL (05 Mar 2021 13:10)  POCT Blood Glucose.: 189 mg/dL (05 Mar 2021 08:59)  POCT Blood Glucose.: 114 mg/dL (05 Mar 2021 02:33)  POCT Blood Glucose.: 95 mg/dL (04 Mar 2021 18:44)    I&O's Summary      PHYSICAL EXAM:  Vital Signs Last 24 Hrs  T(C): 36.4 (05 Mar 2021 08:29), Max: 36.8 (05 Mar 2021 06:09)  T(F): 97.6 (05 Mar 2021 08:29), Max: 98.2 (05 Mar 2021 06:09)  HR: 78 (05 Mar 2021 08:29) (65 - 78)  BP: 140/82 (05 Mar 2021 08:29) (129/61 - 185/75)  BP(mean): --  RR: 18 (05 Mar 2021 08:29) (16 - 18)  SpO2: 100% (05 Mar 2021 08:29) (98% - 100%)    Gen: NAD; resting in bed.  Pulm: no respiratory distress; CTA b/l; no wheezing  Cards: RRR, nl S1/S2; no obvious murmurs  Abd: soft; NT on exam  Ext: no cyanosis; no edema  Skin: no rash; no cyanosis    LABS:                        7.6    8.28  )-----------( 283      ( 05 Mar 2021 12:46 )             26.0     03-05    134<L>  |  98  |  12  ----------------------------<  103<H>  4.6   |  27  |  1.02    Ca    9.4      05 Mar 2021 06:04    TPro  6.8  /  Alb  4.2  /  TBili  0.4  /  DBili  x   /  AST  14  /  ALT  14  /  AlkPhos  76  03-04    PT/INR - ( 04 Mar 2021 15:41 )   PT: 11.4 sec;   INR: 0.99 ratio         PTT - ( 04 Mar 2021 15:41 )  PTT:29.4 sec            RADIOLOGY & ADDITIONAL TESTS:  Results Reviewed: Y  Imaging Personally Reviewed: Y  Electrocardiogram Personally Reviewed: Y    COORDINATION OF CARE:  Care Discussed with Consultants/Other Providers [Y/N]: Y  Prior or Outpatient Records Reviewed [Y/N]: Y

## 2021-03-05 NOTE — CONSULT NOTE ADULT - ATTENDING COMMENTS
Personally saw and examined patient  labs and vitals reviewed  agree with above assessment and plan  80M w/ HTN, CAD/PCI, DM HLD p/w sob in setting of acute blood loss anemia, hgb 7. cardiology c/s for pre-operative risk stratification prior to endoscopic evaluation  -per charts, last PCI to LAD in 2016), last nuclear stress 6/18 without ischemia. pt states he can walk more than one block but over the past two weeks he has been having sob.  -pt is euvolemic on exam, without murmurs (TTE from 10/17 without evidence of severe valvular disease)  -pt denies hx of vt/vf/sudden cardiac death  -given symptomatic acute blood loss anemia, and urgent need for endoscopic eval, pt may proceed and further cardiac testing is not indicated.  -pt is high risk for low risk procedure.   -would cont asa if ok with GI, ans would cont BB through perioperative period without interruption.
GI consulted for anemia. Patient reports dark stools, but per chart some reports of rectal bleeding as well. Currently HD stable with Hgb stable in 7s. Given complaints of ongoing intermittent chest pain/pressure in setting of significant cardiac history, would recommend cardiac evaluation and risk stratification prior to endoscopic evaluation. Will discuss with patient's daughter (HCP/primary provider) regarding plan as patient defers decisions to her; multiple calls made, but unable to reach her earlier today.

## 2021-03-05 NOTE — H&P ADULT - RS GEN PE MLT RESP DETAILS PC
normal/airway patent/breath sounds equal/good air movement/respirations non-labored/clear to auscultation bilaterally normal/airway patent/breath sounds equal/good air movement/respirations non-labored/clear to auscultation bilaterally/no rales/no rhonchi/no wheezes

## 2021-03-05 NOTE — H&P ADULT - NEUROLOGICAL DETAILS
alert and oriented x 3/responds to verbal commands/cranial nerves intact alert and oriented x 3/responds to verbal commands/sensation intact/cranial nerves intact/normal strength

## 2021-03-05 NOTE — H&P ADULT - ASSESSMENT
81 y/o FM with hx of HTN, CAD (s/p stents-last 5 years ago), easily forgetful DM type 2 (not on insulin), GERD, HLD, BPH, diverticulitis and hemorrhoids presents with anemia

## 2021-03-05 NOTE — H&P ADULT - ATTENDING COMMENTS
Patient seen and examined on 3/5/2021, case discussed with STEPH Ingram. This is an 80M with history as above who presents to the hosptial with anemia. Patient states that the Patient seen and examined on 3/5/2021, case discussed with STEPH Ingram. This is an 80M with history as above who presents to the hosptial with anemia. Spoke with patient and patient's daughter, said that patient usually has follow up with his PCP every 3 months for blood work but was unable to follow up with his usual PCP last year due to COVID19. When he had his BRBPR episode earlier this year his daughter arranged for him to have an evaluation with a new PCP and there had blood work that showed him to be anemic to 7s and therefore was recommended to come to the hospital for evaluation. Currently patient denies any acute complaints, said that his bleeding has stopped, last occurred about 2 weeks ago, believes it was related to supplements he was taking. Also with complaints of unsteady gait (long standing for >1 year) and intermittent chest pain (also long standing, >1 year, unclear cause). Work up with JASPER, low and stable trops, unchanged EKG from prior.   - Concern for GI loss causing his anemia, no blood currently on rectal exam (as per ED documentation) and FOBT negative, will repeat FOBT, obtain GI eval  - Unclear cause of patient's chest pain, possibly angina, daughter states patient had a cardiac work up last year in March and was then taken off plavix, unclear what was involved in the work up, here so far no chest pain, said that his last episode was ~1 week ago but is not too sure (states he has memory problems), states that the pain hurts when it comes on but cannot fully describe the pain, denies the pain being exertional though has poor exertion at baseline -> will repeat trops in AM, check TTE, clarify outpatient cardiac work up with daughter, likely would need cardiology eval in AM  - Other management as above

## 2021-03-05 NOTE — H&P ADULT - PROBLEM SELECTOR PLAN 2
hold asa until GI eval patient complained of dizziness in triage  currently denies dizziness  check orthostatics - patient complained of dizziness in triage  - currently denies dizziness  - check orthostatics

## 2021-03-05 NOTE — H&P ADULT - PROBLEM SELECTOR PLAN 3
cont statin most likely angina given patient had the pain for a long time. CE stable  EKG with no acute changed   cont isosorbide - most likely angina given patient had the pain for a long time. CE stable x2, repeat in AM, no chest pain currently  - EKG with no acute changes noted from prior  - cont isosorbide  - Check TTE  - Daughter states patient with recent cardiac work up in March last year that was negative, clarify cardiac work up in AM and see if it needs to be repeated at present - most likely angina given patient had the pain for a long time. CE stable x2, repeat in AM, no chest pain currently, states that pain is intermittent, last occurred about 1 week ago  - EKG with no acute changes noted from prior  - cont isosorbide  - Check TTE  - Consider cardiology evaluation in AM  - Daughter states patient with recent cardiac work up in March last year that was negative, clarify cardiac work up in AM and see if it needs to be repeated at present

## 2021-03-05 NOTE — CONSULT NOTE ADULT - ASSESSMENT
80M w/ HTN, CAD (reportedly s/p stents-last 5 years ago; last PCI in our records: to LAD in 2016), DM , reported Dementia GERD, HLD, BPH, diverticulitis and hemorrhoids presents with symptomatic blood-loss anemia. Cardiology called for pre-op risk assessment for inpatient urgent EGD/Colon.     Recs:  - Given urgency of procedure, w/ lack of ischemia/infarct on stress testing in 2018, no further cardiac workup indicated at this time  - Overall this patient is at 6.6% risk (for cardiac death, nonfatal myocardial infarction, and nonfatal cardiac arrest perioperatively for this moderate risk procedure)    Dereje Oliver MD  Cardiology Fellow PGY-4  [facility]    Notes are not final until signed by attending  For all consults and questions:  [email or web address]   Login: [email or web address] 80M w/ HTN, CAD (reportedly s/p stents-last 5 years ago; last PCI in our records: to LAD in 2016), DM , reported Dementia GERD, HLD, BPH, diverticulitis and hemorrhoids presents with symptomatic blood-loss anemia. Cardiology called for pre-op risk assessment for inpatient urgent EGD/Colon.     Recs:  - Given urgency of procedure, w/ lack of ischemia/infarct on stress testing in 2018, no further cardiac workup indicated at this time  - Reviewed last echo in 2017, no severe valvular issues.  - Overall this patient is at 6.6% risk (for cardiac death, nonfatal myocardial infarction, and nonfatal cardiac arrest perioperatively for this moderate risk procedure)    Dereje Oliver MD  Cardiology Fellow PGY-4  [facility]    Notes are not final until signed by attending  For all consults and questions:  [email or web address]   Login: [email or web address]

## 2021-03-05 NOTE — H&P ADULT - NEGATIVE ENMT SYMPTOMS
no hearing difficulty/no ear pain/no tinnitus no hearing difficulty/no ear pain/no tinnitus/no vertigo/no sinus symptoms

## 2021-03-05 NOTE — CONSULT NOTE ADULT - ASSESSMENT
81 yo M with PMH of HTN, CAD (s/p stents-last 5 years ago), DM type 2 (not on insulin), ?Dementia (on Donepezil), GERD, HLD, BPH, known diverticular disease and hemorrhoids who presents with anemia.    # Anemia - JASPER anemia with no overt bleeding. Reports some black stools at home. Unable to reach daughter for collateral. DDx includes slow UGIB (PUD, erosive gastritis, esophagitis, varices, dieulafoy lesion, angioectasia, stomach cancer) vs. LGIB (hemorrhoids, polyps, colitis, diverticulosis) given reports of bloody stools that was relayed to the ED team. Patient will benefit from a possible upper endoscopy +/- colonoscopy. However, given recurrent complaints of chest pain would need cardiology clearance prior to any evaluation.   # CAD - on ASA, ?plavix  # Chest pain - no EKG changes, negative troponin, concerning for angina    Recommendations:  - PPI 40mg daily  - Cardiology evaluation prior to endoscopy  - No GI objections to ASA - In general if patients are on ASA for secondary prevention we favor continuing ASA even in the settings of a GI bleed  - Will obtain collateral from daughter  - Pending above - tentatively plan for endoscopy +/- colonoscopy next week.    Thank you for involving us in the care of this patient. Please reach out if any further questions.    Stan Durham, PGY-4  GI Fellow    Available on [employer] Teams  Pager [phone] ([facility]) or [phone] ([facility])  After 5PM/Weekends, please contact the on-call GI fellow: [phone]  Available through [employer] Teams

## 2021-03-05 NOTE — PROGRESS NOTE ADULT - PROBLEM SELECTOR PLAN 1
- no signs of overt GIB, stable on repeat.   - iron study consistent with JASPER.   - daughter to reach out to previous PMD to get baseline Hgb (on our labs prior levels were ~Hgb 10)  - Daughter reports patient with history of hemorrhoids and diverticulosis  - GI consult - EGD/colon monday.  - c/w PPI. monitor h/h. keep T&S active.

## 2021-03-05 NOTE — H&P ADULT - PROBLEM SELECTOR PROBLEM 5
Type 2 diabetes mellitus with hyperglycemia, without long-term current use of insulin HLD (hyperlipidemia)

## 2021-03-05 NOTE — H&P ADULT - PROBLEM SELECTOR PROBLEM 7
Need for prophylactic measure Type 2 diabetes mellitus with hyperglycemia, without long-term current use of insulin

## 2021-03-05 NOTE — CONSULT NOTE ADULT - SUBJECTIVE AND OBJECTIVE BOX
Patient seen and evaluated at bedside    Reason for consult: pre-op cardiac risk assessment for EGD/colon    HPI:  80M w/ HTN, CAD (reportedly s/p stents-last 5 years ago; last PCI in our records: to LAD in 2016), DM , reported Dementia GERD, HLD, BPH, diverticulitis and hemorrhoids presents with symptomatic blood-loss anemia. Cardiology called for pre-op risk assessment for inpatient urgent EGD/Colon. Patient does not report chest pain, SOB, lightheadedness, palpitations or orthopnea currently. At baseline, he has noticed ROMERO after walking multiple steps. He does not use stairs at his home. No further complaints. No cardiac workup available since 2018, at which time stress testing did not show ischemia/infarct. ECG today did not show signs of ischemia.     PMHx:   GERD (gastroesophageal reflux disease)    Essential hypertension    HLD (hyperlipidemia)    Intraparenchymal hemorrhage of brain    DM (diabetes mellitus)    CAD (coronary artery disease)    TIA (transient ischemic attack)    HLD (hyperlipidemia)    GERD (gastroesophageal reflux disease)    Anemia    Diabetes    BPH (benign prostatic hyperplasia)    HTN (hypertension)        PSHx:   History of appendectomy    History of coronary artery stent placement    No significant past surgical history    Stented coronary artery    H/O hernia repair    History of appendectomy        Allergies:  No Known Allergies      Home Meds:    Current Medications:   aspirin enteric coated 81 milliGRAM(s) Oral daily  cyproheptadine 4 milliGRAM(s) Oral <User Schedule>  dextrose 40% Gel 15 Gram(s) Oral once  dextrose 5%. 1000 milliLiter(s) IV Continuous <Continuous>  dextrose 5%. 1000 milliLiter(s) IV Continuous <Continuous>  dextrose 50% Injectable 25 Gram(s) IV Push once  dextrose 50% Injectable 12.5 Gram(s) IV Push once  dextrose 50% Injectable 25 Gram(s) IV Push once  donepezil 10 milliGRAM(s) Oral at bedtime  finasteride 5 milliGRAM(s) Oral daily  gabapentin 100 milliGRAM(s) Oral daily  glucagon  Injectable 1 milliGRAM(s) IntraMuscular once  insulin lispro (ADMELOG) corrective regimen sliding scale   SubCutaneous three times a day before meals  insulin lispro (ADMELOG) corrective regimen sliding scale   SubCutaneous at bedtime  isosorbide   mononitrate ER Tablet (IMDUR) 30 milliGRAM(s) Oral daily  metoprolol succinate ER 50 milliGRAM(s) Oral daily  pantoprazole    Tablet 40 milliGRAM(s) Oral before breakfast  simvastatin 40 milliGRAM(s) Oral at bedtime  sodium chloride 0.9% lock flush 3 milliLiter(s) IV Push every 8 hours  tamsulosin 0.4 milliGRAM(s) Oral at bedtime      FAMILY HISTORY:  Family history of heart disease    Social History: NA    Review of Systems:  REVIEW OF SYSTEMS:  CONSTITUTIONAL: No weakness, fevers or chills  EYES/ENT: No visual changes;  No dysphagia  NECK: No pain or stiffness  RESPIRATORY: No cough, wheezing, hemoptysis; No shortness of breath  CARDIOVASCULAR: No chest pain or palpitations; No lower extremity edema  GASTROINTESTINAL: No abdominal or epigastric pain. No nausea, vomiting, or hematemesis; No diarrhea or constipation. No melena or hematochezia.  BACK: No back pain  GENITOURINARY: No dysuria, frequency or hematuria  NEUROLOGICAL: No numbness or weakness  SKIN: No itching, burning, rashes, or lesions   All other review of systems is negative unless indicated above.    [x ] All other systems negative  [ ] Unable to assess ROS due to    Physical Exam:  T(F): 97.6 (03-05), Max: 98.2 (03-05)  HR: 78 (-05) (65 - 78)  BP: 140/82 (03-05) (140/82 - 185/75)  RR: 18 (03-05)  SpO2: 100% (03-05)  GENERAL: No acute distress, well-developed  HEAD:  Atraumatic, Normocephalic  ENT: EOMI, PERRLA, conjunctiva and sclera clear, Neck supple, No JVD, moist mucosa  CHEST/LUNG: Clear to auscultation bilaterally; No wheeze, equal breath sounds bilaterally   BACK: No spinal tenderness  HEART: Regular rate and rhythm; No murmurs, rubs, or gallops  ABDOMEN: Soft, Nontender, Nondistended; Bowel sounds present  EXTREMITIES:  No clubbing, cyanosis, or edema  PSYCH: Nl behavior, nl affect  NEUROLOGY: AAOx3, non-focal, cranial nerves intact  SKIN: Normal color, No rashes or lesions      Cardiovascular Diagnostic Testing:    < from: Cardiac Cath Lab - Adult (.16 @ 15:40) >  PROCEDURE:  --  Left heart catheterization with ventriculography.  --  Left coronary angiography.  --  Right coronary angiography.  --  Intervention on proximal LAD: drug-eluting stent.  TECHNIQUE: The risks and alternatives of the procedures and conscious  sedation were explained to the patient and informed consent was obtained.  Cardiac catheterization performed urgently.  Local anesthetic given. Right radial artery access. Left heart  catheterization. Ventriculography was performed. Left coronary artery  angiography. The vessel was injected utilizing a catheter. Right coronary  artery angiography. The vessel was injected utilizing a catheter.  RADIATION EXPOSURE: 10.5 min. A successful drug-eluting stent was  performed on the 90 % lesion in the proximal LAD. Following intervention  there was a 1 % residual stenosis. According to the ACC/AHA classification  system, this lesion was a type C lesion. There was AGUILAR 3 flow before the  procedure and AGUILAR 3 flow after the procedure. Vessel setup was performed.  A 6FR JL3.5 LAUNCHER guiding catheter was used to intubate the vessel.  Vessel setup was performed. A [employer] UNIVERSAL 190CM wire was used to cross  thelesion. Balloon angioplasty was performed, using a 2.75 X 12 NC APEX  balloon, with 2 inflations and a maximum inflation pressure of 12 emma. A  ABSORB 3.00 X 18 drug-eluting stent was placed across the lesion and  deployed at a maximum inflation pressure of 12 emam. Balloon angioplasty  was performed, using a 3.25 X 15 NC APEX balloon, with 1 inflations and a  maximum inflation pressure of 20 emma.  CONTRAST GIVEN: Omnipaque 44 ml. Omnipaque 54 ml.  MEDICATIONS GIVEN: Fentanyl, 25 mcg, IV. Midazolam, 1 mg, IV. Verapamil  (Isoptin, Calan, Covera), 2.5 mg, IA. Heparin, 3000 units, IA. Heparin,  2000 units, IV.  CORONARY VESSELS: The coronary circulation is right dominant.  LM:   --  LM: Angiography showed minor luminal irregularities with no flow  limiting lesions.  LAD:   --  Proximal LAD: There was a 90 % stenosis.  CX:   --  Distal circumflex: There was a 80 % stenosis. The reference  vessel diameter was 1.5 mm. Lesion severity was assessed by visual  estimate.  RCA:   --  RCA: Angiographyshowed minor luminal irregularities with no  flow limiting lesions.  COMPLICATIONS: There were no complications.  DIAGNOSTIC RECOMMENDATIONS: ASA and Plavix for 1 year.  INTERVENTIONAL RECOMMENDATIONS: ASA and Plavix for 1 year.  Prepared and signed by  Andi Krishnan M.D.  Signed 2016 16:55:55  HEMODYNAMIC TABLES  Pressures:  Baseline/ Room Air  Pressures:  - HR: 64  Pressures:  - Rhythm:  Pressures:  -- Aortic Pressure (S/D/M): 164/68/103  Pressures:  -- Left Ventricle (s/edp): 166/23/--  Pressures:  Intervention  Pressures:  - HR: 66  Pressures:  - Rhythm:  Pressures:  -- Aortic Pressure (S/D/M): 166/72/110  Outputs:  Baseline/ Room Air  Outputs:  -- CALCULATIONS[age]  Outputs:  -- CALCULATIONS: Body Surface Area: 1.66  Outputs:  -- CALCULATIONS: Height in cm: 160.00  Outputs:  -- CALCULATIONS: Sex: Male  Outputs:  -- CALCULATIONS: Weight in k.50    < end of copied text >  < from: Nuclear Stress Test-Pharmacologic (18 @ 09:14) >  PATIENT: DELROY OROZCO  : 1940   AGE: 78 (M)   MR#: [id number]  STUDY DATE: 2018  LOCATION: O/P  REF. PHYSICIAN(S):  KATIE CURIEL MD  FELLOW:  ------------------------------------------------------------------------    TYPE OF TEST: Rest/Stress Pharmacologic  INDICATION: Atherosclerotic heart disease of native  coronary artery without angina pectoris (I25.10)  ------------------------------------------------------------------------  HISTORY:  CARDIAC HISTORY: 78 years old male with ROMERO.  OTHER HISTORY: CAD,DM,HTN,GERD,CVA  RISK FACTORS: Diabetes, Hypertension, Past smoker, Family  History  MEDICATIONS:  norvasc,augmentin,aspirin,plavix,aricept,zetia,neurontin,flomax,zocor,prilosec,toprol xl,cozaar  ------------------------------------------------------------------------    BASELINE ELECTROCARDIOGRAM:  Rhythm: normal sinus  ST: Nonspecific ST-T wave abnormality.    ------------------------------------------------------------------------    HEMODYNAMIC PARAMETERS:                                   HR      BP  Baseline  Pre-Injection             56  178/64  00:00     Inject Regadenoson         0  00:30     Post Injection             0  01:00     Post Injection            85  132/59  02:00     Post Injection     100  117/53  05:00     Recovery                  96  121/56  06:00     Recovery                  93  124/61  07:00     Recovery                  90  135/61  10:00     Recovery                  88  131/65  ------------------------------------------------------------------------    Agent: Regadenoson 0.4 mg/5 ml NS. injected over 10 sec.  HR: Baseline HR: 56 bpm   Peak HR: 100 bpm (71% of MPHR)  MPHR: 141 bpm   85% of MPHR: 120 bpm  BP: Baseline BP: 178/64 mmHg   Peak BP: 178/64 mmHg   Peak  RPP: 15778 (Rate Pressure Product)  HR Isotope Injected: 56 bpm (Tc 99m Tetrofosmin)  58 bpm (Tc 99m Tetrofosmin)  Last Caffeine intake: 12 hrs  Terminated: Completion of protocol  ------------------------------------------------------------------------    SYMPTOMS/FINDINGS:  Symptoms: sob  Chest pain: No chest pain with administration of  Regadenoson  ------------------------------------------------------------------------    ECG ABNORMALITIES DURING/AFTER STRESS:   Abnormalities: ECG changes could not be interpreted due  to abnormal baseline ECG.  ------------------------------------------------------------------------    NEW ARRHYTHMIAS DEVELOPED DURING/AFTER STRESS:  None  ------------------------------------------------------------------------    STRESS TEST IMPRESSIONS:    Chest Pain: No chest pain with administration of  Regadenoson.  HR Response: Appropriate.  BP Response: Appropriate.  Heart Rhythm: normal sinus.  ECG Abnormalities: ECG changes could not be interpreted  due to abnormal baseline ECG.  Arrhythmia: None.    ------------------------------------------------------------------------    PROCEDURE:  10.2 mCi of Tc 99m Tetrofosmin were injected intravenously  at rest. Approximately 45 minutes later, tomographic  images were obtained in a 180 degree arc from right  anterior oblique to left anterior oblique with 64 stops.  At a separate time, 30.0 mCi of Tc 99m Tetrofosmin were  injected intravenously during stress protocol.  Approximately 45 minute(s) later, tomographic images were  obtained in a 180 degree arc from right anterior oblique  to left anterior oblique with 64 stops. The tomographic  slices were reconstructed in 3 orthogonal planes (short  axis, horizontal long axis and vertical long axis).  Interpretation was performed both by visual and  quantitative analysis.    ------------------------------------------------------------------------    NUCLEAR FINDINGS:  The left ventricle was normal in size.  Normal myocardial perfusion scan, with no evidence of  infarction or inducible ischemia.  ------------------------------------------------------------------------      GATED ANALYSIS:  Post-stress resting myocardial perfusion gated SPECT  imaging was performed (LVEF > 70%;LVEDV = 44 ml.) ans  showed normal wall motion.  ------------------------------------------------------------------------      LV/RV OBSERVATION:  Normal LV ejection fraction.  ------------------------------------------------------------------------    IMPRESSIONS:  * Non-diagnostic ECG with Regadenoson stress.  * The left ventricle was normal in size.  * Tracer uptake was homogeneous throughout the left  ventricle.  * Normal study; no evidence for myocardial infarction or  ischemia.  * Gated wall motion analysis was performed, and shows  normal wall motion.  ------------------------------------------------------------------------      ------------------------------------------------------------------------    Confirmed on  2018 - 11:45:44 at  by Cari Kelly MD  ------------------------------------------------------------------------    < end of copied text >    Imaging:    CXR: Personally reviewed    Labs: Personally reviewed                        7.6    8.28  )-----------( 283      ( 05 Mar 2021 12:46 )             26.0     03-05    134<L>  |  98  |  12  ----------------------------<  103<H>  4.6   |  27  |  1.02    Ca    9.4      05 Mar 2021 06:04    TPro  6.8  /  Alb  4.2  /  TBili  0.4  /  DBili  x   /  AST  14  /  ALT  14  /  AlkPhos  76  03-04    PT/INR - ( 04 Mar 2021 15:41 )   PT: 11.4 sec;   INR: 0.99 ratio         PTT - ( 04 Mar 2021 15:41 )  PTT:29.4 sec

## 2021-03-06 NOTE — PROGRESS NOTE ADULT - SUBJECTIVE AND OBJECTIVE BOX
[facility] Division of [facility]  Kike Herbert MD  Pager [phone]      Patient is a 80y old  Male who presents with a chief complaint of Anemia (05 Mar 2021 14:25)      SUBJECTIVE / OVERNIGHT EVENTS:    No acute events. pt reports no new BRBPR. no new complaint     ADDITIONAL REVIEW OF SYSTEMS:    RESPIRATORY: No cough, wheezing, chills or hemoptysis; No shortness of breath  CARDIOVASCULAR: No chest pain, palpitations, dizziness, or leg swelling  GASTROINTESTINAL: no abdominal  pain. No nausea, vomiting, or hematemesis; No diarrhea or constipation. No melena or hematochezia.      MEDICATIONS  (STANDING):  aspirin enteric coated 81 milliGRAM(s) Oral daily  bisacodyl 10 milliGRAM(s) Oral at bedtime  cyproheptadine 4 milliGRAM(s) Oral <User Schedule>  dextrose 40% Gel 15 Gram(s) Oral once  dextrose 5%. 1000 milliLiter(s) (50 mL/Hr) IV Continuous <Continuous>  dextrose 5%. 1000 milliLiter(s) (100 mL/Hr) IV Continuous <Continuous>  dextrose 50% Injectable 25 Gram(s) IV Push once  dextrose 50% Injectable 12.5 Gram(s) IV Push once  dextrose 50% Injectable 25 Gram(s) IV Push once  donepezil 10 milliGRAM(s) Oral at bedtime  finasteride 5 milliGRAM(s) Oral daily  gabapentin 100 milliGRAM(s) Oral daily  glucagon  Injectable 1 milliGRAM(s) IntraMuscular once  insulin lispro (ADMELOG) corrective regimen sliding scale   SubCutaneous three times a day before meals  insulin lispro (ADMELOG) corrective regimen sliding scale   SubCutaneous at bedtime  iron sucrose IVPB 100 milliGRAM(s) IV Intermittent <User Schedule>  isosorbide   mononitrate ER Tablet (IMDUR) 30 milliGRAM(s) Oral daily  metoprolol succinate ER 50 milliGRAM(s) Oral daily  pantoprazole    Tablet 40 milliGRAM(s) Oral before breakfast  polyethylene glycol/electrolyte Solution. 4000 milliLiter(s) Oral once  simvastatin 40 milliGRAM(s) Oral at bedtime  sodium chloride 1 Gram(s) Oral three times a day  sodium chloride 0.9% lock flush 3 milliLiter(s) IV Push every 8 hours  tamsulosin 0.4 milliGRAM(s) Oral at bedtime    MEDICATIONS  (PRN):      CAPILLARY BLOOD GLUCOSE      POCT Blood Glucose.: 121 mg/dL (07 Mar 2021 07:39)  POCT Blood Glucose.: 137 mg/dL (06 Mar 2021 21:26)  POCT Blood Glucose.: 160 mg/dL (06 Mar 2021 16:40)  POCT Blood Glucose.: 175 mg/dL (06 Mar 2021 12:08)    I&O's Summary      PHYSICAL EXAM:  Vital Signs Last 24 Hrs  T(C): 36.8 (07 Mar 2021 06:26), Max: 37 (06 Mar 2021 16:45)  T(F): 98.3 (07 Mar 2021 06:26), Max: 98.6 (06 Mar 2021 16:45)  HR: 75 (07 Mar 2021 06:26) (69 - 78)  BP: 138/63 (07 Mar 2021 06:26) (101/79 - 151/76)  BP(mean): --  RR: 17 (07 Mar 2021 06:26) (16 - 18)  SpO2: 99% (07 Mar 2021 06:26) (99% - 100%)    CONSTITUTIONAL: NAD  EYES: PERRLA; conjunctiva and sclera clear  ENMT: Moist oral mucosa, no pharyngeal injection or exudates;   NECK: Supple, no palpable masses;   RESPIRATORY: Normal respiratory effort; lungs are clear to auscultation bilaterally  CARDIOVASCULAR: Regular rate and rhythm, normal S1 and S2, no murmur/rub/gallop; No lower extremity edema; Peripheral pulses are 2+ bilaterally  ABDOMEN: Nontender to palpation, normoactive bowel sounds, no rebound/guarding;  MUSCLOSKELETAL:  no clubbing or cyanosis of digits; no joint swelling or tenderness to palpation  PSYCH: A+O to person,; affect appropriate  NEUROLOGY: CN 2-12 are intact and symmetric; no gross sensory deficits;   SKIN: No rashes; no palpable lesions    LABS:                        7.3    8.52  )-----------( 262      ( 07 Mar 2021 07:24 )             25.4     03-07    134<L>  |  101  |  15  ----------------------------<  116<H>  4.2   |  23  |  1.02    Ca    8.6      07 Mar 2021 07:24                  RADIOLOGY & ADDITIONAL TESTS:  Results Reviewed:   Imaging Personally Reviewed:  Electrocardiogram Personally Reviewed:    COORDINATION OF CARE:  Care Discussed with Consultants/Other Providers [Y/N]:  Prior or Outpatient Records Reviewed [Y/N]:

## 2021-03-07 NOTE — PROGRESS NOTE ADULT - PROBLEM SELECTOR PLAN 1
- no signs of overt GIB, stable on repeat.   - iron study consistent with JASPER.   - Daughter reports patient with history of hemorrhoids and diverticulosis  - GI consult - EGD/colon monday. Bowel prep per GI today  - c/w PPI. monitor h/h. keep T&S active. - no signs of overt GIB, stable on repeat.   - iron study consistent with JASPER. c/w IV iron x 3 days  - Daughter reports patient with history of hemorrhoids and diverticulosis  - GI consult - EGD/colon monday. Bowel prep per GI today  - c/w PPI. monitor h/h. keep T&S active.

## 2021-03-07 NOTE — PROGRESS NOTE ADULT - ASSESSMENT
79 y/o FM with hx of HTN, CAD (s/p stents-last 5 years ago), easily forgetful DM type 2 (not on insulin), GERD, HLD, BPH, diverticulitis and hemorrhoids send by PCP for anemia. Per PCP, patient recently changed PCP, routine lab 2 days prior showed anemia, thus send patient to hospital. Patient noted to have chronic microcytic anemia and diverticulitis in the past. potentially chronic slow GIB. He reports CP/epigastric pain. rule-out angina, being evaluated by cardiology. GI plan for EGD/colon next week after cards eval.

## 2021-03-07 NOTE — PROGRESS NOTE ADULT - SUBJECTIVE AND OBJECTIVE BOX
[facility] Division of Hospital Medicine  Kike Herbert MD  Pager [phone]      Patient is a 80y old  Male who presents with a chief complaint of Anemia (06 Mar 2021 08:52)      SUBJECTIVE / OVERNIGHT EVENTS:    No acute event. No overt GIB. Pt denies chest pain, sob     ADDITIONAL REVIEW OF SYSTEMS:    RESPIRATORY: No cough, wheezing, chills or hemoptysis; No shortness of breath  CARDIOVASCULAR: No chest pain, palpitations, dizziness, or leg swelling  GASTROINTESTINAL: No abdominal or epigastric pain. No nausea, vomiting, or hematemesis; No diarrhea or constipation. No melena or hematochezia.    MEDICATIONS  (STANDING):  aspirin enteric coated 81 milliGRAM(s) Oral daily  bisacodyl 10 milliGRAM(s) Oral at bedtime  cyproheptadine 4 milliGRAM(s) Oral <User Schedule>  dextrose 40% Gel 15 Gram(s) Oral once  dextrose 5%. 1000 milliLiter(s) (50 mL/Hr) IV Continuous <Continuous>  dextrose 5%. 1000 milliLiter(s) (100 mL/Hr) IV Continuous <Continuous>  dextrose 50% Injectable 25 Gram(s) IV Push once  dextrose 50% Injectable 12.5 Gram(s) IV Push once  dextrose 50% Injectable 25 Gram(s) IV Push once  donepezil 10 milliGRAM(s) Oral at bedtime  finasteride 5 milliGRAM(s) Oral daily  gabapentin 100 milliGRAM(s) Oral daily  glucagon  Injectable 1 milliGRAM(s) IntraMuscular once  insulin lispro (ADMELOG) corrective regimen sliding scale   SubCutaneous three times a day before meals  insulin lispro (ADMELOG) corrective regimen sliding scale   SubCutaneous at bedtime  iron sucrose IVPB 100 milliGRAM(s) IV Intermittent <User Schedule>  isosorbide   mononitrate ER Tablet (IMDUR) 30 milliGRAM(s) Oral daily  metoprolol succinate ER 50 milliGRAM(s) Oral daily  pantoprazole    Tablet 40 milliGRAM(s) Oral before breakfast  polyethylene glycol/electrolyte Solution. 4000 milliLiter(s) Oral once  simvastatin 40 milliGRAM(s) Oral at bedtime  sodium chloride 1 Gram(s) Oral three times a day  sodium chloride 0.9% lock flush 3 milliLiter(s) IV Push every 8 hours  tamsulosin 0.4 milliGRAM(s) Oral at bedtime    MEDICATIONS  (PRN):      CAPILLARY BLOOD GLUCOSE      POCT Blood Glucose.: 121 mg/dL (07 Mar 2021 07:39)  POCT Blood Glucose.: 137 mg/dL (06 Mar 2021 21:26)  POCT Blood Glucose.: 160 mg/dL (06 Mar 2021 16:40)  POCT Blood Glucose.: 175 mg/dL (06 Mar 2021 12:08)    I&O's Summary      PHYSICAL EXAM:  Vital Signs Last 24 Hrs  T(C): 36.8 (07 Mar 2021 06:26), Max: 37 (06 Mar 2021 16:45)  T(F): 98.3 (07 Mar 2021 06:26), Max: 98.6 (06 Mar 2021 16:45)  HR: 75 (07 Mar 2021 06:26) (69 - 78)  BP: 138/63 (07 Mar 2021 06:26) (101/79 - 151/76)  BP(mean): --  RR: 17 (07 Mar 2021 06:26) (16 - 18)  SpO2: 99% (07 Mar 2021 06:26) (99% - 100%)    CONSTITUTIONAL: NAD,  EYES: PERRLA; conjunctiva and sclera clear  ENMT: Moist oral mucosa, no pharyngeal injection or exudates;   NECK: Supple, no palpable masses;  RESPIRATORY: Normal respiratory effort; lungs are clear to auscultation bilaterally  CARDIOVASCULAR: Regular rate and rhythm, normal S1 and S2, no murmur/rub/gallop; No lower extremity edema; Peripheral pulses are 2+ bilaterally  ABDOMEN: Nontender to palpation, normoactive bowel sounds, no rebound/guarding;   MUSCLOSKELETAL:   no clubbing or cyanosis of digits; no joint swelling or tenderness to palpation  PSYCH: A+O to person, place, and time; affect appropriate  NEUROLOGY: CN 2-12 are intact and symmetric; no gross sensory deficits;   SKIN: No new rashes;     LABS:                        7.3    8.52  )-----------( 262      ( 07 Mar 2021 07:24 )             25.4     03-07    134<L>  |  101  |  15  ----------------------------<  116<H>  4.2   |  23  |  1.02    Ca    8.6      07 Mar 2021 07:24                  RADIOLOGY & ADDITIONAL TESTS:  Results Reviewed:   Imaging Personally Reviewed:  Electrocardiogram Personally Reviewed:    COORDINATION OF CARE:  Care Discussed with Consultants/Other Providers [Y/N]:  Prior or Outpatient Records Reviewed [Y/N]:

## 2021-03-08 NOTE — PROVIDER CONTACT NOTE (OTHER) - ACTION/TREATMENT ORDERED:
No actions ordered. Continue to monitor.
ACP to assess the patient at bedside. CXR and continue to monitor as per ACP Naga Marion
Give Imdur 30 mg and recheck BP in 1 hour.

## 2021-03-08 NOTE — PROVIDER CONTACT NOTE (OTHER) - ASSESSMENT
Patient denies chest pain or SOB.
VS within normal patient limits. Patient is noted to be ROMERO and to have wheezing.
Patient blood pressure 178/86. Patient asymptomatic.

## 2021-03-08 NOTE — PROVIDER CONTACT NOTE (OTHER) - SITUATION
Patient blood pressure 178/86.
Patient presents with ROMERO and wheezing while drinking golytle in preparation for EGD tomorrow.

## 2021-03-08 NOTE — CHART NOTE - NSCHARTNOTEFT_GEN_A_CORE
Notified by RN that patient is short of breath on exertion and noted to have wheezing while drinking Golytely. Patient seen and examined at bedside. Patient is lying in bed  with no signs of  respiratory distress. Patient able to speak  in full sentences. Patient reports that he had shortness of breath prior to admission and its unchanged.  Expiratory wheezing heard on ausculation of left lower lobe. Bilateral Lower extremity no edema noted. Stat chest x ray ordered. Will follow up.  ICU Vital Signs Last 24 Hrs  T(C): 37.1 (08 Mar 2021 22:44), Max: 37.1 (08 Mar 2021 22:44)  T(F): 98.7 (08 Mar 2021 22:44), Max: 98.7 (08 Mar 2021 22:44)  HR: 79 (08 Mar 2021 22:44) (64 - 98)  BP: 160/78 (08 Mar 2021 22:44) (124/56 - 161/65)  RR: 19 (08 Mar 2021 22:44) (16 - 19)  SpO2: 100% (08 Mar 2021 22:44) (94% - 100%)

## 2021-03-08 NOTE — PROGRESS NOTE ADULT - ASSESSMENT
79 yo M with PMH of HTN, CAD (s/p stents-last 5 years ago), DM type 2 (not on insulin), ?Dementia (on Donepezil), GERD, HLD, BPH, known diverticular disease and hemorrhoids who presents with anemia.    Impression:  # Anemia - JASPER anemia with no overt bleeding. Reports some black stools at home. Unable to reach daughter for collateral. DDx includes slow UGIB (PUD, erosive gastritis, esophagitis, varices, dieulafoy lesion, angioectasia, stomach cancer) vs. LGIB (hemorrhoids, polyps, colitis, diverticulosis) given reports of bloody stools that was relayed to the ED team. Patient will benefit from a possible upper endoscopy +/- colonoscopy. However, given recurrent complaints of chest pain would need cardiology clearance prior to any evaluation.   # CAD - on ASA  # Chest pain - no EKG changes, negative troponin, concerning for angina    Recommendations:  - PPI 40mg daily  - Appreciate cardiology evaluation on 3/5  - No GI objections to ASA  - Clear liquid diet today  - NPO @ [facility]  - Prep ordered  - EGD/Colonoscopy tomorrow    Aquilino Morley  Gastroenterology Fellow  NON-URGENT CONSULTS:  Please email [email or web address]  AT NIGHT AND ON WEEKENDS:  Contact on-call GI fellow via answering service ([phone]) from 5pm-8am and on weekends/holidays  MONDAY-FRIDAY 8AM-5PM:  Available via Microsoft Teams  Pager# [phone] ([facility]) or [phone] ([facility])  GI Phone# [phone] ([facility])

## 2021-03-08 NOTE — PROGRESS NOTE ADULT - ASSESSMENT
79 y/o M with hx of HTN, CAD (s/p stents-last 5 years ago), easily forgetful DM type 2 (not on insulin), GERD, HLD, BPH, diverticulitis and hemorrhoids p/w anemia.

## 2021-03-08 NOTE — PROGRESS NOTE ADULT - SUBJECTIVE AND OBJECTIVE BOX
Chief Complaint:  Patient is a 80y old  Male who presents with a chief complaint of Anemia (07 Mar 2021 09:14)    Reason for consult: anemia    Interval Events: Pt feels well, brown stool, Hb stable.    Hospital Medications:  aspirin enteric coated 81 milliGRAM(s) Oral daily  bisacodyl 20 milliGRAM(s) Oral once  cyproheptadine 4 milliGRAM(s) Oral <User Schedule>  dextrose 40% Gel 15 Gram(s) Oral once  dextrose 5%. 1000 milliLiter(s) IV Continuous <Continuous>  dextrose 5%. 1000 milliLiter(s) IV Continuous <Continuous>  dextrose 50% Injectable 25 Gram(s) IV Push once  dextrose 50% Injectable 12.5 Gram(s) IV Push once  dextrose 50% Injectable 25 Gram(s) IV Push once  donepezil 10 milliGRAM(s) Oral at bedtime  finasteride 5 milliGRAM(s) Oral daily  gabapentin 100 milliGRAM(s) Oral daily  glucagon  Injectable 1 milliGRAM(s) IntraMuscular once  insulin lispro (ADMELOG) corrective regimen sliding scale   SubCutaneous three times a day before meals  insulin lispro (ADMELOG) corrective regimen sliding scale   SubCutaneous at bedtime  iron sucrose IVPB 100 milliGRAM(s) IV Intermittent <User Schedule>  isosorbide   mononitrate ER Tablet (IMDUR) 30 milliGRAM(s) Oral daily  metoprolol succinate ER 50 milliGRAM(s) Oral daily  pantoprazole    Tablet 40 milliGRAM(s) Oral before breakfast  polyethylene glycol/electrolyte Solution. 4000 milliLiter(s) Oral once  simvastatin 40 milliGRAM(s) Oral at bedtime  sodium chloride 1 Gram(s) Oral three times a day  sodium chloride 0.9% lock flush 3 milliLiter(s) IV Push every 8 hours  tamsulosin 0.4 milliGRAM(s) Oral at bedtime      ROS:   General:  No  fevers, chills, night sweats, fatigue  Eyes:  Good vision, no reported pain  ENT:  No sore throat, pain, runny nose  CV:  No pain, palpitations  Pulm:  No dyspnea, cough  GI:  See HPI, otherwise negative  :  No  incontinence, nocturia  Muscle:  No pain, weakness  Neuro:  No memory problems  Psych:  No insomnia, mood problems, depression  Endocrine:  No polyuria, polydipsia, cold/heat intolerance  Heme:  No petechiae, ecchymosis, easy bruisability  Skin:  No rash    PHYSICAL EXAM:   Vital Signs:  Vital Signs Last 24 Hrs  T(C): 36.4 (08 Mar 2021 05:19), Max: 36.8 (07 Mar 2021 12:45)  T(F): 97.6 (08 Mar 2021 05:19), Max: 98.2 (07 Mar 2021 12:45)  HR: 98 (08 Mar 2021 05:19) (69 - 98)  BP: 124/56 (08 Mar 2021 05:19) (124/56 - 160/68)  BP(mean): --  RR: 17 (08 Mar 2021 05:19) (16 - 18)  SpO2: 98% (08 Mar 2021 05:19) (94% - 99%)  Daily     Daily     GENERAL: no acute distress  NEURO: alert  HEENT: anicteric sclera, no conjunctival pallor appreciated  CHEST: no respiratory distress, no accessory muscle use  CARDIAC: regular rate, rhythm  ABDOMEN: soft, non-tender, non-distended, no rebound or guarding  EXTREMITIES: warm, well perfused, no edema  SKIN: no lesions noted    LABS: reviewed                        7.4    9.39  )-----------( 278      ( 08 Mar 2021 05:09 )             26.2     03-08    135  |  102  |  12  ----------------------------<  150<H>  4.1   |  23  |  1.01    Ca    8.6      08 Mar 2021 05:09          Interval Diagnostic Studies: see sunrise for full report

## 2021-03-08 NOTE — PROGRESS NOTE ADULT - PROBLEM SELECTOR PLAN 1
- Microcytic anemia with Fe def. no signs of overt GIB  - Daughter reports patient with history of hemorrhoids and diverticulosis  - iron study consistent with JASPER. c/w IV iron x 3 days  - PPI  - GI consult - EGD/colon Tomm

## 2021-03-08 NOTE — PROVIDER CONTACT NOTE (OTHER) - BACKGROUND
Admitted with anemia. PMH HLD, HTN, CAD, DM2, GERD, BPH, diverticulitis, hemorrhoids.
Patient admitted for anemia

## 2021-03-08 NOTE — PROGRESS NOTE ADULT - SUBJECTIVE AND OBJECTIVE BOX
Patient is a 80y old  Male who presents with a chief complaint of Anemia (08 Mar 2021 10:20)      SUBJECTIVE / OVERNIGHT EVENTS: Pt in NAD denies BRBPR   ADDITIONAL REVIEW OF SYSTEMS: denies black stools/N/V    MEDICATIONS  (STANDING):  aspirin enteric coated 81 milliGRAM(s) Oral daily  bisacodyl 20 milliGRAM(s) Oral once  cyproheptadine 4 milliGRAM(s) Oral <User Schedule>  dextrose 40% Gel 15 Gram(s) Oral once  dextrose 5%. 1000 milliLiter(s) (50 mL/Hr) IV Continuous <Continuous>  dextrose 5%. 1000 milliLiter(s) (100 mL/Hr) IV Continuous <Continuous>  dextrose 50% Injectable 25 Gram(s) IV Push once  dextrose 50% Injectable 12.5 Gram(s) IV Push once  dextrose 50% Injectable 25 Gram(s) IV Push once  donepezil 10 milliGRAM(s) Oral at bedtime  finasteride 5 milliGRAM(s) Oral daily  gabapentin 100 milliGRAM(s) Oral daily  glucagon  Injectable 1 milliGRAM(s) IntraMuscular once  insulin lispro (ADMELOG) corrective regimen sliding scale   SubCutaneous three times a day before meals  insulin lispro (ADMELOG) corrective regimen sliding scale   SubCutaneous at bedtime  iron sucrose IVPB 100 milliGRAM(s) IV Intermittent <User Schedule>  isosorbide   mononitrate ER Tablet (IMDUR) 30 milliGRAM(s) Oral daily  metoprolol succinate ER 50 milliGRAM(s) Oral daily  pantoprazole    Tablet 40 milliGRAM(s) Oral before breakfast  polyethylene glycol/electrolyte Solution. 4000 milliLiter(s) Oral once  simvastatin 40 milliGRAM(s) Oral at bedtime  sodium chloride 1 Gram(s) Oral three times a day  sodium chloride 0.9% lock flush 3 milliLiter(s) IV Push every 8 hours  tamsulosin 0.4 milliGRAM(s) Oral at bedtime    MEDICATIONS  (PRN):      CAPILLARY BLOOD GLUCOSE      POCT Blood Glucose.: 124 mg/dL (08 Mar 2021 11:51)  POCT Blood Glucose.: 122 mg/dL (08 Mar 2021 07:54)  POCT Blood Glucose.: 124 mg/dL (07 Mar 2021 21:06)  POCT Blood Glucose.: 104 mg/dL (07 Mar 2021 17:17)  POCT Blood Glucose.: 165 mg/dL (07 Mar 2021 12:05)    I&O's Summary      PHYSICAL EXAM:  Vital Signs Last 24 Hrs  T(C): 36.4 (08 Mar 2021 05:19), Max: 36.8 (07 Mar 2021 12:45)  T(F): 97.6 (08 Mar 2021 05:19), Max: 98.2 (07 Mar 2021 12:45)  HR: 98 (08 Mar 2021 05:19) (69 - 98)  BP: 124/56 (08 Mar 2021 05:19) (124/56 - 160/68)  BP(mean): --  RR: 17 (08 Mar 2021 05:19) (16 - 18)  SpO2: 98% (08 Mar 2021 05:19) (94% - 99%)    CONSTITUTIONAL: NAD,   EYES:  conjunctiva and sclera clear  ENMT: Moist oral mucosa  NECK: Supple, no palpable masses;  RESPIRATORY: Normal respiratory effort; lungs are clear to auscultation bilaterally  CARDIOVASCULAR: Regular rate and rhythm, normal S1 and S2,   ABDOMEN: Nontender to palpation, normoactive bowel sounds, no rebound/guarding;  PSYCH: A+O to person, place, and time; affect appropriate  NEUROLOGY: CN 2-12 are intact and symmetric; no gross sensory deficits       LABS:                        7.4    9.39  )-----------( 278      ( 08 Mar 2021 05:09 )             26.2     03-08    135  |  102  |  12  ----------------------------<  150<H>  4.1   |  23  |  1.01    Ca    8.6      08 Mar 2021 05:09                  RADIOLOGY & ADDITIONAL TESTS:  Results Reviewed:   Imaging Personally Reviewed:  Electrocardiogram Personally Reviewed:    COORDINATION OF CARE:  Care Discussed with Consultants/Other Providers [Y/N]:  Prior or Outpatient Records Reviewed [Y/N]:

## 2021-03-08 NOTE — PROGRESS NOTE ADULT - PROBLEM SELECTOR PLAN 9
- pneumatic compressions for VTE px Suturegard Intro: Intraoperative tissue expansion was performed, utilizing the SUTUREGARD device, in order to reduce wound tension.

## 2021-03-09 NOTE — PROGRESS NOTE ADULT - PROBLEM SELECTOR PROBLEM 7
Type 2 diabetes mellitus with hyperglycemia, without long-term current use of insulin BPH (benign prostatic hyperplasia)

## 2021-03-09 NOTE — PHYSICAL THERAPY INITIAL EVALUATION ADULT - PERTINENT HX OF CURRENT PROBLEM, REHAB EVAL
Pt. is a 80 year old male, admitted with anemia.  PMH: esstential hypertension, diabetes mellitus, CAD, TIA, GERD, anemia, BPH., intraparenchymal hemorrhage of brain.

## 2021-03-09 NOTE — PHYSICAL THERAPY INITIAL EVALUATION ADULT - DISCHARGE DISPOSITION, PT EVAL
Anticipate Home with Home PT services upon discharge.  Pt. will benefit in skilled PT services to provide instruction in activity modification, caregiver education, home safety assessment, and reducing fall risk factors./home w/ home PT

## 2021-03-09 NOTE — PHYSICAL THERAPY INITIAL EVALUATION ADULT - ADDITIONAL COMMENTS
Pt. lives in a house with his daughter and landlord(ex-wife).  Pt. requires some assistance with ADLs provided by daughter and ambulates with a straight cane prior to hospitalization.      Pt. left supine in bed in NAD, lines intact, and call bell in reach.

## 2021-03-09 NOTE — CHART NOTE - NSCHARTNOTEFT_GEN_A_CORE
Patient is s/p upper endoscopy and colonoscopy today. Updated endoscopy and colonoscopy results  to daughter Lizzeth Chamberlain ( [phone])  over the phone with patient's permission.

## 2021-03-09 NOTE — PROGRESS NOTE ADULT - SUBJECTIVE AND OBJECTIVE BOX
Patient is a 80y old  Male who presents with a chief complaint of Anemia (08 Mar 2021 12:03)      SUBJECTIVE / OVERNIGHT EVENTS: Pt noted to be wheezing while drinking prep for colonoscopy  ADDITIONAL REVIEW OF SYSTEMS:    MEDICATIONS  (STANDING):  aspirin enteric coated 81 milliGRAM(s) Oral daily  cyproheptadine 4 milliGRAM(s) Oral <User Schedule>  dextrose 40% Gel 15 Gram(s) Oral once  dextrose 5%. 1000 milliLiter(s) (50 mL/Hr) IV Continuous <Continuous>  dextrose 5%. 1000 milliLiter(s) (100 mL/Hr) IV Continuous <Continuous>  dextrose 50% Injectable 25 Gram(s) IV Push once  dextrose 50% Injectable 12.5 Gram(s) IV Push once  dextrose 50% Injectable 25 Gram(s) IV Push once  donepezil 10 milliGRAM(s) Oral at bedtime  finasteride 5 milliGRAM(s) Oral daily  gabapentin 100 milliGRAM(s) Oral daily  glucagon  Injectable 1 milliGRAM(s) IntraMuscular once  insulin lispro (ADMELOG) corrective regimen sliding scale   SubCutaneous three times a day before meals  insulin lispro (ADMELOG) corrective regimen sliding scale   SubCutaneous at bedtime  isosorbide   mononitrate ER Tablet (IMDUR) 30 milliGRAM(s) Oral daily  lactated ringers. 500 milliLiter(s) (30 mL/Hr) IV Continuous <Continuous>  metoprolol succinate ER 50 milliGRAM(s) Oral daily  pantoprazole    Tablet 40 milliGRAM(s) Oral before breakfast  simvastatin 40 milliGRAM(s) Oral at bedtime  sodium chloride 0.9% lock flush 3 milliLiter(s) IV Push every 8 hours  tamsulosin 0.4 milliGRAM(s) Oral at bedtime    MEDICATIONS  (PRN):      CAPILLARY BLOOD GLUCOSE      POCT Blood Glucose.: 125 mg/dL (09 Mar 2021 05:30)  POCT Blood Glucose.: 117 mg/dL (08 Mar 2021 20:47)  POCT Blood Glucose.: 126 mg/dL (08 Mar 2021 17:08)  POCT Blood Glucose.: 124 mg/dL (08 Mar 2021 11:51)    I&O's Summary      PHYSICAL EXAM:  Vital Signs Last 24 Hrs  T(C): 36.6 (09 Mar 2021 09:25), Max: 37.1 (08 Mar 2021 22:44)  T(F): 97.9 (09 Mar 2021 09:25), Max: 98.7 (08 Mar 2021 22:44)  HR: 67 (09 Mar 2021 09:25) (64 - 80)  BP: 165/67 (09 Mar 2021 09:25) (145/58 - 173/84)  BP(mean): --  RR: 14 (09 Mar 2021 09:25) (14 - 19)  SpO2: 98% (09 Mar 2021 09:25) (96% - 100%)  CONSTITUTIONAL: NAD, well-developed, well-groomed  EYES: PERRLA; conjunctiva and sclera clear  ENMT: Moist oral mucosa, no pharyngeal injection or exudates; normal dentition  NECK: Supple, no palpable masses; no thyromegaly  RESPIRATORY: Normal respiratory effort; lungs are clear to auscultation bilaterally  CARDIOVASCULAR: Regular rate and rhythm, normal S1 and S2, no murmur/rub/gallop; No lower extremity edema; Peripheral pulses are 2+ bilaterally  ABDOMEN: Nontender to palpation, normoactive bowel sounds, no rebound/guarding; No hepatosplenomegaly  MUSCULOSKELETAL:  Normal gait; no clubbing or cyanosis of digits; no joint swelling or tenderness to palpation  PSYCH: A+O to person, place, and time; affect appropriate  NEUROLOGY: CN 2-12 are intact and symmetric; no gross sensory deficits   SKIN: No rashes; no palpable lesions    LABS:                        8.0    11.08 )-----------( 317      ( 09 Mar 2021 02:56 )             28.3     03-09    134<L>  |  101  |  7   ----------------------------<  115<H>  4.2   |  22  |  0.83    Ca    8.8      09 Mar 2021 02:56                  RADIOLOGY & ADDITIONAL TESTS:  Results Reviewed: < from: Xray Chest 1 View- PORTABLE-Urgent (Xray Chest 1 View- PORTABLE-Urgent .) (03.08.21 @ 23:48) >    IMPRESSION: Clear lungs.    < end of copied text >  < from: Transthoracic Echocardiogram (03.09.21 @ 07:19) >  CONCLUSIONS:  1. Mitral annular calcification, otherwise normal mitral  valve. Minimal mitral regurgitation.  2. Calcified trileaflet aortic valve with normal opening.  Mild aortic regurgitation.  3. Mildly dilated left atrium.  LA volume index = 38 cc/m2.  4. Normal left ventricular internal dimensions and wall  thicknesses.  5. Endocardium not well visualized; grossly normal left  ventricular systolic function.  6. Mild diastolic dysfunction (Stage I).  7. Normal right ventricular size and function.  ------------------------------------------------------------------------  Confirmed on  3/9/2021 - 08:45:05 by Christophe Burt M.D.,  [facility]    < end of copied text >    Imaging Personally Reviewed:  Electrocardiogram Personally Reviewed:    COORDINATION OF CARE:  Care Discussed with Consultants/Other Providers [Y/N]:  Prior or Outpatient Records Reviewed [Y/N]:   Patient is a 80y old  Male who presents with a chief complaint of Anemia (08 Mar 2021 12:03)      SUBJECTIVE / OVERNIGHT EVENTS: Pt noted to be wheezing while drinking prep for colonoscopy  ADDITIONAL REVIEW OF SYSTEMS: denies SOB/N/V    MEDICATIONS  (STANDING):  aspirin enteric coated 81 milliGRAM(s) Oral daily  cyproheptadine 4 milliGRAM(s) Oral <User Schedule>  dextrose 40% Gel 15 Gram(s) Oral once  dextrose 5%. 1000 milliLiter(s) (50 mL/Hr) IV Continuous <Continuous>  dextrose 5%. 1000 milliLiter(s) (100 mL/Hr) IV Continuous <Continuous>  dextrose 50% Injectable 25 Gram(s) IV Push once  dextrose 50% Injectable 12.5 Gram(s) IV Push once  dextrose 50% Injectable 25 Gram(s) IV Push once  donepezil 10 milliGRAM(s) Oral at bedtime  finasteride 5 milliGRAM(s) Oral daily  gabapentin 100 milliGRAM(s) Oral daily  glucagon  Injectable 1 milliGRAM(s) IntraMuscular once  insulin lispro (ADMELOG) corrective regimen sliding scale   SubCutaneous three times a day before meals  insulin lispro (ADMELOG) corrective regimen sliding scale   SubCutaneous at bedtime  isosorbide   mononitrate ER Tablet (IMDUR) 30 milliGRAM(s) Oral daily  lactated ringers. 500 milliLiter(s) (30 mL/Hr) IV Continuous <Continuous>  metoprolol succinate ER 50 milliGRAM(s) Oral daily  pantoprazole    Tablet 40 milliGRAM(s) Oral before breakfast  simvastatin 40 milliGRAM(s) Oral at bedtime  sodium chloride 0.9% lock flush 3 milliLiter(s) IV Push every 8 hours  tamsulosin 0.4 milliGRAM(s) Oral at bedtime    MEDICATIONS  (PRN):      CAPILLARY BLOOD GLUCOSE      POCT Blood Glucose.: 125 mg/dL (09 Mar 2021 05:30)  POCT Blood Glucose.: 117 mg/dL (08 Mar 2021 20:47)  POCT Blood Glucose.: 126 mg/dL (08 Mar 2021 17:08)  POCT Blood Glucose.: 124 mg/dL (08 Mar 2021 11:51)    I&O's Summary      PHYSICAL EXAM:  Vital Signs Last 24 Hrs  T(C): 36.6 (09 Mar 2021 09:25), Max: 37.1 (08 Mar 2021 22:44)  T(F): 97.9 (09 Mar 2021 09:25), Max: 98.7 (08 Mar 2021 22:44)  HR: 67 (09 Mar 2021 09:25) (64 - 80)  BP: 165/67 (09 Mar 2021 09:25) (145/58 - 173/84)  BP(mean): --  RR: 14 (09 Mar 2021 09:25) (14 - 19)  SpO2: 98% (09 Mar 2021 09:25) (96% - 100%)  CONSTITUTIONAL: NAD, well-developed, well-groomed  EYES: PERRLA; conjunctiva and sclera clear  ENMT: Moist oral mucosa, no pharyngeal injection or exudates; normal dentition  NECK: Supple, no palpable masses; no thyromegaly  RESPIRATORY: Normal respiratory effort; lungs are clear to auscultation bilaterally  CARDIOVASCULAR: Regular rate and rhythm, normal S1 and S2, no murmur/rub/gallop; No lower extremity edema; Peripheral pulses are 2+ bilaterally  ABDOMEN: Nontender to palpation, normoactive bowel sounds, no rebound/guarding; No hepatosplenomegaly  MUSCULOSKELETAL:  Normal gait; no clubbing or cyanosis of digits; no joint swelling or tenderness to palpation  PSYCH: A+O to person, place, and time; affect appropriate  NEUROLOGY: CN 2-12 are intact and symmetric; no gross sensory deficits   SKIN: No rashes; no palpable lesions    LABS:                        8.0    11.08 )-----------( 317      ( 09 Mar 2021 02:56 )             28.3     03-09    134<L>  |  101  |  7   ----------------------------<  115<H>  4.2   |  22  |  0.83    Ca    8.8      09 Mar 2021 02:56                  RADIOLOGY & ADDITIONAL TESTS:  Results Reviewed: < from: Xray Chest 1 View- PORTABLE-Urgent (Xray Chest 1 View- PORTABLE-Urgent .) (03.08.21 @ 23:48) >    IMPRESSION: Clear lungs.    < end of copied text >  < from: Transthoracic Echocardiogram (03.09.21 @ 07:19) >  CONCLUSIONS:  1. Mitral annular calcification, otherwise normal mitral  valve. Minimal mitral regurgitation.  2. Calcified trileaflet aortic valve with normal opening.  Mild aortic regurgitation.  3. Mildly dilated left atrium.  LA volume index = 38 cc/m2.  4. Normal left ventricular internal dimensions and wall  thicknesses.  5. Endocardium not well visualized; grossly normal left  ventricular systolic function.  6. Mild diastolic dysfunction (Stage I).  7. Normal right ventricular size and function.  ------------------------------------------------------------------------  Confirmed on  3/9/2021 - 08:45:05 by Christophe Burt M.D.,  [facility]    < end of copied text >    Imaging Personally Reviewed:  Electrocardiogram Personally Reviewed:    COORDINATION OF CARE:  Care Discussed with Consultants/Other Providers [Y/N]:  Prior or Outpatient Records Reviewed [Y/N]:

## 2021-03-09 NOTE — PROGRESS NOTE ADULT - PROBLEM SELECTOR PLAN 1
- Microcytic anemia with iron study consistent with JASPER.    - s/p IV iron x 3 days  - Daughter reports patient with history of hemorrhoids and diverticulosis  - PPI  - GI consult - EGD/colon Today

## 2021-03-09 NOTE — PROGRESS NOTE ADULT - PROBLEM SELECTOR PLAN 4
- No current bleeding at present   - per GI, can resume aspirin. - in setting of colonoscopy Prep   - albuterol PRN  - CXR neg - in setting of colonoscopy Prep no longer wheezing on exam   - albuterol PRN  - CXR neg

## 2021-03-10 NOTE — PROGRESS NOTE ADULT - PROBLEM SELECTOR PROBLEM 10
Need for prophylactic measure
Discharge planning issues
Discharge planning issues
Need for prophylactic measure

## 2021-03-10 NOTE — PROGRESS NOTE ADULT - PROBLEM SELECTOR PLAN 2
- patient complained of dizziness in triage  - orthostatic neg.   - dizziness resolved.

## 2021-03-10 NOTE — PROGRESS NOTE ADULT - PROBLEM SELECTOR PLAN 1
- Microcytic anemia with iron study consistent with JASPER.    - s/p IV iron x 3 days; now on feso4 and vitamin c  - Daughter reports patient with history of hemorrhoids and diverticulosis and gastritis and gastric polyp  - H/H stable   - PPI  - GI consult - EGD/colon with internal/external hemorrhoids and diverticulosis

## 2021-03-10 NOTE — DISCHARGE NOTE PROVIDER - NSFOLLOWUPCLINICS_GEN_ALL_ED_FT
Gastroenterology at [facility]  Gastroenterology  [address] [place] [id number]  Phone: [phone]  Fax:   Follow Up Time:

## 2021-03-10 NOTE — PROGRESS NOTE ADULT - ASSESSMENT
79 yo M with PMH of HTN, CAD (s/p stents-last 5 years ago), DM type 2 (not on insulin), ?Dementia (on Donepezil), GERD, HLD, BPH, known diverticular disease and hemorrhoids who presents with anemia, s/p EGD/colonoscopy w/o identifiable source    Impression:  # Anemia - JASPER anemia with no overt bleeding. Reports some black stools at home, though none in hospital; EGD/colonoscopy negative for acute etiology  # CAD - on ASA  # Chest pain - no EKG changes, negative troponin, concerning for angina    Recommendations:  - f/u pathology  - f/u outpatient w/ GI for possible capsule endoscopy if ongoing iron deficiency anemia and within GOC  - no GI contraindication to antiplatelet/anticoagulation  - rest of care per primary team    GI willsign off, please call back as necessary.     Amol Nolan, PGY4  Gastroenterology Fellow  Available on [employer] Teams  [phone] ([employer] Short Range Pager)  [phone] (Long Range Pager)    After 5pm, please contact the on-call GI fellow. [phone] 81 yo M with PMH of HTN, CAD (s/p stents-last 5 years ago), DM type 2 (not on insulin), ?Dementia (on Donepezil), GERD, HLD, BPH, known diverticular disease and hemorrhoids who presents with anemia, s/p EGD/colonoscopy w/o identifiable source    Impression:  # Anemia - JASPER anemia with no overt bleeding. Reports some black stools at home, though none in hospital; EGD/colonoscopy negative for acute etiology  # CAD - on ASA  # Chest pain - no EKG changes, negative troponin, concerning for angina    Recommendations:  - f/u pathology  - f/u outpatient w/ GI for possible capsule endoscopy if ongoing iron deficiency anemia and within GOC  - no GI contraindication to antiplatelet/anticoagulation  - rest of care per primary team    GI willsign off, please call back as necessary.     Amol Nolan, PGY4  Gastroenterology Fellow  Available on [employer] Teams  [phone] ([employer] Short Range Pager)  [phone] (Long Range Pager)    After 5pm, please contact the on-call GI fellow. [phone]

## 2021-03-10 NOTE — DISCHARGE NOTE PROVIDER - NSDCMRMEDTOKEN_GEN_ALL_CORE_FT
aspirin 81 mg oral delayed release tablet: 1 tab(s) orally once a day  CYPROHEPTADINE 4 MG TABLET: TAKE 1 TABLET (4 MG TOTAL) BY MOUTH NIGHTLY MAX DAILY AMOUNT  4 MG. N/C**  DONEPEZIL HCL 10 MG TABLET: 1 tab(s) orally once a day  dutasteride 0.5 mg oral capsule: 1 cap(s) orally once a day  GABAPENTIN 100 MG CAPSULE: 1 cap(s) orally once a day  isosorbide mononitrate 30 mg oral tablet, extended release: 1 tab(s) orally once a day  LOSARTAN POTASSIUM 50 MG TAB:   metFORMIN 850 mg oral tablet: 1 tab(s) orally 2 times a day  METOPROLOL SUCC ER 50 MG TAB: 1 tab(s) orally once a day  OMEPRAZOLE DR 40 MG CAPSULE: 1 cap(s) orally once a day  simvastatin 40 mg oral tablet: 1 tab(s) orally once a day (at bedtime)  tamsulosin 0.4 mg oral capsule: 1 cap(s) orally once a day   ascorbic acid 500 mg oral tablet: 1 tab(s) orally once a day  aspirin 81 mg oral delayed release tablet: 1 tab(s) orally once a day  CYPROHEPTADINE 4 MG TABLET: TAKE 1 TABLET (4 MG TOTAL) BY MOUTH NIGHTLY MAX DAILY AMOUNT  4 MG. N/C**  DONEPEZIL HCL 10 MG TABLET: 1 tab(s) orally once a day  dutasteride 0.5 mg oral capsule: 1 cap(s) orally once a day  ferrous sulfate 325 mg (65 mg elemental iron) oral tablet: 1 tab(s) orally once a day  finasteride 5 mg oral tablet: 1 tab(s) orally once a day  GABAPENTIN 100 MG CAPSULE: 1 cap(s) orally once a day  isosorbide mononitrate 30 mg oral tablet, extended release: 1 tab(s) orally once a day  metFORMIN 850 mg oral tablet: 1 tab(s) orally 2 times a day  METOPROLOL SUCC ER 50 MG TAB: 1 tab(s) orally once a day  OMEPRAZOLE DR 40 MG CAPSULE: 1 cap(s) orally once a day  simvastatin 40 mg oral tablet: 1 tab(s) orally once a day (at bedtime)  tamsulosin 0.4 mg oral capsule: 1 cap(s) orally once a day   alcohol swabs : Apply topically to affected area 4 times a day   ascorbic acid 500 mg oral tablet: 1 tab(s) orally once a day  aspirin 81 mg oral delayed release tablet: 1 tab(s) orally once a day  CYPROHEPTADINE 4 MG TABLET: TAKE 1 TABLET (4 MG TOTAL) BY MOUTH NIGHTLY MAX DAILY AMOUNT  4 MG. N/C**  DONEPEZIL HCL 10 MG TABLET: 1 tab(s) orally once a day  dutasteride 0.5 mg oral capsule: 1 cap(s) orally once a day  ferrous sulfate 325 mg (65 mg elemental iron) oral tablet: 1 tab(s) orally once a day  finasteride 5 mg oral tablet: 1 tab(s) orally once a day  GABAPENTIN 100 MG CAPSULE: 1 cap(s) orally once a day  glucometer (per patient&#x27;s insurance): Test blood sugars four times a day. Dispense #1 glucometer.  glucose tablets: Follow instructions on bottle when sugar is low.  Insulin Pen Needles, 4mm: 1 application subcutaneously 4 times a day. ** Use with insulin pen **   isosorbide mononitrate 30 mg oral tablet, extended release: 1 tab(s) orally once a day  lancets: 1 application subcutaneously 4 times a day   METOPROLOL SUCC ER 50 MG TAB: 1 tab(s) orally once a day  OMEPRAZOLE DR 40 MG CAPSULE: 1 cap(s) orally once a day  simvastatin 40 mg oral tablet: 1 tab(s) orally once a day (at bedtime)  tamsulosin 0.4 mg oral capsule: 1 cap(s) orally once a day  test strips (per patient&#x27;s insurance): 1 application subcutaneously 4 times a day. ** Compatible with patient&#x27;s glucometer **  U-100 Insulin Syringe, 1 mL: 1 application subcutaneously 2 times a day ** 1 mL holds up to 100 units of insulin **

## 2021-03-10 NOTE — DISCHARGE NOTE PROVIDER - NSDCCPCAREPLAN_GEN_ALL_CORE_FT
PRINCIPAL DISCHARGE DIAGNOSIS  Diagnosis: Anemia  Assessment and Plan of Treatment: Follow up with GI outpatient within 1-2 weeks of discharge        SECONDARY DISCHARGE DIAGNOSES  Diagnosis: CAD (coronary artery disease)  Assessment and Plan of Treatment: Continue aspirin, do not stop unless instructed by your physician.  Continue low salt, fat, cholesterol and carbohydrate diet. Follow up with cardiologist and primary care physician's routine appointment.      Diagnosis: Type 2 diabetes mellitus with hyperglycemia, without long-term current use of insulin  Assessment and Plan of Treatment: Monitor finger sticks pre-meal and bedtime, low salt, fat and carbohydrate diet, minimize glucose intake.  Exercise daily for at least 30 minutes and weight loss.  Follow up with primary care physician and endocrinologist for routine Hemoglobin A1C checks and management.  Follow up with your ophthalmologist for routine yearly vision exams.      Diagnosis: HLD (hyperlipidemia)  Assessment and Plan of Treatment: Low fat diet, exercise daily and continue current medications. Follow up with primary care physician and cardiologist for management.      Diagnosis: Wheezing  Assessment and Plan of Treatment: Improved    Diagnosis: Essential hypertension  Assessment and Plan of Treatment: Low sodium and fat diet, continue anti-hypertensive medications, and follow up with primary care physician.

## 2021-03-10 NOTE — PROGRESS NOTE ADULT - PROVIDER SPECIALTY LIST ADULT
Gastroenterology
Gastroenterology
Hospitalist

## 2021-03-10 NOTE — PROGRESS NOTE ADULT - SUBJECTIVE AND OBJECTIVE BOX
Patient is a 80y old  Male who presents with a chief complaint of Anemia (10 Mar 2021 08:20)      SUBJECTIVE / OVERNIGHT EVENTS: s/p EGD and colonoscopy   ADDITIONAL REVIEW OF SYSTEMS: no black or bloody stool     MEDICATIONS  (STANDING):  ascorbic acid 500 milliGRAM(s) Oral daily  aspirin enteric coated 81 milliGRAM(s) Oral daily  cyproheptadine 4 milliGRAM(s) Oral <User Schedule>  dextrose 40% Gel 15 Gram(s) Oral once  dextrose 5%. 1000 milliLiter(s) (50 mL/Hr) IV Continuous <Continuous>  dextrose 5%. 1000 milliLiter(s) (100 mL/Hr) IV Continuous <Continuous>  dextrose 50% Injectable 25 Gram(s) IV Push once  dextrose 50% Injectable 12.5 Gram(s) IV Push once  dextrose 50% Injectable 25 Gram(s) IV Push once  donepezil 10 milliGRAM(s) Oral at bedtime  ferrous    sulfate 325 milliGRAM(s) Oral daily  finasteride 5 milliGRAM(s) Oral daily  gabapentin 100 milliGRAM(s) Oral daily  glucagon  Injectable 1 milliGRAM(s) IntraMuscular once  insulin lispro (ADMELOG) corrective regimen sliding scale   SubCutaneous three times a day before meals  insulin lispro (ADMELOG) corrective regimen sliding scale   SubCutaneous at bedtime  isosorbide   mononitrate ER Tablet (IMDUR) 30 milliGRAM(s) Oral daily  metoprolol succinate ER 50 milliGRAM(s) Oral daily  pantoprazole    Tablet 40 milliGRAM(s) Oral before breakfast  simvastatin 40 milliGRAM(s) Oral at bedtime  sodium chloride 0.9% lock flush 3 milliLiter(s) IV Push every 8 hours  tamsulosin 0.4 milliGRAM(s) Oral at bedtime    MEDICATIONS  (PRN):  ALBUTerol    90 MICROgram(s) HFA Inhaler 2 Puff(s) Inhalation every 6 hours PRN Shortness of Breath and/or Wheezing      CAPILLARY BLOOD GLUCOSE      POCT Blood Glucose.: 135 mg/dL (10 Mar 2021 07:48)  POCT Blood Glucose.: 125 mg/dL (09 Mar 2021 21:28)  POCT Blood Glucose.: 117 mg/dL (09 Mar 2021 16:35)  POCT Blood Glucose.: 115 mg/dL (09 Mar 2021 11:35)    I&O's Summary      PHYSICAL EXAM:  Vital Signs Last 24 Hrs  T(C): 36.7 (10 Mar 2021 05:49), Max: 36.9 (10 Mar 2021 02:03)  T(F): 98 (10 Mar 2021 05:49), Max: 98.4 (10 Mar 2021 02:03)  HR: 85 (10 Mar 2021 05:49) (59 - 85)  BP: 151/69 (10 Mar 2021 05:49) (127/86 - 164/72)  BP(mean): 83 (09 Mar 2021 11:20) (81 - 83)  RR: 17 (10 Mar 2021 05:49) (13 - 17)  SpO2: 95% (10 Mar 2021 05:49) (95% - 100%)  CONSTITUTIONAL: NAD, well-developed, well-groomed  EYES: PERRLA; conjunctiva and sclera clear  ENMT: Moist oral mucosa, no pharyngeal injection or exudates; normal dentition  NECK: Supple, no palpable masses; no thyromegaly  RESPIRATORY: Normal respiratory effort; lungs are clear to auscultation bilaterally  CARDIOVASCULAR: Regular rate and rhythm, normal S1 and S2, no murmur/rub/gallop; No lower extremity edema; Peripheral pulses are 2+ bilaterally  ABDOMEN: Nontender to palpation, normoactive bowel sounds, no rebound/guarding; No hepatosplenomegaly  MUSCULOSKELETAL:  Normal gait; no clubbing or cyanosis of digits; no joint swelling or tenderness to palpation  PSYCH: A+O to person, place, and time; affect appropriate  NEUROLOGY: CN 2-12 are intact and symmetric; no gross sensory deficits   SKIN: No rashes; no palpable lesions    LABS:                        7.5    7.95  )-----------( 283      ( 10 Mar 2021 07:01 )             27.3     03-10    134<L>  |  99  |  7   ----------------------------<  111<H>  3.9   |  25  |  0.92    Ca    8.8      10 Mar 2021 07:01            Urinalysis Basic - ( 09 Mar 2021 22:26 )    Color: Light Yellow / Appearance: Clear / S.011 / pH: x  Gluc: x / Ketone: Negative  / Bili: Negative / Urobili: <2 mg/dL   Blood: x / Protein: Negative / Nitrite: Negative   Leuk Esterase: Negative / RBC: x / WBC x   Sq Epi: x / Non Sq Epi: x / Bacteria: x          RADIOLOGY & ADDITIONAL TESTS:  Results Reviewed: < from: Upper Endoscopy (21 @ 08:26) >  Impression:          - No evidence of bleeding in the upper GI tract.                       - Mildly tortuous esophagus.                       - Gastritis. Biopsied.                       - A few small gastric polyps, likely benign fundic gland                        type.                       - Normal examined duodenum. Biopsied.  Recommendation:      - Return patient to hospital plata for ongoing care.                       - Advance diet as tolerated today.                       - Await pathology results.                       - Iron supplementation.                       - Consider outpatient Hematology evaluation.                                                                                     < end of copied text >  < from: Colonoscopy (21 @ 08:27) >  Findings:       Small and large diverticula were found in the entire colon. There was no        evidence of diverticular bleeding.       Internal hemorrhoids were found during retroflexion. The hemorrhoids        were small.       A moderate amount of green stool was found in the entire colon,        interfering with visualization. No large masses were seen but small        lesions may have been missed.       Non-bleeding internal hemorrhoids were found during retroflexion.        External hemorrhoids and skin tags were also present. The hemorrhoids        were small.       The terminal ileum appeared normal.    Impression:          - No source of anemia identified.                       - Preparation of the colon was inadequate. No large                        masses or signs of bleeding were seen.                       - Severe diverticulosis in the entire examined colon.                        There was no evidence of diverticular bleeding.                       - Internal and external hemorrhoids.                       - The examined portion of the ileum was normal.                       - No specimens collected.  Recommendation:      - Return patient to hospital plata for ongoing care.                       - Proceed with scheduled endoscopy today.                                                                                   Attending Participation:       I was present and participated during the entire procedure, including        non-key portions.    < end of copied text >    Imaging Personally Reviewed:  Electrocardiogram Personally Reviewed:    COORDINATION OF CARE:  Care Discussed with Consultants/Other Providers [Y/N]:  Prior or Outpatient Records Reviewed [Y/N]:

## 2021-03-10 NOTE — PROGRESS NOTE ADULT - SUBJECTIVE AND OBJECTIVE BOX
Chief Complaint:  Patient is a 80y old  Male who presents with a chief complaint of Anemia (09 Mar 2021 10:25)      Interval Events:   - yesterday underwent EGD/colonoscopy, tolerated well  - no new complaints today, still some abd distention, one small bowel movement    Allergies:  No Known Allergies      Hospital Medications:  ALBUTerol    90 MICROgram(s) HFA Inhaler 2 Puff(s) Inhalation every 6 hours PRN  aspirin enteric coated 81 milliGRAM(s) Oral daily  cyproheptadine 4 milliGRAM(s) Oral <User Schedule>  dextrose 40% Gel 15 Gram(s) Oral once  dextrose 5%. 1000 milliLiter(s) IV Continuous <Continuous>  dextrose 5%. 1000 milliLiter(s) IV Continuous <Continuous>  dextrose 50% Injectable 25 Gram(s) IV Push once  dextrose 50% Injectable 12.5 Gram(s) IV Push once  dextrose 50% Injectable 25 Gram(s) IV Push once  donepezil 10 milliGRAM(s) Oral at bedtime  ferrous    sulfate 325 milliGRAM(s) Oral daily  finasteride 5 milliGRAM(s) Oral daily  gabapentin 100 milliGRAM(s) Oral daily  glucagon  Injectable 1 milliGRAM(s) IntraMuscular once  insulin lispro (ADMELOG) corrective regimen sliding scale   SubCutaneous three times a day before meals  insulin lispro (ADMELOG) corrective regimen sliding scale   SubCutaneous at bedtime  isosorbide   mononitrate ER Tablet (IMDUR) 30 milliGRAM(s) Oral daily  metoprolol succinate ER 50 milliGRAM(s) Oral daily  pantoprazole    Tablet 40 milliGRAM(s) Oral before breakfast  simvastatin 40 milliGRAM(s) Oral at bedtime  sodium chloride 0.9% lock flush 3 milliLiter(s) IV Push every 8 hours  tamsulosin 0.4 milliGRAM(s) Oral at bedtime        PHYSICAL EXAM:   Vital Signs:  Vital Signs Last 24 Hrs  T(C): 36.7 (10 Mar 2021 05:49), Max: 36.9 (10 Mar 2021 02:03)  T(F): 98 (10 Mar 2021 05:49), Max: 98.4 (10 Mar 2021 02:03)  HR: 85 (10 Mar 2021 05:49) (59 - 85)  BP: 151/69 (10 Mar 2021 05:49) (127/86 - 173/84)  BP(mean): 83 (09 Mar 2021 11:20) (81 - 83)  RR: 17 (10 Mar 2021 05:49) (13 - 18)  SpO2: 95% (10 Mar 2021 05:49) (95% - 100%)  Daily Height in cm: 152.4 (09 Mar 2021 09:03)    Daily     GENERAL: no acute distress  NEURO: alert  HEENT: anicteric sclera, no conjunctival pallor appreciated  CHEST: no respiratory distress, no accessory muscle use  CARDIAC: regular rate, rhythm  ABDOMEN: soft, non-tender, non-distended, no rebound or guarding  EXTREMITIES: warm, well perfused, no edema  SKIN: no lesions noted    LABS:                        7.5    7.95  )-----------( 283      ( 10 Mar 2021 07:01 )             27.3     Mean Cell Volume: 69.1 fL (03-10-21 @ 07:01)    03-10    134<L>  |  99  |  7   ----------------------------<  111<H>  3.9   |  25  |  0.92    Ca    8.8      10 Mar 2021 07:01          Urinalysis Basic - ( 09 Mar 2021 22:26 )    Color: Light Yellow / Appearance: Clear / S.011 / pH: x  Gluc: x / Ketone: Negative  / Bili: Negative / Urobili: <2 mg/dL   Blood: x / Protein: Negative / Nitrite: Negative   Leuk Esterase: Negative / RBC: x / WBC x   Sq Epi: x / Non Sq Epi: x / Bacteria: x            Imaging:  EGD 3/9/21  Findings:       The lower third of the esophagus was mildly tortuous.       The Z-line was regular and was found 35 cm from the incisors.       Localized mild inflammation characterized by erythema was found in the        gastric antrum. Biopsies were taken with a cold forceps for histology.       Multiple small sessile polyps with no bleeding and no stigmata of recent        bleeding were found in the gastric fundus.       The exam of the stomach was otherwise normal.       The examined duodenum up to the 2nd part was normal. Biopsies were taken        with a cold forceps for histology.                                                                                   Impression:          - No evidence of bleeding in the upper GI tract.                       - Mildly tortuous esophagus.                       - Gastritis. Biopsied.                       - A few small gastric polyps, likely benign fundic gland                        type.                       - Normal examined duodenum. Biopsied.    Colonoscopy 3/10/21  Findings:       Small and large diverticula were found in the entire colon. There was no        evidence of diverticular bleeding.       Internal hemorrhoids were found during retroflexion. The hemorrhoids        were small.       A moderate amount of green stool was found in the entire colon,        interfering with visualization. No large masses were seen but small        lesions may have been missed.       Non-bleeding internal hemorrhoids were found during retroflexion.        External hemorrhoids and skin tags were also present. The hemorrhoids        were small.       The terminal ileum appeared normal.    Impression:          - No source of anemia identified.                       - Preparation of the colon was inadequate. No large                        masses or signs of bleeding were seen.                       - Severe diverticulosis in the entire examined colon.                        There was no evidence of diverticular bleeding.                       - Internal and external hemorrhoids.                       - The examined portion of the ileum was normal.                       - No specimens collected.       Chief Complaint:  Patient is a 80y old  Male who presents with a chief complaint of Anemia (09 Mar 2021 10:25)    Interval Events:   - yesterday underwent EGD/colonoscopy, tolerated well  - no new complaints today, still some abd distention, one small bowel movement  - Hb stable    Allergies:  No Known Allergies      Hospital Medications:  ALBUTerol    90 MICROgram(s) HFA Inhaler 2 Puff(s) Inhalation every 6 hours PRN  aspirin enteric coated 81 milliGRAM(s) Oral daily  cyproheptadine 4 milliGRAM(s) Oral <User Schedule>  dextrose 40% Gel 15 Gram(s) Oral once  dextrose 5%. 1000 milliLiter(s) IV Continuous <Continuous>  dextrose 5%. 1000 milliLiter(s) IV Continuous <Continuous>  dextrose 50% Injectable 25 Gram(s) IV Push once  dextrose 50% Injectable 12.5 Gram(s) IV Push once  dextrose 50% Injectable 25 Gram(s) IV Push once  donepezil 10 milliGRAM(s) Oral at bedtime  ferrous    sulfate 325 milliGRAM(s) Oral daily  finasteride 5 milliGRAM(s) Oral daily  gabapentin 100 milliGRAM(s) Oral daily  glucagon  Injectable 1 milliGRAM(s) IntraMuscular once  insulin lispro (ADMELOG) corrective regimen sliding scale   SubCutaneous three times a day before meals  insulin lispro (ADMELOG) corrective regimen sliding scale   SubCutaneous at bedtime  isosorbide   mononitrate ER Tablet (IMDUR) 30 milliGRAM(s) Oral daily  metoprolol succinate ER 50 milliGRAM(s) Oral daily  pantoprazole    Tablet 40 milliGRAM(s) Oral before breakfast  simvastatin 40 milliGRAM(s) Oral at bedtime  sodium chloride 0.9% lock flush 3 milliLiter(s) IV Push every 8 hours  tamsulosin 0.4 milliGRAM(s) Oral at bedtime        PHYSICAL EXAM:   Vital Signs:  Vital Signs Last 24 Hrs  T(C): 36.7 (10 Mar 2021 05:49), Max: 36.9 (10 Mar 2021 02:03)  T(F): 98 (10 Mar 2021 05:49), Max: 98.4 (10 Mar 2021 02:03)  HR: 85 (10 Mar 2021 05:49) (59 - 85)  BP: 151/69 (10 Mar 2021 05:49) (127/86 - 173/84)  BP(mean): 83 (09 Mar 2021 11:20) (81 - 83)  RR: 17 (10 Mar 2021 05:49) (13 - 18)  SpO2: 95% (10 Mar 2021 05:49) (95% - 100%)  Daily Height in cm: 152.4 (09 Mar 2021 09:03)    Daily     GENERAL: no acute distress  NEURO: alert  HEENT: anicteric sclera, no conjunctival pallor appreciated  CHEST: no respiratory distress, no accessory muscle use  CARDIAC: regular rate, rhythm  ABDOMEN: soft, non-tender, non-distended, no rebound or guarding  EXTREMITIES: warm, well perfused, no edema  SKIN: no lesions noted    LABS:                        7.5    7.95  )-----------( 283      ( 10 Mar 2021 07:01 )             27.3     Mean Cell Volume: 69.1 fL (03-10-21 @ 07:01)    03-10    134<L>  |  99  |  7   ----------------------------<  111<H>  3.9   |  25  |  0.92    Ca    8.8      10 Mar 2021 07:01          Urinalysis Basic - ( 09 Mar 2021 22:26 )    Color: Light Yellow / Appearance: Clear / S.011 / pH: x  Gluc: x / Ketone: Negative  / Bili: Negative / Urobili: <2 mg/dL   Blood: x / Protein: Negative / Nitrite: Negative   Leuk Esterase: Negative / RBC: x / WBC x   Sq Epi: x / Non Sq Epi: x / Bacteria: x            Imaging:  EGD 3/9/21  Findings:       The lower third of the esophagus was mildly tortuous.       The Z-line was regular and was found 35 cm from the incisors.       Localized mild inflammation characterized by erythema was found in the        gastric antrum. Biopsies were taken with a cold forceps for histology.       Multiple small sessile polyps with no bleeding and no stigmata of recent        bleeding were found in the gastric fundus.       The exam of the stomach was otherwise normal.       The examined duodenum up to the 2nd part was normal. Biopsies were taken        with a cold forceps for histology.                                                                                   Impression:          - No evidence of bleeding in the upper GI tract.                       - Mildly tortuous esophagus.                       - Gastritis. Biopsied.                       - A few small gastric polyps, likely benign fundic gland                        type.                       - Normal examined duodenum. Biopsied.    Colonoscopy 3/10/21  Findings:       Small and large diverticula were found in the entire colon. There was no        evidence of diverticular bleeding.       Internal hemorrhoids were found during retroflexion. The hemorrhoids        were small.       A moderate amount of green stool was found in the entire colon,        interfering with visualization. No large masses were seen but small        lesions may have been missed.       Non-bleeding internal hemorrhoids were found during retroflexion.        External hemorrhoids and skin tags were also present. The hemorrhoids        were small.       The terminal ileum appeared normal.    Impression:          - No source of anemia identified.                       - Preparation of the colon was inadequate. No large                        masses or signs of bleeding were seen.                       - Severe diverticulosis in the entire examined colon.                        There was no evidence of diverticular bleeding.                       - Internal and external hemorrhoids.                       - The examined portion of the ileum was normal.                       - No specimens collected.

## 2021-03-10 NOTE — PROGRESS NOTE ADULT - ASSESSMENT
81 y/o M with hx of HTN, CAD (s/p stents-last 5 years ago), easily forgetful DM type 2 (not on insulin), GERD, HLD, BPH, diverticulitis and hemorrhoids p/w anemia.

## 2021-03-10 NOTE — PROGRESS NOTE ADULT - PROBLEM SELECTOR PLAN 10
1.  Name of PCP: Dr. Devicka Persaud  2.  PCP Contacted on Admission: [ ] Y    [ ] N    3.  PCP contacted at Discharge: [ ] Y    [ ] N    [ ] N/A  4.  Post-Discharge Appointment Date and Location:  5.  Summary of Handoff given to PCP:
1.  Name of PCP: Dr. Devicka Persaud  2.  PCP Contacted on Admission: [ ] Y    [ ] N    3.  PCP contacted at Discharge: [ ] Y    [ ] N    [ ] N/A  4.  Post-Discharge Appointment Date and Location:  5.  Summary of Handoff given to PCP:
- pneumatic compressions for VTE px
- pneumatic compressions for VTE px

## 2021-03-10 NOTE — DISCHARGE NOTE PROVIDER - HOSPITAL COURSE
79 y/o M with hx of HTN, CAD (s/p stents-last 5 years ago), easily forgetful DM type 2 (not on insulin), GERD, HLD, BPH, diverticulitis and hemorrhoids p/w anemia.      Anemia:  Microcytic anemia with iron study consistent with JASPER.    s/p IV iron x 3 days; now on feso4 and vitamin c  Daughter reports patient with history of hemorrhoids and diverticulosis and gastritis and gastric polyp  H/H stable   PPI  GI consult - EGD/colon with internal/external hemorrhoids and diverticulosis.     Dizziness  Patient complained of dizziness in triage  Orthostatic negative   Dizziness resolved     Chest pain.   CE stable x2. no chest pain currently, states that pain is intermittent, last occurred about 1 week ago. no specific triggers or aggravating triggers.   EKG with no acute changes noted from prior  Continue isosorbide  - prior stress neg 2018  - cardiology following -no further cardiac testing required for endoscopy  - TTE w/o overt WMA normal EF.     Wheezing.  In setting of colonoscopy prep no longer wheezing on exam   CXR negative     CAD (coronary artery disease)  No current bleeding at present   GI: can resume aspirin  Continue aspirin, do not stop unless instructed by your physician.  Continue low salt, fat, cholesterol and carbohydrate diet. Follow up with cardiologist and primary care physician's routine appointment.    HLD (hyperlipidemia).  Continue statin  Low fat diet, exercise daily and continue current medications. Follow up with primary care physician and cardiologist for management.    BPH (benign prostatic hyperplasia)  cont flomax, dutasteride.     Type 2 diabetes mellitus with hyperglycemia, without long-term current use of insulin.    A1C 5.9  Hold oral hypoglycemics  Monitor finger sticks pre-meal and bedtime, low salt, fat and carbohydrate diet, minimize glucose intake.  Exercise daily for at least 30 minutes and weight loss.  Follow up with primary care physician and endocrinologist for routine Hemoglobin A1C checks and management.  Follow up with your ophthalmologist for routine yearly vision exams.    Essential hypertension  Continue  metoprolol and isosorbide   Low sodium and fat diet, continue anti-hypertensive medications, and follow up with primary care physician.    Discharge outpatient Follow up with GI, attempted to call Lizzeth Chamberlain ( [phone]) unable to reach.     3/10: Case discussed with Dr. Enrique. Patient is stable for discharge. Medications reviewed and sent to agreed upon pharmacy    81 y/o M with hx of HTN, CAD (s/p stents-last 5 years ago), easily forgetful DM type 2 (not on insulin), GERD, HLD, BPH, diverticulitis and hemorrhoids p/w anemia.      Anemia  Microcytic anemia with iron study consistent with JASPER.    s/p IV iron x 3 days; now on feso4 and vitamin c  Daughter reports patient with history of hemorrhoids and diverticulosis and gastritis and gastric polyp  H/H stable   PPI  GI consult - EGD/colon with internal/external hemorrhoids and diverticulosis.     Dizziness  Patient complained of dizziness in triage  Orthostatic negative   Dizziness resolved     Chest pain.   CE stable x2. no chest pain currently, states that pain is intermittent, last occurred about 1 week ago. no specific triggers or aggravating triggers.   EKG with no acute changes noted from prior  Continue isosorbide  Prior stress neg 2018  Cardiology following -no further cardiac testing required for endoscopy  TTE w/o overt WMA normal EF.     Wheezing.  In setting of colonoscopy prep no longer wheezing on exam   CXR negative     CAD (coronary artery disease)  No current bleeding at present   GI: can resume aspirin  Continue aspirin, do not stop unless instructed by your physician.  Continue low salt, fat, cholesterol and carbohydrate diet. Follow up with cardiologist and primary care physician's routine appointment.    HLD (hyperlipidemia).  Continue statin  Low fat diet, exercise daily and continue current medications. Follow up with primary care physician and cardiologist for management.    BPH (benign prostatic hyperplasia)  cont flomax, dutasteride.     Type 2 diabetes mellitus with hyperglycemia, without long-term current use of insulin.    A1C 5.9  Hold oral hypoglycemics  Monitor finger sticks pre-meal and bedtime, low salt, fat and carbohydrate diet, minimize glucose intake.  Exercise daily for at least 30 minutes and weight loss.  Follow up with primary care physician and endocrinologist for routine Hemoglobin A1C checks and management.  Follow up with your ophthalmologist for routine yearly vision exams.    Essential hypertension  Continue  metoprolol and isosorbide   Low sodium and fat diet, continue anti-hypertensive medications, and follow up with primary care physician.    Discharge outpatient Follow up with GI, attempted to call Lizzeth Chamberlain ( [phone]) unable to reach.     3/10: Case discussed with Dr. Enrique. Patient is stable for discharge. Medications reviewed and sent to agreed upon pharmacy    79 y/o M with hx of HTN, CAD (s/p stents-last 5 years ago), easily forgetful DM type 2 (not on insulin), GERD, HLD, BPH, diverticulitis and hemorrhoids p/w anemia.      Anemia  Microcytic anemia with iron study consistent with JASPER.    s/p IV iron x 3 days; now on feso4 and vitamin c  Daughter reports patient with history of hemorrhoids and diverticulosis and gastritis and gastric polyp  H/H stable   PPI  GI consult - EGD/colon with internal/external hemorrhoids and diverticulosis.     Dizziness  Patient complained of dizziness in triage  Orthostatic negative   Dizziness resolved     Chest pain.   CE stable x2. no chest pain currently, states that pain is intermittent, last occurred about 1 week ago. no specific triggers or aggravating triggers.   EKG with no acute changes noted from prior  Continue isosorbide  Prior stress neg 2018  Cardiology following -no further cardiac testing required for endoscopy  TTE w/o overt WMA normal EF.     Wheezing.  In setting of colonoscopy prep no longer wheezing on exam   CXR negative     CAD (coronary artery disease)  No current bleeding at present   GI: can resume aspirin  Continue aspirin, do not stop unless instructed by your physician.  Continue low salt, fat, cholesterol and carbohydrate diet. Follow up with cardiologist and primary care physician's routine appointment.    HLD (hyperlipidemia).  Continue statin  Low fat diet, exercise daily and continue current medications. Follow up with primary care physician and cardiologist for management.    BPH (benign prostatic hyperplasia)  cont flomax, dutasteride.     Type 2 diabetes mellitus with hyperglycemia, without long-term current use of insulin.    A1C 5.9  Hold oral hypoglycemics  Monitor finger sticks pre-meal and bedtime, low salt, fat and carbohydrate diet, minimize glucose intake.  Exercise daily for at least 30 minutes and weight loss.  Follow up with primary care physician and endocrinologist for routine Hemoglobin A1C checks and management.  Follow up with your ophthalmologist for routine yearly vision exams.    Essential hypertension  Continue  metoprolol and isosorbide   Low sodium and fat diet, continue anti-hypertensive medications, and follow up with primary care physician.    Discharge outpatient Follow up with GI    3/10: Case discussed with Dr. Enrique. Patient is stable for discharge. Medications reviewed and sent to agreed upon pharmacy

## 2021-03-10 NOTE — DISCHARGE NOTE NURSING/CASE MANAGEMENT/SOCIAL WORK - NSSCTYPOFSERV_GEN_ALL_CORE
Nurse will visit patient the day after discharge. Nurse will telephone patient to schedule visit time.  Nurse will assess for physical therapist.

## 2021-03-10 NOTE — PROGRESS NOTE ADULT - ATTENDING COMMENTS
I have seen and evaluated the patient with the GI Fellow and GI Team.  I agree with the findings, formulation and plan of care as documented in the resident / fellows note and edited where appropriate.
I have seen and evaluated the patient with the GI Fellow and GI Team.  I agree with the findings, formulation and plan of care as documented in the resident / fellows note and edited where appropriate.
discharge outpt f/u with GI 40 min. attempted to call Lizzeth Chamberlain ( [phone]) unable to reach.

## 2021-03-10 NOTE — DISCHARGE NOTE NURSING/CASE MANAGEMENT/SOCIAL WORK - PATIENT PORTAL LINK FT
You can access the FollowMyHealth Patient Portal offered by [facility] by registering at the following website: [email or web address]. By joining [employer]’s FollowMyHealth portal, you will also be able to view your health information using other applications (apps) compatible with our system.

## 2021-05-20 NOTE — ED ADULT NURSE NOTE - NSIMPLEMENTINTERV_GEN_ALL_ED
Implemented All Fall with Harm Risk Interventions:  [place] to call system. Call bell, personal items and telephone within reach. Instruct patient to call for assistance. Room bathroom lighting operational. Non-slip footwear when patient is off stretcher. Physically safe environment: no spills, clutter or unnecessary equipment. Stretcher in lowest position, wheels locked, appropriate side rails in place. Provide visual cue, wrist band, yellow gown, etc. Monitor gait and stability. Monitor for mental status changes and reorient to person, place, and time. Review medications for side effects contributing to fall risk. Reinforce activity limits and safety measures with patient and family. Provide visual clues: red socks.

## 2021-05-20 NOTE — ED PROVIDER NOTE - PROGRESS NOTE DETAILS
Med: patient signed out to me. Admitted to hospitalist for syncopal evaluation and increasing lactate. Cultures sent-dosed with CTX empirically. Second liter IVF order and repeat VBG

## 2021-05-20 NOTE — ED PROVIDER NOTE - CARE PLAN
Principal Discharge DX:	Syncope  Secondary Diagnosis:	Rib pain on left side  Secondary Diagnosis:	Fall, initial encounter

## 2021-05-20 NOTE — H&P ADULT - NSHPREVIEWOFSYSTEMS_GEN_ALL_CORE
REVIEW OF SYSTEMS  --------------------------------------------------------------------------------  Gen: No weight changes, fatigue, fevers/chills, weakness  Skin: No rashes  Head/Eyes/Ears/Mouth: No headache; Normal hearing; Normal vision w/o blurriness; No sinus pain/discomfort, sore throat  Respiratory: No dyspnea, cough, wheezing, hemoptysis  CV: No chest pain, PND, orthopnea  GI: No abdominal pain, diarrhea, constipation, nausea, vomiting, melena, hematochezia  : No increased frequency, dysuria, hematuria, nocturia  MSK: No joint pain/swelling; no back pain; no edema  Neuro: No dizziness/lightheadedness, weakness, seizures, numbness, tingling  Heme: No easy bruising or bleeding  Endo: No heat/cold intolerance  Psych: No significant nervousness, anxiety, stress, depression REVIEW OF SYSTEMS  --------------------------------------------------------------------------------  Gen: No weight changes, fatigue, fevers/chills, weakness  Skin: No rashes  Head/Eyes/Ears/Mouth: No headache; Normal hearing; Normal vision w/o blurriness; No sinus pain/discomfort, sore throat  Respiratory: No dyspnea, cough, wheezing, hemoptysis  CV: +chest pain, PND, orthopnea  GI: No abdominal pain, diarrhea, constipation, nausea, vomiting, melena, hematochezia  : No increased frequency, dysuria, hematuria, nocturia  MSK: No joint pain/swelling; no back pain; no edema  Neuro: +dizziness/lightheadedness, -weakness, seizures, numbness, tingling  Heme: No easy bruising or bleeding  Endo: No heat/cold intolerance  Psych: No significant nervousness, anxiety, stress, depression

## 2021-05-20 NOTE — PROVIDER CONTACT NOTE (CRITICAL VALUE NOTIFICATION) - ASSESSMENT
pt stable and resting Pt asymptomatic, vital signs stable, afebrile, 1 liter bolus completed. lactate is trending down. Will relay critical lactate to oncoming RN.

## 2021-05-20 NOTE — H&P ADULT - PROBLEM SELECTOR PLAN 4
-c/w home metoprolol -not on home insulin. Holding home oral medications   -A1C 5.9 in March  -monitor fingersticks. ISS for now -c/w home finasteride, tamsulosin

## 2021-05-20 NOTE — H&P ADULT - NSHPPHYSICALEXAM_GEN_ALL_CORE
T(C): 36.7 (05-20-21 @ 17:18), Max: 36.7 (05-20-21 @ 13:10)  HR: 98 (05-20-21 @ 17:18) (76 - 98)  BP: 170/66 (05-20-21 @ 17:18) (145/58 - 176/70)  RR: 18 (05-20-21 @ 17:18) (18 - 21)  SpO2: 100% (05-20-21 @ 17:18) (98% - 100%)    GENERAL: patient appears well, no acute distress, appropriate, pleasant  EYES: sclera clear, no exudates  ENMT: oropharynx clear without erythema, no exudates, moist mucous membranes  NECK: supple, soft, no thyromegaly noted  LUNGS: good air entry bilaterally, clear to auscultation, symmetric breath sounds, no wheezing or rhonchi appreciated  HEART: soft S1/S2, regular rate and rhythm, no murmurs noted, no lower extremity edema  GASTROINTESTINAL: abdomen is soft, nontender, nondistended, normoactive bowel sounds, no palpable masses  INTEGUMENT: good skin turgor, no lesions noted  MUSCULOSKELETAL: no clubbing or cyanosis, no obvious deformity  NEUROLOGIC: awake, alert, oriented x3, good muscle tone in 4 extremities, no obvious sensory deficits  PSYCHIATRIC: mood is good, affect is congruent, linear and logical thought process  HEME/LYMPH: no palpable supraclavicular nodules, no obvious ecchymosis or petechiae T(C): 36.7 (05-20-21 @ 17:18), Max: 36.7 (05-20-21 @ 13:10)  HR: 98 (05-20-21 @ 17:18) (76 - 98)  BP: 170/66 (05-20-21 @ 17:18) (145/58 - 176/70)  RR: 18 (05-20-21 @ 17:18) (18 - 21)  SpO2: 100% (05-20-21 @ 17:18) (98% - 100%)    GENERAL: patient appears well, no acute distress, appropriate, pleasant  EYES: sclera clear, no exudates  ENMT: oropharynx clear without erythema, no exudates, moist mucous membranes  NECK: supple, soft, no thyromegaly noted  LUNGS: good air entry bilaterally, clear to auscultation, symmetric breath sounds, no wheezing or rhonchi appreciated  HEART: soft S1/S2, regular rate and rhythm, no murmurs noted, no lower extremity edema  GASTROINTESTINAL: abdomen is distended but soft. TTP on L ribs   INTEGUMENT: good skin turgor, no lesions noted  MUSCULOSKELETAL: no clubbing or cyanosis, no obvious deformity  NEUROLOGIC: awake, alert, oriented x3, good muscle tone in 4 extremities, no obvious sensory deficits  PSYCHIATRIC: mood is good, affect is congruent, linear and logical thought process  HEME/LYMPH: no palpable supraclavicular nodules, no obvious ecchymosis or petechiae

## 2021-05-20 NOTE — ED PROVIDER NOTE - CLINICAL SUMMARY MEDICAL DECISION MAKING FREE TEXT BOX
Lester: Concern for ACS in this elderly man w/ CAD who had syncope. Concern for rib fx. Check CT chest, trop, EKG. Admit. Lester: Concern for ACS in this elderly man w/ CAD who had syncope. Concern for rib fx. Check CT chest, trop, EKG. DDx includes symptomatic anemia (h/o same, considered 2/2 diverticular disease and hemorrhoids. Check Hb. Not likely PE (no LE edema, not tachycardic or hypoxic). Admit.

## 2021-05-20 NOTE — ED PROCEDURE NOTE - US POC STATEMENT
The patient/family was/were informed of limited nature of the exam. Representative images were printed to be scanned into the chart or directly uploaded into the medical record.

## 2021-05-20 NOTE — H&P ADULT - PROBLEM SELECTOR PLAN 3
-not on home insulin   -A1C 5.9 in March  -ISS for now -c/w home finasteride, tamsulosin -C/W aspirin, imdur   -monitor on tele  -EKG WNL

## 2021-05-20 NOTE — ED PROVIDER NOTE - ATTENDING CONTRIBUTION TO CARE
I performed a face-to-face evaluation of the patient and performed a history and physical examination. I agree with the history and physical examination.    Lester: Concern for ACS in this elderly man w/ CAD who had syncope. Concern for rib fx. Check CT chest, trop, EKG. Admit. I performed a face-to-face evaluation of the patient and performed a history and physical examination. I agree with the history and physical examination.    Lester: Concern for ACS in this elderly man w/ CAD who had syncope. Concern for rib fx. Check CT chest, trop, EKG. DDx includes symptomatic anemia (h/o same, considered 2/2 diverticular disease and hemorrhoids. Check Hb. Not likely PE (no LE edema, not tachycardic or hypoxic). Admit.

## 2021-05-20 NOTE — H&P ADULT - NSHPLABSRESULTS_GEN_ALL_CORE
LABS:                          10.9   13.29 )-----------( 259      ( 20 May 2021 12:22 )             37.2     05-20    131<L>  |  96<L>  |  10  ----------------------------<  191<H>  4.0   |  21<L>  |  0.82    Ca    9.4      20 May 2021 11:36    TPro  6.8  /  Alb  4.3  /  TBili  0.6  /  DBili  x   /  AST  15  /  ALT  15  /  AlkPhos  84  05-20    Imaging:   < from: CT Head No Cont (05.20.21 @ 13:35) >    IMPRESSION:    Cervical spine CT: No acute fractures or dislocations.    Head CT: No acute intracranial hemorrhage, mass effect, or shift of the midline structures.        < end of copied text >    < from: CT Abdomen and Pelvis w/ IV Cont (05.20.21 @ 13:46) >      IMPRESSION:  *  Acute minimally displaced fractures of the left posterior eighth and ninth ribs. No pneumothorax.  *  No CT evidence for traumatic injury in the abdomen or pelvis.  *  Wall thickening of the proximal sigmoid colon with surrounding stranding and increase in number of subcentimeter pericolonic lymph nodes. In the absence of clinical findings of acute diverticulitis, the presence of underlying colonic mass is a consideration. Further evaluation with colonoscopy is recommended.    < end of copied text >

## 2021-05-20 NOTE — H&P ADULT - PROBLEM SELECTOR PLAN 7
DVT: subQ heparin   Diet: Dash/TLC DVT: subQ heparin   Diet: Dash/TLC    Attempted to reach patient's family on 5/21- no answer and no voicemail box. Will try again at a later time.

## 2021-05-20 NOTE — H&P ADULT - PROBLEM SELECTOR PLAN 5
DVT: subQ heparin   Diet: Dash/TLC -c/w home metoprolol -not on home insulin. Holding home oral medications   -A1C 5.9 in March  -monitor fingersticks. ISS for now

## 2021-05-20 NOTE — ED PROVIDER NOTE - PHYSICAL EXAMINATION
Well appearing, well nourished, awake, alert, oriented to person, place, time/situation and in L rib pain istress.    Airway patent. Lungs clear.     Eyes without scleral injection. No jaundice.    Strong pulse.    Respirations unlabored.    Abdomen soft, non-tender, no guarding.    MSK: L axillary-area ribs TTP.    Alert and oriented, no gross motor or sensory deficits.    Skin normal color for race, warm, dry and intact. No evidence of rash.    No SI/HI.

## 2021-05-20 NOTE — H&P ADULT - ATTENDING COMMENTS
81 year old male, PMH DM, CAD s/p stents, presenting with syncopal episode. Lactate elevated.   Syncope- Orthosatic VS positive. Will continue IVFs, repeat orthostatics tomorrow. Encourage OOB to chair if possible. okay to d/c tele 81 year old male, PMH DM, CAD s/p stents, presenting with syncopal episode. Lactate elevated.   Syncope- Orthosatic VS positive. Likely secondary to vol depletion from poor PO intake as endorsed by pt. Will continue IVFs, repeat orthostatics tomorrow. Nutrition consult. Encourage OOB to chair if possible. okay to d/c tele

## 2021-05-20 NOTE — H&P ADULT - PROBLEM SELECTOR PLAN 2
-C/W aspirin   -monitor on tele  -EKG WNL -C/W aspirin, imdur   -monitor on tele  -EKG WNL -L 8th and 9th rib factures from fall  -pain management w morphine and tylenol  -incentive spirometry

## 2021-05-20 NOTE — ED ADULT TRIAGE NOTE - CHIEF COMPLAINT QUOTE
pt arrives by ambulance from home s/p witnessed syncopal episode this morning. + fall to ground from chair. c/o left rib pain. no head injury. pt denies cp/sob. pt appears diaphoretic. hx of cardiac stents. pt arrives by ambulance from home s/p witnessed syncopal episode this morning. + fall to ground from chair. c/o left rib pain. no head injury. pt denies cp/sob. pt appears diaphoretic. hx of cardiac stents.  in field

## 2021-05-20 NOTE — H&P ADULT - HISTORY OF PRESENT ILLNESS
The patient is a 80 year old male, PMH HTN, CAD (s/p stens, last 5 years ago), DMII, dementia, gerd, HLD, BPH, diverticulitis and hemorrhoids presenting s/o syncopal episode.     Notably, the patient was admitted in March for symptomatic iron deficiency anemia (Hgb 7). Had a colonoscopy showing hemorrhoids and diverticulosis, treated with IV and PO iron.  The patient is a 80 year old male, PMH HTN, CAD (s/p stens, last 5 years ago), DMII, dementia, gerd, HLD, BPH, diverticulitis and hemorrhoids presenting s/p syncopal episode.  The patient was in his usual state of health this morning. He was sitting down and checking his sugar, which he noted was high. He took his metformin and next thing he remembers he was on the floor. He believes he may have blacked out for a second, unsure if he lost consciousness. No one was in the room to witness his fall. He woke up oriented. Did not bite his tongue or have any incontinence. He notes dizziness over the past few months.   Notably, the patient was admitted in March for symptomatic iron deficiency anemia (Hgb 7). Had a colonoscopy showing hemorrhoids and diverticulosis, treated with IV and PO iron.  The patient is a 80 year old male, PMH HTN, CAD (s/p stens, last 5 years ago), DMII, dementia, gerd, HLD, BPH, diverticulitis and hemorrhoids presenting s/p syncopal episode.  The patient was in his usual state of health this morning. He was sitting down and checking his sugar, which he noted was high. He took his metformin and next thing he remembers he was on the floor. He believes he may have blacked out for a second, unsure if he lost consciousness. No one was in the room to witness his fall. He woke up oriented but in a cold sweat. Did not bite his tongue or have any incontinence. He notes dizziness over the past few months. Denies any fevers, recent illnesses, dysuria, N/V/D. Chronic straining when he urinates. Endorses intermittent chest pain but this has been occurring for months.   Notably, the patient was admitted in March for symptomatic iron deficiency anemia (Hgb 7). Had a colonoscopy showing hemorrhoids and diverticulosis, treated with IV and PO iron.

## 2021-05-20 NOTE — ED PROVIDER NOTE - OBJECTIVE STATEMENT
Lester: DM. While checking BG (220) this AM. Took metformin. Lay down. Woke up. Stood up. Fell down. Wife and daughter found him. When he awoke, had L mid-axillary chest pain. No prior HA/CP/SOB. H/o CAD (stent). No current HA or neck pain. Lester: H/o DM, CAD (stent), anemia. While checking BG (220) this AM. Took metformin. Lay down. Woke up. Stood up. Fell down. Wife and daughter found him. When he awoke, had L mid-axillary chest pain. No prior HA/CP/SOB. No current HA or neck pain.

## 2021-05-20 NOTE — H&P ADULT - PROBLEM SELECTOR PLAN 1
-unclear etiology. Unlikely cardiac as troponin WNL, no EKG findings, no heart failure on echo in March.   -FU prolactin -unclear etiology. Unlikely cardiac as troponin WNL, no EKG findings, no heart failure on echo in March. Could be orthostatic  -monitor on tele   -repeat lactate, continue fluids   -FU orthostatics   -FU prolactin -unclear etiology. Unlikely cardiac as troponin WNL, no EKG findings, no heart failure on echo in March. Could be orthostatic due to poor PO intake (patient said he hasn't been eating or drinking much lately).   -monitor on tele   -repeat lactate, continue fluids   -FU orthostatics   -FU prolactin -unclear etiology. Unlikely cardiac as troponin WNL, no EKG findings, no heart failure on echo in March. Could be orthostatic due to poor PO intake (patient said he hasn't been eating or drinking much lately) vs vasovagal.   -monitor on tele   -repeat lactate, continue fluids   -FU orthostatics   -FU prolactin

## 2021-05-20 NOTE — ED ADULT NURSE NOTE - OBJECTIVE STATEMENT
82 y/o male arrives to E.D. rm 2 endorsing an unwitnessed syncopal episode after checking his BG level at home, pt now c/o severe left rib pain. Pt is a&ox4, ambulatory at baseline, unknown LOC, unknown if pt hit head, denies headache, endorses "feeling light-headed", neg sob, neg accessory muscle use, denies chest pain, denies n/v/d, denies recent fever/cough/body aches. No deformity noted to area of pain. Left ribcage pain worsens upon palpation. Pt endorses poor appetite for past week. R 18g IV placed to AC, blood work sent. Will continue to monitor.

## 2021-05-21 NOTE — PROGRESS NOTE ADULT - PROBLEM SELECTOR PLAN 5
-not on home insulin. Holding home oral medications   -A1C 5.9 in March  -monitor fingersticks. ISS for now

## 2021-05-21 NOTE — PROVIDER CONTACT NOTE (OTHER) - SITUATION
Patient had 13 beats of v-tach, VS wnl, PA made aware, will continue to monitor
Patient c/o pain 10/10 Left rib area, SOB, and wheezing noted, VS wnl, SpO2 wnl, MD made aware, pain medication given as ordered, incentive spirometer encouraged,

## 2021-05-21 NOTE — PROGRESS NOTE ADULT - SUBJECTIVE AND OBJECTIVE BOX
Nery Paz Y1  [phone]    Patient is a 81y old  Male who presents with a chief complaint of     SUBJECTIVE / OVERNIGHT EVENTS:  Patient seen and examined at bedside.    Other Review of Systems Negative.    MEDICATIONS  (STANDING):  ascorbic acid 500 milliGRAM(s) Oral daily  aspirin enteric coated 81 milliGRAM(s) Oral daily  dextrose 40% Gel 15 Gram(s) Oral once  dextrose 5%. 1000 milliLiter(s) (50 mL/Hr) IV Continuous <Continuous>  dextrose 5%. 1000 milliLiter(s) (100 mL/Hr) IV Continuous <Continuous>  dextrose 50% Injectable 25 Gram(s) IV Push once  dextrose 50% Injectable 12.5 Gram(s) IV Push once  dextrose 50% Injectable 25 Gram(s) IV Push once  donepezil 10 milliGRAM(s) Oral at bedtime  finasteride 5 milliGRAM(s) Oral daily  gabapentin 100 milliGRAM(s) Oral daily  glucagon  Injectable 1 milliGRAM(s) IntraMuscular once  heparin   Injectable 5000 Unit(s) SubCutaneous every 8 hours  insulin lispro (ADMELOG) corrective regimen sliding scale   SubCutaneous three times a day before meals  insulin lispro (ADMELOG) corrective regimen sliding scale   SubCutaneous at bedtime  isosorbide   mononitrate ER Tablet (IMDUR) 30 milliGRAM(s) Oral daily  metoprolol succinate ER 50 milliGRAM(s) Oral daily  pantoprazole    Tablet 40 milliGRAM(s) Oral before breakfast  simvastatin 40 milliGRAM(s) Oral at bedtime  tamsulosin 0.4 milliGRAM(s) Oral at bedtime    MEDICATIONS  (PRN):  acetaminophen   Tablet .. 650 milliGRAM(s) Oral every 6 hours PRN Mild Pain (1 - 3), Moderate Pain (4 - 6)  morphine  - Injectable 4 milliGRAM(s) IV Push every 4 hours PRN Severe Pain (7 - 10)      OBJECTIVE:    Vital Signs Last 24 Hrs  T(C): 37.1 (21 May 2021 05:23), Max: 37.2 (20 May 2021 21:16)  T(F): 98.8 (21 May 2021 05:23), Max: 98.9 (20 May 2021 21:16)  HR: 83 (21 May 2021 05:23) (76 - 104)  BP: 168/83 (21 May 2021 05:23) (145/58 - 178/81)  BP(mean): --  RR: 18 (21 May 2021 05:23) (16 - 21)  SpO2: 100% (21 May 2021 05:23) (90% - 100%)    CAPILLARY BLOOD GLUCOSE      POCT Blood Glucose.: 151 mg/dL (20 May 2021 21:39)  POCT Blood Glucose.: 216 mg/dL (20 May 2021 16:04)  POCT Blood Glucose.: 169 mg/dL (20 May 2021 11:06)    I&O's Summary    20 May 2021 07:01  -  21 May 2021 07:00  --------------------------------------------------------  IN: 720 mL / OUT: 900 mL / NET: -180 mL        PHYSICAL EXAM:  GENERAL: NAD, well-developed  HEAD:  Atraumatic, Normocephalic  EYES: EOMI, PERRLA, conjunctiva and sclera clear  NECK: Supple, No JVD  CHEST/LUNG: Clear to auscultation bilaterally; No wheeze  HEART: Regular rate and rhythm; No murmurs, rubs, or gallops  ABDOMEN: Soft, Nontender, Nondistended; Bowel sounds present  EXTREMITIES:  2+ Peripheral Pulses, No clubbing, cyanosis, or edema  PSYCH: AAOx3  NEUROLOGY: non-focal  SKIN: No rashes or lesions    LABS:                        10.9   13.29 )-----------( 259      ( 20 May 2021 12:22 )             37.2     Auto Eosinophil # 0.00  / Auto Eosinophil % 0.0   / Auto Neutrophil # 12.36 / Auto Neutrophil % 93.0  / BANDS % x        -20    131<L>  |  96<L>  |  10  ----------------------------<  191<H>  4.0   |  21<L>  |  0.82    Ca    9.4      20 May 2021 11:36  TPro  6.8  /  Alb  4.3  /  TBili  0.6  /  DBili  x   /  AST  15  /  ALT  15  /  AlkPhos  84  05-          Urinalysis Basic - ( 20 May 2021 13:16 )    Color: Yellow / Appearance: Clear / S.017 / pH: x  Gluc: x / Ketone: Trace  / Bili: Negative / Urobili: <2 mg/dL   Blood: x / Protein: 30 mg/dL / Nitrite: Negative   Leuk Esterase: Negative / RBC: 1 /HPF / WBC 4 /HPF   Sq Epi: x / Non Sq Epi: 1 /HPF / Bacteria: Negative            ABG:     VBG: ( 19:14 ) - VBG - pH: 7.26  | pCO2: 51    | pO2: 36    | Lactate: 4.6    ( 14:55 ) - VBG - pH: 7.25  | pCO2: 50    | pO2: 50    | Lactate: 5.1    ( 11:36 ) - VBG - pH: 7.30  | pCO2: 49    | pO2: 28    | Lactate: 3.7          Care Discussed with Consultants/Other Providers:    RADIOLOGY & ADDITIONAL TESTS:  (Imaging Personally Reviewed)       Nery Paz PGY1  [phone]    Patient is a 81y old  Male who presents with a chief complaint of     SUBJECTIVE / OVERNIGHT EVENTS:  Patient seen and examined at bedside.  No acute events overnight. Complaining of rib pain at fracture location, relieved with lidocaine patch and morphine. Also experiencing intermittent chest pain.  No tele events overnight. Lactate remains elevated.     Other Review of Systems Negative.    MEDICATIONS  (STANDING):  ascorbic acid 500 milliGRAM(s) Oral daily  aspirin enteric coated 81 milliGRAM(s) Oral daily  dextrose 40% Gel 15 Gram(s) Oral once  dextrose 5%. 1000 milliLiter(s) (50 mL/Hr) IV Continuous <Continuous>  dextrose 5%. 1000 milliLiter(s) (100 mL/Hr) IV Continuous <Continuous>  dextrose 50% Injectable 25 Gram(s) IV Push once  dextrose 50% Injectable 12.5 Gram(s) IV Push once  dextrose 50% Injectable 25 Gram(s) IV Push once  donepezil 10 milliGRAM(s) Oral at bedtime  finasteride 5 milliGRAM(s) Oral daily  gabapentin 100 milliGRAM(s) Oral daily  glucagon  Injectable 1 milliGRAM(s) IntraMuscular once  heparin   Injectable 5000 Unit(s) SubCutaneous every 8 hours  insulin lispro (ADMELOG) corrective regimen sliding scale   SubCutaneous three times a day before meals  insulin lispro (ADMELOG) corrective regimen sliding scale   SubCutaneous at bedtime  isosorbide   mononitrate ER Tablet (IMDUR) 30 milliGRAM(s) Oral daily  metoprolol succinate ER 50 milliGRAM(s) Oral daily  pantoprazole    Tablet 40 milliGRAM(s) Oral before breakfast  simvastatin 40 milliGRAM(s) Oral at bedtime  tamsulosin 0.4 milliGRAM(s) Oral at bedtime    MEDICATIONS  (PRN):  acetaminophen   Tablet .. 650 milliGRAM(s) Oral every 6 hours PRN Mild Pain (1 - 3), Moderate Pain (4 - 6)  morphine  - Injectable 4 milliGRAM(s) IV Push every 4 hours PRN Severe Pain (7 - 10)      OBJECTIVE:    Vital Signs Last 24 Hrs  T(C): 37.1 (21 May 2021 05:23), Max: 37.2 (20 May 2021 21:16)  T(F): 98.8 (21 May 2021 05:23), Max: 98.9 (20 May 2021 21:16)  HR: 83 (21 May 2021 05:23) (76 - 104)  BP: 168/83 (21 May 2021 05:23) (145/58 - 178/81)  BP(mean): --  RR: 18 (21 May 2021 05:23) (16 - 21)  SpO2: 100% (21 May 2021 05:23) (90% - 100%)    CAPILLARY BLOOD GLUCOSE      POCT Blood Glucose.: 151 mg/dL (20 May 2021 21:39)  POCT Blood Glucose.: 216 mg/dL (20 May 2021 16:04)  POCT Blood Glucose.: 169 mg/dL (20 May 2021 11:06)    I&O's Summary    20 May 2021 07:01  -  21 May 2021 07:00  --------------------------------------------------------  IN: 720 mL / OUT: 900 mL / NET: -180 mL        PHYSICAL EXAM:  GENERAL: NAD, well-developed  HEAD:  Atraumatic, Normocephalic  EYES: EOMI, PERRLA, conjunctiva and sclera clear  NECK: Supple, No JVD  CHEST/LUNG: Clear to auscultation bilaterally; No wheeze  HEART: Regular rate and rhythm; No murmurs, rubs, or gallops  ABDOMEN: Distended, soft. TTP on L ribs. Normoactive bowel sounds.   EXTREMITIES:  2+ Peripheral Pulses, No clubbing, cyanosis, or edema  PSYCH: AAOx3  NEUROLOGY: non-focal  SKIN: No rashes or lesions    LABS:                        10.9   13.29 )-----------( 259      ( 20 May 2021 12:22 )             37.2     Auto Eosinophil # 0.00  / Auto Eosinophil % 0.0   / Auto Neutrophil # 12.36 / Auto Neutrophil % 93.0  / BANDS % x        05-20    131<L>  |  96<L>  |  10  ----------------------------<  191<H>  4.0   |  21<L>  |  0.82    Ca    9.4      20 May 2021 11:36  TPro  6.8  /  Alb  4.3  /  TBili  0.6  /  DBili  x   /  AST  15  /  ALT  15  /  AlkPhos  84  05-20          Urinalysis Basic - ( 20 May 2021 13:16 )    Color: Yellow / Appearance: Clear / S.017 / pH: x  Gluc: x / Ketone: Trace  / Bili: Negative / Urobili: <2 mg/dL   Blood: x / Protein: 30 mg/dL / Nitrite: Negative   Leuk Esterase: Negative / RBC: 1 /HPF / WBC 4 /HPF   Sq Epi: x / Non Sq Epi: 1 /HPF / Bacteria: Negative    VBG: ( 19:14 ) - VBG - pH: 7.26  | pCO2: 51    | pO2: 36    | Lactate: 4.6    ( 14:55 ) - VBG - pH: 7.25  | pCO2: 50    | pO2: 50    | Lactate: 5.1    ( 11:36 ) - VBG - pH: 7.30  | pCO2: 49    | pO2: 28    | Lactate: 3.7          Care Discussed with Consultants/Other Providers:    RADIOLOGY & ADDITIONAL TESTS:  (Imaging Personally Reviewed)

## 2021-05-21 NOTE — PROGRESS NOTE ADULT - PROBLEM SELECTOR PLAN 2
-L 8th and 9th rib factures from fall  -pain management w morphine and tylenol  -incentive spirometry

## 2021-05-21 NOTE — PROGRESS NOTE ADULT - ATTENDING COMMENTS
81 year old male, PMH DM, CAD s/p stents, presenting with syncopal episode. Lactate elevated.   Syncope- Orthosatic VS positive. Likely secondary to vol depletion from poor PO intake as endorsed by pt. Will continue IVFs, trend orthostatics. Nutrition consult. Encourage OOB to chair if possible. okay to d/c tele

## 2021-05-21 NOTE — PROGRESS NOTE ADULT - PROBLEM SELECTOR PLAN 1
-unclear etiology. Unlikely cardiac as troponin WNL, no EKG findings, no heart failure on echo in March. Could be orthostatic due to poor PO intake (patient said he hasn't been eating or drinking much lately) vs vasovagal.   -monitor on tele   -repeat lactate, continue fluids   -FU orthostatics   -FU prolactin -unclear etiology. Unlikely cardiac as troponin WNL, no EKG findings, no heart failure on echo in March. Could be orthostatic due to poor PO intake (patient said he hasn't been eating or drinking much lately) vs vasovagal.   -monitor on tele, no events overnight   -repeat lactate, continue fluids   -FU orthostatics   -FU prolactin

## 2021-05-22 NOTE — DISCHARGE NOTE FOR THE EXPIRED PATIENT - HOSPITAL COURSE
The patient is a 80 year old male, PMH HTN, CAD (s/p stens, last 5 years ago), DMII, dementia, gerd, HLD, BPH, diverticulitis and hemorrhoids initially presenting s/p syncopal episode.  The patient was in his usual state of health this morning. He was sitting down and checking his sugar, which he noted was high. He took his metformin and next thing he remembers he was on the floor. He believes he may have blacked out for a second, unsure if he lost consciousness. No one was in the room to witness his fall. He woke up oriented but in a cold sweat. Did not bite his tongue or have any incontinence. Upon admission he was placed on telemetry for observation. He was pending a carotid duplex and vascular echocardiogram. At 23:07 on 5/21/2021 a code blue was called. As per RN tele had alerted her that pt was bradycardic to 29. Upon entering the room she could not palpate a pulse and did not see chest rise and activated the Code Blue response team. The initial rhythm showed asystole then PEA arrest. Epi wwas given x5, Narcan x 2 as pt had received Morphine earlier, mutliple rounds of Bicarb and Calcium were given prior to achieving ROSC. Pt was intubated during CPR by Anesthesia with significant blood present in ET tube requirng repeated suctioning. O2 sat while intubated ranged from 50s-70%. CXR post intubation showed extensive subcutaneous emphysema, left-sided pneumothorax cannot be excluded, multiple left sided rib fractures, and diffuse opacification of the right lung likely 2/2 pulmonary hemorrhage/contusion. Levophed gtt was started, however, pt lost pulse 2 more times with PEA arrest on monitor and CPR was initiated again with ROSC achieved after 5 additional epi and bicarb x 4 given. MICU was at bedside, given extensive subcutaneous emphysema extending to the distal extremities), unable to determine presence of L. pneumothorax via POCUS. Pt pronounced dead at 00:19AM 5/22/21. Family notified.

## 2021-05-22 NOTE — RAPID RESPONSE TEAM SUMMARY - NSSITUATIONBACKGROUNDRRT_GEN_ALL_CORE
81y man with DM, CAD s/p stents, presenting with syncopal episode. Lactate elevated on lab. Code blue called at 23:07,  pt was yaneth immediately prior to code blue. Rhythm    81y man with DM, CAD s/p stents, presenting with syncopal episode. Lactate elevated on lab. Code blue called at 23:07,  pt was yaneth immediately prior to code blue. Initial rhythm showed asystole, then PEA arrest, epi 5 and Narcan x2 were given before ROSC was achieved. Pt intubated with significant blood present in ET tube. O2 sat while intubated ranged from 50s-70%. Levophed gtt was started, however, pt lost pulse 2 more times, and 5 additional epi was given. Pt pronounced dead at 12:19AM 5/22. Family notified.  81y man with DM, CAD s/p stents, presenting with syncopal episode. Lactate elevated on lab. Code blue called at 23:07,  pt was yaneth immediately prior to code blue. Initial rhythm showed asystole, then PEA arrest, epi x5 and Narcan x2 were given before ROSC was achieved. Pt intubated with significant blood present in ET tube. O2 sat while intubated ranged from 50s-70%. CXR post intubation showed extensive subcutaneous emphysema, left-sided pneumothorax cannot be excluded, multiple left sided rib fractures, and diffuse opacification of the right lung likely 2/2 pulmonary hemorrhage/contusion. Levophed gtt was started, however, pt lost pulse 2 more times, PEA arrest, and 5 additional epi was given, bicarb x 4 given. MICU was at bedside. Pt pronounced dead at 12:19AM 5/22. Family notified.  81y man with DM, CAD s/p stents, presenting with syncopal episode. Lactate elevated on lab. Code blue called at 23:07,  pt was yaneth immediately prior to code blue. Initial rhythm showed asystole, then PEA arrest, epi x5 and Narcan x2 were given before ROSC was achieved. Pt intubated with significant blood present in ET tube. O2 sat while intubated ranged from 50s-70%. CXR post intubation showed extensive subcutaneous emphysema, left-sided pneumothorax cannot be excluded, multiple left sided rib fractures, and diffuse opacification of the right lung likely 2/2 pulmonary hemorrhage/contusion. Levophed gtt was started, however, pt lost pulse 2 more times, PEA arrest, and 5 additional epi was given, bicarb x 4 given. MICU was at bedside, given extensive subcutaneous emphysema, unable to determine presence of L. pneumothorax. Pt pronounced dead at 00:19AM 5/22. Family notified.

## 2021-05-22 NOTE — CHART NOTE - NSCHARTNOTEFT_GEN_A_CORE
Received call from RN re: concern for Pt not having pulse and cessation of spontaneous respirations. Code Blue activated. Time of Death 12:19am    Pt seen and examined at bedside during code blue with CPR initiated. Please see RRT summary by Dr. Brito on 00:35 2021 for details of Code Blue.    Vitals at time of death  BP: 0  HR: 0  RR: 0  O2 Sat: 0    PHYSICAL EXAM:  HEENT: Pupils dilated and nonreactive to light  Constitutional: unresponsive to verbal/noxious stimuli  Respiratory: No air movement or breath sounds, no chest rise  Cardiovascular: No heart beat/rate  Vascular:  No peripheral pulses  Skin: pallor, decreased warmth      Assessment  Pt has . TOD: 12:19am    Plan  - Called family: Dr. Polly Ford and I spoke to Lizzeth Chamberlain (daughter)  - Discharge note for  completed and sent to Dr. Aristeo Rosado or co-sign  - Discussed plan w/RN who is aware and will contact MD w/further concerns should they arise    Discussed w/ JAN Brito and MICU team

## 2021-09-04 NOTE — PROGRESS NOTE ADULT - PROBLEM SELECTOR PLAN 3
ED Physician Addendum Note    Case endorsed to me by Dr. Ramesh, at shift change, pending CT A/P.     8:42 AM  Reviewed diagnostic results with the patient, as well as plans for disposition. Patient agrees to discharge home and understands need for follow-up on an outpatient basis.      Results for orders placed or performed during the hospital encounter of 09/04/21   Comprehensive Metabolic Panel   Result Value Ref Range    Fasting Status      Sodium 137 135 - 145 mmol/L    Potassium 3.5 3.4 - 5.1 mmol/L    Chloride 102 98 - 107 mmol/L    Carbon Dioxide 29 21 - 32 mmol/L    Anion Gap 10 10 - 20 mmol/L    Glucose 179 (H) 65 - 99 mg/dL    BUN 14 6 - 20 mg/dL    Creatinine 0.54 0.51 - 0.95 mg/dL    Glomerular Filtration Rate >90 >=60    BUN/ Creatinine Ratio 26 (H) 7 - 25    Calcium 9.1 8.4 - 10.2 mg/dL    Bilirubin, Total 0.5 0.2 - 1.0 mg/dL    GOT/AST 15 <=37 Units/L    GPT/ALT 26 <64 Units/L    Alkaline Phosphatase 124 (H) 45 - 117 Units/L    Albumin 3.6 3.6 - 5.1 g/dL    Protein, Total 7.4 6.4 - 8.2 g/dL    Globulin 3.8 2.0 - 4.0 g/dL    A/G Ratio 0.9 (L) 1.0 - 2.4   Lipase   Result Value Ref Range    Lipase 79 73 - 393 Units/L   Urinalysis & Reflex Microscopy With Culture If Indicated   Result Value Ref Range    COLOR, URINALYSIS Yellow     APPEARANCE, URINALYSIS Clear     GLUCOSE, URINALYSIS 100  (A) Negative mg/dL    BILIRUBIN, URINALYSIS Negative Negative    KETONES, URINALYSIS Negative Negative mg/dL    SPECIFIC GRAVITY, URINALYSIS 1.025 1.005 - 1.030    OCCULT BLOOD, URINALYSIS Negative Negative    PH, URINALYSIS 6.0 5.0 - 7.0    PROTEIN, URINALYSIS Negative Negative mg/dL    UROBILINOGEN, URINALYSIS 0.2 0.2, 1.0 mg/dL    NITRITE, URINALYSIS Negative Negative    LEUKOCYTE ESTERASE, URINALYSIS Negative Negative   Light Green Top   Result Value Ref Range    Extra Tube Hold for Add Ons    Lavender Top   Result Value Ref Range    Extra Tube Hold for Add Ons    Gold Top   Result Value Ref Range    Extra Tube Hold 
-  CE stable x2. no chest pain currently, states that pain is intermittent, last occurred about 1 week ago. no specific triggers or aggravating triggers.   - EKG with no acute changes noted from prior  - cont isosorbide  - prior stress neg 2018  - cardiology following -no further cardiac testing required for endoscopy  - TTE w/o overt WMA normal EF
for Add Ons    Pink Top Tube   Result Value Ref Range    Extra Tube Hold for Add Ons    CBC with Automated Differential (performable only)   Result Value Ref Range    WBC 7.5 4.2 - 11.0 K/mcL    RBC 4.15 4.00 - 5.20 mil/mcL    HGB 11.8 (L) 12.0 - 15.5 g/dL    HCT 36.1 36.0 - 46.5 %    MCV 87.0 78.0 - 100.0 fl    MCH 28.4 26.0 - 34.0 pg    MCHC 32.7 32.0 - 36.5 g/dL    RDW-CV 13.7 11.0 - 15.0 %    RDW-SD 43.1 39.0 - 50.0 fL     140 - 450 K/mcL    NRBC 0 <=0 /100 WBC    Neutrophil, Percent 33 %    Lymphocytes, Percent 58 %    Mono, Percent 6 %    Eosinophils, Percent 2 %    Basophils, Percent 1 %    Immature Granulocytes 0 %    Absolute Neutrophils 2.5 1.8 - 7.7 K/mcL    Absolute Lymphocytes 4.3 1.0 - 4.8 K/mcL    Absolute Monocytes 0.5 0.3 - 0.9 K/mcL    Absolute Eosinophils  0.2 0.0 - 0.5 K/mcL    Absolute Basophils 0.0 0.0 - 0.3 K/mcL    Absolute Immmature Granulocytes 0.0 0.0 - 0.2 K/mcL   POCT Urine pregnancy   Result Value Ref Range    URINE PREGNANCY,QUAL Negative Negative    Internal Procedural Controls Acceptable Yes        CT ABDOMEN PELVIS WO CONTRAST   Final Result   1.  Fluid in the small bowel with areas of mild colonic wall thickening    or underdistention.  Correlate clinically regarding enterocolitis.   2.  Cholelithiasis, nephrolithiasis, and additional incidental findings,    as above.            Electronically signed by Marcia Collins M.D. on 09 04 21 at 08:03          ED Diagnosis   1. Right lower quadrant abdominal pain         Disposition        Still a Patient 9/4/2021  7:33 AM  There is no comment      Charting performed by ED Tay gillette Matthews.    I have reviewed the tay's charting and agree that the final signed note accurately reflects my personal performance of the history, physical exam, hospital course, and assessment and plan.    
-  CE stable x2. no chest pain currently, states that pain is intermittent, last occurred about 1 week ago. no specific triggers or aggravating triggers.   - EKG with no acute changes noted from prior  - cont isosorbide  -cardiology following -no further cardiac testing required for endoscopy
- most likely angina given patient had the pain for a long time. CE stable x2. no chest pain currently, states that pain is intermittent, last occurred about 1 week ago. no specific triggers or aggravating triggers.   - EKG with no acute changes noted from prior  - cont isosorbide  -cardiology following -no further cardiac testing required for endoscopy
-  CE stable x2. no chest pain currently, states that pain is intermittent, last occurred about 1 week ago. no specific triggers or aggravating triggers.   - EKG with no acute changes noted from prior  - cont isosorbide  - prior stress neg 2018  - cardiology following -no further cardiac testing required for endoscopy  - TTE w/o overt WMA normal EF
- most likely angina given patient had the pain for a long time. CE stable x2, repeat in AM, no chest pain currently, states that pain is intermittent, last occurred about 1 week ago. no specific triggers or aggravating triggers.   - Daughter states patient with recent cardiac work up in March last year that was negative, clarify cardiac work up in AM and see if it needs to be repeated at present  - EKG with no acute changes noted from prior  - cont isosorbide  - trop slight up trend.   - cardiology evaluation
- most likely angina given patient had the pain for a long time. CE stable x2, repeat in AM, no chest pain currently, states that pain is intermittent, last occurred about 1 week ago. no specific triggers or aggravating triggers.   - EKG with no acute changes noted from prior  - cont isosorbide  -cardiology following -no further cardiac testing required for endoscopy

## 2022-07-28 NOTE — H&P ADULT - NSHPSOURCEINFORD_GEN_ALL_CORE
Declined need for assistance dressing. Pt dangled on side of bed prior to standing, denied dizziness. Call light within reach.    
Child/Patient/Spouse/Significant Other

## 2022-11-16 NOTE — H&P ADULT - NSHPPOAPRESSUREULCER_GEN_ALL_CORE
"Subjective:       Patient ID: Dominique Huynh is a 28 y.o. female.    Vitals:  height is 5' 2" (1.575 m) and weight is 59 kg (130 lb). Her temperature is 99 °F (37.2 °C). Her blood pressure is 107/74 and her pulse is 98. Her respiration is 18 and oxygen saturation is 99%.     Chief Complaint: Sinus Problem    28-year-old female was already vaccine for COVID-19 presents to urgent care clinic for evaluation.  Complain of symptoms that started yesterday with chills, sweats, subjective fever but did not measure her temperature, sore throat, sneezing, runny nose, headache, body aches, productive cough, nasal/sinus congestion, and fatigue.  Has been taking TheraFlu every 6 hours with some improvements.  No other associated symptoms.  No sick exposure but does work at [employer] around COVID and flu patients.      Medical assistant note:   Patient presents with c.o headache, sore throat, and sneezing. Patient also notes body aches and chills. Onset of sxs yesterday. Patient is agreeable to a flu test in clinic today.    Sinus Problem  This is a new problem. The current episode started yesterday. The problem is unchanged. Associated symptoms include chills, congestion, coughing, diaphoresis, headaches, sinus pressure, sneezing and a sore throat. Pertinent negatives include no ear pain, neck pain or shortness of breath.     Constitution: Positive for chills, sweating and fatigue. Negative for activity change, appetite change, fever and generalized weakness.   HENT:  Positive for congestion, postnasal drip, sinus pain, sinus pressure and sore throat. Negative for ear pain, hearing loss, facial swelling, trouble swallowing and voice change.    Neck: Negative for neck pain, neck stiffness and painful lymph nodes.   Cardiovascular:  Negative for chest pain, leg swelling, palpitations, sob on exertion and passing out.   Eyes:  Negative for eye discharge, eye pain, photophobia, vision loss, double vision and " blurred vision.   Respiratory:  Positive for cough and sputum production. Negative for chest tightness, bloody sputum, COPD, shortness of breath, stridor, wheezing and asthma.    Gastrointestinal:  Negative for abdominal pain, nausea, vomiting, constipation, diarrhea, bright red blood in stool, rectal bleeding, heartburn and bowel incontinence.   Genitourinary:  Negative for dysuria, frequency, urgency, urine decreased, flank pain, bladder incontinence and hematuria.   Musculoskeletal:  Positive for muscle ache. Negative for trauma, joint pain, joint swelling, abnormal ROM of joint and muscle cramps.   Skin:  Negative for color change, pale, rash and wound.   Allergic/Immunologic: Positive for sneezing. Negative for seasonal allergies, asthma and immunocompromised state.   Neurological:  Positive for headaches. Negative for dizziness, history of vertigo, light-headedness, passing out, facial drooping, speech difficulty, coordination disturbances, loss of balance, disorientation, altered mental status, loss of consciousness, numbness, tingling and seizures.   Hematologic/Lymphatic: Negative for swollen lymph nodes, easy bruising/bleeding and trouble clotting. Does not bruise/bleed easily.   Psychiatric/Behavioral:  Negative for altered mental status and disorientation.          Past Medical History:   Diagnosis Date    Endometriosis     Migraines        Objective:      Physical Exam   Constitutional: She is oriented to person, place, and time. She appears well-developed. She is cooperative.  Non-toxic appearance. She does not appear ill. No distress.      Comments:Well-appearing     HENT:   Head: Normocephalic and atraumatic.   Ears:   Right Ear: Hearing, external ear and ear canal normal. No no drainage, swelling or tenderness.   Left Ear: Hearing, external ear and ear canal normal. No no drainage, swelling or tenderness.   Nose: Rhinorrhea and congestion present. No purulent discharge. Right sinus exhibits  maxillary sinus tenderness. Right sinus exhibits no frontal sinus tenderness. Left sinus exhibits maxillary sinus tenderness. Left sinus exhibits no frontal sinus tenderness.   Mouth/Throat: Uvula is midline, oropharynx is clear and moist and mucous membranes are normal. Mucous membranes are moist. No oral lesions. No trismus in the jaw. No uvula swelling. No oropharyngeal exudate, posterior oropharyngeal edema or posterior oropharyngeal erythema. No tonsillar exudate. Oropharynx is clear.   Eyes: Conjunctivae, EOM and lids are normal. Pupils are equal, round, and reactive to light. No visual field deficit is present. Right eye exhibits no discharge. Left eye exhibits no discharge. Right conjunctiva is not injected. Right conjunctiva has no hemorrhage. Left conjunctiva is not injected. Left conjunctiva has no hemorrhage. Extraocular movement intact vision grossly intact gaze aligned appropriately   Neck: Neck supple. No neck rigidity present.   Cardiovascular: Normal rate, regular rhythm, normal heart sounds and normal pulses.   No murmur heard.  Pulmonary/Chest: Effort normal and breath sounds normal. No accessory muscle usage or stridor. No respiratory distress. She has no wheezes. She exhibits no tenderness.   Abdominal: Normal appearance. She exhibits no distension and no mass. Soft. There is no abdominal tenderness. There is no rebound and no guarding.   Musculoskeletal: Normal range of motion.         General: Normal range of motion.      Right lower leg: No edema.      Left lower leg: No edema.      Comments: Moves all extremities with normal tone, strength, and ROM.  Gait normal.   Lymphadenopathy:     She has no cervical adenopathy.   Neurological: no focal deficit. She is alert, oriented to person, place, and time and at baseline. She has normal motor skills and normal sensation. She displays no weakness, facial symmetry, normal reflexes and no dysarthria. No cranial nerve deficit or sensory deficit. She  exhibits normal muscle tone. She has a normal Finger-Nose-Finger Test. Coordination: Heel to shin test normal. She shows no pronator drift. She displays no seizure activity. Gait and coordination normal. Coordination normal. GCS eye subscore is 4. GCS verbal subscore is 5. GCS motor subscore is 6.   Skin: Skin is warm, dry, not diaphoretic and no rash. Capillary refill takes less than 2 seconds.   Psychiatric: Her speech is normal and behavior is normal. Thought content normal.   Nursing note and vitals reviewed.      Results for orders placed or performed in visit on 11/16/22   POCT Influenza A/B MOLECULAR   Result Value Ref Range    POC Molecular Influenza A Ag Negative Negative, Not Reported    POC Molecular Influenza B Ag Negative Negative, Not Reported     Acceptable Yes    POCT COVID-19 Rapid Screening   Result Value Ref Range    POC Rapid COVID Negative Negative     Acceptable Yes        Assessment:       1. Acute viral syndrome    2. Body aches    3. Cough with congestion of paranasal sinus    4. Sore throat    5. Low grade fever    6. Encounter for screening for COVID-19    7. Encounter for screening for viral disease        Nontoxic appearing. Vitals are stable. Rapid covid and influenza negative.   All diagnostic testing personally reviewed and interpreted.   Patient has symptoms at this time which is consistent with above diagnosis.        Patient was recommended OTC treatments for their symptoms.   DISCUSSED THE IMPORTANCE OF SYMPTOMATIC TREATMENT HELP IMPROVE SYMPTOMS.  SHE NEEDS TO ADD ZYRTEC/CLARITIN DAILY, FLONASE Q.12 HOURS, MUCINEX DM Q.12 HOURS, TYLENOL/IBUPROFEN AS NEEDED.  MAY SUPPLEMENT WITH TheraFlu q.6 hours.     Patient was counseled, explained with the test results meaning, expected course, and answered all of questions. They can also receive results via my chart.  Printed and verbal treatment guidelines/recommendations were given.   Recommend follow-up PCP in  the next 2-3 days if new or worsening symptoms.    Patient understands that they received an Urgent Care treatment only and that they may be released before all your medical problems are known or treated. Strict ED versus clinic precautions given.  Patient verbalized understanding and agreed with plan of care.    Note dictated with voice recognition software, please excuse any grammatical errors.      Plan:         Acute viral syndrome    Body aches  -     POCT Influenza A/B MOLECULAR  -     POCT COVID-19 Rapid Screening    Cough with congestion of paranasal sinus    Sore throat    Low grade fever    Encounter for screening for COVID-19    Encounter for screening for viral disease            Additional MDM:     Heart Failure Score:   COPD = No  Patient Instructions     PLEASE READ YOUR DISCHARGE INSTRUCTIONS ENTIRELY AS IT CONTAINS IMPORTANT INFORMATION.    Patient had covid testing done today.      If Negative and no direct exposure: symptom free without fever reducing meds in 24 hours - can go back to work in 24 hours with surgical mask for 14 days.  If you are directly exposed to someone who tested positive, you need to return for repeat testing in the next 5-7 days.  You may return sooner if you have any new worsening symptoms.    Discussed corona virus precautions and reviewed CDC FAC; printed a copy for patient.  I discussed to continue to monitor their symptoms. Discussed that if their symptoms persist or worsen to seek re-evaluation. Clinic vs. ER precautions were given.  Patient verbalized understanding and agreed with the above plan of care.    - Rest.  Drink plenty of fluids.    - Tylenol/anti-inflammatory(ibuprofen/motrin/aleve) as directed as needed for fever/pain.  For Tylenol, do not exceed 3000 mg/ day. If no contraindication.  -OK to supplement with OTC DayQuil, NyQuil or TheraFlu every 6 hours as needed for cough and congestion.  Use caution of total amount of Tylenol/acetaminophen per day.  -  Reviewed radiographs and all diagnostic testing with patient/family.      -Below are suggestions for symptomatic relief:              -Salt water gargles to soothe throat pain.              -Chloroseptic spray also helps to numb throat pain.              -Nasal saline spray reduces inflammation and dryness.              -Warm face compresses to help with facial sinus pain/pressure.              -Vicks vapor rub at night.              -Flonase OTC or Nasacort OTC  once a day for nasal/sinus congestion. DON'T USE IF YOU HAVE GLAUCOMA. CHECK WITH YOUR PHARMACIST/PHYSICIAN.              -Simple foods like chicken noodle soup.              -Mucinex DM (ANY COUGH EXPECTORANT--guaifenesin) for cough or chest congestion with mucus during the day time. Delsym or robitussin (ANY COUGH SUPPRESSANT--dextromethorphan) helps with coughing at night. Mucinex-DM if you have chest congestion or sputum (caution if history of high blood pressure or palpitations).              -Zyrtec/Claritin/xyzal during the day time  & Benadryl at night (only if severe runny nose) may help with allergies and runny nose. Add decongestant if you have nasal/sinus congestion/sinus pressure/ear fullness sensation. (see below)    Caution with use of Decongestant meds:  -If you DO NOT have Hypertension or any history of palpitations, it is ok to take over the counter Sudafed or Mucinex D or Allegra-D or Claritin-D or Zyrtec-D.  -If you do take one of the above, it is ok to combine that with plain over the counter Mucinex or Allegra or Claritin or Zyrtec. If, for example, you are taking Zyrtec -D, you can combine that with Mucinex, but not Mucinex-D.  If you are taking Mucinex-D, you can combine that with plain Allegra or Claritin or Zyrtec.     -Do not combine pseudophed or phenylephrine with any other brand allergy-D for DECONGESTANT.   -Or vice versa, you can you take plain allergy medications (allegra/claritin/zyrtec with NO Decongestant) and ADD OTC  pseudophed or phenelyphrine 2-3 times a day. Avoid taking decongestant late at night or with caffeine as it can keep you up or cause jittery feeling.    -If you DO have Hypertension , anxiety, or palpitations, it is safe to take Coricidin HBP for relief of cough, congestion, or sinus symptoms.      For your GI symptoms:  -Use gatorade/pedialyte or rehydration packets to help stay hydrated. Vitamin water and plain water do not contain rehydrating electrolytes.  -Increase clear liquids (water, gatorade, pedialyte, broths, jello, etc) Hold off on solids for 12-18 hours. Then advance to BRAT diet (banana, rice, applesauce, tea, toast/crackers), then advance further as tolerated. Avoid spicy or fatty foods.   -May take Imitrol OTC as needed for nausea.   -Use Peptobismol or Immodium to help alleviate your diarrhea symptoms.   -Take mylanta or simethicone for bloating or gas pain.   -Take pepcid or omeprazole if you have heartburn or reflux sensation.  -Avoid imodium unless you have more than 6 loose stools in 24 hours. Take 1 dose and monitor to see if you can repeat AS IT WILL CAUSE CONSTIPATION.  -Wash hands frequently while sick. Avoid ibuprofen or other NSAIDS until you are well.   -Please go to the ER if you experience worsening abdominal pain, blood in your vomit or stool, high fever, dizziness, fainting, swelling of your abdomen, inability to pass gas or stool, or inability to urinate.         -You must understand that you've received an Urgent Care treatment only and that you may be released before all your medical problems are known or treated. You, the patient, will arrange for follow up care as instructed. Please arrange follow up with your primary medical clinic within 2-5 days if your signs and symptoms have not resolved or worsen.     - Follow up with your PCP or specialty clinic as directed.  You can call [phone] or [phone] to schedule an appointment with the appropriate provider.    - If your  condition worsens or fails to improve we recommend that you receive another evaluation at the emergency room immediately or contact your primary medical clinic to discuss your concerns.              Prevention steps for patients with confirmed or suspected COVID-19  Stay home and stay away from family members and friends. The CDC says, you can leave home after these three things have happened: 1) You have had no fever for at least 24 hours (that is one full day of no fever without the use of medicine that reduces fevers) 2) AND other symptoms have improved (for example, when your cough or shortness of breath have improved) 3) AND at least 10 days have passed since your symptoms first appeared OR after 10 days passed from first positive test.  Separate yourself from other people and animals in your home.  Call ahead before visiting your doctor.  Wear a facemask.  Cover your coughs and sneezes.  Wash your hands often with soap and water; hand  can be used, too.  Avoid sharing personal household items.  Wipe down surfaces used daily.  Monitor your symptoms. Seek prompt medical attention if your illness is worsening (e.g., difficulty breathing).   Before seeking care, call your healthcare provider.  If you have a medical emergency and need to call 911, notify the dispatch personnel that you have, or are being evaluated for COVID-19. If possible, put on a facemask before emergency medical services arrive.        Recommended precautions for household members, intimate partners, and caregivers in a home setting of a patient with symptomatic laboratory-confirmed COVID-19 or a patient under investigation.  Household members, intimate partners, and caregivers in the home setting awaiting tests results have close contact with a person with symptomatic, laboratory-confirmed COVID-19 or a person under investigation. Close contacts should monitor their health; they should call their provider right away if they develop  symptoms suggestive of COVID-19 (e.g., fever, cough, shortness of breath).    Close contacts should also follow these recommendations:  Make sure that you understand and can help the patient follow their provider's instructions for medication(s) and care. You should help the patient with basic needs in the home and provide support for getting groceries, prescriptions, and other personal needs.  Monitor the patient's symptoms. If the patient is getting sicker, call his or her healthcare provider and tell them that the patient has laboratory-confirmed COVID-19. If the patient has a medical emergency and you need to call 911, notify the dispatch personnel that the patient has, or is being evaluated for COVID-19.  Household members should stay in another room or be  from the patient. Household members should use a separate bedroom and bathroom, if available.  Prohibit visitors.  Household members should care for any pets in the home.  Make sure that shared spaces in the home have good air flow, such as by an air conditioner or an opened window, weather permitting.  Perform hand hygiene frequently. Wash your hands often with soap and water for at least 20 seconds or use an alcohol-based hand  (that contains > 60% alcohol) covering all surfaces of your hands and rubbing them together until they feel dry. Soap and water should be used preferentially.  Avoid touching your eyes, nose, and mouth.  The patient should wear a facemask. If the patient is not able to wear a facemask (for example, because it causes trouble breathing), caregivers should wear a mask when they are in the same room as the patient.  Wear a disposable facemask and gloves when you touch or have contact with the patient's blood, stool, or body fluids, such as saliva, sputum, nasal mucus, vomit, urine.  Throw out disposable facemasks and gloves after using them. Do not reuse.  When removing personal protective equipment, first remove and  dispose of gloves. Then, immediately clean your hands with soap and water or alcohol-based hand . Next, remove and dispose of facemask, and immediately clean your hands again with soap and water or alcohol-based hand .  You should not share dishes, drinking glasses, cups, eating utensils, towels, bedding, or other items with the patient. After the patient uses these items, you should wash them thoroughly (see below Wash laundry thoroughly).  Clean all high-touch surfaces, such as counters, tabletops, doorknobs, bathroom fixtures, toilets, phones, keyboards, tablets, and bedside tables, every day. Also, clean any surfaces that may have blood, stool, or body fluids on them.  Use a household cleaning spray or wipe, according to the label instructions. Labels contain instructions for safe and effective use of the cleaning product including precautions you should take when applying the product, such as wearing gloves and making sure you have good ventilation during use of the product.  Wash laundry thoroughly.  Immediately remove and wash clothes or bedding that have blood, stool, or body fluids on them.  Wear disposable gloves while handling soiled items and keep soiled items away from your body. Clean your hands (with soap and water or an alcohol-based hand ) immediately after removing your gloves.  Read and follow directions on labels of laundry or clothing items and detergent. In general, using a normal laundry detergent according to washing machine instructions and dry thoroughly using the warmest temperatures recommended on the clothing label.  Place all used disposable gloves, facemasks, and other contaminated items in a lined container before disposing of them with other household waste. Clean your hands (with soap and water or an alcohol-based hand ) immediately after handling these items. Soap and water should be used preferentially if hands are visibly dirty.  Discuss  any additional questions with your state or local health department or healthcare provider. Check available hours when contacting your local health department.    For more information see CDC link below.      https://www.cdc.gov/coronavirus/2019-ncov/hcp/guidance-prevent-spread.html#precautions        Sources:  [facility] [place], [place] Department of [facility] and [facility]          Instructions for Home Care of Patients and Caretakers with Coronavirus Disease 2019  Limit visitors to the home.  Older persons and those that have chronic medical conditions such as diabetes, lung and heart disease are at increased risk for illness.   If possible, patients should use a separate bedroom while recovering. Caregivers and household members should avoid prolonged contact with the patient which means to stay 6 feet away and avoid contact with cough droplets.  When close contact is necessary, wash your hands before and immediately after contact.   Perform hand hygiene frequently. Wash your hands often with soap and water for at least 20 seconds or use an alcohol-based hand , covering all surfaces of your hands and rubbing them together until they feel dry.   Avoid touching your eyes, nose, and mouth with unwashed hands.  Avoid sharing household items with the patient. You should not share dishes, drinking glasses, cups, eating utensils, towels, bedding, or other items. After the patient uses these items, you should wash them thoroughly.  Wash laundry thoroughly.   Immediately remove and wash clothes or bedding that have blood, stool, or body fluids on them.  Clean all high-touch surfaces, such as counters, tabletops, doorknobs, bathroom fixtures, toilets, phones, keyboards, tablets, and bedside tables, every day.   Use a household cleaning spray or wipe, according to the label instructions. Labels contain instructions for safe and effective use of the cleaning product including precautions you should take when applying the  product, such as wearing gloves and making sure you have good ventilation during use of the product.    For more information see CDC link below.      https://www.cdc.gov/coronavirus/2019-ncov/hcp/guidance-prevent-spread.html#precautions               If your symptoms worsen or if you have any other concerns, please contact [facility] at [phone].             no

## 2023-01-12 NOTE — H&P ADULT - HISTORY OF PRESENT ILLNESS
76yo M with h/o CAD s/p RIGO in LAD (12/16, unstable angina) presents to ER with abdominal pain for a few weeks. Pt is unable to clearly define how long he has had the pain. The pain was initially vague, but became more localized to the LLQ over time. He notes that the pain is worse with movement/going over bumps, urination, and with bowel movements. PT also notes dysuria and frequency of urination. He endorses chills, denies fevers. He is able to tolerate PO without emesis or nausea. He has been having small, hard bowel movements, no diarrhea.     His last colonoscopy was a number of years ago. The pt was unable to recall when, but reports it was normal. Tissue Cultured Epidermal Autograft Text: The defect edges were debeveled with a #15 scalpel blade.  Given the location of the defect, shape of the defect and the proximity to free margins a tissue cultured epidermal autograft was deemed most appropriate.  The graft was then trimmed to fit the size of the defect.  The graft was then placed in the primary defect and oriented appropriately.

## 2023-03-17 NOTE — DISCHARGE NOTE ADULT - LAUNCH MEDICATION RECONCILIATION
Follow up VM to previous call cancelling March 21st appt.  
<<-----Click here for Discharge Medication Review

## 2023-05-24 NOTE — ED CLERICAL - DIVISION
[facility]... Consent: The risks of pain and injection site reactions were reviewed with the patient prior to the injection.

## 2023-08-31 NOTE — PROGRESS NOTE ADULT - PROBLEM SELECTOR PLAN 2
Orders sari Giles PA-C     Needs hospital follow up   Perhaps related to hyponatremia, will continue to address as in problem #1. Will also provide empiric dyspepsia regimen. No acute structural pathology noted on CTH low suspicion for central cause of hiccups).  - Hiccups improving at this time, to cont to monitor

## 2023-12-28 NOTE — PHYSICAL THERAPY INITIAL EVALUATION ADULT - GAIT TRAINING, PT EVAL
Form mailed to the patient along with an authorization    GOAL: Pt will ambulate 100' independently with or without AD as needed in 3-4wks.

## 2024-06-11 NOTE — CHART NOTE - NSCHARTNOTEFT_GEN_A_CORE
Patient scheduled for EGD/colonoscopy tomorrow.     Instructions for colonoscopy  - clear liquid diet today, NPO at midnight  - please ensure golytely is completed (to be ordered by GI fellow)  - please ensure patient drinks entire prep  - please ensure morning labs are drawn at 2am, electrolytes repleted as necessary, and Hg>7  - rest of care per primary team Patient seen in ED 6/10/24 for weakness of BLE.  LOV 9/13/23 for transient alteration of awareness, chronic migraine, post-traumatic epilepsy and impaired functional mobility.  Patient was to return in 3 months.  Routing to [facility] for review.  Patient was referred to physical therapy.  Any further action needed?    Please call patient to schedule overdue follow up.    Patient scheduled for EGD/colonoscopy tuesday morning.     Instructions for colonoscopy  - regular diet today  - clear liquid diet monday, NPO at midnight monday evening  - please ensure golytely is completed (to be ordered by GI fellow)  - please ensure patient drinks entire prep  - please ensure morning labs are drawn at 2am, electrolytes repleted as necessary, and Hg>7  - rest of care per primary team Patient scheduled for EGD/colonoscopy tuesday morning.     Instructions for colonoscopy  - regular diet today  - clear liquid diet monday, NPO at midnight monday evening  - please ensure golytely is completed (to be ordered by GI fellow)  - please ensure patient drinks entire prep  - please ensure morning labs are drawn at 2am, electrolytes repleted as necessary, and Hg>7  - rest of care per primary team.

## 2024-10-22 NOTE — DISCHARGE NOTE ADULT - NURSING SECTION COMPLETE
You can access the FollowMyHealth Patient Portal offered by [facility] by registering at the following website: [email or web address]. By joining [employer]’s FollowMyHealth portal, you will also be able to view your health information using other applications (apps) compatible with our system. Patient/Caregiver provided printed discharge information.

## 2025-06-14 NOTE — PATIENT PROFILE ADULT. - HAS THE PATIENT HAD A SIGNIFICANT CHANGE IN FUNCTIONAL STATUS DUE TO CVA, HEAD TRAUMA, ORTHOPEDIC TRAUMA/SURGERY, OR FALL, WITH THE WEEK PRIOR TO ADMISSION
"Patient ID: Elpidio Jamil is a 72 y.o. male who presents for Follow-up (Pt here for 3 month follow up and review. Pt had a laprosccopic \"clean up\" of his rt shoulder on May 29th by Dr Martin. Pt also saw GI Dr Dove May 2, and he is due for Colonoscopy Nov 2025.  Last Monday he started PT for the shoulder. Pt also saw Dr Yuan HENNING in March)    (LAST VISIT PLAN FROM: 03/19/2025:  Patient Instructions:  Recommend, if ENT says all clear with fungal laryngitis, would stop the cetirizine/generic zyrtec , move xyzal / levo cetirizine to am, and add montelukast /Singulair 10 mg once every evening.  Feel free to discuss this plan with ENT.You have a printed rx for the montelukast and there are plenty of refills on the xyzal from pulmonary med.   Let me know.  Follow up 3 months   Also prefer you keep an albuterol inhaler  around and refill sent.use if needed for coughing spasms.  Glad you do not have asthma.  Continue other meds.  Am : minimum 16 oz water plus whatever coffee you want. And not just toast, can do toast and peanut butter or almond butter. You need protein before going out to do activities.  This is for allergies, sinus issues, intermittent cough related to these issues.  END LAST VISIT PLAN)  -------------------------------------------  HPI  Patient is a 72-year-old male who presents today for follow up on the following conditions:    Right Shoulder Pain:  Patient had a laprosccopic \"clean up\" of his right shoulder on 05/29/2025 by Dr Martin.  Last Monday, he started PT for the shoulder.     Bilateral Hearing Loss:  Dizziness:  Cough:  Chronic Rhinitis:  Patient followed with ENT on 03/26/2025.  I reviewed the note as follows:  Assessment  Coughing  Fungal laryngitis  Laryngopharyngeal reflux  Chronic rhinitis  Bilateral sensorineural hearing loss  Plan  -Coughing, fungal laryngitis, LPR  Previously noted fungal laryngitis has resolved.  Dietary modifications for reflux control in addition to OTC Gaviscon use " discussed.  Takes lansoprazole in the mornings which he will continue  - CR  The patient and I discussed their current nasal / sinus symptoms as well as several therapeutic options.   Sinonasal symptomatology is controlled  He will continue his current nasal medical regimen consisting of daily Flonase use.  He was advised not to inhale the topical steroid.   The patient was instructed on the correct technique to administer the topical nasal spray (s) aiming at the lateral nasal wall for maximal efficacy and to avoid irritation to the septum which could lead to epistaxis. I stressed consistency of usage for maximal benefit. We discussed the rationale for the timing of this therapy and the benefits and potential adverse effects.    - Bilateral sensorineural hearing loss  MRI 6/24/22: No CPA/ IAC lesions/ masses   Audiogram 6/17/24 notable for bilateral sensorineural hearing loss with slight progression in his left ear at 3000 Hz, otherwise minimal changes.  Using hearing aids with benefit  We will monitor his hearing with yearly audiograms  RTC 6m  END OF NOTE    Patient underwent nasal endoscopy.  I reviewed the results as follows:  The patient tolerated the procedure well and these structures were found to be normal except as follows:  Bilateral inferior turbinate hypertrophy  Left septal deviation   No mucopurulence, polyps, nor masses seen in inferior meati, middle meati, nor sphenoethmoidal recesses bilaterally.     Patient reports that his cough has improved but not completely resolved.  He was recommended Gaviscon by ENT.  We discussed that he should stop his Flonase, as it is a steroid nasal spray and can contribute to fungal laryngitis.  Recommended alternative such as Astelin 137 mcg nasal spray, 1 spray each nostril twice daily.  Of note, patient has a positive allergy to grasses.  Advised to make the switch around the 4th of July.  Advised to call if symptoms persist.  I instructed the patient of proper  administration of nasal spray.    Patient had his hearing aids repaired.    PVC:  BP today is 104/67.  Follow up on cardiovascular conditions:  Patient continues on aspirin 81 mg daily and atenolol 12.5 mg daily.  Cardiac:   Chest pain?No  Palpitations? No  Increased mcguire?  No  Other:  Resp:   Shortness of breath?No  Wheezing No  Cough No  Other:  Extremities:   Leg or ankle swelling?No   Claudication?No  Other:  Neuro:  DizzinessNo  SyncopeNo   Blurred visionNo  New headaches No  Other:  Medications:Taking them regularly.Yes  Side effects.No    Hypercholesterolemia:  No myalgias, nausea, abdominal pain.  Meds: Taking regularly, no adverse effect.  Patient continues on atorvastatin 40 mg daily.  Labs:  Last LDL was 78 mg/dL with triglycerides at 178 mg/dL on 10/10/2024.  LDL goal: < 70 mg/dL.  Moderate coronary artery calcifications involving left main, lad, and left circumflex vessels on CT Chest 08/19/2024. ASCVD risk 17.2%.    GERD:  Patient continues on Prevacid 30 mg daily.  No concerns reported today.    Patient also saw GI, Dr. Dove, 05/02/2025 and is due for Colonoscopy 11/2025.     Anxiety:  Depression:  Patient continues on Prozac 50 mg daily. No concerns reported today.     BPH:  Patient continues on VESIcare 5 mg daily.  He follows with Urology, Dr. Saunders.  No concerns reported today.    Insomnia:  Patient continues on trazodone 50 mg nightly.  No concerns reported today.    Prediabetes:  A1c was 6.1% on 03/19/2025. Prior A1c was 6.1% with glucose at 113 mg/dL on 10/10/2024.  Lab Results   Component Value Date    HGBA1C 6.1 03/19/2025     Elevated B12:  Last B12 was > 2000 on 10/10/2024.  Patient continues on B12 supplementation at 1000 mcg five times weekly.    Orders placed for blood work as required to be completed 2 weeks prior to next visit.    Orders placed for prescriptions as required.    Follow up 10/21/2025 for Annual Wellness Visit and Annual Physical.    Review of Systems  See ROS in  HPI    Current Outpatient Medications   Medication Instructions    albuterol 90 mcg/actuation inhaler 2 puffs, inhalation, Every 6 hours PRN    aspirin 81 mg, Daily    atenolol (TENORMIN) 12.5 mg, oral, Daily    atorvastatin (LIPITOR) 40 mg, oral, Nightly    azelastine (Astelin) 137 mcg (0.1 %) nasal spray 1 spray, Each Nostril, 2 times daily PRN, Use in each nostril as directed    CALCIUM CITRATE-VITAMIN D3 ORAL 1 tablet, Daily    cyanocobalamin, vitamin B-12, 1,000 mcg capsule 1 capsule, 5 times weekly    diclofenac sodium (VOLTAREN) 2 g, Topical, 3 times daily    FLUoxetine (PROzac) 40 mg capsule TAKE 1 CAPSULE BY MOUTH ONCE  DAILY WITH 10 MG TO MAKE 50 MG    FLUoxetine (PROZAC) 10 mg, oral, Daily, with 40mg to equal 50mg daily    lansoprazole (Prevacid) 30 mg DR capsule Take one cap daily  30 minutes ac    levocetirizine (XYZAL) 5 mg, oral, Every evening    montelukast (SINGULAIR) 10 mg, oral, Nightly    omega-3 acid ethyl esters (Lovaza) 1 gram capsule Take one cap in the am. And 2 caps in the pm.    psyllium husk-calcium (Metamucil Plus Calcium) 1-60 gram-mg capsule 5 capsules, Daily    solifenacin (VESICARE) 5 mg, oral, Daily, Swallow tablet whole; do not crush, chew, or split.    traZODone (DESYREL) 50 mg, oral, Nightly     RX Allergies[1]  Objective    The following test results have been reviewed:  Latest Complete Lab Results:  CBC:   Lab Results   Component Value Date    WBC 6.0 12/17/2024    RBC 4.54 12/17/2024    HGB 14.0 12/17/2024    HCT 42.8 12/17/2024    MCV 94 12/17/2024    MCH 30.8 12/17/2024    MCHC 32.7 12/17/2024     12/17/2024    MPV 9.1 10/05/2023     CMP:  Lab Results   Component Value Date    GLUCOSE 113 (H) 10/10/2024     10/10/2024    K 4.2 10/10/2024     10/10/2024    CO2 31 10/10/2024    ANIONGAP 12 10/10/2024    BUN 12 10/10/2024    CREATININE 0.80 10/10/2024    CALCIUM 9.8 10/10/2024    ALBUMIN 4.8 10/10/2024    ALKPHOS 53 10/10/2024    PROT 7.4 10/10/2024    AST 17  "10/10/2024    BILITOT 0.9 10/10/2024    ALT 24 10/10/2024      Lipid:   Lab Results   Component Value Date    CHOL 139 10/10/2024    HDL 39.6 10/10/2024    CHHDL 3.5 10/10/2024    LDLF 61 03/09/2023    VLDL 36 10/10/2024    TRIG 178 (H) 10/10/2024     LDL Direct:  Lab Results   Component Value Date    LDLDIRECT 78 10/10/2024      TSH:   Lab Results   Component Value Date    TSH 1.16 10/10/2024      B12:  Lab Results   Component Value Date    RAEQIBMV13 >2,000 (H) 10/10/2024     Vitamin D:  Lab Results   Component Value Date    VITD25 39 07/26/2021      HgA1c:   Lab Results   Component Value Date    HGBA1C 6.1 03/19/2025    DXNHYMKT7S 128 10/10/2024     PSA:   Lab Results   Component Value Date    PSA 0.25 10/10/2024        Physical Exam  Vitals:      12/10/2024     9:11 AM 12/14/2024    10:49 AM 1/16/2025    12:09 PM 2/5/2025    10:36 AM 3/19/2025     1:40 PM 3/26/2025    10:07 AM 6/18/2025     1:58 PM   Vitals   Systolic 132 126 108 116 116  104   Diastolic 79 75 67 76 77  67   Heart Rate 67 62 62 58 63  62   Temp 36.7 °C (98.1 °F) 35.6 °C (96.1 °F) 36.2 °C (97.1 °F)       Resp 18 16 16    16   Height 1.778 m (5' 10\")  1.778 m (5' 10\") 1.778 m (5' 10\") 1.778 m (5' 10\") 1.778 m (5' 10\") 1.778 m (5' 10\")   Weight (lb) 200 197 200.4 200 198.4 198 197   BMI 28.7 kg/m2 28.27 kg/m2 28.75 kg/m2 28.7 kg/m2 28.47 kg/m2 28.41 kg/m2 28.27 kg/m2   BSA (m2) 2.12 m2 2.1 m2 2.12 m2 2.12 m2 2.11 m2 2.11 m2 2.1 m2   Visit Report Report Report Report Report Report Report Report     Patient looks their usual self, is known to me, and is in no obvious distress.  HEENT:   Nares: Bilateral inferior turbinate hypertrophy.  Left septal deviation.  No inflammation noted.  Normocephalic, no facial asymmetry  Eyes: Sclera and conjunctiva are clear.  Neck: No adenopathy cervical (Ant/post/lat), no Supraclavicular nodes.   Thyroid normal.  Carotid pulses normal, no carotid bruits.  Lungs : RR normal. Clear to auscultation anterior, posterior " and lateral. No rales, wheezes rhonchi or rubs. Good air exchange.  Heart: RRR. No Murmur, gallop, click or rub.  Vascular:  Posterior tibialis and dorsalis pedis pulses within normal limits bilaterally.   Extremities: No upper extremity edema. No lower extremity edema.   Musculoskeletal: No synovitis of joints seen. No new deformity.  Neuro: CN 2-12 intact. Alert, appropriate.  Ambulates independently.  No gross motor deficit.   No tremors.  Psych: normal mood and affect.  Skin: No rash, bruising petechiae or jaundice.    Elpidio was seen today for follow-up.  Diagnoses and all orders for this visit:  Hypercholesterolemia (Primary)  -     Cholesterol, LDL Direct; Future  PVC (premature ventricular contraction)  Seasonal allergic rhinitis due to pollen  -     azelastine (Astelin) 137 mcg (0.1 %) nasal spray; Administer 1 spray into each nostril 2 times a day as needed for rhinitis. Use in each nostril as directed  Sensorineural hearing loss (SNHL) of both ears  Elevated hemoglobin A1c  Gastroesophageal reflux disease, unspecified whether esophagitis present  -     CBC; Future  Benign prostatic hyperplasia with urinary obstruction  Anxiety disorder, unspecified type  Depression, major, single episode, mild  Primary insomnia  Pre-diabetes  -     Hemoglobin A1c; Future  Benign essential hypertension  -     Comprehensive Metabolic Panel; Future  -     Magnesium; Future  -     Uric Acid; Future  -     CBC; Future  -     Albumin , Urine Random; Future  Encounter for monitoring statin therapy  -     Comprehensive Metabolic Panel; Future  -     CK; Future  Elevated triglycerides with high cholesterol  -     Lipid Panel; Future  -     TSH with reflex to Free T4 if abnormal; Future  Low serum vitamin B12  -     Vitamin B12; Future  Benign prostatic hyperplasia (BPH) with urinary urgency  -     Prostate Specific Antigen; Future  Encounter for subsequent annual wellness visit (AWV) in Medicare patient  -     Follow Up In Advanced  Primary Care - PCP; Future  Central retinal vein occlusion, right eye, stable  Acute cough  -     albuterol 90 mcg/actuation inhaler; Inhale 2 puffs every 6 hours if needed for wheezing.  Wheezing  -     albuterol 90 mcg/actuation inhaler; Inhale 2 puffs every 6 hours if needed for wheezing.  -     montelukast (Singulair) 10 mg tablet; Take 1 tablet (10 mg) by mouth once daily at bedtime.  Chronic cough  -     levocetirizine (Xyzal) 5 mg tablet; Take 1 tablet (5 mg) by mouth once daily in the evening.  -     montelukast (Singulair) 10 mg tablet; Take 1 tablet (10 mg) by mouth once daily at bedtime.  Allergic rhinitis due to grass pollen  -     montelukast (Singulair) 10 mg tablet; Take 1 tablet (10 mg) by mouth once daily at bedtime.       See patient instructions in wrap up plan, orders and comments for treatment plan.  Patient Instructions:   Stop the flonase due to history of fungal issues esophagus and sinus, after 4th of July .  Start azelastine nasal spray if noticing more congestion when you stop the flonase.  Follow up for Wellness visit/annual physical and review results. 3-4 months.  Azelastine can be taken twice daily regularly, or as needed.    Fasting bloodand urine test in 3 months, in the 2 weeks before the above visit.  Instructions for obtaining lab tests:   For fasting blood tests:  Please do not consume calories for 10-12 hours prior to this blood test. It is ok to drink water, you should be hydrated.  Please do not take vitamins, supplements or thyroid medication before your blood is drawn this day.   Most lab results should be available for your review on the  My Chart portal within 48 hours. Please contact the office if you have not seen or been given results of these tests within 2 weeks of having them done.    I am the Internal Medicine physician providing ongoing chronic medical care for this patient, which is managed during and in between office visits.    Scribe Attestation  By signing  my name below, I, Leo Anguiano   attest that this documentation has been prepared under the direction and in the presence of Katiuska Helm MD.    22 diagnoses addressed, 17 orders.       [1]   Allergies  Allergen Reactions    Nirmatrelvir-Ritonavir Nausea/vomiting     AKA.. Paxlovid      no